# Patient Record
Sex: FEMALE | Race: BLACK OR AFRICAN AMERICAN | NOT HISPANIC OR LATINO | Employment: FULL TIME | URBAN - METROPOLITAN AREA
[De-identification: names, ages, dates, MRNs, and addresses within clinical notes are randomized per-mention and may not be internally consistent; named-entity substitution may affect disease eponyms.]

---

## 2018-06-15 ENCOUNTER — OFFICE VISIT (OUTPATIENT)
Dept: FAMILY MEDICINE | Facility: CLINIC | Age: 28
End: 2018-06-15
Payer: COMMERCIAL

## 2018-06-15 ENCOUNTER — LAB VISIT (OUTPATIENT)
Dept: LAB | Facility: HOSPITAL | Age: 28
End: 2018-06-15
Attending: NURSE PRACTITIONER
Payer: COMMERCIAL

## 2018-06-15 VITALS
RESPIRATION RATE: 18 BRPM | WEIGHT: 281.94 LBS | HEIGHT: 65 IN | TEMPERATURE: 98 F | OXYGEN SATURATION: 99 % | HEART RATE: 85 BPM | SYSTOLIC BLOOD PRESSURE: 138 MMHG | DIASTOLIC BLOOD PRESSURE: 82 MMHG | BODY MASS INDEX: 46.98 KG/M2

## 2018-06-15 VITALS
BODY MASS INDEX: 46.58 KG/M2 | HEART RATE: 86 BPM | SYSTOLIC BLOOD PRESSURE: 116 MMHG | HEIGHT: 65 IN | DIASTOLIC BLOOD PRESSURE: 72 MMHG | WEIGHT: 279.56 LBS

## 2018-06-15 DIAGNOSIS — Z01.419 WELL WOMAN EXAM: ICD-10-CM

## 2018-06-15 DIAGNOSIS — Z78.9 ATTEMPTING TO CONCEIVE: ICD-10-CM

## 2018-06-15 DIAGNOSIS — Z23 NEED FOR VACCINE FOR DT (DIPHTHERIA-TETANUS): ICD-10-CM

## 2018-06-15 DIAGNOSIS — F32.1 CURRENT MODERATE EPISODE OF MAJOR DEPRESSIVE DISORDER WITHOUT PRIOR EPISODE: ICD-10-CM

## 2018-06-15 DIAGNOSIS — Z00.00 ENCOUNTER FOR MEDICAL EXAMINATION TO ESTABLISH CARE: Primary | ICD-10-CM

## 2018-06-15 DIAGNOSIS — E66.01 MORBID OBESITY: ICD-10-CM

## 2018-06-15 DIAGNOSIS — L83 ACANTHOSIS NIGRICANS: ICD-10-CM

## 2018-06-15 DIAGNOSIS — N97.9 INFERTILITY, FEMALE: ICD-10-CM

## 2018-06-15 DIAGNOSIS — R53.83 FATIGUE, UNSPECIFIED TYPE: ICD-10-CM

## 2018-06-15 DIAGNOSIS — Z00.00 ANNUAL PHYSICAL EXAM: ICD-10-CM

## 2018-06-15 DIAGNOSIS — Z01.419 ENCOUNTER FOR WELL WOMAN EXAM: Primary | ICD-10-CM

## 2018-06-15 DIAGNOSIS — L30.8 OTHER ECZEMA: ICD-10-CM

## 2018-06-15 DIAGNOSIS — Z13.1 SCREENING FOR DIABETES MELLITUS: ICD-10-CM

## 2018-06-15 DIAGNOSIS — Z13.220 SCREENING FOR LIPID DISORDERS: ICD-10-CM

## 2018-06-15 DIAGNOSIS — N89.8 VAGINAL DISCHARGE: ICD-10-CM

## 2018-06-15 DIAGNOSIS — N89.8 VAGINAL ODOR: ICD-10-CM

## 2018-06-15 DIAGNOSIS — N92.6 IRREGULAR PERIODS/MENSTRUAL CYCLES: ICD-10-CM

## 2018-06-15 PROBLEM — L30.9 ECZEMA: Status: ACTIVE | Noted: 2018-06-15

## 2018-06-15 LAB
ALBUMIN SERPL BCP-MCNC: 3.5 G/DL
ALP SERPL-CCNC: 62 U/L
ALT SERPL W/O P-5'-P-CCNC: 13 U/L
ANION GAP SERPL CALC-SCNC: 7 MMOL/L
AST SERPL-CCNC: 15 U/L
BASOPHILS # BLD AUTO: 0.06 K/UL
BASOPHILS NFR BLD: 0.8 %
BILIRUB SERPL-MCNC: 0.9 MG/DL
BUN SERPL-MCNC: 14 MG/DL
CALCIUM SERPL-MCNC: 9.4 MG/DL
CHLORIDE SERPL-SCNC: 106 MMOL/L
CHOLEST SERPL-MCNC: 159 MG/DL
CHOLEST/HDLC SERPL: 4.2 {RATIO}
CO2 SERPL-SCNC: 24 MMOL/L
CREAT SERPL-MCNC: 0.8 MG/DL
DIFFERENTIAL METHOD: ABNORMAL
EOSINOPHIL # BLD AUTO: 0.1 K/UL
EOSINOPHIL NFR BLD: 1.1 %
ERYTHROCYTE [DISTWIDTH] IN BLOOD BY AUTOMATED COUNT: 12.6 %
EST. GFR  (AFRICAN AMERICAN): >60 ML/MIN/1.73 M^2
EST. GFR  (NON AFRICAN AMERICAN): >60 ML/MIN/1.73 M^2
ESTIMATED AVG GLUCOSE: 105 MG/DL
GLUCOSE SERPL-MCNC: 69 MG/DL
HBA1C MFR BLD HPLC: 5.3 %
HCT VFR BLD AUTO: 43.7 %
HDLC SERPL-MCNC: 38 MG/DL
HDLC SERPL: 23.9 %
HGB BLD-MCNC: 14 G/DL
IMM GRANULOCYTES # BLD AUTO: 0.02 K/UL
IMM GRANULOCYTES NFR BLD AUTO: 0.3 %
LDLC SERPL CALC-MCNC: 105.2 MG/DL
LYMPHOCYTES # BLD AUTO: 2.2 K/UL
LYMPHOCYTES NFR BLD: 29.7 %
MCH RBC QN AUTO: 28.3 PG
MCHC RBC AUTO-ENTMCNC: 32 G/DL
MCV RBC AUTO: 89 FL
MONOCYTES # BLD AUTO: 0.4 K/UL
MONOCYTES NFR BLD: 5.5 %
NEUTROPHILS # BLD AUTO: 4.7 K/UL
NEUTROPHILS NFR BLD: 62.6 %
NONHDLC SERPL-MCNC: 121 MG/DL
NRBC BLD-RTO: 0 /100 WBC
PLATELET # BLD AUTO: 367 K/UL
PMV BLD AUTO: 10.3 FL
POTASSIUM SERPL-SCNC: 4.2 MMOL/L
PROT SERPL-MCNC: 7.2 G/DL
RBC # BLD AUTO: 4.94 M/UL
SODIUM SERPL-SCNC: 137 MMOL/L
TRIGL SERPL-MCNC: 79 MG/DL
TSH SERPL DL<=0.005 MIU/L-ACNC: 1.43 UIU/ML
WBC # BLD AUTO: 7.48 K/UL

## 2018-06-15 PROCEDURE — 85025 COMPLETE CBC W/AUTO DIFF WBC: CPT

## 2018-06-15 PROCEDURE — 80061 LIPID PANEL: CPT

## 2018-06-15 PROCEDURE — 99999 PR PBB SHADOW E&M-EST. PATIENT-LVL III: CPT | Mod: PBBFAC,,, | Performed by: NURSE PRACTITIONER

## 2018-06-15 PROCEDURE — 90714 TD VACC NO PRESV 7 YRS+ IM: CPT | Mod: S$GLB,,, | Performed by: NURSE PRACTITIONER

## 2018-06-15 PROCEDURE — 90471 IMMUNIZATION ADMIN: CPT | Mod: S$GLB,,, | Performed by: NURSE PRACTITIONER

## 2018-06-15 PROCEDURE — 99999 PR PBB SHADOW E&M-NEW PATIENT-LVL V: CPT | Mod: PBBFAC,,, | Performed by: NURSE PRACTITIONER

## 2018-06-15 PROCEDURE — 99385 PREV VISIT NEW AGE 18-39: CPT | Mod: S$GLB,,, | Performed by: NURSE PRACTITIONER

## 2018-06-15 PROCEDURE — 84443 ASSAY THYROID STIM HORMONE: CPT

## 2018-06-15 PROCEDURE — 99385 PREV VISIT NEW AGE 18-39: CPT | Mod: 25,S$GLB,, | Performed by: NURSE PRACTITIONER

## 2018-06-15 PROCEDURE — 87510 GARDNER VAG DNA DIR PROBE: CPT

## 2018-06-15 PROCEDURE — 36415 COLL VENOUS BLD VENIPUNCTURE: CPT | Mod: PO

## 2018-06-15 PROCEDURE — 88175 CYTOPATH C/V AUTO FLUID REDO: CPT

## 2018-06-15 PROCEDURE — 80053 COMPREHEN METABOLIC PANEL: CPT

## 2018-06-15 PROCEDURE — 83036 HEMOGLOBIN GLYCOSYLATED A1C: CPT

## 2018-06-15 PROCEDURE — 87480 CANDIDA DNA DIR PROBE: CPT

## 2018-06-15 RX ORDER — ESCITALOPRAM OXALATE 20 MG/1
20 TABLET ORAL DAILY
Qty: 30 TABLET | Refills: 1 | Status: SHIPPED | OUTPATIENT
Start: 2018-06-15 | End: 2018-08-24 | Stop reason: SDUPTHER

## 2018-06-15 RX ORDER — TRIAMCINOLONE ACETONIDE 5 MG/G
CREAM TOPICAL 2 TIMES DAILY
Qty: 15 G | Refills: 2 | Status: SHIPPED | OUTPATIENT
Start: 2018-06-15 | End: 2018-09-13 | Stop reason: ALTCHOICE

## 2018-06-15 NOTE — PROGRESS NOTES
Chief Complaint   Patient presents with    Well Woman       History of Present Illness: Jamilah French is a 28 y.o. female that presents today 6/19/2018 for well gyn visit.  Pt presents today for well woman exam. She reports that this is her first well exam. She reports that her cycles are irregular and can occur monthly, but then she will skip 3 months at a time. The longest she has gone without a cycle in 6 months. Her LMP was 5/27/18. She does not wish to be on birth control at this time because her and her  are trying to conceive. She states that they have been trying to conceive for almost 2 years now. Pt reports that she uses an tanika to track her cycles and ovulation. She has never tried ovulation predictor kits. Pt does not desire STD testing. She does report noticing a vaginal odor off and on at times. She denies any vaginal discharge, itching or burning. Pt does report using feminine washes. No other complaints or concerns noted.         Past Medical History:   Diagnosis Date    Current moderate episode of major depressive disorder without prior episode 6/15/2018       History reviewed. No pertinent surgical history.    Family History   Problem Relation Age of Onset    Hypertension Mother     Mental illness Brother        Social History     Social History    Marital status:      Spouse name: Neil    Number of children: 0    Years of education: N/A     Social History Main Topics    Smoking status: Never Smoker    Smokeless tobacco: Never Used    Alcohol use Yes      Comment: social    Drug use: No    Sexual activity: Yes     Partners: Male     Birth control/ protection: None     Other Topics Concern    None     Social History Narrative    None       OB History   No data available       Current Outpatient Prescriptions   Medication Sig Dispense Refill    escitalopram oxalate (LEXAPRO) 20 MG tablet Take 1 tablet (20 mg total) by mouth once daily. Take 1/2 tab for 6 days then 1  "tab daily 30 tablet 1    triamcinolone acetonide 0.5% (KENALOG) 0.5 % Crea Apply topically 2 (two) times daily. 15 g 2    tinidazole (TINDAMAX) 500 MG tablet Take 4 tablets (2 g total) by mouth daily with breakfast. 8 tablet 0     No current facility-administered medications for this visit.        Review of patient's allergies indicates:   Allergen Reactions    Sulfa (sulfonamide antibiotics)        Review of Symptoms:  GENERAL: Denies weight gain or weight loss. Feeling well overall.   SKIN: Denies rash or lesions.   HEAD: Denies head injury or headache.   ABDOMEN: No abdominal pain, constipation, diarrhea, nausea, vomiting or rectal bleeding.   URINARY: No frequency, dysuria, hematuria, or burning on urination.    /72   Pulse 86   Ht 5' 5" (1.651 m)   Wt 126.8 kg (279 lb 8.7 oz)   LMP 05/27/2018     Physical Exam:  APPEARANCE: Well nourished, well developed, in no acute distress.  SKIN: Normal skin turgor, no lesions.  RESPIRATORY: Normal respiratory effort with no retractions or use of accessory muscles  ABDOMEN: Soft. No tenderness or masses. No hepatosplenomegaly. No hernias.  BREASTS: Symmetrical, no skin changes or visible lesions. No palpable masses, nipple discharge or adenopathy bilaterally.  PELVIC: Normal external female genitalia without lesions. Normal hair distribution. Adequate perineal body, normal urethral meatus. Urethra with no masses.  Bladder nontender. Vagina moist and well rugated without lesions, + thin yellow discharge; odor noted. Cervix pink and without lesions. No significant cystocele or rectocele. Bimanual exam showed uterus normal size, shape, position, mobile and nontender. Adnexa without masses or tenderness. Urethra and bladder normal. PAP DONE    ASSESSMENT/PLAN:  Encounter for well woman exam  -     Liquid-based pap smear, screening    Vaginal odor  -     Vaginosis Screen by DNA Probe    Vaginal discharge  -     Vaginosis Screen by DNA Probe    Attempting to " conceive      -Discussed avoiding any scented products in the vaginal area such as bubble baths, bath bombs, scented soaps/body washes, douches, feminine washes, etc... Also wear cotton underwear and change out of wet/sweaty clothing as soon as possible. Pt verbalized understanding.    -Discussed with pt that I suggest using ovulation predictor kits for a few months to see exactly when she is ovulating, so that her and her  can appropriately time intercourse. Instructed pt that if she does not have success with this the she needs to f/u with OBGYN for further testing and potentially a fertility clinic. Also discussed weight loss, which may help to resume monthly cycles. Pt verbalized understanding.       Patient was counseled today on Pap guidelines, recommendation for pelvic exams, mammograms starting annually at age 40, Colonoscopy after the age of 50, Dexa Bone Scan and calcium and vitamin D supplementation in menopause and to see her PCP for other health maintenance.       Follow-up:  RTC if symptoms worsen or do not improve  RTC in 1 year for well woman exam or as needed

## 2018-06-15 NOTE — PROGRESS NOTES
Subjective:       Patient ID: Jamilah French is a 28 y.o. female.    Chief Complaint: Annual Exam    HPI   Chief Complaint  Chief Complaint   Patient presents with    Annual Exam       HPI  Jmailah French is a 28 y.o. female with medical diagnoses as listed in the medical history and problem list that presents as a new patient to establish care and for an annual exam. Patient has no chronic medical problems and has never had any surgeries.  Patient does c/o irregular menstrual periods, skipping multiple months.  Has been trying to get pregnant for 2 years without success. Patient has never had a Pap Smear.  BMI today is 46.92 with a weight of 281 pounds.        PAST MEDICAL HISTORY:  Past Medical History:   Diagnosis Date    Current moderate episode of major depressive disorder without prior episode 6/15/2018       PAST SURGICAL HISTORY:  History reviewed. No pertinent surgical history.    SOCIAL HISTORY:  Social History     Social History    Marital status:      Spouse name: Neil    Number of children: 0    Years of education: N/A     Occupational History    Not on file.     Social History Main Topics    Smoking status: Never Smoker    Smokeless tobacco: Never Used    Alcohol use Yes      Comment: social    Drug use: No    Sexual activity: Yes     Partners: Male     Birth control/ protection: None     Other Topics Concern    Not on file     Social History Narrative    No narrative on file       FAMILY HISTORY:  Family History   Problem Relation Age of Onset    Hypertension Mother     Mental illness Brother        ALLERGIES AND MEDICATIONS: updated and reviewed.  Review of patient's allergies indicates:   Allergen Reactions    Sulfa (sulfonamide antibiotics)      Current Outpatient Prescriptions   Medication Sig Dispense Refill    escitalopram oxalate (LEXAPRO) 20 MG tablet Take 1 tablet (20 mg total) by mouth once daily. Take 1/2 tab for 6 days then 1 tab daily 30 tablet 1     triamcinolone acetonide 0.5% (KENALOG) 0.5 % Crea Apply topically 2 (two) times daily. 15 g 2     No current facility-administered medications for this visit.    I have reviewed the patient's medical history in detail and updated the computerized patient record.  Review of Systems   Constitutional: Positive for fatigue. Negative for activity change, appetite change, chills and fever.        Exercises cardio walking or goes to gym treadmill or bicycle 2 days per week. Occasional fatigue.    HENT: Negative for congestion, ear pain, nosebleeds, postnasal drip, rhinorrhea, sinus pain, sinus pressure and sore throat.    Eyes: Negative for visual disturbance.        Wears glasses with good correction   Respiratory: Negative for cough and shortness of breath.    Cardiovascular: Negative for chest pain, palpitations and leg swelling.   Gastrointestinal: Positive for nausea. Negative for abdominal pain, blood in stool, constipation, diarrhea and vomiting.        Occasional nausea   Genitourinary: Positive for menstrual problem. Negative for decreased urine volume, difficulty urinating, dysuria and urgency.        Periods are irregular, skips months, periods are usually 5 days long, some are heavy   Musculoskeletal: Negative for arthralgias and myalgias.   Skin: Positive for rash. Negative for wound.        Dry flaking skin that is pruritic to left lateral ankle   Neurological: Positive for numbness. Negative for dizziness and headaches.        Lack of sensation to right forearm that is continuous x 4 years, preceded by a sharp pain in back    Hematological: Does not bruise/bleed easily.   Psychiatric/Behavioral: Positive for dysphoric mood and sleep disturbance. The patient is nervous/anxious.         States probably has a problem with some depression and anxiety, difficulty falling and staying asleep most nights.  Has never been on medication to treat depression or anxiety. Suicide crosses her mind but it is not something  "she would do.      Patient Health Questionnaire Depression Scale (PHQ-9):    Over the last 2 weeks, how often have you been bothered by any of the following problems?  (Not at all: 0; several days: 1, more than half the days: 2, nearly every day: 3)    1. Little interest or pleasure in doing things: 2  2. Feeling down, depressed, or hopeless: 2  3. Trouble falling or staying asleep, or sleeping too much: 3  4. Feeling tired or having little energy: 2  5. Poor appetite or overeatin  6. Feeling bad about yourself, or that you are a failure, or have let yourself or your family down: 3  7. Trouble concentrating on things, such as reading the newspaper or watching television: 0  8. Moving or speaking so slowly that other people could have noticed. Or the opposite- being so fidgety or restless that you have been moving       around a lot more than usual: 1  9. Thoughts that you would be better off dead, or of hurting yourself in some way: 1    Total Score: 16    (Score >15 - major depression; Score >20 - severe major depression)  Objective:       Vitals:    06/15/18 0719   BP: 138/82   BP Location: Right arm   Patient Position: Sitting   BP Method: Large (Manual)   Pulse: 85   Resp: 18   Temp: 97.7 °F (36.5 °C)   TempSrc: Oral   SpO2: 99%   Weight: 127.9 kg (281 lb 15.5 oz)   Height: 5' 5" (1.651 m)     Physical Exam   Constitutional: She is oriented to person, place, and time. Vital signs are normal. She appears well-developed. No distress.   HENT:   Head: Normocephalic and atraumatic.   Right Ear: Tympanic membrane, external ear and ear canal normal.   Left Ear: Tympanic membrane, external ear and ear canal normal.   Nose: Nose normal.   Mouth/Throat: Oropharynx is clear and moist and mucous membranes are normal. No oropharyngeal exudate.   Moderate amount of dark brown cerumen to bilateral external ear canals   Eyes: Conjunctivae and lids are normal. Pupils are equal, round, and reactive to light. Right eye " exhibits no discharge. Left eye exhibits no discharge. Right conjunctiva is not injected. Left conjunctiva is not injected.   Neck: Normal range of motion. Neck supple. Normal carotid pulses present. Carotid bruit is not present. No thyromegaly present.   Cardiovascular: Normal rate, regular rhythm, S1 normal, S2 normal, normal heart sounds, intact distal pulses and normal pulses.  Exam reveals no gallop and no friction rub.    No murmur heard.  Pulses:       Carotid pulses are 2+ on the right side, and 2+ on the left side.       Radial pulses are 2+ on the right side, and 2+ on the left side.        Posterior tibial pulses are 2+ on the right side, and 2+ on the left side.   No edema   Pulmonary/Chest: Effort normal and breath sounds normal. No respiratory distress. She has no decreased breath sounds. She has no wheezes. She has no rhonchi. She has no rales.   Abdominal: Soft. Normal appearance, normal aorta and bowel sounds are normal. She exhibits no distension, no abdominal bruit, no pulsatile midline mass and no mass. There is no hepatosplenomegaly. There is no tenderness. No hernia.   obese   Musculoskeletal: Normal range of motion. She exhibits no edema, tenderness or deformity.   Negative SLR bilateral   Lymphadenopathy:        Head (right side): No submental, no submandibular, no tonsillar, no preauricular, no posterior auricular and no occipital adenopathy present.        Head (left side): No submental, no submandibular, no tonsillar, no preauricular, no posterior auricular and no occipital adenopathy present.     She has no cervical adenopathy.   Neurological: She is alert and oriented to person, place, and time. She has normal strength.   Skin: Skin is warm, dry and intact. Capillary refill takes less than 2 seconds. Rash noted. She is not diaphoretic.        Psychiatric: She has a normal mood and affect. Her speech is normal and behavior is normal. Judgment and thought content normal. Her mood appears  not anxious. Cognition and memory are normal. She does not exhibit a depressed mood.   Nursing note and vitals reviewed.      Assessment:       1. Encounter for medical examination to establish care    2. Annual physical exam    3. Irregular periods/menstrual cycles    4. Infertility, female    5. Current moderate episode of major depressive disorder without prior episode    6. Screening for lipid disorders    7. Fatigue, unspecified type    8. Acanthosis nigricans    9. Other eczema    10. Screening for diabetes mellitus    11. Need for vaccine for DT (diphtheria-tetanus)    12. Well woman exam    13. Morbid obesity    14. BMI 45.0-49.9, adult        Plan:   Jamilah was seen today for annual exam.    Diagnoses and all orders for this visit:    Encounter for medical examination to establish care   Patient has not had a PCP for several years due to no insurance  Annual physical exam  -     CBC auto differential; Future  -     Comprehensive metabolic panel; Future  -     Lipid panel; Future  -     Hemoglobin A1c; Future  -     TSH; Future  - Discussed healthy diet, regular exercise, necessary labs, age appropriate cancer screening, and routine vaccinations.  LINKs checked and patient has had her 3 doses of HPV vaccinations.   - Educational handouts provided. Prevention guidelines women age 18-39    Irregular periods/menstrual cycles  -     Ambulatory referral to Gynecology    Infertility, female  -     Ambulatory referral to Gynecology  - Patient has been actively trying to get pregnant for 2 years without success    Current moderate episode of major depressive disorder without prior episode  -     escitalopram oxalate (LEXAPRO) 20 MG tablet; Take 1 tablet (20 mg total) by mouth once daily. Take 1/2 tab for 6 days then 1 tab daily  - PHQ-9 score is 16 today, moderate depression  -     TSH; Future  - Return in 4 weeks for recheck    Screening for lipid disorders  -     Lipid panel; Future    Fatigue, unspecified type  -      CBC auto differential; Future  -     TSH; Future    Acanthosis nigricans  -     Hemoglobin A1c; Future  - Possible PCOS    Other eczema  -     triamcinolone acetonide 0.5% (KENALOG) 0.5 % Crea; Apply topically 2 (two) times daily.    Screening for diabetes mellitus  -     Comprehensive metabolic panel; Future  -     Hemoglobin A1c; Future    Need for vaccine for DT (diphtheria-tetanus)  -     (In Office Administered) Td Vaccine - Preservative Free    Well woman exam  -     Ambulatory referral to Gynecology    Morbid obesity  -     Comprehensive metabolic panel; Future  -     Lipid panel; Future  -     Hemoglobin A1c; Future    BMI 45.0-49.9, adult   BMI today is 46.92 today with a weight of 281 pounds   Weight loss recommended with well balanced diet and portion controlled meals and increased activity.     Follow-up in about 4 weeks (around 7/13/2018) for follow up depression.     Patient readiness: acceptance and barriers:readiness    During the course of the visit the patient was educated and counseled about the following:     Obesity:   General weight loss/lifestyle modification strategies discussed (elicit support from others; identify saboteurs; non-food rewards, etc).  Informal exercise measures discussed, e.g. taking stairs instead of elevator.  Regular aerobic exercise program discussed.    Goals: Obesity: Reduce calorie intake and BMI    Did patient meet goals/outcomes: No    The following self management tools provided: declined    Patient Instructions (the written plan) was given to the patient/family.     Time spent with patient: 45 minutes    Barriers to medications present (no )    Adverse reactions to current medications (no)    Over the counter medications reviewed (Yes)

## 2018-06-15 NOTE — PATIENT INSTRUCTIONS
Prevention Guidelines, Women Ages 18 to 39  Screening tests and vaccines are an important part of managing your health. Health counseling is essential, too. Below are guidelines for these, for women ages 18 to 39. Talk with your healthcare provider to make sure youre up-to-date on what you need.  Screening Who needs it How often   Alcohol misuse All women in this age group At routine exams   Blood pressure All women in this age group Every 2 years if your blood pressure is less than 120/80 mm Hg; yearly if your systolic blood pressure is 120 to 139 mm Hg, or your diastolic blood pressure reading is 80 to 89 mm Hg   Breast cancer All women in this age group should talk with their healthcare providers about the need for clinical breast exams (CBE)1 Clinical breast exam every 3 years1   Cervical cancer Women ages 21 and older Women between ages 21 and 29 should have a Pap test every 3 years; women between ages 30 and 65 are advised to have a Pap test plus an HPV test every 5 years   Chlamydia Sexually active women ages 24 and younger, and women at increased risk for infection Every 3 years if you're at risk or have symptoms   Depression All women in this age group At routine exams   Diabetes mellitus, type 2 Adults with no symptoms who are overweight or obese and have 1 or more other risk factors for diabetes At least every 3 years   Gonorrhea Sexually active women at increased risk for infection At routine exams   Hepatitis C Anyone at increased risk At routine exams   HIV All women At routine exams   Obesity All women in this age group At routine exams   Syphilis Women at increased risk for infection - talk with your healthcare provider At routine exams   Tuberculosis Women at increased risk for infection - talk with your healthcare provider Ask your healthcare provider   Vision All women in this age group At least 1 complete exam in your 20s, and 2 in your 30s   Vaccine2 Who needs it How often   Chickenpox  (varicella) All women in this age group who have no record of this infection or vaccine 2 doses; the second dose should be given 4 to 8 weeks after the first dose   Hepatitis A Women at increased risk for infection - talk with your healthcare provider 2 doses given at least 6 months apart   Hepatitis B Women at increased risk for infection - talk with your healthcare provider 3 doses over 6 months; second dose should be given 1 month after the first dose; the third dose should be given at least 2 months after the second dose and at least 4 months after the first dose   Haemophilus influenzae Type B (HIB) Women at increased risk for infection - talk with your healthcare provider 1 to 3 doses   Human papillomavirus (HPV) All women in this age group up to age 26 3 doses; the second dose should be given 1 to 2 months after the first dose and the third dose given 6 months after the first dose   Influenza (flu) All women in this age group Once a year   Measles, mumps, rubella (MMR) All women in this age group who have no record of these infections or vaccines 1 or 2 doses   Meningococcal Women at increased risk for infection - talk with your healthcare provider 1 or more doses   Pneumococcal conjugate vaccine (PCV13) and pneumococcal polysaccharide vaccine (PPSV23) Women at increased risk for infection - talk with your healthcare provider PCV13: 1 dose ages 19 to 65 (protects against 13 types of pneumococcal bacteria)  PPSV23: 1 to 2 doses through age 64, or 1 dose at 65 or older (protects against 23 types of pneumococcal bacteria)      Tetanus/diphtheria/pertussis (Td/Tdap) booster All women in this age group Td every 10 years, or a one-time dose of Tdap instead of a Td booster after age 18, then Td every 10 years   Counseling Who needs it How often   BRCA gene mutation testing for breast and ovarian cancer susceptibility Women with increased risk for having gene mutation When your risk is known   Breast cancer and  chemoprevention Women at high risk for breast cancer When your risk is known   Diet and exercise Women who are overweight or obese When diagnosed, and then at routine exams   Domestic violence Women at the age in which they are able to have children At routine exams   Sexually transmitted infection prevention Women who are sexually active At routine exams   Skin cancer Prevention of skin cancer in fair-skinned adults through age 24 At routine exams   Use of tobacco and the health effects it can cause All women in this age group Every visit   1According to the ACS, women ages 20 to 39 years should have a clinical breast exam (CBE) as part of their routine health exam every 3 years. Breast self-exams are an option for women starting in their 20s. But the  USPSTF does not recommend CBE.  2Those who are 18 years old and not up-to-date on their childhood vaccines should get all appropriate catch-up vaccines recommended by the CDC.  Date Last Reviewed: 8/27/2015  © 6054-9242 [employer]. [address] [place] [address], [place] [address]. All rights reserved. This information is not intended as a substitute for professional medical care. Always follow your healthcare professional's instructions.

## 2018-06-16 LAB
CANDIDA RRNA VAG QL PROBE: NEGATIVE
G VAGINALIS RRNA GENITAL QL PROBE: POSITIVE
T VAGINALIS RRNA GENITAL QL PROBE: NEGATIVE

## 2018-06-18 ENCOUNTER — PATIENT MESSAGE (OUTPATIENT)
Dept: FAMILY MEDICINE | Facility: CLINIC | Age: 28
End: 2018-06-18

## 2018-06-18 RX ORDER — TINIDAZOLE 500 MG/1
2 TABLET ORAL
Qty: 8 TABLET | Refills: 0 | Status: SHIPPED | OUTPATIENT
Start: 2018-06-18 | End: 2018-06-20

## 2018-07-13 ENCOUNTER — OFFICE VISIT (OUTPATIENT)
Dept: FAMILY MEDICINE | Facility: CLINIC | Age: 28
End: 2018-07-13
Payer: COMMERCIAL

## 2018-07-13 VITALS
BODY MASS INDEX: 47.02 KG/M2 | TEMPERATURE: 98 F | WEIGHT: 282.19 LBS | SYSTOLIC BLOOD PRESSURE: 118 MMHG | HEIGHT: 65 IN | HEART RATE: 68 BPM | DIASTOLIC BLOOD PRESSURE: 74 MMHG

## 2018-07-13 DIAGNOSIS — N97.9 INFERTILITY, FEMALE: ICD-10-CM

## 2018-07-13 DIAGNOSIS — R53.83 FATIGUE, UNSPECIFIED TYPE: ICD-10-CM

## 2018-07-13 DIAGNOSIS — F40.10 SOCIAL ANXIETY DISORDER: ICD-10-CM

## 2018-07-13 DIAGNOSIS — N92.6 IRREGULAR PERIODS/MENSTRUAL CYCLES: ICD-10-CM

## 2018-07-13 DIAGNOSIS — F32.0 CURRENT MILD EPISODE OF MAJOR DEPRESSIVE DISORDER WITHOUT PRIOR EPISODE: Primary | ICD-10-CM

## 2018-07-13 PROCEDURE — 99213 OFFICE O/P EST LOW 20 MIN: CPT | Mod: S$GLB,,, | Performed by: NURSE PRACTITIONER

## 2018-07-13 PROCEDURE — 99999 PR PBB SHADOW E&M-EST. PATIENT-LVL III: CPT | Mod: PBBFAC,,, | Performed by: NURSE PRACTITIONER

## 2018-07-13 PROCEDURE — 3008F BODY MASS INDEX DOCD: CPT | Mod: CPTII,S$GLB,, | Performed by: NURSE PRACTITIONER

## 2018-07-13 RX ORDER — BUSPIRONE HYDROCHLORIDE 5 MG/1
5 TABLET ORAL 2 TIMES DAILY
Qty: 60 TABLET | Refills: 2 | Status: SHIPPED | OUTPATIENT
Start: 2018-07-13 | End: 2018-09-13 | Stop reason: SDUPTHER

## 2018-07-13 NOTE — PROGRESS NOTES
Subjective:       Patient ID: Jamilah French is a 28 y.o. female.    Chief Complaint: Follow-up    HPI   Chief Complaint  Chief Complaint   Patient presents with    Follow-up       HPI  Jamilah French is a 28 y.o. female with medical diagnoses as listed in the medical history and problem list that presents for a 4 week follow up of depression since starting lexapro. Patient states that she is sleeping better, falling and staying asleep, still feeling tired during the day. BMI today is 46.96 and patient has gained 3 pounds since last visit.  Established patient with last clinic appointment 6/15/2018.        PAST MEDICAL HISTORY:  Past Medical History:   Diagnosis Date    Current moderate episode of major depressive disorder without prior episode 6/15/2018       PAST SURGICAL HISTORY:  History reviewed. No pertinent surgical history.    SOCIAL HISTORY:  Social History     Social History    Marital status:      Spouse name: Neil    Number of children: 0    Years of education: N/A     Occupational History    Not on file.     Social History Main Topics    Smoking status: Never Smoker    Smokeless tobacco: Never Used    Alcohol use Yes      Comment: social    Drug use: No    Sexual activity: Yes     Partners: Male     Birth control/ protection: None     Other Topics Concern    Not on file     Social History Narrative    No narrative on file       FAMILY HISTORY:  Family History   Problem Relation Age of Onset    Hypertension Mother     Mental illness Brother        ALLERGIES AND MEDICATIONS: updated and reviewed.  Review of patient's allergies indicates:   Allergen Reactions    Sulfa (sulfonamide antibiotics)      Current Outpatient Prescriptions   Medication Sig Dispense Refill    escitalopram oxalate (LEXAPRO) 20 MG tablet Take 1 tablet (20 mg total) by mouth once daily. Take 1/2 tab for 6 days then 1 tab daily 30 tablet 1    busPIRone (BUSPAR) 5 MG Tab Take 1 tablet (5 mg total) by mouth 2  (two) times daily. 60 tablet 2    triamcinolone acetonide 0.5% (KENALOG) 0.5 % Crea Apply topically 2 (two) times daily. 15 g 2     No current facility-administered medications for this visit.        Review of Systems   Constitutional: Positive for fatigue. Negative for activity change, appetite change, chills and fever.        Chronically fatigued.   Eyes: Negative for visual disturbance.        Vision is corrected with glasses   Respiratory: Negative for cough and shortness of breath.    Cardiovascular: Negative for chest pain and palpitations.   Gastrointestinal: Negative for abdominal pain, constipation, diarrhea, nausea and vomiting.   Genitourinary: Negative for difficulty urinating and dysuria.   Skin: Negative for rash and wound.   Psychiatric/Behavioral: Negative for dysphoric mood, sleep disturbance and suicidal ideas. The patient is nervous/anxious.         States feels more anxious than sad.      Patient Health Questionnaire Depression Scale (PHQ-9):    Over the last 2 weeks, how often have you been bothered by any of the following problems?  (Not at all: 0; several days: 1, more than half the days: 2, nearly every day: 3)    1. Little interest or pleasure in doing things: 2  2. Feeling down, depressed, or hopeless: 1  3. Trouble falling or staying asleep, or sleeping too much: 0  4. Feeling tired or having little energy: 1  5. Poor appetite or overeatin  6. Feeling bad about yourself, or that you are a failure, or have let yourself or your family down: 1  7. Trouble concentrating on things, such as reading the newspaper or watching television: 0  8. Moving or speaking so slowly that other people could have noticed. Or the opposite- being so fidgety or restless that you have been moving       around a lot more than usual: 2  9. Thoughts that you would be better off dead, or of hurting yourself in some way: 1    Total Score: 9    (Score >15 - major depression; Score >20 - severe major  "depression)    Patient states has more social anxiety, has trouble speaking up to people in authority or to people shr does not know.   Objective:       Vitals:    07/13/18 0715   BP: 118/74   BP Location: Right arm   Patient Position: Sitting   BP Method: Large (Automatic)   Pulse: 68   Temp: 97.9 °F (36.6 °C)   TempSrc: Oral   Weight: 128 kg (282 lb 3 oz)   Height: 5' 5" (1.651 m)     Physical Exam   Constitutional: She is oriented to person, place, and time. Vital signs are normal. She appears well-developed. No distress.   Blood pressure normal 118/74   HENT:   Head: Normocephalic and atraumatic.   Eyes: Conjunctivae and lids are normal. Right eye exhibits no discharge. Left eye exhibits no discharge. Right conjunctiva is not injected. Left conjunctiva is not injected.   Neck: Normal range of motion. Neck supple. Normal carotid pulses present. Carotid bruit is not present.   Neck circumference 14.5"   Cardiovascular: Normal rate, regular rhythm, S1 normal, S2 normal, normal heart sounds, intact distal pulses and normal pulses.    No murmur heard.  Pulses:       Carotid pulses are 2+ on the right side, and 2+ on the left side.       Radial pulses are 2+ on the right side, and 2+ on the left side.   Pulmonary/Chest: Effort normal and breath sounds normal. No respiratory distress. She has no decreased breath sounds. She has no wheezes. She has no rhonchi. She has no rales.   Musculoskeletal: Normal range of motion.   Neurological: She is alert and oriented to person, place, and time. She has normal strength.   Skin: Skin is warm, dry and intact. She is not diaphoretic. No pallor.   Psychiatric: She has a normal mood and affect. Her speech is normal and behavior is normal. Judgment and thought content normal. Cognition and memory are normal.   Nursing note and vitals reviewed.      Assessment:       1. Current mild episode of major depressive disorder without prior episode    2. Social anxiety disorder    3. " Fatigue, unspecified type    4. Irregular periods/menstrual cycles    5. Infertility, female    6. BMI 45.0-49.9, adult        Plan:   Jamilah was seen today for follow-up.    Diagnoses and all orders for this visit:    Current mild episode of major depressive disorder without prior episode   Continue lexapro 20 mg daily, depression improved with PHQ-9 score at 9 today down from 16   Follow up in 2 months  Social anxiety disorder  -     busPIRone (BUSPAR) 5 MG Tab; Take 1 tablet (5 mg total) by mouth 2 (two) times daily.  - Follow up in 2 months    Fatigue, unspecified type   Discussed possible sleep apnea, sleep study, patient does not wish to proceed with study at this time  Irregular periods/menstrual cycles   Has been to gynecology, normal Pap, treated for BV with flagyl  Infertility, female   Monitoring ovulation for 2-3 months and depending on results may have to be referred to OB  BMI 45.0-49.9, adult   Morbidly obese with BMI 46.96 and 3 pound weight gain since last visit   Weight loss recommended with well balanced diet and portion controlled meals and increased activity.     Follow-up in about 2 months (around 9/13/2018) for depression/anxiety follow up.     Patient readiness: nonacceptance and barriers:readiness    During the course of the visit the patient was educated and counseled about the following:     Obesity:   General weight loss/lifestyle modification strategies discussed (elicit support from others; identify saboteurs; non-food rewards, etc).  Informal exercise measures discussed, e.g. taking stairs instead of elevator.  Regular aerobic exercise program discussed.    Goals: Obesity: Reduce calorie intake and BMI    Did patient meet goals/outcomes: No    The following self management tools provided: declined    Patient Instructions (the written plan) was given to the patient/family.     Time spent with patient: 30 minutes    Barriers to medications present (no )    Adverse reactions to current  medications (no)    Over the counter medications reviewed (Yes)

## 2018-07-13 NOTE — PATIENT INSTRUCTIONS
Obstructive Sleep Apnea  Obstructive sleep apnea is a condition that causes your air passages to become narrowed or blocked during sleep. As a result, breathing stops for short periods. Your body wakes up enough for breathing to begin again, though you don't remember it. The cycle of stopped breathing and brief awakenings can repeat dozens of times a night. This prevents the body from getting to the deeper stages of sleep that are needed for good rest and may cause your body's oxygen level to fall.  Signs of sleep apnea include loud snoring, noisy breathing, and gasping sounds during sleep. Daytime symptoms include waking up tired after a full night's sleep, waking up with headaches, feeling very sleepy or falling asleep during the day, and having problems with memory or concentration.  Risk factors for sleep apnea include:  · Being overweight  · Being a man, or a woman in menopause  · Smoking  · Using alcohol or sedating medicines  · Having enlarged structures in the nose or throat  Home care  Lifestyle changes that can help treat snoring and sleep apnea include the following:  · If you are overweight, lose weight. Talk to your healthcare provider about a weight-loss plan for you.  · Avoid alcohol for 3 to 4 hours before bedtime. Avoid sedating medications. Ask your healthcare provider about the medicines you take.  · If you smoke, talk to your healthcare provider about ways to quit.  · Sleep on your side. This can help prevent gravity from pulling relaxed throat tissues into your breathing passages.  · If you have allergies or sinus problems that block your nose, ask your healthcare provider for help.  Follow-up care  Follow up with your healthcare provider, or as advised. A diagnosis of sleep apnea is made with a sleep study. Your healthcare provider can tell you more about this test.  When to seek medical advice  Sleep apnea can make you more likely to have certain health problems. These include high blood  pressure, heart attack, stroke, and sexual dysfunction. If you have sleep apnea, talk to your healthcare provider about the best treatments for you.  Date Last Reviewed: 4/1/2017  © 1078-0538 [employer]. [address] [place] [address], [place] [address]. All rights reserved. This information is not intended as a substitute for professional medical care. Always follow your healthcare professional's instructions.

## 2018-08-24 DIAGNOSIS — F32.1 CURRENT MODERATE EPISODE OF MAJOR DEPRESSIVE DISORDER WITHOUT PRIOR EPISODE: ICD-10-CM

## 2018-08-24 RX ORDER — ESCITALOPRAM OXALATE 20 MG/1
TABLET ORAL
Qty: 30 TABLET | Refills: 0 | Status: SHIPPED | OUTPATIENT
Start: 2018-08-24 | End: 2018-09-13 | Stop reason: SDUPTHER

## 2018-09-10 ENCOUNTER — TELEPHONE (OUTPATIENT)
Dept: FAMILY MEDICINE | Facility: CLINIC | Age: 28
End: 2018-09-10

## 2018-09-10 NOTE — TELEPHONE ENCOUNTER
Left voicemail for patient to return call to clinic to reschedule appointment. NP is out of office.

## 2018-09-13 ENCOUNTER — OFFICE VISIT (OUTPATIENT)
Dept: FAMILY MEDICINE | Facility: CLINIC | Age: 28
End: 2018-09-13
Payer: COMMERCIAL

## 2018-09-13 VITALS
BODY MASS INDEX: 47.65 KG/M2 | DIASTOLIC BLOOD PRESSURE: 86 MMHG | SYSTOLIC BLOOD PRESSURE: 138 MMHG | TEMPERATURE: 98 F | HEIGHT: 65 IN | HEART RATE: 74 BPM | WEIGHT: 286 LBS

## 2018-09-13 DIAGNOSIS — R06.81 WITNESSED EPISODE OF APNEA: ICD-10-CM

## 2018-09-13 DIAGNOSIS — F40.10 SOCIAL ANXIETY DISORDER: ICD-10-CM

## 2018-09-13 DIAGNOSIS — E66.01 MORBID OBESITY: ICD-10-CM

## 2018-09-13 DIAGNOSIS — Z23 NEED FOR IMMUNIZATION AGAINST INFLUENZA: ICD-10-CM

## 2018-09-13 DIAGNOSIS — R53.82 CHRONIC FATIGUE: ICD-10-CM

## 2018-09-13 DIAGNOSIS — N92.6 IRREGULAR PERIODS/MENSTRUAL CYCLES: ICD-10-CM

## 2018-09-13 DIAGNOSIS — R06.83 SNORING: ICD-10-CM

## 2018-09-13 DIAGNOSIS — N97.9 INFERTILITY, FEMALE: ICD-10-CM

## 2018-09-13 DIAGNOSIS — F32.1 CURRENT MODERATE EPISODE OF MAJOR DEPRESSIVE DISORDER WITHOUT PRIOR EPISODE: Primary | ICD-10-CM

## 2018-09-13 PROCEDURE — 99999 PR PBB SHADOW E&M-EST. PATIENT-LVL III: CPT | Mod: PBBFAC,,, | Performed by: NURSE PRACTITIONER

## 2018-09-13 PROCEDURE — 90686 IIV4 VACC NO PRSV 0.5 ML IM: CPT | Mod: S$GLB,,, | Performed by: NURSE PRACTITIONER

## 2018-09-13 PROCEDURE — 99214 OFFICE O/P EST MOD 30 MIN: CPT | Mod: 25,S$GLB,, | Performed by: NURSE PRACTITIONER

## 2018-09-13 PROCEDURE — 90471 IMMUNIZATION ADMIN: CPT | Mod: S$GLB,,, | Performed by: NURSE PRACTITIONER

## 2018-09-13 PROCEDURE — 3008F BODY MASS INDEX DOCD: CPT | Mod: CPTII,S$GLB,, | Performed by: NURSE PRACTITIONER

## 2018-09-13 RX ORDER — ESCITALOPRAM OXALATE 20 MG/1
20 TABLET ORAL DAILY
Qty: 30 TABLET | Refills: 3 | Status: SHIPPED | OUTPATIENT
Start: 2018-09-13 | End: 2020-09-01

## 2018-09-13 RX ORDER — BUSPIRONE HYDROCHLORIDE 10 MG/1
10 TABLET ORAL 3 TIMES DAILY
Qty: 90 TABLET | Refills: 3 | Status: SHIPPED | OUTPATIENT
Start: 2018-09-13 | End: 2020-09-01

## 2018-09-13 NOTE — PROGRESS NOTES
Subjective:       Patient ID: Jamilah French is a 28 y.o. female.    Chief Complaint: Depression and Anxiety    Chief Complaint  Chief Complaint   Patient presents with    Depression    Anxiety       HPI  Jamilah French is a 28 y.o. female with medical diagnoses as listed in the medical history and problem list that presents for follow up of anxiety and depression since starting buspar with her lexapro. Patient states that she has noted that she is 'braver' and is doing more things than she used to.  No side effects with buspar. BMI today is 47.59 and patient has gained 4 pounds since her last visit.   Established patient with last clinic appointment 7/13/2018.        PAST MEDICAL HISTORY:  Past Medical History:   Diagnosis Date    Current moderate episode of major depressive disorder without prior episode 6/15/2018       PAST SURGICAL HISTORY:  History reviewed. No pertinent surgical history.    SOCIAL HISTORY:  Social History     Socioeconomic History    Marital status:      Spouse name: Neil    Number of children: 0    Years of education: Not on file    Highest education level: Not on file   Social Needs    Financial resource strain: Not on file    Food insecurity - worry: Not on file    Food insecurity - inability: Not on file    Transportation needs - medical: Not on file    Transportation needs - non-medical: Not on file   Occupational History    Not on file   Tobacco Use    Smoking status: Never Smoker    Smokeless tobacco: Never Used   Substance and Sexual Activity    Alcohol use: Yes     Comment: social    Drug use: No    Sexual activity: Yes     Partners: Male     Birth control/protection: None   Other Topics Concern    Not on file   Social History Narrative    Not on file       FAMILY HISTORY:  Family History   Problem Relation Age of Onset    Hypertension Mother     Mental illness Brother        ALLERGIES AND MEDICATIONS: updated and reviewed.  Review of patient's  allergies indicates:   Allergen Reactions    Sulfa (sulfonamide antibiotics)      Current Outpatient Medications   Medication Sig Dispense Refill    busPIRone (BUSPAR) 10 MG tablet Take 1 tablet (10 mg total) by mouth 3 (three) times daily. 90 tablet 3    escitalopram oxalate (LEXAPRO) 20 MG tablet Take 1 tablet (20 mg total) by mouth once daily. 30 tablet 3     No current facility-administered medications for this visit.        I have reviewed the patient's medical history in detail and updated the computerized patient record.    Review of Systems   Constitutional: Positive for fatigue. Negative for activity change and unexpected weight change.        Some daytime fatigue.  and can fall asleep after driving for 20 minutes. May doze off at a red light.    HENT: Negative for hearing loss, rhinorrhea and trouble swallowing.    Eyes: Negative for discharge and visual disturbance.   Respiratory: Negative for cough, chest tightness, shortness of breath and wheezing.         Patient snores loudly at night and  states witnessed episodes of apnea.    Cardiovascular: Negative for chest pain and palpitations.   Gastrointestinal: Negative for blood in stool, constipation, diarrhea and vomiting.   Endocrine: Positive for polydipsia. Negative for polyuria.        Increased thirst but thinks it is just because she needs to drink more water   Genitourinary: Positive for menstrual problem. Negative for difficulty urinating, dysuria and hematuria.        Patient has irregular menstrual periods, trying to get pregnant for 2-3 years without success.  Has been to gynecology and is using ovulation kit for 2 months and will go back to see gynecology.  LMP was in August.    Musculoskeletal: Negative for arthralgias, joint swelling and neck pain.   Skin: Negative for wound.   Neurological: Negative for dizziness, weakness and headaches.   Hematological: Does not bruise/bleed easily.   Psychiatric/Behavioral:  Positive for dysphoric mood. Negative for confusion and sleep disturbance. The patient is not nervous/anxious.         Still feeling a little depressed, but improved. Sleeps good for the most part.  Wakes not feeling rested some days.    EPWORTH SLEEPINESS SCALE  0-WOULD NEVER FALL ASLEEP  , 1-SLIGHT CHANCE OF FALLING ASLEEP, 2-MODERATE CHANCE, 3-HIGH CHANCE    Sitting and reading 1  Watching TV 2  Sitting inactive in a public place 2  Being a passenger in a motor vehicle for an hour or more 3  Lying down in the afternoon 2  Sitting and talking to someone 0  Sitting quietly after lunch (no alcohol)* 2  Stopped for a few minutes in traffic while driving 1    [place] score 13 (10 or greater considered excess sleepiness)    Patient Health Questionnaire Depression Scale (PHQ-9):    Over the last 2 weeks, how often have you been bothered by any of the following problems?  (Not at all: 0; several days: 1, more than half the days: 2, nearly every day: 3)    1. Little interest or pleasure in doing things: 2  2. Feeling down, depressed, or hopeless: 1  3. Trouble falling or staying asleep, or sleeping too much: 3, either difficulty or sleeping all day on days off  4. Feeling tired or having little energy: 2  5. Poor appetite or overeatin  6. Feeling bad about yourself, or that you are a failure, or have let yourself or your family down: 3  7. Trouble concentrating on things, such as reading the newspaper or watching television: 0  8. Moving or speaking so slowly that other people could have noticed. Or the opposite- being so fidgety or restless that you have been moving       around a lot more than usual: 0  9. Thoughts that you would be better off dead, or of hurting yourself in some way: 1    Total Score: 13    (Score >15 - major depression; Score >20 - severe major depression)      Objective:      Vitals:    18 0716 18 0717   BP: (!) 150/86 138/86   BP Location: Right arm Left arm   Patient Position:  "Sitting Sitting   BP Method: X-Large (Automatic)    Pulse: 74    Temp: 98.1 °F (36.7 °C)    TempSrc: Oral    Weight: 129.7 kg (286 lb)    Height: 5' 5" (1.651 m)      Physical Exam   Constitutional: She is oriented to person, place, and time. Vital signs are normal. She appears well-developed. No distress.   Blood pressure rechecked 130/70    HENT:   Head: Normocephalic and atraumatic.   Right Ear: External ear normal.   Left Ear: External ear normal.   Eyes: Conjunctivae and lids are normal. Pupils are equal, round, and reactive to light. Right eye exhibits no discharge. Left eye exhibits no discharge. Right conjunctiva is not injected. Left conjunctiva is not injected.   Neck: Normal range of motion. Neck supple. Normal carotid pulses present. Carotid bruit is not present.   Cardiovascular: Normal rate, regular rhythm, S1 normal, S2 normal, normal heart sounds, intact distal pulses and normal pulses.   No murmur heard.  Pulses:       Carotid pulses are 2+ on the right side, and 2+ on the left side.       Radial pulses are 2+ on the right side, and 2+ on the left side.   No edema   Pulmonary/Chest: Effort normal. No respiratory distress. She has no decreased breath sounds. She has no wheezes. She has no rhonchi. She has no rales.   Musculoskeletal: Normal range of motion.   Neurological: She is alert and oriented to person, place, and time. She has normal strength.   Skin: Skin is warm, dry and intact. She is not diaphoretic. No pallor.   Psychiatric: She has a normal mood and affect. Her speech is normal and behavior is normal. Judgment and thought content normal. Cognition and memory are normal.   Nursing note and vitals reviewed.        Assessment:       1. Current moderate episode of major depressive disorder without prior episode    2. Social anxiety disorder    3. Chronic fatigue    4. Irregular periods/menstrual cycles    5. Infertility, female    6. Morbid obesity    7. Snoring    8. Witnessed episode of " apnea    9. Need for immunization against influenza          Plan:       Jamilah was seen today for depression and anxiety.    Diagnoses and all orders for this visit:    Current moderate episode of major depressive disorder without prior episode  -     escitalopram oxalate (LEXAPRO) 20 MG tablet; Take 1 tablet (20 mg total) by mouth once daily.  - PHQ-9 score is 13 today which is higher than previous visit, patient feels that much of her depression is related to infertility.  All of her friends are having babies, posted on social media, feels sad.    Social anxiety disorder  -     busPIRone (BUSPAR) 10 MG tablet; Take 1 tablet (10 mg total) by mouth 3 (three) times daily, increased dose.    Chronic fatigue  -     Polysomnogram (CPAP will be added if patient meets diagnostic criteria.); Future  - [place] Sleep Score is 13    Irregular periods/menstrual cycles   Patient assisted to schedule follow up with gynecology  Infertility, female   Patient assisted to schedule follow up with gynecology  Morbid obesity   BMI 47.59 with 4 pound weight gain since last visit   Weight loss recommended with well balanced diet and portion controlled meals and increased activity.   Snoring  -     Polysomnogram (CPAP will be added if patient meets diagnostic criteria.); Future    Witnessed episode of apnea  -     Polysomnogram (CPAP will be added if patient meets diagnostic criteria.); Future    Need for immunization against influenza  -     Influenza - Quadrivalent (3 years & older) (PF)      Follow-up in about 4 months (around 1/13/2019) for follow up.      Patient readiness: acceptance and barriers:readiness    During the course of the visit the patient was educated and counseled about the following:     Obesity:   General weight loss/lifestyle modification strategies discussed (elicit support from others; identify saboteurs; non-food rewards, etc).  Informal exercise measures discussed, e.g. taking stairs instead of  elevator.  Regular aerobic exercise program discussed.    Goals: Obesity: Reduce calorie intake and BMI    Did patient meet goals/outcomes: No    The following self management tools provided: declined    Patient Instructions (the written plan) was given to the patient/family.     Time spent with patient: 30 minutes    Barriers to medications present (no )    Adverse reactions to current medications (no)    Over the counter medications reviewed (Yes)

## 2018-09-18 ENCOUNTER — HOSPITAL ENCOUNTER (OUTPATIENT)
Dept: RADIOLOGY | Facility: HOSPITAL | Age: 28
Discharge: HOME | End: 2018-09-18
Attending: OBSTETRICS & GYNECOLOGY
Payer: COMMERCIAL

## 2018-09-18 ENCOUNTER — OFFICE VISIT (OUTPATIENT)
Dept: OBSTETRICS AND GYNECOLOGY | Facility: CLINIC | Age: 28
End: 2018-09-18
Payer: COMMERCIAL

## 2018-09-18 VITALS — WEIGHT: 285 LBS | BODY MASS INDEX: 47.43 KG/M2

## 2018-09-18 DIAGNOSIS — N92.6 IRREGULAR BLEEDING: ICD-10-CM

## 2018-09-18 DIAGNOSIS — N92.6 IRREGULAR BLEEDING: Primary | ICD-10-CM

## 2018-09-18 PROCEDURE — 76830 TRANSVAGINAL US NON-OB: CPT | Mod: 26,,, | Performed by: RADIOLOGY

## 2018-09-18 PROCEDURE — 76856 US EXAM PELVIC COMPLETE: CPT | Mod: 26,,, | Performed by: RADIOLOGY

## 2018-09-18 PROCEDURE — 99214 OFFICE O/P EST MOD 30 MIN: CPT | Mod: S$GLB,,, | Performed by: OBSTETRICS & GYNECOLOGY

## 2018-09-18 PROCEDURE — 76830 TRANSVAGINAL US NON-OB: CPT | Mod: TC

## 2018-09-18 PROCEDURE — 99999 PR PBB SHADOW E&M-EST. PATIENT-LVL III: CPT | Mod: PBBFAC,,, | Performed by: OBSTETRICS & GYNECOLOGY

## 2018-09-18 PROCEDURE — 3008F BODY MASS INDEX DOCD: CPT | Mod: CPTII,S$GLB,, | Performed by: OBSTETRICS & GYNECOLOGY

## 2018-09-18 NOTE — PROGRESS NOTES
Subjective:       Patient ID: Jamilah French is a 28 y.o. female.    Chief Complaint:  Advice Only      History of Present Illness  HPI  28 y.o. G0 referred for evaluation of irregular menses. Patient also desires pregnancy. Patient reports menarche at age 15 with regular menses till 5 years ago. Since then menses have been anywhere from monthly to every 2-3 months. Patient reports no change in weight this past 5-6 years.    GYN & OB History  Patient's last menstrual period was 09/15/2018 (approximate). Prior menses 08/11/18 and 05/27/18.  Date of Last Pap: 6/20/2018-normal    OB History   No data available       Review of Systems  Review of Systems   Constitutional: Negative for activity change, appetite change, chills, diaphoresis, fatigue, fever and unexpected weight change.   HENT: Negative for mouth sores and tinnitus.    Eyes: Negative for discharge and visual disturbance.   Respiratory: Negative for cough, shortness of breath and wheezing.    Cardiovascular: Negative for chest pain, palpitations and leg swelling.   Gastrointestinal: Negative for abdominal pain, bloating, blood in stool, constipation, diarrhea, nausea and vomiting.   Endocrine: Negative for diabetes, hair loss, hot flashes, hyperthyroidism and hypothyroidism.   Genitourinary: Positive for menstrual problem. Negative for decreased libido, dyspareunia, dysuria, flank pain, frequency, genital sores, hematuria, menorrhagia, pelvic pain, urgency, vaginal bleeding, vaginal discharge, vaginal pain, dysmenorrhea, urinary incontinence, postcoital bleeding, postmenopausal bleeding and vaginal odor.   Musculoskeletal: Negative for back pain, joint swelling and myalgias.   Skin:  Negative for rash, no acne and hair changes.   Neurological: Negative for seizures, syncope, numbness and headaches.   Hematological: Negative for adenopathy. Does not bruise/bleed easily.   Psychiatric/Behavioral: Positive for depression. Negative for sleep disturbance. The  patient is not nervous/anxious.    Breast: Negative for breast mass, breast pain, nipple discharge and skin changes          Objective:    Physical Exam:   Constitutional: She is oriented to person, place, and time. She appears well-developed and well-nourished. No distress.    HENT:   Head: Normocephalic.    Eyes: Pupils are equal, round, and reactive to light.    Neck: Normal range of motion.    Cardiovascular: Normal rate.     Pulmonary/Chest: Effort normal.          Genitourinary:   Genitourinary Comments: Deferred today           Musculoskeletal: Normal range of motion and moves all extremeties.       Neurological: She is alert and oriented to person, place, and time.    Skin: Skin is warm and dry.    Psychiatric: She has a normal mood and affect. Her behavior is normal. Judgment and thought content normal.          Assessment:        1. Irregular bleeding       2. Morbid Obesity  3. Possible PCOS         Plan:      Pelvic ultrasound this week  Serum progesterone,HCG,testosterone,and DHEA-SO4 in 3 weeks  Follow up at clinic in 4 weeks

## 2018-09-18 NOTE — LETTER
September 18, 2018      No Recipients           [facility]  [address] [place] [address] Drive [address]  [place] [address]  Phone: [phone]          Patient: Jamilah French   MR Number: [id number]   YOB: 1990   Date of Visit: 9/18/2018       Dear :    Thank you for referring Jamilah French to me for evaluation. Attached you will find relevant portions of my assessment and plan of care.    If you have questions, please do not hesitate to call me. I look forward to following Jamilah French along with you.    Sincerely,    Héctor Holden MD    Enclosure  CC:  No Recipients    If you would like to receive this communication electronically, please contact [email or web address] or [phone] to request more information on [employer] access.    For providers and/or their staff who would like to refer a patient to [facility], please contact us through our one-stop-shop provider referral line, [facility], at [phone].    If you feel you have received this communication in error or would no longer like to receive these types of communications, please e-mail [email or web address]

## 2018-10-09 ENCOUNTER — LAB VISIT (OUTPATIENT)
Dept: LAB | Facility: HOSPITAL | Age: 28
End: 2018-10-09
Attending: OBSTETRICS & GYNECOLOGY
Payer: COMMERCIAL

## 2018-10-09 DIAGNOSIS — N92.6 IRREGULAR BLEEDING: ICD-10-CM

## 2018-10-09 LAB
DHEA-S SERPL-MCNC: 238.4 UG/DL
HCG INTACT+B SERPL-ACNC: <1.2 MIU/ML
PROGEST SERPL-MCNC: 0.2 NG/ML

## 2018-10-09 PROCEDURE — 84402 ASSAY OF FREE TESTOSTERONE: CPT

## 2018-10-09 PROCEDURE — 36415 COLL VENOUS BLD VENIPUNCTURE: CPT | Mod: PO

## 2018-10-09 PROCEDURE — 82627 DEHYDROEPIANDROSTERONE: CPT

## 2018-10-09 PROCEDURE — 84702 CHORIONIC GONADOTROPIN TEST: CPT

## 2018-10-09 PROCEDURE — 84144 ASSAY OF PROGESTERONE: CPT

## 2018-10-11 ENCOUNTER — TELEPHONE (OUTPATIENT)
Dept: OBSTETRICS AND GYNECOLOGY | Facility: CLINIC | Age: 28
End: 2018-10-11

## 2018-10-11 NOTE — TELEPHONE ENCOUNTER
Called pt regarding her upcoming appointment on 10/16/18 @ 9am. Upon provider request, appointment needs to be reschedule due to appointment time issue. Called pt, left message explaining that we do have availability on 10/18/18, after from 9-3. Left clinic call back number for pt to call to reschedule at her earliest convenience.

## 2018-10-12 LAB — TESTOST FREE SERPL-MCNC: 2.3 PG/ML

## 2018-10-18 ENCOUNTER — OFFICE VISIT (OUTPATIENT)
Dept: OBSTETRICS AND GYNECOLOGY | Facility: CLINIC | Age: 28
End: 2018-10-18
Payer: COMMERCIAL

## 2018-10-18 VITALS
WEIGHT: 284.38 LBS | RESPIRATION RATE: 18 BRPM | HEIGHT: 65 IN | SYSTOLIC BLOOD PRESSURE: 124 MMHG | DIASTOLIC BLOOD PRESSURE: 80 MMHG | BODY MASS INDEX: 47.38 KG/M2

## 2018-10-18 DIAGNOSIS — D25.1 INTRAMURAL UTERINE FIBROID: ICD-10-CM

## 2018-10-18 DIAGNOSIS — N92.6 IRREGULAR MENSES: Primary | ICD-10-CM

## 2018-10-18 PROCEDURE — 99213 OFFICE O/P EST LOW 20 MIN: CPT | Mod: S$GLB,,, | Performed by: OBSTETRICS & GYNECOLOGY

## 2018-10-18 PROCEDURE — 99999 PR PBB SHADOW E&M-EST. PATIENT-LVL III: CPT | Mod: PBBFAC,,, | Performed by: OBSTETRICS & GYNECOLOGY

## 2018-10-18 PROCEDURE — 3008F BODY MASS INDEX DOCD: CPT | Mod: CPTII,S$GLB,, | Performed by: OBSTETRICS & GYNECOLOGY

## 2018-10-18 NOTE — PROGRESS NOTES
Subjective:       Patient ID: Jamilah French is a 28 y.o. female.    Chief Complaint:  Follow-up (painful cycles- uterine fibroids & simply appearing ovarian cyst)      History of Present Illness  HPI  28 y.o. for consultation after workup for irregular menses. Patient had ultrasound on day 4 of this past cycle with normal appearance of both ovaries and a 1.6 cm apparent fibroid in anterior uterus.The labs were all normal and the serum progesterone level on 10/09/18 was consistent with proliferative phase of this cycle.Patient desires pregnancy.    GYN & OB History  Patient's last menstrual period was 09/15/2018.   Date of Last Pap: 6/20/2018    OB History   No data available       Review of Systems  Review of Systems   Constitutional: Negative for activity change, appetite change, chills, diaphoresis, fatigue, fever and unexpected weight change.   HENT: Negative for mouth sores and tinnitus.    Eyes: Negative for discharge and visual disturbance.   Respiratory: Negative for cough, shortness of breath and wheezing.    Cardiovascular: Negative for chest pain, palpitations and leg swelling.   Gastrointestinal: Negative for abdominal pain, bloating, blood in stool, constipation, diarrhea, nausea and vomiting.   Endocrine: Negative for diabetes, hair loss, hot flashes, hyperthyroidism and hypothyroidism.   Genitourinary: Positive for menstrual problem. Negative for decreased libido, dyspareunia, dysuria, flank pain, frequency, genital sores, hematuria, menorrhagia, pelvic pain, urgency, vaginal bleeding, vaginal discharge, vaginal pain, dysmenorrhea, urinary incontinence, postcoital bleeding, postmenopausal bleeding and vaginal odor.   Musculoskeletal: Negative for back pain, joint swelling and myalgias.   Skin:  Negative for rash, no acne and hair changes.   Neurological: Negative for seizures, syncope, numbness and headaches.   Hematological: Negative for adenopathy. Does not bruise/bleed easily.    Psychiatric/Behavioral: Positive for depression. Negative for sleep disturbance. The patient is not nervous/anxious.    Breast: Negative for breast mass, breast pain, nipple discharge and skin changes          Objective:    Physical Exam:   Constitutional: She is oriented to person, place, and time. She appears well-developed and well-nourished. No distress.    HENT:   Head: Normocephalic.    Eyes: Pupils are equal, round, and reactive to light.    Neck: Normal range of motion.    Cardiovascular: Normal rate.     Pulmonary/Chest: Effort normal.        Abdominal:   obese     Genitourinary:   Genitourinary Comments: deferred           Musculoskeletal: Normal range of motion and moves all extremeties.       Neurological: She is alert and oriented to person, place, and time.    Skin: Skin is warm and dry.    Psychiatric: She has a normal mood and affect. Her behavior is normal. Judgment and thought content normal.          Assessment:        1. Irregular menses    2. Intramural uterine fibroid                Plan:      Repeat serum Progesterone level next week. If still proliferative phase level then will prescribe Provera 10 mg x 10 days to induce menses.Patient may require meds for ovulation induction in future.

## 2018-10-24 ENCOUNTER — LAB VISIT (OUTPATIENT)
Dept: LAB | Facility: HOSPITAL | Age: 28
End: 2018-10-24
Attending: OBSTETRICS & GYNECOLOGY
Payer: COMMERCIAL

## 2018-10-24 DIAGNOSIS — N92.6 IRREGULAR MENSES: ICD-10-CM

## 2018-10-24 LAB — PROGEST SERPL-MCNC: 0.2 NG/ML

## 2018-10-24 PROCEDURE — 36415 COLL VENOUS BLD VENIPUNCTURE: CPT | Mod: PO

## 2018-10-24 PROCEDURE — 84144 ASSAY OF PROGESTERONE: CPT

## 2018-10-25 RX ORDER — MEDROXYPROGESTERONE ACETATE 10 MG/1
10 TABLET ORAL DAILY
Qty: 10 TABLET | Refills: 3 | Status: SHIPPED | OUTPATIENT
Start: 2018-10-25 | End: 2020-09-01

## 2019-05-26 ENCOUNTER — HOSPITAL ENCOUNTER (EMERGENCY)
Facility: HOSPITAL | Age: 29
Discharge: HOME | End: 2019-05-26
Attending: EMERGENCY MEDICINE
Payer: COMMERCIAL

## 2019-05-26 VITALS
TEMPERATURE: 98 F | BODY MASS INDEX: 48.82 KG/M2 | SYSTOLIC BLOOD PRESSURE: 139 MMHG | RESPIRATION RATE: 18 BRPM | HEART RATE: 88 BPM | HEIGHT: 65 IN | WEIGHT: 293 LBS | DIASTOLIC BLOOD PRESSURE: 83 MMHG | OXYGEN SATURATION: 98 %

## 2019-05-26 DIAGNOSIS — J02.9 VIRAL PHARYNGITIS: Primary | ICD-10-CM

## 2019-05-26 LAB — DEPRECATED S PYO AG THROAT QL EIA: NEGATIVE

## 2019-05-26 PROCEDURE — 25000003 PHARM REV CODE 250: Performed by: NURSE PRACTITIONER

## 2019-05-26 PROCEDURE — 87880 STREP A ASSAY W/OPTIC: CPT

## 2019-05-26 PROCEDURE — 99282 EMERGENCY DEPT VISIT SF MDM: CPT

## 2019-05-26 PROCEDURE — 87081 CULTURE SCREEN ONLY: CPT

## 2019-05-26 RX ORDER — CETIRIZINE HYDROCHLORIDE 10 MG/1
10 TABLET ORAL
Status: COMPLETED | OUTPATIENT
Start: 2019-05-26 | End: 2019-05-26

## 2019-05-26 RX ADMIN — CETIRIZINE HYDROCHLORIDE 10 MG: 10 TABLET, FILM COATED ORAL at 01:05

## 2019-05-26 NOTE — ED NOTES
"Pt complained of a sore throat. Started a "few days ago" progressively gotten worse.  Mild redness to the left inner ear.  "

## 2019-05-26 NOTE — ED PROVIDER NOTES
Encounter Date: 5/26/2019    SCRIBE #1 NOTE: I, Fay Martinez, am scribing for, and in the presence of, Leonor Rutherford NP.       History     Chief Complaint   Patient presents with    Sore Throat     since friday night       Time seen by provider: 1:47 PM on 05/26/2019    Jamilah French is a 29 y.o. female with depression who presents to the ED with an onset of itchy throat. She states her symptoms began when she drank her [place] tea at dinner 2 days ago and reports a constant itching sensation. The patient denies smoking tobacco, having recent problems with seasonal allergies, fevers, chills, congestion, or any other symptoms at this time. No PSHx noted. Sulfa drug allergy noted.    The history is provided by the patient.     Review of patient's allergies indicates:   Allergen Reactions    Sulfa (sulfonamide antibiotics)      Past Medical History:   Diagnosis Date    Current moderate episode of major depressive disorder without prior episode 6/15/2018     No past surgical history on file.  Family History   Problem Relation Age of Onset    Hypertension Mother     Mental illness Brother      Social History     Tobacco Use    Smoking status: Never Smoker    Smokeless tobacco: Never Used   Substance Use Topics    Alcohol use: Yes     Comment: social    Drug use: No     Review of Systems   Constitutional: Negative for activity change, appetite change, chills and fever.   HENT: Positive for sore throat (itchy). Negative for congestion, ear pain, rhinorrhea, sinus pressure, sinus pain, sneezing, trouble swallowing and voice change.    Eyes: Negative for pain, discharge and redness.   Respiratory: Negative for cough, shortness of breath, wheezing and stridor.    Cardiovascular: Negative for chest pain, palpitations and leg swelling.   Gastrointestinal: Negative for abdominal distention, abdominal pain, blood in stool, constipation, diarrhea, nausea and vomiting.   Genitourinary: Negative for difficulty urinating,  dysuria, flank pain, frequency, hematuria and urgency.   Musculoskeletal: Negative for arthralgias, gait problem, joint swelling and myalgias.   Skin: Negative for rash and wound.   Neurological: Negative for dizziness, syncope, facial asymmetry, speech difficulty, weakness, light-headedness, numbness and headaches.   Hematological: Negative for adenopathy.   Psychiatric/Behavioral: Negative.      Physical Exam     Initial Vitals [05/26/19 1243]   BP Pulse Resp Temp SpO2   139/83 88 18 98.1 °F (36.7 °C) 98 %      MAP       --         Physical Exam    Nursing note and vitals reviewed.  Constitutional: She appears well-developed and well-nourished. She is not diaphoretic. She is active. No distress.   HENT:   Head: Normocephalic and atraumatic.   Right Ear: External ear normal.   Left Ear: External ear normal.   Nose: Nose normal.   Mouth/Throat: Oropharynx is clear and moist. No oropharyngeal exudate.   Eyes: Conjunctivae, EOM and lids are normal. Pupils are equal, round, and reactive to light. Right eye exhibits no chemosis and no discharge. Left eye exhibits no chemosis and no discharge. Right conjunctiva is not injected. Left conjunctiva is not injected.   Neck: Trachea normal and normal range of motion. Neck supple. No stridor present. No tracheal deviation present. No neck rigidity.   Cardiovascular: Normal rate, regular rhythm, normal heart sounds and normal pulses. Exam reveals no distant heart sounds and no friction rub.    No murmur heard.  Pulmonary/Chest: Breath sounds normal. No stridor. She has no wheezes. She has no rhonchi. She has no rales.   Musculoskeletal: Normal range of motion.   Neurological: She is alert and oriented to person, place, and time. She has normal strength. GCS score is 15. GCS eye subscore is 4. GCS verbal subscore is 5. GCS motor subscore is 6.   Skin: Skin is warm, dry and intact. Capillary refill takes less than 2 seconds. No rash noted.   Psychiatric: She has a normal mood and  affect. Her speech is normal and behavior is normal. Thought content normal.       ED Course   Procedures  Labs Reviewed   THROAT SCREEN, RAPID   CULTURE, STREP A,  THROAT        Imaging Results    None          Medical Decision Making:   History:   Old Medical Records: I decided to obtain old medical records.  Differential Diagnosis:   Allergic reaction  Strep throat  Viral pharyngitis  Seasonal allergies  Clinical Tests:   Lab Tests: Ordered and Reviewed       APC / Resident Notes:   The patient's symptoms are consistent with viral pharyngitis.  Rapid strep negative The patient doesn't appear to have any stridor, drooling, hoarseness, uvular deviation, facial swelling, meningismus to suggest peritonsillar abscess, retropharyngeal abscess, epiglotitis, meningitis, airway compromise.  Will treat with supportive care. I have discussed pt with Dr Ordonez who agrees with POC. Pt voices understanding and is agreeable to the plan.  She is given specific return precautions.            Scribe Attestation:   Scribe #1: I performed the above scribed service and the documentation accurately describes the services I performed. I attest to the accuracy of the note.    Attending Attestation:     Physician Attestation Statement for NP/PA:   I discussed this assessment and plan of this patient with the NP/PA, but I did not personally examine the patient. The face to face encounter was performed by the NP/PA.          I, PRAVEENA Henson, personally performed the services described in this documentation. All medical record entries made by the scribe were at my direction and in my presence.  I have reviewed the chart and agree that the record reflects my personal performance and is accurate and complete. PRAVEENA Henson.  4:10 PM 05/26/2019          ED Course as of May 26 1610   Sun May 26, 2019   1428 RAPID STREP A SCREEN: Negative [EF]   1428 BP: 139/83 [EF]   1428 BP: 139/83 [EF]   1428 Temp: 98.1 °F (36.7 °C) [EF]   1428  Temp src: Oral [EF]   1428 Pulse: 88 [EF]   1428 Resp: 18 [EF]   1428 SpO2: 98 % [EF]      ED Course User Index  [EF] Akhil Ordonez MD     Clinical Impression:       ICD-10-CM ICD-9-CM   1. Viral pharyngitis J02.9 462         Disposition:   Disposition: Discharged  Condition: Stable                        Leonor Rutherford NP  05/26/19 1610       Akhil Ordonez MD  05/26/19 3048

## 2019-05-29 LAB — BACTERIA THROAT CULT: NORMAL

## 2019-07-11 ENCOUNTER — TELEPHONE (OUTPATIENT)
Dept: OBSTETRICS AND GYNECOLOGY | Facility: CLINIC | Age: 29
End: 2019-07-11

## 2019-07-11 NOTE — TELEPHONE ENCOUNTER
Calling patient to reschedule her appointment on tomorrow per Dr. Almendarez. Left voicemail for patient to give us a call back so we can get her appointment rescheduled.    Radha

## 2019-07-12 ENCOUNTER — LAB VISIT (OUTPATIENT)
Dept: LAB | Facility: HOSPITAL | Age: 29
End: 2019-07-12
Attending: OBSTETRICS & GYNECOLOGY
Payer: COMMERCIAL

## 2019-07-12 ENCOUNTER — OFFICE VISIT (OUTPATIENT)
Dept: OBSTETRICS AND GYNECOLOGY | Facility: CLINIC | Age: 29
End: 2019-07-12
Payer: COMMERCIAL

## 2019-07-12 VITALS
HEIGHT: 65 IN | BODY MASS INDEX: 48.41 KG/M2 | WEIGHT: 290.56 LBS | SYSTOLIC BLOOD PRESSURE: 118 MMHG | DIASTOLIC BLOOD PRESSURE: 64 MMHG

## 2019-07-12 DIAGNOSIS — Z01.419 WELL WOMAN EXAM WITH ROUTINE GYNECOLOGICAL EXAM: Primary | ICD-10-CM

## 2019-07-12 DIAGNOSIS — E28.2 PCOS (POLYCYSTIC OVARIAN SYNDROME): ICD-10-CM

## 2019-07-12 DIAGNOSIS — N91.2 AMENORRHEA: ICD-10-CM

## 2019-07-12 LAB — PROLACTIN SERPL IA-MCNC: 15 NG/ML (ref 5.2–26.5)

## 2019-07-12 PROCEDURE — 99999 PR PBB SHADOW E&M-EST. PATIENT-LVL III: CPT | Mod: PBBFAC,,, | Performed by: OBSTETRICS & GYNECOLOGY

## 2019-07-12 PROCEDURE — 99395 PREV VISIT EST AGE 18-39: CPT | Mod: S$GLB,,, | Performed by: OBSTETRICS & GYNECOLOGY

## 2019-07-12 PROCEDURE — 36415 COLL VENOUS BLD VENIPUNCTURE: CPT

## 2019-07-12 PROCEDURE — 99395 PR PREVENTIVE VISIT,EST,18-39: ICD-10-PCS | Mod: S$GLB,,, | Performed by: OBSTETRICS & GYNECOLOGY

## 2019-07-12 PROCEDURE — 99999 PR PBB SHADOW E&M-EST. PATIENT-LVL III: ICD-10-PCS | Mod: PBBFAC,,, | Performed by: OBSTETRICS & GYNECOLOGY

## 2019-07-12 PROCEDURE — 84146 ASSAY OF PROLACTIN: CPT

## 2019-07-12 NOTE — PROGRESS NOTES
History & Physical  Gynecology      SUBJECTIVE:     Chief Complaint: Metrorrhagia       History of Present Illness:  Annual Exam-Premenopausal  Patient presents for annual exam. The patient has complaints today of absent menses. First menstrual period was in seventh grade. She had normal cycles, but when she reached college, she started missing months. In her early 20's, she would skip every other month.  Now, she has a period, then skips 2-3 months.  Does have some problems with hair growth on the face.  No problems with acne.  The patient is sexually active. GYN screening history: last pap: approximate date 2018 and was normal. The patient participates in regular exercise: yes.  The patient does not smoke.  UPT negative.      Contraception: none, desires pregnancy    FH:  Breast cancer: neg  Colon cancer: none  Ovarian cancer: neg    Review of patient's allergies indicates:   Allergen Reactions    Sulfa (sulfonamide antibiotics)        Past Medical History:   Diagnosis Date    Current moderate episode of major depressive disorder without prior episode 6/15/2018     History reviewed. No pertinent surgical history.  OB History    None       Family History   Problem Relation Age of Onset    Hypertension Mother     Mental illness Brother     Breast cancer Neg Hx     Cancer Neg Hx     Colon cancer Neg Hx     Diabetes Neg Hx     Eclampsia Neg Hx     Miscarriages / Stillbirths Neg Hx     Ovarian cancer Neg Hx      labor Neg Hx     Stroke Neg Hx      Social History     Tobacco Use    Smoking status: Never Smoker    Smokeless tobacco: Never Used   Substance Use Topics    Alcohol use: Yes     Comment: social    Drug use: No       Current Outpatient Medications   Medication Sig    escitalopram oxalate (LEXAPRO) 20 MG tablet Take 1 tablet (20 mg total) by mouth once daily.    medroxyPROGESTERone (PROVERA) 10 MG tablet Take 1 tablet (10 mg total) by mouth once daily.    busPIRone (BUSPAR) 10 MG  tablet Take 1 tablet (10 mg total) by mouth 3 (three) times daily.     No current facility-administered medications for this visit.          Review of Systems:  Review of Systems   Constitutional: Negative for appetite change, fever and unexpected weight change.   Respiratory: Negative for shortness of breath.    Cardiovascular: Negative for chest pain.   Gastrointestinal: Negative for nausea and vomiting.   Genitourinary: Positive for menstrual problem. Negative for menorrhagia, pelvic pain, vaginal bleeding, vaginal discharge and vaginal pain.   Integumentary:  Positive for hair changes. Negative for acne.        OBJECTIVE:     Physical Exam:  Physical Exam   Constitutional: She is oriented to person, place, and time. She appears well-developed and well-nourished.   HENT:   Positive chin hairs   Neck: Normal range of motion. Neck supple. No tracheal deviation present. No thyromegaly present.   Cardiovascular: Normal rate, regular rhythm and normal heart sounds.   Pulmonary/Chest: Effort normal and breath sounds normal. Right breast exhibits no inverted nipple, no mass, no nipple discharge, no skin change and no tenderness. Left breast exhibits no inverted nipple, no mass, no nipple discharge, no skin change and no tenderness. Breasts are symmetrical.   Abdominal: Soft.   Genitourinary: Vagina normal and uterus normal. No labial fusion. There is no rash, tenderness, lesion or injury on the right labia. There is no rash, tenderness, lesion or injury on the left labia. Uterus is not deviated, not enlarged, not fixed and not tender. Cervix exhibits no motion tenderness, no discharge and no friability. Right adnexum displays no mass, no tenderness and no fullness. Left adnexum displays no mass, no tenderness and no fullness. No erythema, tenderness or bleeding in the vagina. No foreign body in the vagina. No signs of injury around the vagina. No vaginal discharge found.   Genitourinary Comments: Urethra: normal  appearing urethra with no masses, tenderness or lesions  Urethral meatus: normal size, anterior vaginal wall with no prolapse, no lesions   Neurological: She is alert and oriented to person, place, and time.   Psychiatric: She has a normal mood and affect. Her behavior is normal. Judgment and thought content normal.   Nursing note and vitals reviewed.      Chaperoned by: Angela    ASSESSMENT:       ICD-10-CM ICD-9-CM    1. Well woman exam with routine gynecological exam Z01.419 V72.31    2. Amenorrhea N91.2 626.0 POCT Urine Pregnancy      Prolactin   3. PCOS (polycystic ovarian syndrome) E28.2 256.4           Plan:      Jamilah was seen today for metrorrhagia.    Diagnoses and all orders for this visit:    Well woman exam with routine gynecological exam    Amenorrhea  -     POCT Urine Pregnancy  -     Prolactin; Future    PCOS (polycystic ovarian syndrome)        Orders Placed This Encounter   Procedures    Prolactin    POCT Urine Pregnancy       Well Woman:  - Pap smear up to date  - Birth control: none desired; provera monthly  - GC/CT:na  - Mammogram: due age 42  - Smoking cessation: n/a  - Labs: prolactin   - Vaccines: n/a  - Exercise counseling    PCOS.obesity:  - discussed pathology of oligomenorrhea  - recent lab and ultrasound reviewed  - patient counseled on uterine protection with provera and weight loss for achievement of normal menstrual cycles    Follow up in one year for annual or prn.    Brittani Almendarez

## 2020-08-20 ENCOUNTER — OFFICE VISIT (OUTPATIENT)
Dept: ENDOCRINOLOGY | Facility: CLINIC | Age: 30
End: 2020-08-20
Payer: COMMERCIAL

## 2020-08-20 VITALS
SYSTOLIC BLOOD PRESSURE: 128 MMHG | WEIGHT: 288.06 LBS | BODY MASS INDEX: 47.99 KG/M2 | HEIGHT: 65 IN | DIASTOLIC BLOOD PRESSURE: 88 MMHG | HEART RATE: 123 BPM

## 2020-08-20 DIAGNOSIS — E28.2 PCOS (POLYCYSTIC OVARIAN SYNDROME): ICD-10-CM

## 2020-08-20 DIAGNOSIS — N97.0 INFERTILITY ASSOCIATED WITH ANOVULATION: ICD-10-CM

## 2020-08-20 PROCEDURE — 99204 OFFICE O/P NEW MOD 45 MIN: CPT | Mod: S$GLB,,, | Performed by: INTERNAL MEDICINE

## 2020-08-20 PROCEDURE — 99999 PR PBB SHADOW E&M-EST. PATIENT-LVL IV: ICD-10-PCS | Mod: PBBFAC,,, | Performed by: INTERNAL MEDICINE

## 2020-08-20 PROCEDURE — 3008F PR BODY MASS INDEX (BMI) DOCUMENTED: ICD-10-PCS | Mod: CPTII,S$GLB,, | Performed by: INTERNAL MEDICINE

## 2020-08-20 PROCEDURE — 99999 PR PBB SHADOW E&M-EST. PATIENT-LVL IV: CPT | Mod: PBBFAC,,, | Performed by: INTERNAL MEDICINE

## 2020-08-20 PROCEDURE — 3008F BODY MASS INDEX DOCD: CPT | Mod: CPTII,S$GLB,, | Performed by: INTERNAL MEDICINE

## 2020-08-20 PROCEDURE — 99204 PR OFFICE/OUTPT VISIT, NEW, LEVL IV, 45-59 MIN: ICD-10-PCS | Mod: S$GLB,,, | Performed by: INTERNAL MEDICINE

## 2020-08-20 NOTE — ASSESSMENT & PLAN NOTE
Criteria:  Irregular menstrual cycles though onset was as a young adult  Not ovulating regularly and is trying to conceive without success  Cystic ovaries   Terminal hair    Would obtain testosterone panel, OGTT, 17 OHP  PROLACTIN and TSH are normal  OBGYN evaluation      may need evaluation with sperm sample.

## 2020-08-20 NOTE — PROGRESS NOTES
Subjective:      Patient ID: Jamilah French is a 30 y.o. female.    Chief Complaint:  Polycystic Ovary Syndrome      History of Present Illness  Jamilah French is here for evaluation and management of irregular menstrual cycles.    Menarchal history:  Menarche occurred at 12/13. Very very regular until age [age]. Menstrual cycle frequency has changed in the past eight years, from skipping a few weeks, to skipping a month, to skipping several months.   Menstrual bleed can last 2 1/2 weeks.   LMP 6/19  Denies use of OCPs  Denies previous pregnancies.    Hyperandrogenism:  Reports acne over face. No treatment right now.   Reports dark, coarse hair over chin, jaw line. No immediate family member with terminal hair (grandmother maybe a little bit).  She removes terminal hair by shaving occasionally (every other month). Reports hair loss over frontal/temporal area.       Ultrasound of the ovaries in the past demonstrates(done due to irregular cycles two years ago) :   Right ovary:    Size: 3.7 x 2.3 x 2.7 cm   Appearance: Several cysts largest measuring 1.7 x 1.0 by 1.6 cm and a few other subcentimeter.  Right ovarian volume 12 cc.   Vascular flow: Normal.   Left ovary: Size: 2.7 x 2.2 x 2.7 cm.  Volume 8.4 cc   Appearance: 1.6 by 1.9 x 1.3 cm cyst along with othe a few other, smaller cysts next largest measuring 7 x 12 mm.     Metabolic risk:  Denies elevated lipid profile  Denies history of hypertension or elevated blood pressure.   Denies history of insulin resistance or preDM/DM.   Denies history of fatty liver  Reports snoring at nights, does not feel refreshed in the morning.    Denies recent changes to weight: fluctuates 10 - 15 lbs for the past four to five years.      Current exercise regimen includes:  Boxing a few times a week   Meal plan     Denies any history of galactorrhea, headaches or recent changes to vision.   Denies striae, plethora, proximal muscle weakness. Denies eating disorders.     Right knee  pain.     Review of Systems   Constitutional: Negative for fever.   HENT: Negative for congestion.         Strep throat three weeks ago.    Eyes: Negative for visual disturbance.   Respiratory: Negative for shortness of breath.    Cardiovascular: Negative for chest pain.   Gastrointestinal: Negative for abdominal pain.   Genitourinary: Negative for dysuria.   Musculoskeletal: Negative for arthralgias.   Skin: Negative for rash.   Neurological: Negative for weakness.   Hematological: Does not bruise/bleed easily.   Psychiatric/Behavioral: Negative for sleep disturbance.       Objective:   Physical Exam  Constitutional:       General: She is not in acute distress.     Appearance: She is well-developed.   HENT:      Head: Normocephalic and atraumatic.      Nose: Nose normal.      Mouth/Throat:      Pharynx: No oropharyngeal exudate.   Eyes:      General: No scleral icterus.     Conjunctiva/sclera: Conjunctivae normal.      Pupils: Pupils are equal, round, and reactive to light.   Neck:      Musculoskeletal: Normal range of motion and neck supple.      Thyroid: No thyromegaly.      Trachea: No tracheal deviation.   Cardiovascular:      Rate and Rhythm: Normal rate and regular rhythm.      Heart sounds: Normal heart sounds.   Pulmonary:      Effort: Pulmonary effort is normal.      Breath sounds: Normal breath sounds.   Abdominal:      General: Bowel sounds are normal. There is no distension.      Palpations: Abdomen is soft. There is no mass.   Musculoskeletal: Normal range of motion.         General: No tenderness.   Lymphadenopathy:      Cervical: No cervical adenopathy.   Skin:     General: Skin is warm and dry.      Findings: No rash.      Comments: A few terminal hair over jaw line   Recently shaved    Acanthosis over posterior neck   Neurological:      Mental Status: She is alert.      Deep Tendon Reflexes: Reflexes are normal and symmetric. Reflexes normal.       Vitals:    08/20/20 1412   BP: 128/88   Pulse: (!)  "123   Weight: 130.7 kg (288 lb 0.5 oz)   Height: 5' 5" (1.651 m)       BP Readings from Last 3 Encounters:   08/20/20 128/88   07/12/19 118/64   05/26/19 139/83     Wt Readings from Last 1 Encounters:   08/20/20 1412 130.7 kg (288 lb 0.5 oz)         Body mass index is 47.93 kg/m².    Lab Review:   Lab Results   Component Value Date    HGBA1C 5.3 06/15/2018     Lab Results   Component Value Date    CHOL 159 06/15/2018    HDL 38 (L) 06/15/2018    LDLCALC 105.2 06/15/2018    TRIG 79 06/15/2018    CHOLHDL 23.9 06/15/2018     Lab Results   Component Value Date     06/15/2018    K 4.2 06/15/2018     06/15/2018    CO2 24 06/15/2018    GLU 69 (L) 06/15/2018    BUN 14 06/15/2018    CREATININE 0.8 06/15/2018    CALCIUM 9.4 06/15/2018    PROT 7.2 06/15/2018    ALBUMIN 3.5 06/15/2018    BILITOT 0.9 06/15/2018    ALKPHOS 62 06/15/2018    AST 15 06/15/2018    ALT 13 06/15/2018    ANIONGAP 7 (L) 06/15/2018    ESTGFRAFRICA >60.0 06/15/2018    EGFRNONAA >60.0 06/15/2018    TSH 1.426 06/15/2018     Results for MITESH BILL (MRN [id number]) as of 8/20/2020 14:49   Ref. Range 10/9/2018 14:15   DHEA-SO4 Latest Ref Range: 95.8 - 511.7 ug/dL 238.4   Progesterone Latest Ref Range: See Text ng/mL 0.2   Testosterone, Free Latest Units: pg/mL 2.3   Results for MITESH BILL (MRN [id number]) as of 8/20/2020 14:49   Ref. Range 7/12/2019 11:10   Prolactin Latest Ref Range: 5.2 - 26.5 ng/mL 15.0   Results for MITESH BILL (MRN [id number]) as of 8/20/2020 14:49   Ref. Range 6/15/2018 08:54   TSH Latest Ref Range: 0.400 - 4.000 uIU/mL 1.426   Results for MITESH BILL (MRN [id number]) as of 8/20/2020 14:49   Ref. Range 6/15/2018 08:54   Hemoglobin A1C External Latest Ref Range: 4.0 - 5.6 % 5.3   Estimated Avg Glucose Latest Ref Range: 68 - 131 mg/dL 105       Assessment and Plan     PCOS (polycystic ovarian syndrome)  Criteria:  Irregular menstrual cycles though onset was as a young adult  Not ovulating regularly and is trying to " conceive without success  Cystic ovaries   Terminal hair    Would obtain testosterone panel, OGTT, 17 OHP  PROLACTIN and TSH are normal  OBGYN evaluation      may need evaluation with sperm sample.     Infertility associated with anovulation  Referral for gyn   Discussed goals   Recommend PNV  Discussed letrozole/clomiphine.

## 2020-08-21 ENCOUNTER — LAB VISIT (OUTPATIENT)
Dept: LAB | Facility: HOSPITAL | Age: 30
End: 2020-08-21
Attending: INTERNAL MEDICINE
Payer: COMMERCIAL

## 2020-08-21 DIAGNOSIS — E28.2 PCOS (POLYCYSTIC OVARIAN SYNDROME): ICD-10-CM

## 2020-08-21 DIAGNOSIS — N97.0 INFERTILITY ASSOCIATED WITH ANOVULATION: ICD-10-CM

## 2020-08-21 PROCEDURE — 82040 ASSAY OF SERUM ALBUMIN: CPT

## 2020-08-21 PROCEDURE — 36415 COLL VENOUS BLD VENIPUNCTURE: CPT | Mod: PO

## 2020-08-21 PROCEDURE — 82951 GLUCOSE TOLERANCE TEST (GTT): CPT

## 2020-08-22 LAB
GLUCOSE SERPL-MCNC: 126 MG/DL
GLUCOSE SERPL-MCNC: 148 MG/DL
GLUCOSE SERPL-MCNC: 81 MG/DL (ref 70–110)

## 2020-08-27 LAB
ALBUMIN SERPL-MCNC: 3.9 G/DL (ref 3.6–5.1)
SHBG SERPL-SCNC: 41 NMOL/L (ref 17–124)
TESTOST FREE SERPL-MCNC: 7.4 PG/ML (ref 0.2–5)
TESTOST SERPL-MCNC: 72 NG/DL (ref 2–45)
TESTOSTERONE.FREE+WB SERPL-MCNC: 13.3 NG/DL (ref 0.5–8.5)

## 2020-09-01 ENCOUNTER — OFFICE VISIT (OUTPATIENT)
Dept: OBSTETRICS AND GYNECOLOGY | Facility: CLINIC | Age: 30
End: 2020-09-01
Payer: COMMERCIAL

## 2020-09-01 ENCOUNTER — HOSPITAL ENCOUNTER (OUTPATIENT)
Dept: RADIOLOGY | Facility: HOSPITAL | Age: 30
Discharge: HOME | End: 2020-09-01
Attending: OBSTETRICS & GYNECOLOGY
Payer: COMMERCIAL

## 2020-09-01 VITALS
WEIGHT: 284.38 LBS | SYSTOLIC BLOOD PRESSURE: 110 MMHG | BODY MASS INDEX: 47.38 KG/M2 | HEIGHT: 65 IN | DIASTOLIC BLOOD PRESSURE: 78 MMHG

## 2020-09-01 DIAGNOSIS — Z31.9 PROCREATIVE MANAGEMENT: ICD-10-CM

## 2020-09-01 DIAGNOSIS — Z01.419 ENCOUNTER FOR GYNECOLOGICAL EXAMINATION WITHOUT ABNORMAL FINDING: Primary | ICD-10-CM

## 2020-09-01 DIAGNOSIS — N92.6 IRREGULAR MENSTRUAL CYCLE: ICD-10-CM

## 2020-09-01 PROCEDURE — 99395 PREV VISIT EST AGE 18-39: CPT | Mod: S$GLB,,, | Performed by: OBSTETRICS & GYNECOLOGY

## 2020-09-01 PROCEDURE — 3008F PR BODY MASS INDEX (BMI) DOCUMENTED: ICD-10-PCS | Mod: CPTII,S$GLB,, | Performed by: OBSTETRICS & GYNECOLOGY

## 2020-09-01 PROCEDURE — 99999 PR PBB SHADOW E&M-EST. PATIENT-LVL III: CPT | Mod: PBBFAC,,, | Performed by: OBSTETRICS & GYNECOLOGY

## 2020-09-01 PROCEDURE — 99395 PR PREVENTIVE VISIT,EST,18-39: ICD-10-PCS | Mod: S$GLB,,, | Performed by: OBSTETRICS & GYNECOLOGY

## 2020-09-01 PROCEDURE — 99999 PR PBB SHADOW E&M-EST. PATIENT-LVL III: ICD-10-PCS | Mod: PBBFAC,,, | Performed by: OBSTETRICS & GYNECOLOGY

## 2020-09-01 PROCEDURE — 3008F BODY MASS INDEX DOCD: CPT | Mod: CPTII,S$GLB,, | Performed by: OBSTETRICS & GYNECOLOGY

## 2020-09-01 PROCEDURE — 76830 TRANSVAGINAL US NON-OB: CPT | Mod: TC

## 2020-09-01 NOTE — PROGRESS NOTES
"CC: Well woman exam    Jamilah French is a 30 y.o. female No obstetric history on file. presents for a well woman exam.    She has been trying to get pregnant.   She has irregular cycles.  She is not easily tracking ovulation with the kits.   Her weight is going up and down.   She has a great deal of spotting in between      Past Medical History:   Diagnosis Date    Current moderate episode of major depressive disorder without prior episode 6/15/2018       No past surgical history on file.    OB History   No obstetric history on file.       Family History   Problem Relation Age of Onset    Hypertension Mother     Mental illness Brother     Breast cancer Neg Hx     Cancer Neg Hx     Colon cancer Neg Hx     Diabetes Neg Hx     Eclampsia Neg Hx     Miscarriages / Stillbirths Neg Hx     Ovarian cancer Neg Hx      labor Neg Hx     Stroke Neg Hx        Social History     Tobacco Use    Smoking status: Never Smoker    Smokeless tobacco: Never Used   Substance Use Topics    Alcohol use: Yes     Comment: social    Drug use: No       /78   Ht 5' 5" (1.651 m)   Wt 129 kg (284 lb 6.3 oz)   LMP 2020   BMI 47.33 kg/m²     ROS:  GENERAL: Denies weight gain or weight loss. Feeling well overall.   SKIN: Denies rash or lesions.   HEAD: Denies head injury or headache.   NODES: Denies enlarged lymph nodes.   CHEST: Denies chest pain or shortness of breath.   CARDIOVASCULAR: Denies palpitations or left sided chest pain.   ABDOMEN: No abdominal pain, constipation, diarrhea, nausea, vomiting or rectal bleeding.   URINARY: No frequency, dysuria, hematuria, or burning on urination.  REPRODUCTIVE: See HPI.   BREASTS: The patient performs breast self-examination and denies pain, lumps, or nipple discharge.   HEMATOLOGIC: No easy bruisability or excessive bleeding.  MUSCULOSKELETAL: Denies joint pain or swelling.   NEUROLOGIC: Denies syncope or weakness.   PSYCHIATRIC: Denies depression, anxiety or mood " swings.    Physical Exam:    APPEARANCE: Well nourished, well developed, in no acute distress.  AFFECT: WNL, alert and oriented x 3  SKIN: No acne or hirsutism  NECK: Neck symmetric without masses or thyromegaly  NODES: No inguinal, cervical, axillary, or femoral lymph node enlargement  CHEST: Good respiratory effect  ABDOMEN: Soft.  No tenderness or masses.  No hepatosplenomegaly.  No hernias.  BREASTS: Symmetrical, no skin changes or visible lesions.  No palpable masses, nipple discharge bilaterally.  PELVIC: Normal external genitalia without lesions.  Normal hair distribution.  Adequate perineal body, normal urethral meatus.  Vagina moist and well rugated without lesions or discharge.  Cervix pink, without lesions, discharge or tenderness.  No significant cystocele or rectocele.  Bimanual exam shows uterus to be normal size, regular, mobile and nontender.  Adnexa without masses or tenderness.    EXTREMITIES: No edema.      ASSESSMENT AND PLAN  1. Encounter for gynecological examination without abnormal finding     2. Irregular menstrual cycle  US Pelvis Comp with Transvag NON-OB (xpd   3. Procreative management  US Pelvis Comp with Transvag NON-OB (xpd     SA- IF NL  Consider Femara/ LH kits   PNV daily  F.U on US  May consider hysteroscopy if Abnormal    Patient was counseled today on A.C.S. Pap guidelines and recommendations for yearly pelvic exams, mammograms and monthly self breast exams; to see her PCP for other health maintenance.     Follow up if symptoms worsen or fail to improve.   F/U after SA and US

## 2020-09-14 ENCOUNTER — LAB VISIT (OUTPATIENT)
Dept: LAB | Facility: HOSPITAL | Age: 30
End: 2020-09-14
Attending: INTERNAL MEDICINE
Payer: COMMERCIAL

## 2020-09-14 ENCOUNTER — TELEPHONE (OUTPATIENT)
Dept: ENDOCRINOLOGY | Facility: CLINIC | Age: 30
End: 2020-09-14

## 2020-09-14 DIAGNOSIS — E28.2 PCOS (POLYCYSTIC OVARIAN SYNDROME): ICD-10-CM

## 2020-09-14 DIAGNOSIS — E28.2 PCOS (POLYCYSTIC OVARIAN SYNDROME): Primary | ICD-10-CM

## 2020-09-14 DIAGNOSIS — N97.0 INFERTILITY ASSOCIATED WITH ANOVULATION: ICD-10-CM

## 2020-09-14 LAB
ALBUMIN SERPL BCP-MCNC: 3.7 G/DL (ref 3.5–5.2)
ALP SERPL-CCNC: 66 U/L (ref 55–135)
ALT SERPL W/O P-5'-P-CCNC: 16 U/L (ref 10–44)
ANION GAP SERPL CALC-SCNC: 7 MMOL/L (ref 8–16)
AST SERPL-CCNC: 16 U/L (ref 10–40)
BILIRUB SERPL-MCNC: 0.6 MG/DL (ref 0.1–1)
BUN SERPL-MCNC: 13 MG/DL (ref 6–20)
CALCIUM SERPL-MCNC: 9 MG/DL (ref 8.7–10.5)
CHLORIDE SERPL-SCNC: 105 MMOL/L (ref 95–110)
CO2 SERPL-SCNC: 27 MMOL/L (ref 23–29)
CREAT SERPL-MCNC: 0.9 MG/DL (ref 0.5–1.4)
EST. GFR  (AFRICAN AMERICAN): >60 ML/MIN/1.73 M^2
EST. GFR  (NON AFRICAN AMERICAN): >60 ML/MIN/1.73 M^2
GLUCOSE SERPL-MCNC: 75 MG/DL (ref 70–110)
POTASSIUM SERPL-SCNC: 4.3 MMOL/L (ref 3.5–5.1)
PROT SERPL-MCNC: 7.4 G/DL (ref 6–8.4)
SODIUM SERPL-SCNC: 139 MMOL/L (ref 136–145)

## 2020-09-14 PROCEDURE — 80053 COMPREHEN METABOLIC PANEL: CPT

## 2020-09-14 PROCEDURE — 83498 ASY HYDROXYPROGESTERONE 17-D: CPT

## 2020-09-14 PROCEDURE — 36415 COLL VENOUS BLD VENIPUNCTURE: CPT | Mod: PO

## 2020-09-14 RX ORDER — METFORMIN HYDROCHLORIDE 500 MG/1
500 TABLET, EXTENDED RELEASE ORAL 2 TIMES DAILY WITH MEALS
Qty: 60 TABLET | Refills: 2 | Status: SHIPPED | OUTPATIENT
Start: 2020-09-14 | End: 2020-10-18

## 2020-09-14 NOTE — PROGRESS NOTES
Goal is conception   Trial of metformin for 3 - 6 months, may improve conception rates   Has high levels of T   But normal OGTT (although 1 hr is borderline)    Check C MP and 17 OHP today or tomorrow  Metformin s/e discussed   Try one daily for 7 - 10 dys  Increase to twice daily if tolerates   Once on medicaiton for one month try 750 mg one tab bid

## 2020-09-18 LAB — 17OHP SERPL-MCNC: 251 NG/DL (ref 35–413)

## 2020-09-22 ENCOUNTER — OFFICE VISIT (OUTPATIENT)
Dept: OBSTETRICS AND GYNECOLOGY | Facility: CLINIC | Age: 30
End: 2020-09-22
Payer: COMMERCIAL

## 2020-09-22 VITALS
BODY MASS INDEX: 47.31 KG/M2 | SYSTOLIC BLOOD PRESSURE: 122 MMHG | DIASTOLIC BLOOD PRESSURE: 72 MMHG | HEIGHT: 65 IN | WEIGHT: 283.94 LBS

## 2020-09-22 DIAGNOSIS — Z31.9 PROCREATIVE MANAGEMENT: ICD-10-CM

## 2020-09-22 DIAGNOSIS — N93.9 ABNORMAL UTERINE BLEEDING: Primary | ICD-10-CM

## 2020-09-22 PROCEDURE — 99214 PR OFFICE/OUTPT VISIT, EST, LEVL IV, 30-39 MIN: ICD-10-PCS | Mod: S$GLB,,, | Performed by: OBSTETRICS & GYNECOLOGY

## 2020-09-22 PROCEDURE — 3008F BODY MASS INDEX DOCD: CPT | Mod: CPTII,S$GLB,, | Performed by: OBSTETRICS & GYNECOLOGY

## 2020-09-22 PROCEDURE — 99999 PR PBB SHADOW E&M-EST. PATIENT-LVL IV: ICD-10-PCS | Mod: PBBFAC,,, | Performed by: OBSTETRICS & GYNECOLOGY

## 2020-09-22 PROCEDURE — 3008F PR BODY MASS INDEX (BMI) DOCUMENTED: ICD-10-PCS | Mod: CPTII,S$GLB,, | Performed by: OBSTETRICS & GYNECOLOGY

## 2020-09-22 PROCEDURE — 99999 PR PBB SHADOW E&M-EST. PATIENT-LVL IV: CPT | Mod: PBBFAC,,, | Performed by: OBSTETRICS & GYNECOLOGY

## 2020-09-22 PROCEDURE — 99214 OFFICE O/P EST MOD 30 MIN: CPT | Mod: S$GLB,,, | Performed by: OBSTETRICS & GYNECOLOGY

## 2020-09-24 ENCOUNTER — TELEPHONE (OUTPATIENT)
Dept: OBSTETRICS AND GYNECOLOGY | Facility: CLINIC | Age: 30
End: 2020-09-24

## 2020-09-24 NOTE — TELEPHONE ENCOUNTER
----- Message from [facility] sent at 9/24/2020  3:16 PM CDT -----  Type:  Patient Returning Call    Who Called: MITESH BILL    Who Left Message for Patient: Meghanasampson Mcmullen    Does the patient know what this is regarding?: No    Best Call Back Number: [phone]    Additional Information:  Please call patient after 4:30, if possible.

## 2020-09-24 NOTE — TELEPHONE ENCOUNTER
I have scheduled your appointment with Dr. Rivas at 11:15 AM on 9/30. She will not be in the office in the afternoon.   Also, the semen analysis show a low concentration. Dr. Rivas recommend that you all follow up with  Urology base on the results.       Patient verbalized understanding.

## 2020-09-30 ENCOUNTER — OFFICE VISIT (OUTPATIENT)
Dept: OBSTETRICS AND GYNECOLOGY | Facility: CLINIC | Age: 30
End: 2020-09-30
Payer: COMMERCIAL

## 2020-09-30 ENCOUNTER — ANESTHESIA EVENT (OUTPATIENT)
Dept: SURGERY | Facility: OTHER | Age: 30
End: 2020-09-30
Payer: COMMERCIAL

## 2020-09-30 ENCOUNTER — HOSPITAL ENCOUNTER (OUTPATIENT)
Dept: PREADMISSION TESTING | Facility: OTHER | Age: 30
Discharge: HOME | End: 2020-09-30
Attending: OBSTETRICS & GYNECOLOGY
Payer: COMMERCIAL

## 2020-09-30 VITALS
HEIGHT: 65 IN | SYSTOLIC BLOOD PRESSURE: 122 MMHG | BODY MASS INDEX: 47.31 KG/M2 | DIASTOLIC BLOOD PRESSURE: 72 MMHG | WEIGHT: 283.94 LBS

## 2020-09-30 VITALS
BODY MASS INDEX: 46.65 KG/M2 | TEMPERATURE: 97 F | RESPIRATION RATE: 16 BRPM | OXYGEN SATURATION: 98 % | HEART RATE: 97 BPM | DIASTOLIC BLOOD PRESSURE: 79 MMHG | SYSTOLIC BLOOD PRESSURE: 138 MMHG | WEIGHT: 280 LBS | HEIGHT: 65 IN

## 2020-09-30 DIAGNOSIS — N93.9 ABNORMAL UTERINE BLEEDING: Primary | ICD-10-CM

## 2020-09-30 DIAGNOSIS — Z01.818 PREOP TESTING: Primary | ICD-10-CM

## 2020-09-30 LAB
BASOPHILS # BLD AUTO: 0.03 K/UL (ref 0–0.2)
BASOPHILS NFR BLD: 0.3 % (ref 0–1.9)
DIFFERENTIAL METHOD: ABNORMAL
EOSINOPHIL # BLD AUTO: 0.1 K/UL (ref 0–0.5)
EOSINOPHIL NFR BLD: 1.1 % (ref 0–8)
ERYTHROCYTE [DISTWIDTH] IN BLOOD BY AUTOMATED COUNT: 13.2 % (ref 11.5–14.5)
HCT VFR BLD AUTO: 38.5 % (ref 37–48.5)
HGB BLD-MCNC: 12.3 G/DL (ref 12–16)
IMM GRANULOCYTES # BLD AUTO: 0.02 K/UL (ref 0–0.04)
IMM GRANULOCYTES NFR BLD AUTO: 0.2 % (ref 0–0.5)
LYMPHOCYTES # BLD AUTO: 2.5 K/UL (ref 1–4.8)
LYMPHOCYTES NFR BLD: 25 % (ref 18–48)
MCH RBC QN AUTO: 27.5 PG (ref 27–31)
MCHC RBC AUTO-ENTMCNC: 31.9 G/DL (ref 32–36)
MCV RBC AUTO: 86 FL (ref 82–98)
MONOCYTES # BLD AUTO: 0.5 K/UL (ref 0.3–1)
MONOCYTES NFR BLD: 4.8 % (ref 4–15)
NEUTROPHILS # BLD AUTO: 6.9 K/UL (ref 1.8–7.7)
NEUTROPHILS NFR BLD: 68.6 % (ref 38–73)
NRBC BLD-RTO: 0 /100 WBC
PLATELET # BLD AUTO: 345 K/UL (ref 150–350)
PMV BLD AUTO: 9.3 FL (ref 9.2–12.9)
RBC # BLD AUTO: 4.47 M/UL (ref 4–5.4)
WBC # BLD AUTO: 10.06 K/UL (ref 3.9–12.7)

## 2020-09-30 PROCEDURE — 99999 PR PBB SHADOW E&M-EST. PATIENT-LVL III: ICD-10-PCS | Mod: PBBFAC,,, | Performed by: OBSTETRICS & GYNECOLOGY

## 2020-09-30 PROCEDURE — 85025 COMPLETE CBC W/AUTO DIFF WBC: CPT

## 2020-09-30 PROCEDURE — 99999 PR PBB SHADOW E&M-EST. PATIENT-LVL III: CPT | Mod: PBBFAC,,, | Performed by: OBSTETRICS & GYNECOLOGY

## 2020-09-30 PROCEDURE — 36415 COLL VENOUS BLD VENIPUNCTURE: CPT

## 2020-09-30 PROCEDURE — 99499 UNLISTED E&M SERVICE: CPT | Mod: S$GLB,,, | Performed by: OBSTETRICS & GYNECOLOGY

## 2020-09-30 PROCEDURE — 99499 NO LOS: ICD-10-PCS | Mod: S$GLB,,, | Performed by: OBSTETRICS & GYNECOLOGY

## 2020-09-30 RX ORDER — SODIUM CHLORIDE 9 MG/ML
INJECTION, SOLUTION INTRAVENOUS CONTINUOUS
Status: CANCELLED | OUTPATIENT
Start: 2020-09-30

## 2020-09-30 RX ORDER — HYDROCODONE BITARTRATE AND ACETAMINOPHEN 5; 325 MG/1; MG/1
1 TABLET ORAL EVERY 6 HOURS PRN
Qty: 20 TABLET | Refills: 0 | Status: ON HOLD | OUTPATIENT
Start: 2020-09-30 | End: 2020-10-26 | Stop reason: HOSPADM

## 2020-09-30 RX ORDER — FAMOTIDINE 20 MG/1
20 TABLET, FILM COATED ORAL
Status: CANCELLED | OUTPATIENT
Start: 2020-09-30 | End: 2020-09-30

## 2020-09-30 RX ORDER — ACETAMINOPHEN 500 MG
1000 TABLET ORAL
Status: CANCELLED | OUTPATIENT
Start: 2020-09-30 | End: 2020-09-30

## 2020-09-30 RX ORDER — MUPIROCIN 20 MG/G
OINTMENT TOPICAL
Status: CANCELLED | OUTPATIENT
Start: 2020-09-30

## 2020-09-30 RX ORDER — LIDOCAINE HYDROCHLORIDE 10 MG/ML
0.5 INJECTION, SOLUTION EPIDURAL; INFILTRATION; INTRACAUDAL; PERINEURAL ONCE
Status: CANCELLED | OUTPATIENT
Start: 2020-09-30 | End: 2020-09-30

## 2020-09-30 RX ORDER — SODIUM CHLORIDE, SODIUM LACTATE, POTASSIUM CHLORIDE, CALCIUM CHLORIDE 600; 310; 30; 20 MG/100ML; MG/100ML; MG/100ML; MG/100ML
INJECTION, SOLUTION INTRAVENOUS CONTINUOUS
Status: CANCELLED | OUTPATIENT
Start: 2020-09-30

## 2020-09-30 NOTE — H&P (VIEW-ONLY)
[facility] [address]  Obstetrics & Gynecology  History & Physical    Patient Name: Jamilah French  MRN: [id number]  Admission Date: (Not on file)  Primary Care Provider: Primary Doctor No    Subjective:     Chief Complaint/Reason for Admission: procreative management   Fluid in the uterus      History of Present Illness:   Jamilah French is a 30 y.o. female  presents for abnormal uterine bleeding  She is trying to conceive for years.  She has bleeding in between cycles. NO dysmenorrhea or dyspareunia  She was diagnosed with PCOS. She is on glucaphage 500 XR daily.  SA done at [facility] - Results being faxed here today.  Semen has low concentration         US showed  FINDINGS:  Uterus: Normal in size but mildly heterogeneous in echotexture. There  is a subserosal fibroid along the anterior mid body measuring 1.9 x  1.7 x 1.4 cm, and intramural fibroid in the fundus measuring 1.8 x 1.8  x 1.6 cm. There is a small nabothian cyst in the cervix and trace  fluid in the endocervical canal.  Position: Anteverted  Uterine size: 7.0 x 4.3 x 3.9 cm (60 mL)     Endometrium: Unremarkable. No abnormal vascularity on color Doppler.  Maximum thickness: 4-5 mm     RIGHT ovary/adnexa: The right ovary is unremarkable. There is normal  color flow in the ovary.  Ovarian size: 3.1 x 2.8 x 1.8 cm     LEFT ovary/adnexa: The left ovary is within normal limits noting a  dominant simple follicle measuring 12 mm in diameter.. There is normal  color flow in the ovary.  Ovarian size: 2.7 x 2.3 x 2.1 cm     Fluid: There is no abnormal free fluid in the cul-de-sac.     IMPRESSION:  1. Heterogeneous appearing fibroid uterus with index fibroids as  outlined above.  2. Physiologic appearing ovaries.                     Current Outpatient Medications on File Prior to Visit   Medication Sig    metFORMIN (GLUCOPHAGE-XR) 500 MG ER 24hr tablet Take 1 tablet (500 mg total) by mouth 2 (two) times daily with meals.    PNV  102-IRON-FOLATE 1-DSS-DHA ORAL Take 1 tablet by mouth once daily.     No current facility-administered medications on file prior to visit.        Review of patient's allergies indicates:   Allergen Reactions    Sulfa (sulfonamide antibiotics)        Past Medical History:   Diagnosis Date    Current moderate episode of major depressive disorder without prior episode 6/15/2018     OB History    Para Term  AB Living   0 0 0 0 0 0   SAB TAB Ectopic Multiple Live Births   0 0 0 0 0     History reviewed. No pertinent surgical history.  Family History     Problem Relation (Age of Onset)    Hypertension Mother    Mental illness Brother        Tobacco Use    Smoking status: Current Every Day Smoker     Types: Vaping w/o nicotine    Smokeless tobacco: Never Used   Substance and Sexual Activity    Alcohol use: Yes     Comment: social    Drug use: No    Sexual activity: Yes     Partners: Male     Birth control/protection: None     Review of Systems  Objective:     Vital Signs (Most Recent):  BP: 122/72 (20 1133) Vital Signs (24h Range):  [unfilled]     Weight: 128.8 kg (283 lb 15.2 oz)  Body mass index is 47.25 kg/m².  Patient's last menstrual period was 2020.    Physical Exam    PE:  APPEARANCE: Well nourished, well developed, in no acute distress.  AFFECT: WNL, alert and oriented x 3.  SKIN: No lesions.  NECK: Neck symmetric without masses or thyromegaly.  NODES: No inguinal, cervical, axillary or femoral lymph node enlargement.  CHEST: Good respiratory effort.   ABDOMEN: Soft. No tenderness or masses. No hepatosplenomegaly. No hernias.  BREASTS: Symmetrical, no skin changes or visible lesions. No palpable masses, nipple discharge bilaterally.  PELVIC: ATROPHIC EXTERNAL FEMALE GENITALIA without lesions. Normal hair distribution. Adequate perineal body, normal urethral meatus. Vagina dry without lesions or discharge. CERVIX STENOTIC without lesions, discharge or tenderness. No significant  cystocele or rectocele. Bimanual exam shows uterus to be normal size, regular, mobile and nontender. Adnexa without masses or tenderness.  EXTREMITIES: No edema.        Assessment/Plan:     There are no hospital problems to display for this patient.      1.  Abnormal uterine bleeding   2.  Procreative management    Will prepare for Hysteroscopy, D &  C    Will prepare for surgery as discussed  The risks benefits and alternatives of surgery discussed in detail  Vicodin rx given   Post op appt given  Pelvic rest X 4  wks      Manjula Rivas MD  Obstetrics & Gynecology  [facility] [address]

## 2020-09-30 NOTE — ANESTHESIA PREPROCEDURE EVALUATION
09/30/2020  Jamilah French is a 30 y.o., female.    Anesthesia Evaluation    I have reviewed the Patient Summary Reports.    I have reviewed the Nursing Notes. I have reviewed the NPO Status.   I have reviewed the Medications.     Review of Systems  Anesthesia Hx:  Neg history of prior surgery. Denies Family Hx of Anesthesia complications.   Denies Personal Hx of Anesthesia complications.   Social:  Non-Smoker    Hematology/Oncology:  Hematology Normal   Oncology Normal     EENT/Dental:EENT/Dental Normal   Cardiovascular:  Cardiovascular Normal     Pulmonary:  Pulmonary Normal    Renal/:  Renal/ Normal     Hepatic/GI:  Hepatic/GI Normal    Musculoskeletal:  Musculoskeletal Normal    OB/GYN/PEDS:  PCOS on metformin   Neurological:  Neurology Normal    Endocrine:   Denies Diabetes.    Dermatological:  Skin Normal    Psych:   depression          Physical Exam  General:  Morbid Obesity    Airway/Jaw/Neck:  Airway Findings: Mouth Opening: Normal Mallampati: II  TM Distance: Normal, at least 6 cm      Dental:  Dental Findings: In tact        Mental Status:  Mental Status Findings:  Cooperative, Alert and Oriented         Anesthesia Plan  Type of Anesthesia, risks & benefits discussed:  Anesthesia Type:  general  Patient's Preference:   Intra-op Monitoring Plan: standard ASA monitors  Intra-op Monitoring Plan Comments:   Post Op Pain Control Plan: per primary service following discharge from PACU and multimodal analgesia  Post Op Pain Control Plan Comments:   Induction:   IV  Beta Blocker:         Informed Consent: Patient understands risks and agrees with Anesthesia plan.  Questions answered. Anesthesia consent signed with patient.  ASA Score: 3     Day of Surgery Review of History & Physical:    H&P update referred to the surgeon.     Anesthesia Plan Notes: CBC today        Ready For Surgery From  Anesthesia Perspective.

## 2020-09-30 NOTE — PROGRESS NOTES
[facility] [address]  Obstetrics & Gynecology  History & Physical    Patient Name: Jamilah French  MRN: [id number]  Admission Date: (Not on file)  Primary Care Provider: Primary Doctor No    Subjective:     Chief Complaint/Reason for Admission: procreative management   Fluid in the uterus      History of Present Illness:   Jamilah French is a 30 y.o. female  presents for abnormal uterine bleeding  She is trying to conceive for years.  She has bleeding in between cycles. NO dysmenorrhea or dyspareunia  She was diagnosed with PCOS. She is on glucaphage 500 XR daily.  SA done at [facility] - Results being faxed here today.  Semen has low concentration         US showed  FINDINGS:  Uterus: Normal in size but mildly heterogeneous in echotexture. There  is a subserosal fibroid along the anterior mid body measuring 1.9 x  1.7 x 1.4 cm, and intramural fibroid in the fundus measuring 1.8 x 1.8  x 1.6 cm. There is a small nabothian cyst in the cervix and trace  fluid in the endocervical canal.  Position: Anteverted  Uterine size: 7.0 x 4.3 x 3.9 cm (60 mL)     Endometrium: Unremarkable. No abnormal vascularity on color Doppler.  Maximum thickness: 4-5 mm     RIGHT ovary/adnexa: The right ovary is unremarkable. There is normal  color flow in the ovary.  Ovarian size: 3.1 x 2.8 x 1.8 cm     LEFT ovary/adnexa: The left ovary is within normal limits noting a  dominant simple follicle measuring 12 mm in diameter.. There is normal  color flow in the ovary.  Ovarian size: 2.7 x 2.3 x 2.1 cm     Fluid: There is no abnormal free fluid in the cul-de-sac.     IMPRESSION:  1. Heterogeneous appearing fibroid uterus with index fibroids as  outlined above.  2. Physiologic appearing ovaries.                     Current Outpatient Medications on File Prior to Visit   Medication Sig    metFORMIN (GLUCOPHAGE-XR) 500 MG ER 24hr tablet Take 1 tablet (500 mg total) by mouth 2 (two) times daily with meals.    PNV  102-IRON-FOLATE 1-DSS-DHA ORAL Take 1 tablet by mouth once daily.     No current facility-administered medications on file prior to visit.        Review of patient's allergies indicates:   Allergen Reactions    Sulfa (sulfonamide antibiotics)        Past Medical History:   Diagnosis Date    Current moderate episode of major depressive disorder without prior episode 6/15/2018     OB History    Para Term  AB Living   0 0 0 0 0 0   SAB TAB Ectopic Multiple Live Births   0 0 0 0 0     History reviewed. No pertinent surgical history.  Family History     Problem Relation (Age of Onset)    Hypertension Mother    Mental illness Brother        Tobacco Use    Smoking status: Current Every Day Smoker     Types: Vaping w/o nicotine    Smokeless tobacco: Never Used   Substance and Sexual Activity    Alcohol use: Yes     Comment: social    Drug use: No    Sexual activity: Yes     Partners: Male     Birth control/protection: None     Review of Systems  Objective:     Vital Signs (Most Recent):  BP: 122/72 (20 1133) Vital Signs (24h Range):  [unfilled]     Weight: 128.8 kg (283 lb 15.2 oz)  Body mass index is 47.25 kg/m².  Patient's last menstrual period was 2020.    Physical Exam    PE:  APPEARANCE: Well nourished, well developed, in no acute distress.  AFFECT: WNL, alert and oriented x 3.  SKIN: No lesions.  NECK: Neck symmetric without masses or thyromegaly.  NODES: No inguinal, cervical, axillary or femoral lymph node enlargement.  CHEST: Good respiratory effort.   ABDOMEN: Soft. No tenderness or masses. No hepatosplenomegaly. No hernias.  BREASTS: Symmetrical, no skin changes or visible lesions. No palpable masses, nipple discharge bilaterally.  PELVIC: ATROPHIC EXTERNAL FEMALE GENITALIA without lesions. Normal hair distribution. Adequate perineal body, normal urethral meatus. Vagina dry without lesions or discharge. CERVIX STENOTIC without lesions, discharge or tenderness. No significant  cystocele or rectocele. Bimanual exam shows uterus to be normal size, regular, mobile and nontender. Adnexa without masses or tenderness.  EXTREMITIES: No edema.        Assessment/Plan:     There are no hospital problems to display for this patient.      1.  Abnormal uterine bleeding   2.  Procreative management    Will prepare for Hysteroscopy, D &  C    Will prepare for surgery as discussed  The risks benefits and alternatives of surgery discussed in detail  Vicodin rx given   Post op appt given  Pelvic rest X 4  wks      Manjula Rivas MD  Obstetrics & Gynecology  [facility] [address]

## 2020-09-30 NOTE — DISCHARGE INSTRUCTIONS
Information to Prepare you for your Surgery    PRE-ADMIT TESTING -  [phone]    [address]  [place] [facility]          Your surgery has been scheduled at [facility]. We are pleased to have the opportunity to serve you. For Further Information please call [phone].    On the day of surgery please report to the Information Desk on the 1st floor.    · CONTACT YOUR PHYSICIAN'S OFFICE THE DAY PRIOR TO YOUR SURGERY TO OBTAIN YOUR ARRIVAL TIME.     · The evening before surgery do not eat anything after 9 p.m. ( this includes hard candy, chewing gum and mints).  You may only have GATORADE, POWERADE AND WATER  from 9 p.m. until you leave your home.   DO NOT DRINK ANY LIQUIDS ON THE WAY TO THE HOSPITAL.      SPECIAL MEDICATION INSTRUCTIONS: TAKE medications checked off by the Anesthesiologist on your Medication List.    Angiogram Patients: Take medications as instructed by your physician, including aspirin.     Surgery Patients:    If you take ASPIRIN - Your PHYSICIAN/SURGEON will need to inform you IF/OR when you need to stop taking aspirin prior to your surgery.     Do Not take any medications containing IBUPROFEN.  Do Not Wear any make-up or dark nail polish   (especially eye make-up) to surgery. If you come to surgery with makeup on you will be required to remove the makeup or nail polish.    Do not shave your surgical area at least 5 days prior to your surgery. The surgical prep will be performed at the hospital according to Infection Control regulations.    Leave all valuables at home.   Do Not wear any jewelry or watches, including any metal in body piercings. Jewelry must be removed prior to coming to the hospital.  There is a possibility that rings that are unable to be removed may be cut off if they are on the surgical extremity.    Contact Lens must be removed before surgery. Either do not wear the contact lens or bring a case and solution for  storage.  Please bring a container for eyeglasses or dentures as required.  Bring any paperwork your physician has provided, such as consent forms,  history and physicals, doctor's orders, etc.   Bring comfortable clothes that are loose fitting to wear upon discharge. Take into consideration the type of surgery being performed.  Maintain your diet as advised per your physician the day prior to surgery.      Adequate rest the night before surgery is advised.   Park in the Parking lot behind the hospital or in the [facility] across the street from the parking lot. Parking is complimentary.  If you will be discharged the same day as your procedure, please arrange for a responsible adult to drive you home or to accompany you if traveling by taxi.   YOU WILL NOT BE PERMITTED TO DRIVE OR TO LEAVE THE HOSPITAL ALONE AFTER SURGERY.   If you are being discharged the same day, it is strongly recommended that you arrange for someone to remain with you for the first 24 hrs following your surgery.    The Surgeon will speak to your family/visitor after your surgery regarding the outcome of your surgery and post op care.  The Surgeon may speak to you after your surgery, but there is a possibility you may not remember the details.  Please check with your family members regarding the conversation with the Surgeon.    We strongly recommend whoever is bringing you home be present for discharge instructions.  This will ensure a thorough understanding for your post op home care.    ALL CHILDREN MUST ALWAYS BE ACCOMPANIED BY AN ADULT.    Visitors-Refer to current Visitor policy handouts.    Thank you for your cooperation.  The Staff of [facility].                Bathing Instructions with Hibiclens     Shower the evening before and morning of your procedure with Hibiclens:   Wash your face with water and your regular face wash/soap   Apply Hibiclens directly on your skin or on a wet washcloth and wash  gently. When showering: Move away from the shower stream when applying Hibiclens to avoid rinsing off too soon.   Rinse thoroughly with warm water   Do not dilute Hibiclens         Dry off as usual, do not use any deodorant, powder, body lotions, perfume, after shave or cologne.

## 2020-10-02 ENCOUNTER — TELEPHONE (OUTPATIENT)
Dept: URGENT CARE | Facility: CLINIC | Age: 30
End: 2020-10-02

## 2020-10-04 ENCOUNTER — NURSE TRIAGE (OUTPATIENT)
Dept: ADMINISTRATIVE | Facility: CLINIC | Age: 30
End: 2020-10-04

## 2020-10-04 NOTE — TELEPHONE ENCOUNTER
Reason for Disposition   Question about upcoming scheduled test, no triage required and triager able to answer question    Additional Information   Negative: [1] Caller is not with the adult (patient) AND [2] reporting urgent symptoms   Negative: Lab result questions   Negative: Medication questions   Negative: Caller can't be reached by phone   Negative: Caller has already spoken to PCP or another triager   Negative: RN needs further essential information from caller in order to complete triage   Negative: Requesting regular office appointment   Negative: [1] Caller requesting NON-URGENT health information AND [2] PCP's office is the best resource   Negative: Health Information question, no triage required and triager able to answer question   Negative: General information question, no triage required and triager able to answer question   Negative: [1] Caller is not with the adult (patient) AND [2] probable NON-URGENT symptoms   Negative: [1] Follow-up call to recent contact AND [2] information only call, no triage required    Protocols used: INFORMATION ONLY CALL - NO TRIAGE-A-

## 2020-10-05 ENCOUNTER — LAB VISIT (OUTPATIENT)
Dept: PRIMARY CARE CLINIC | Facility: CLINIC | Age: 30
End: 2020-10-05
Payer: COMMERCIAL

## 2020-10-05 ENCOUNTER — TELEPHONE (OUTPATIENT)
Dept: OBSTETRICS AND GYNECOLOGY | Facility: CLINIC | Age: 30
End: 2020-10-05

## 2020-10-05 DIAGNOSIS — N93.9 ABNORMAL UTERINE BLEEDING: Primary | ICD-10-CM

## 2020-10-05 DIAGNOSIS — N93.9 ABNORMAL UTERINE BLEEDING: ICD-10-CM

## 2020-10-05 PROCEDURE — U0003 INFECTIOUS AGENT DETECTION BY NUCLEIC ACID (DNA OR RNA); SEVERE ACUTE RESPIRATORY SYNDROME CORONAVIRUS 2 (SARS-COV-2) (CORONAVIRUS DISEASE [COVID-19]), AMPLIFIED PROBE TECHNIQUE, MAKING USE OF HIGH THROUGHPUT TECHNOLOGIES AS DESCRIBED BY CMS-2020-01-R: HCPCS

## 2020-10-05 NOTE — TELEPHONE ENCOUNTER
Patient has bee rescheduled for her covid testing. Will be done today at 10:50 AM at the [place] location.

## 2020-10-06 ENCOUNTER — TELEPHONE (OUTPATIENT)
Dept: OBSTETRICS AND GYNECOLOGY | Facility: CLINIC | Age: 30
End: 2020-10-06

## 2020-10-06 LAB — SARS-COV-2 RNA RESP QL NAA+PROBE: NOT DETECTED

## 2020-10-06 NOTE — TELEPHONE ENCOUNTER
Called patient. No answer. Left voice message for patient to call the office.     Need to rescheduled surgery. 10/26 available.

## 2020-10-09 ENCOUNTER — PATIENT MESSAGE (OUTPATIENT)
Dept: OBSTETRICS AND GYNECOLOGY | Facility: CLINIC | Age: 30
End: 2020-10-09

## 2020-10-09 ENCOUNTER — TELEPHONE (OUTPATIENT)
Dept: OBSTETRICS AND GYNECOLOGY | Facility: CLINIC | Age: 30
End: 2020-10-09

## 2020-10-09 DIAGNOSIS — N93.9 ABNORMAL UTERINE BLEEDING: Primary | ICD-10-CM

## 2020-10-14 ENCOUNTER — PATIENT MESSAGE (OUTPATIENT)
Dept: ENDOCRINOLOGY | Facility: CLINIC | Age: 30
End: 2020-10-14

## 2020-10-18 RX ORDER — METFORMIN HYDROCHLORIDE 750 MG/1
750 TABLET, EXTENDED RELEASE ORAL 2 TIMES DAILY WITH MEALS
Qty: 60 TABLET | Refills: 11 | Status: SHIPPED | OUTPATIENT
Start: 2020-10-18 | End: 2022-04-12 | Stop reason: CLARIF

## 2020-10-18 NOTE — TELEPHONE ENCOUNTER
Tolerating metformin 500 mg onetab bid well   Ok to increase to 750 mg bid     Has had menstrual cycle this past ,month.

## 2020-10-19 ENCOUNTER — PATIENT MESSAGE (OUTPATIENT)
Dept: OBSTETRICS AND GYNECOLOGY | Facility: CLINIC | Age: 30
End: 2020-10-19

## 2020-10-19 ENCOUNTER — PATIENT MESSAGE (OUTPATIENT)
Dept: ENDOCRINOLOGY | Facility: CLINIC | Age: 30
End: 2020-10-19

## 2020-10-23 ENCOUNTER — LAB VISIT (OUTPATIENT)
Dept: PRIMARY CARE CLINIC | Facility: CLINIC | Age: 30
End: 2020-10-23
Payer: COMMERCIAL

## 2020-10-23 ENCOUNTER — TELEPHONE (OUTPATIENT)
Dept: OBSTETRICS AND GYNECOLOGY | Facility: CLINIC | Age: 30
End: 2020-10-23

## 2020-10-23 DIAGNOSIS — N93.9 ABNORMAL UTERINE BLEEDING: ICD-10-CM

## 2020-10-23 PROCEDURE — U0003 INFECTIOUS AGENT DETECTION BY NUCLEIC ACID (DNA OR RNA); SEVERE ACUTE RESPIRATORY SYNDROME CORONAVIRUS 2 (SARS-COV-2) (CORONAVIRUS DISEASE [COVID-19]), AMPLIFIED PROBE TECHNIQUE, MAKING USE OF HIGH THROUGHPUT TECHNOLOGIES AS DESCRIBED BY CMS-2020-01-R: HCPCS

## 2020-10-23 NOTE — TELEPHONE ENCOUNTER
Called patient. No answer. Left voice message for patient to call the office.     Left message for patient to arrive at 11 AM for her procedure on 10/26/20.

## 2020-10-24 LAB — SARS-COV-2 RNA RESP QL NAA+PROBE: NOT DETECTED

## 2020-10-26 ENCOUNTER — ANESTHESIA (OUTPATIENT)
Dept: SURGERY | Facility: OTHER | Age: 30
End: 2020-10-26
Payer: COMMERCIAL

## 2020-10-26 ENCOUNTER — HOSPITAL ENCOUNTER (OUTPATIENT)
Facility: OTHER | Age: 30
Discharge: HOME | End: 2020-10-26
Attending: OBSTETRICS & GYNECOLOGY | Admitting: OBSTETRICS & GYNECOLOGY
Payer: COMMERCIAL

## 2020-10-26 VITALS
OXYGEN SATURATION: 100 % | RESPIRATION RATE: 16 BRPM | TEMPERATURE: 99 F | WEIGHT: 280 LBS | BODY MASS INDEX: 46.65 KG/M2 | DIASTOLIC BLOOD PRESSURE: 72 MMHG | HEART RATE: 93 BPM | HEIGHT: 65 IN | SYSTOLIC BLOOD PRESSURE: 125 MMHG

## 2020-10-26 DIAGNOSIS — Z98.890 STATUS POST D&C: Primary | ICD-10-CM

## 2020-10-26 DIAGNOSIS — N93.9 ABNORMAL UTERINE BLEEDING: ICD-10-CM

## 2020-10-26 LAB
B-HCG UR QL: NEGATIVE
CTP QC/QA: YES

## 2020-10-26 PROCEDURE — 63600175 PHARM REV CODE 636 W HCPCS: Performed by: OBSTETRICS & GYNECOLOGY

## 2020-10-26 PROCEDURE — 63600175 PHARM REV CODE 636 W HCPCS: Performed by: NURSE ANESTHETIST, CERTIFIED REGISTERED

## 2020-10-26 PROCEDURE — 58558 HYSTEROSCOPY BIOPSY: CPT | Mod: ,,, | Performed by: OBSTETRICS & GYNECOLOGY

## 2020-10-26 PROCEDURE — 36000707: Performed by: OBSTETRICS & GYNECOLOGY

## 2020-10-26 PROCEDURE — C1782 MORCELLATOR: HCPCS | Performed by: OBSTETRICS & GYNECOLOGY

## 2020-10-26 PROCEDURE — 81025 URINE PREGNANCY TEST: CPT | Performed by: ANESTHESIOLOGY

## 2020-10-26 PROCEDURE — 63600175 PHARM REV CODE 636 W HCPCS: Performed by: ANESTHESIOLOGY

## 2020-10-26 PROCEDURE — 27201423 OPTIME MED/SURG SUP & DEVICES STERILE SUPPLY: Performed by: OBSTETRICS & GYNECOLOGY

## 2020-10-26 PROCEDURE — 37000009 HC ANESTHESIA EA ADD 15 MINS: Performed by: OBSTETRICS & GYNECOLOGY

## 2020-10-26 PROCEDURE — 37000008 HC ANESTHESIA 1ST 15 MINUTES: Performed by: OBSTETRICS & GYNECOLOGY

## 2020-10-26 PROCEDURE — 90471 IMMUNIZATION ADMIN: CPT | Performed by: OBSTETRICS & GYNECOLOGY

## 2020-10-26 PROCEDURE — 88305 TISSUE EXAM BY PATHOLOGIST: ICD-10-PCS | Mod: 26,,, | Performed by: PATHOLOGY

## 2020-10-26 PROCEDURE — 71000016 HC POSTOP RECOV ADDL HR: Performed by: OBSTETRICS & GYNECOLOGY

## 2020-10-26 PROCEDURE — 71000015 HC POSTOP RECOV 1ST HR: Performed by: OBSTETRICS & GYNECOLOGY

## 2020-10-26 PROCEDURE — 25000003 PHARM REV CODE 250: Performed by: OBSTETRICS & GYNECOLOGY

## 2020-10-26 PROCEDURE — 36000706: Performed by: OBSTETRICS & GYNECOLOGY

## 2020-10-26 PROCEDURE — 88305 TISSUE EXAM BY PATHOLOGIST: CPT | Mod: 26,,, | Performed by: PATHOLOGY

## 2020-10-26 PROCEDURE — 25000003 PHARM REV CODE 250: Performed by: ANESTHESIOLOGY

## 2020-10-26 PROCEDURE — 71000033 HC RECOVERY, INTIAL HOUR: Performed by: OBSTETRICS & GYNECOLOGY

## 2020-10-26 PROCEDURE — 25000003 PHARM REV CODE 250: Performed by: NURSE ANESTHETIST, CERTIFIED REGISTERED

## 2020-10-26 PROCEDURE — 90686 IIV4 VACC NO PRSV 0.5 ML IM: CPT | Performed by: OBSTETRICS & GYNECOLOGY

## 2020-10-26 PROCEDURE — 88305 TISSUE EXAM BY PATHOLOGIST: CPT | Performed by: PATHOLOGY

## 2020-10-26 PROCEDURE — 58558 PR HYSTEROSCOPY,W/ENDO BX: ICD-10-PCS | Mod: ,,, | Performed by: OBSTETRICS & GYNECOLOGY

## 2020-10-26 RX ORDER — DIPHENHYDRAMINE HCL 25 MG
25 CAPSULE ORAL EVERY 4 HOURS PRN
Status: CANCELLED | OUTPATIENT
Start: 2020-10-26

## 2020-10-26 RX ORDER — MIDAZOLAM HYDROCHLORIDE 1 MG/ML
INJECTION INTRAMUSCULAR; INTRAVENOUS
Status: DISCONTINUED | OUTPATIENT
Start: 2020-10-26 | End: 2020-10-26

## 2020-10-26 RX ORDER — SUCCINYLCHOLINE CHLORIDE 20 MG/ML
INJECTION INTRAMUSCULAR; INTRAVENOUS
Status: DISCONTINUED | OUTPATIENT
Start: 2020-10-26 | End: 2020-10-26

## 2020-10-26 RX ORDER — ACETAMINOPHEN 500 MG
1000 TABLET ORAL
Status: COMPLETED | OUTPATIENT
Start: 2020-10-26 | End: 2020-10-26

## 2020-10-26 RX ORDER — HYDROMORPHONE HYDROCHLORIDE 2 MG/ML
0.4 INJECTION, SOLUTION INTRAMUSCULAR; INTRAVENOUS; SUBCUTANEOUS EVERY 5 MIN PRN
Status: DISCONTINUED | OUTPATIENT
Start: 2020-10-26 | End: 2020-10-26 | Stop reason: HOSPADM

## 2020-10-26 RX ORDER — FENTANYL CITRATE 50 UG/ML
INJECTION, SOLUTION INTRAMUSCULAR; INTRAVENOUS
Status: DISCONTINUED | OUTPATIENT
Start: 2020-10-26 | End: 2020-10-26

## 2020-10-26 RX ORDER — SILVER NITRATE 38.21; 12.74 MG/1; MG/1
STICK TOPICAL
Status: DISCONTINUED | OUTPATIENT
Start: 2020-10-26 | End: 2020-10-26 | Stop reason: HOSPADM

## 2020-10-26 RX ORDER — MUPIROCIN 20 MG/G
OINTMENT TOPICAL
Status: DISCONTINUED | OUTPATIENT
Start: 2020-10-26 | End: 2020-10-26 | Stop reason: HOSPADM

## 2020-10-26 RX ORDER — ONDANSETRON 8 MG/1
8 TABLET, ORALLY DISINTEGRATING ORAL EVERY 8 HOURS PRN
Status: DISCONTINUED | OUTPATIENT
Start: 2020-10-26 | End: 2020-10-26 | Stop reason: HOSPADM

## 2020-10-26 RX ORDER — PROPOFOL 10 MG/ML
VIAL (ML) INTRAVENOUS
Status: DISCONTINUED | OUTPATIENT
Start: 2020-10-26 | End: 2020-10-26

## 2020-10-26 RX ORDER — MEPERIDINE HYDROCHLORIDE 25 MG/ML
12.5 INJECTION INTRAMUSCULAR; INTRAVENOUS; SUBCUTANEOUS ONCE AS NEEDED
Status: DISCONTINUED | OUTPATIENT
Start: 2020-10-26 | End: 2020-10-26 | Stop reason: HOSPADM

## 2020-10-26 RX ORDER — OXYCODONE HYDROCHLORIDE 5 MG/1
5 TABLET ORAL
Status: DISCONTINUED | OUTPATIENT
Start: 2020-10-26 | End: 2020-10-26 | Stop reason: HOSPADM

## 2020-10-26 RX ORDER — IBUPROFEN 600 MG/1
600 TABLET ORAL EVERY 6 HOURS PRN
Status: CANCELLED | OUTPATIENT
Start: 2020-10-26

## 2020-10-26 RX ORDER — LIDOCAINE HYDROCHLORIDE 20 MG/ML
INJECTION INTRAVENOUS
Status: DISCONTINUED | OUTPATIENT
Start: 2020-10-26 | End: 2020-10-26

## 2020-10-26 RX ORDER — DIPHENHYDRAMINE HYDROCHLORIDE 50 MG/ML
25 INJECTION INTRAMUSCULAR; INTRAVENOUS EVERY 4 HOURS PRN
Status: CANCELLED | OUTPATIENT
Start: 2020-10-26

## 2020-10-26 RX ORDER — ONDANSETRON 2 MG/ML
4 INJECTION INTRAMUSCULAR; INTRAVENOUS DAILY PRN
Status: DISCONTINUED | OUTPATIENT
Start: 2020-10-26 | End: 2020-10-26 | Stop reason: HOSPADM

## 2020-10-26 RX ORDER — SODIUM CHLORIDE, SODIUM LACTATE, POTASSIUM CHLORIDE, CALCIUM CHLORIDE 600; 310; 30; 20 MG/100ML; MG/100ML; MG/100ML; MG/100ML
INJECTION, SOLUTION INTRAVENOUS CONTINUOUS
Status: DISCONTINUED | OUTPATIENT
Start: 2020-10-26 | End: 2020-10-26 | Stop reason: HOSPADM

## 2020-10-26 RX ORDER — SODIUM CHLORIDE 9 MG/ML
INJECTION, SOLUTION INTRAVENOUS CONTINUOUS
Status: DISCONTINUED | OUTPATIENT
Start: 2020-10-26 | End: 2020-10-26 | Stop reason: HOSPADM

## 2020-10-26 RX ORDER — SODIUM CHLORIDE 0.9 % (FLUSH) 0.9 %
3 SYRINGE (ML) INJECTION
Status: DISCONTINUED | OUTPATIENT
Start: 2020-10-26 | End: 2020-10-26 | Stop reason: HOSPADM

## 2020-10-26 RX ORDER — HYDROCODONE BITARTRATE AND ACETAMINOPHEN 5; 325 MG/1; MG/1
1 TABLET ORAL EVERY 6 HOURS PRN
Qty: 10 TABLET | Refills: 0 | Status: SHIPPED | OUTPATIENT
Start: 2020-10-26 | End: 2021-10-04

## 2020-10-26 RX ORDER — ONDANSETRON 2 MG/ML
INJECTION INTRAMUSCULAR; INTRAVENOUS
Status: DISCONTINUED | OUTPATIENT
Start: 2020-10-26 | End: 2020-10-26

## 2020-10-26 RX ORDER — HYDROCODONE BITARTRATE AND ACETAMINOPHEN 5; 325 MG/1; MG/1
1 TABLET ORAL EVERY 4 HOURS PRN
Status: CANCELLED | OUTPATIENT
Start: 2020-10-26

## 2020-10-26 RX ORDER — LIDOCAINE HYDROCHLORIDE 10 MG/ML
0.5 INJECTION, SOLUTION EPIDURAL; INFILTRATION; INTRACAUDAL; PERINEURAL ONCE
Status: DISCONTINUED | OUTPATIENT
Start: 2020-10-26 | End: 2020-10-26 | Stop reason: HOSPADM

## 2020-10-26 RX ORDER — FAMOTIDINE 20 MG/1
20 TABLET, FILM COATED ORAL
Status: COMPLETED | OUTPATIENT
Start: 2020-10-26 | End: 2020-10-26

## 2020-10-26 RX ORDER — IBUPROFEN 600 MG/1
600 TABLET ORAL 3 TIMES DAILY
Qty: 60 TABLET | Refills: 2 | Status: SHIPPED | OUTPATIENT
Start: 2020-10-26 | End: 2021-10-04

## 2020-10-26 RX ORDER — ROCURONIUM BROMIDE 10 MG/ML
INJECTION, SOLUTION INTRAVENOUS
Status: DISCONTINUED | OUTPATIENT
Start: 2020-10-26 | End: 2020-10-26

## 2020-10-26 RX ADMIN — ONDANSETRON HYDROCHLORIDE 4 MG: 2 INJECTION INTRAMUSCULAR; INTRAVENOUS at 01:10

## 2020-10-26 RX ADMIN — OXYCODONE 5 MG: 5 TABLET ORAL at 03:10

## 2020-10-26 RX ADMIN — MIDAZOLAM HYDROCHLORIDE 2 MG: 1 INJECTION, SOLUTION INTRAMUSCULAR; INTRAVENOUS at 12:10

## 2020-10-26 RX ADMIN — SODIUM CHLORIDE, SODIUM LACTATE, POTASSIUM CHLORIDE, AND CALCIUM CHLORIDE: 600; 310; 30; 20 INJECTION, SOLUTION INTRAVENOUS at 12:10

## 2020-10-26 RX ADMIN — ROCURONIUM BROMIDE 10 MG: 10 INJECTION, SOLUTION INTRAVENOUS at 01:10

## 2020-10-26 RX ADMIN — INFLUENZA VIRUS VACCINE 0.5 ML: 15; 15; 15; 15 SUSPENSION INTRAMUSCULAR at 03:10

## 2020-10-26 RX ADMIN — MUPIROCIN: 20 OINTMENT TOPICAL at 11:10

## 2020-10-26 RX ADMIN — FAMOTIDINE 20 MG: 20 TABLET ORAL at 11:10

## 2020-10-26 RX ADMIN — CARBOXYMETHYLCELLULOSE SODIUM 2 DROP: 2.5 SOLUTION/ DROPS OPHTHALMIC at 01:10

## 2020-10-26 RX ADMIN — SUCCINYLCHOLINE CHLORIDE 160 MG: 20 INJECTION, SOLUTION INTRAMUSCULAR; INTRAVENOUS at 01:10

## 2020-10-26 RX ADMIN — LIDOCAINE HYDROCHLORIDE 50 MG: 20 INJECTION, SOLUTION INTRAVENOUS at 01:10

## 2020-10-26 RX ADMIN — PROPOFOL 200 MG: 10 INJECTION, EMULSION INTRAVENOUS at 01:10

## 2020-10-26 RX ADMIN — ACETAMINOPHEN 1000 MG: 500 TABLET, FILM COATED ORAL at 11:10

## 2020-10-26 RX ADMIN — FENTANYL CITRATE 100 MCG: 50 INJECTION, SOLUTION INTRAMUSCULAR; INTRAVENOUS at 01:10

## 2020-10-26 NOTE — DISCHARGE INSTRUCTIONS
Home Care Instruction D&C Hysteroscopy             ACTIVITY LEVEL:  If you received sedation and/or an anesthetic, you may feel sleepy for several hours. Rest until you feel more  awake. Gradually resume your normal activities.    DIET:  At home, continue with liquids. If there is no nausea, you may eat a soft diet and gradually resume a regular diet.    BATHING:  You may shower  as desired in one day.  You should avoid tub baths, hot tubs and swimming pools until seen by your physician for a post-op follow up.    CARE:  You can expect watery or bloody vaginal discharge for several days. Do not use tampons, please only use pads. Sexual activity is restricted as advised by your doctor.    MEDICATIONS:  You will receive instructions for any pain and/or antibiotic prescriptions. Pain medication should be taken only if needed and as directed. Antibiotics, if ordered, should be taken as directed until the entire prescription is completed.    ADDITIONAL INFORMATION:  __________________________________________________________________________________________  WHEN TO CALL THE DOCTOR:   For any heavy vaginal bleeding   Fever over 101°F (38.4°C)   Any lower abdominal pain not relieved by the pain mediation  RETURN APPOINTMENT:  __________________________________________________________________________________________  FOR EMERGENCIES:  If any unusual problems or difficulties occur, contact  _______Rob___________ or the resident at [phone] (page ) or the Clinic office, [phone].      Anesthesia: After Your Surgery  Youve just had surgery. During surgery, you received medication called anesthesia to keep you comfortable and pain-free. After surgery, you may experience some pain or nausea. This is common. Here are some tips for feeling better and recovering after surgery.    Going home  Your doctor or nurse will show you how to take care of yourself when you go home. He or she will also answer  your questions. Have an adult family member or friend drive you home. For the first 24 hours after your surgery:  · Do not drive or use heavy equipment.  · Do not make important decisions or sign legal documents.  · Avoid alcohol.  · Have someone stay with you, if needed. He or she can watch for problems and help keep you safe.  · Take your time getting up from a seated or lying position. You may experience dizziness for 24 hours  Be sure to keep all follow-up appointments with your doctor. And rest after your procedure for as long as your doctor tells you to.    Coping with pain  If you have pain after surgery, pain medication will help you feel better. Take it as directed, before pain becomes severe. Also, ask your doctor or pharmacist about other ways to control pain, such as with heat, ice, and relaxation. And follow any other instructions your surgeon or nurse gives you.    URINARY RETENTION  Should you experience a decrease in your urine output or are unable to urinate following surgery, this can be due to the medications given during surgery.  We recommend you going to the nearest Emergency Department.    Tips for taking pain medication  To get the best relief possible, remember these points:  · Pain medications can upset your stomach. Taking them with a little food may help.  · Most pain relievers taken by mouth need at least 20 to 30 minutes to take effect.  · Taking medication on a schedule can help you remember to take it. Try to time your medication so that you can take it before beginning an activity, such as dressing, walking, or sitting down for dinner.  · Constipation is a common side effect of pain medications. Contact your doctor before taking any medications like laxatives or stool softeners to help relieve constipation. Also ask about any dietary restrictions, because drinking lots of fluids and eating foods like fruits and vegetables that are high in fiber can also help. Remember, dont take  laxatives unless your surgeon has prescribed them.  · Mixing alcohol and pain medication can cause dizziness and slow your breathing. It can even be fatal. Dont drink alcohol while taking pain medication.  · Pain medication can slow your reflexes. Dont drive or operate machinery while taking pain medication.  If your health care provider tells you to take acetaminophen to help relieve your pain, ask him or her how much you are supposed to take each day. (Acetaminophen is the generic name for Tylenol and other brand-name pain relievers.) Acetaminophen or other pain relievers may interact with your prescription medicines or other over-the-counter (OTC) drugs. Some prescription medications contain acetaminophen along with other active ingredients. Using both prescription and OTC acetaminophen for pain can cause you to overdose. The FDA recommends that you read the labels on your OTC medications carefully. This will help you to clearly understand the list of active ingredients, dosing instructions, and any warnings. It may also help you avoid taking too much acetaminophen. If you have questions or don't understand the information, ask your pharmacist or health care provider to explain it to you before you take the OTC medication.    Managing nausea  Some people have an upset stomach after surgery. This is often due to anesthesia, pain, pain medications, or the stress of surgery. The following tips will help you manage nausea and get good nutrition as you recover. If you were on a special diet before surgery, ask your doctor if you should follow it during recovery. These tips may help:  · Dont push yourself to eat. Your body will tell you when to eat and how much.  · Start off with clear liquids and soup. They are easier to digest.  · Progress to semi-solid foods (mashed potatoes, applesauce, and gelatin) as you feel ready.  · Slowly move to solid foods. Dont eat fatty, rich, or spicy foods at first.  · Dont force  yourself to have three large meals a day. Instead, eat smaller amounts more often.  · Take pain medications with a small amount of solid food, such as crackers or toast to avoid nausea.      Call your surgeon if    · You feel too sleepy, dizzy, or groggy (medication may be too strong).  · You have side effects like nausea, vomiting, or skin changes (rash, itching, or hives).   © 4008-3843 [employer]. [address] [place] [address], [place] [address]. All rights reserved. This information is not intended as a substitute for professional medical care. Always follow your healthcare professional's instructions.

## 2020-10-26 NOTE — OP NOTE
OPERATIVE REPORT    10/26/2020     PREOPERATIVE DIAGNOSIS  1. AUB    POSTOPERATIVE DIAGNOSIS  1. same. S/P D&C/Hysteroscopy    PROCEDURE:  1. Hysteroscopy  2. Dilation and curettage   3.  Myosure resection of polyps    SURGEON: Dr. Manjula iRvas MD    ASSISTANT: Katherine Boecking MD PGY-1  ANESTHESIA: General    COMPLICATIONS: None    EBL: < 10 cc    IV FLUIDS: See anesthesia report    URINE OUTPUT: 100  cc drained via in-and-out catheterization prior to procedure    Hysteroscopy fluid deficit: 140cc    FINDINGS:  Uterus sounded to 9 cm. Cervix serially dilated to 14 Emirati using Jhony dilators. Entire uterine cavity visualized with hysteroscope and proliferative tissue noted. Curettage performed with MyoSure device and sharp curettage. Specimens sent to pathology. Good hemostasis noted at tenaculum site with silver nitrate.     SPECIMENS:  1. Endometrial curettage      PROCEDURE:   Patient was taken to the operating room where general anesthesia was administered and found to be adequate.  She was placed in the dorsal lithotomy position using Emery stirrups, then prepped and draped in the usual sterile fashion. Bladder was drained via in-and-out catheterization prior to procedure.  A surgical timeout was performed with patient's name, date of birth, procedure to be performed, and allergies verbalized. All OR staff in agreement. No preoperative antibiotics were administered as none were indicated.    Attention was then turned to the vagina where a weighted sterile speculum was placed in the posterior aspect, and a right angle retractor was placed in the anterior aspect.  The anterior lip of the cervix was grasped with a single tooth tenaculum.  The uterus was sounded to approximately 9 cm. The cervix was serially dilated to 14. The 5mm hysteroscope was advanced through the cervical os and the uterus was distended with normal saline.  The endometrium was inspected and found proliferative tissue.  The tubal ostia were  identified without difficulty, and appeared normal. The Reach device was then inserted and curettage performed of proliferative tissue. Endometrial tissue sent to pathology. The hysteroscope and Reach device were withdrawn without difficulty.     The uterus was then curetted in a clockwise fashion until gritty feeling was noted in all aspects of the uterus. The endometrial scrapings were sent to pathology. The tenaculum was removed and hemostasis was noted at the puncture sites in the cervix.     Sponge and instrument counts were correct x 2. The patient tolerated the procedure well and was awakened without difficulty. She was taken to the recovery room in stable condition.    Katherine Boecking MD  Ob/Gyn PGY-1

## 2020-10-26 NOTE — TRANSFER OF CARE
"Anesthesia Transfer of Care Note    Patient: Jamilah French    Procedure(s) Performed: Procedure(s) (LRB):  HYSTEROSCOPY, WITH DILATION AND CURETTAGE OF UTERUS (N/A)    Patient location: PACU    Anesthesia Type: general    Transport from OR: Transported from OR on 2-3 L/min O2 by NC with adequate spontaneous ventilation    Post pain: adequate analgesia    Post assessment: no apparent anesthetic complications and tolerated procedure well    Post vital signs: stable    Level of consciousness: awake, alert and oriented    Nausea/Vomiting: no nausea/vomiting    Complications: none    Transfer of care protocol was followed      Last vitals:   Visit Vitals  BP (!) 144/75 (BP Location: Right arm, Patient Position: Lying)   Pulse 101   Temp 37 °C (98.6 °F) (Oral)   Resp 16   Ht 5' 5" (1.651 m)   Wt 127 kg (280 lb)   SpO2 100%   Breastfeeding No   BMI 46.59 kg/m²     "

## 2020-10-26 NOTE — PLAN OF CARE
Jamilah Jaime French has met all discharge criteria from Phase II. Vital Signs are stable, ambulating  without difficulty.Pain is now under control and tolerable for the pt. Pain score 3 at this time.  Discharge instructions given, patient verbalized understanding. Discharged from facility via wheelchair in stable condition.

## 2020-10-26 NOTE — ANESTHESIA POSTPROCEDURE EVALUATION
Anesthesia Post Evaluation    Patient: Jamilah French    Procedure(s) Performed: Procedure(s) (LRB):  HYSTEROSCOPY, WITH DILATION AND CURETTAGE OF UTERUS (N/A)    Final Anesthesia Type: general    Patient location during evaluation: PACU  Patient participation: Yes- Able to Participate  Level of consciousness: awake and alert  Post-procedure vital signs: reviewed and stable  Pain management: adequate  Airway patency: patent  NATALIE mitigation strategies: Extubation while patient is awake  PONV status at discharge: No PONV  Anesthetic complications: no      Cardiovascular status: hemodynamically stable  Respiratory status: unassisted  Hydration status: euvolemic  Follow-up not needed.          Vitals Value Taken Time   /88 10/26/20 1448   Temp 36.9 °C (98.5 °F) 10/26/20 1448   Pulse 84 10/26/20 1448   Resp 16 10/26/20 1448   SpO2 99 % 10/26/20 1448         Event Time   Out of Recovery 14:41:42         Pain/Sidney Score: Pain Rating Prior to Med Admin: 0 (10/26/2020 11:30 AM)  Sidney Score: 10 (10/26/2020  2:48 PM)

## 2020-10-26 NOTE — INTERVAL H&P NOTE
The patient has been examined and the H&P has been reviewed:    I concur with the findings and no changes have occurred since H&P was written.    Surgery risks, benefits and alternative options discussed and understood by patient/family.    Katherine Boecking MD   Ob/Gyn PGY 1        Active Hospital Problems    Diagnosis  POA    Abnormal uterine bleeding [N93.9]  Yes      Resolved Hospital Problems   No resolved problems to display.

## 2020-10-26 NOTE — OR NURSING
Pt states mild tolerable cramping at this time. No change from previous assessment. prpepared fo transfer to acu.

## 2020-10-26 NOTE — ANESTHESIA PROCEDURE NOTES
Intubation  Performed by: Bella Sofia CRNA  Authorized by: Sharad Watts MD     Intubation:     Induction:  Rapid sequence induction    Intubated:  Postinduction    Mask Ventilation:  Easy mask    Attempts:  1    Attempted By:  CRNA    Method of Intubation:  Video laryngoscopy    Blade:  Estrella 3    Laryngeal View Grade: Grade I - full view of chords      Difficult Airway Encountered?: No      Complications:  None    Airway Device:  Oral endotracheal tube    Airway Device Size:  7.5    Style/Cuff Inflation:  Cuffed (inflated to minimal occlusive pressure)    Inflation Amount (mL):  3    Tube secured:  22    Secured at:  The teeth    Placement Verified By:  Capnometry    Complicating Factors:  Obesity, short neck and poor neck/head extension    Findings Post-Intubation:  BS equal bilateral and atraumatic/condition of teeth unchanged

## 2020-10-26 NOTE — DISCHARGE SUMMARY
[facility]  Brief Op Note/Discharge Note  Short Stay    Admit Date: 10/26/2020    Discharge Date: 10/26/2020    Attending Physician: Manjula Rivas MD     Surgery Date: 10/26/2020     Surgeon(s) and Role:     * Manjula Rivas MD - Primary    Assisting Surgeon: Katherine Boecking MD PGY-1    Pre-op Diagnosis:  Abnormal uterine bleeding [N93.9]    Post-op Diagnosis:  Post-Op Diagnosis Codes:     * Abnormal uterine bleeding [N93.9]    Procedure(s) (LRB):  HYSTEROSCOPY, WITH DILATION AND CURETTAGE OF UTERUS (N/A)    Anesthesia: General    Findings/Key Components: Uterus sounded to 9 cm. Cervix serially dilated to 14 french using Jhony dilators. Entire uterine cavity visualized with hysteroscope and proliferative tissue noted. Curettage performed with MyoSure device and sharp curettage. Specimens sent to pathology. Good hemostasis noted at tenaculum site with silver nitrate.     Estimated Blood Loss: < 10 cc         Specimens:   Endometrial Curettage    Discharge Provider: Katherine C Boecking    Diagnoses:  Active Hospital Problems    Diagnosis  POA    *Status post hysteroscope D&C [Z98.890]  Not Applicable    Abnormal uterine bleeding [N93.9]  Yes      Resolved Hospital Problems   No resolved problems to display.       Discharged Condition: good    Hospital Course:   Patient was admitted for outpatient procedure as above, and tolerated the procedure well with no complications. Please see operative report for further details. Following the procedure, the patient was awakened from anesthesia and transferred to the recovery area in stable condition. She was discharged to home once ambulating, voiding, tolerating PO intake, and pain was well-controlled. Patient was given routine post-op instructions and prescriptions for pain medication to take as needed. Patient instructed to follow up with Dr. Rivas in 4 weeks.    Final Diagnoses: Same as principal problem.    Disposition: Home or Self Care    Follow  up/Patient Instructions:    Medications:  Reconciled Home Medications:      Medication List      START taking these medications    ibuprofen 600 MG tablet  Commonly known as: ADVIL,MOTRIN  Take 1 tablet (600 mg total) by mouth 3 (three) times daily.        CONTINUE taking these medications    HYDROcodone-acetaminophen 5-325 mg per tablet  Commonly known as: NORCO  Take 1 tablet by mouth every 6 (six) hours as needed for Pain.     metFORMIN 750 MG ER 24hr tablet  Commonly known as: GLUCOPHAGE-XR  Take 1 tablet (750 mg total) by mouth 2 (two) times daily with meals.     -IRON-FOLATE 1-DSS-DHA ORAL  Take 1 tablet by mouth once daily.          Discharge Procedure Orders   Diet Adult Regular     Pelvic Rest   Order Comments: Nothing in vagina for 1-2 weeks     Lifting restrictions   Order Comments: Nothing heavier than 5 lbs for 2 weeks     No driving until:   Order Comments: Until not taking narcotics     Notify your health care provider if you experience any of the following:  temperature >100.4     Notify your health care provider if you experience any of the following:  persistent nausea and vomiting or diarrhea     Notify your health care provider if you experience any of the following:  severe uncontrolled pain     Notify your health care provider if you experience any of the following:  redness, tenderness, or signs of infection (pain, swelling, redness, odor or green/yellow discharge around incision site)     Notify your health care provider if you experience any of the following:  difficulty breathing or increased cough     Notify your health care provider if you experience any of the following:  severe persistent headache     Notify your health care provider if you experience any of the following:  persistent dizziness, light-headedness, or visual disturbances     Notify your health care provider if you experience any of the following:  increased confusion or weakness     Notify your health care provider  if you experience any of the following:   Order Comments: Heavy vaginal bleeding     Activity as tolerated     Follow-up Information     Manjula Rivas MD In 4 weeks.    Specialties: Obstetrics, Obstetrics and Gynecology  Why: post op follow up  Contact information:  [address] [id number] [phone]                     Katherine Boecking M.D.  [facility]

## 2020-10-28 LAB
FINAL PATHOLOGIC DIAGNOSIS: NORMAL
GROSS: NORMAL
Lab: NORMAL

## 2020-11-27 ENCOUNTER — OFFICE VISIT (OUTPATIENT)
Dept: OBSTETRICS AND GYNECOLOGY | Facility: CLINIC | Age: 30
End: 2020-11-27
Payer: COMMERCIAL

## 2020-11-27 VITALS
DIASTOLIC BLOOD PRESSURE: 76 MMHG | BODY MASS INDEX: 46.94 KG/M2 | WEIGHT: 281.75 LBS | SYSTOLIC BLOOD PRESSURE: 124 MMHG | HEIGHT: 65 IN

## 2020-11-27 DIAGNOSIS — Z98.890 POST-OPERATIVE STATE: Primary | ICD-10-CM

## 2020-11-27 PROCEDURE — 99024 PR POST-OP FOLLOW-UP VISIT: ICD-10-PCS | Mod: S$GLB,,, | Performed by: OBSTETRICS & GYNECOLOGY

## 2020-11-27 PROCEDURE — 1126F PR PAIN SEVERITY QUANTIFIED, NO PAIN PRESENT: ICD-10-PCS | Mod: S$GLB,,, | Performed by: OBSTETRICS & GYNECOLOGY

## 2020-11-27 PROCEDURE — 3008F BODY MASS INDEX DOCD: CPT | Mod: CPTII,S$GLB,, | Performed by: OBSTETRICS & GYNECOLOGY

## 2020-11-27 PROCEDURE — 99999 PR PBB SHADOW E&M-EST. PATIENT-LVL III: CPT | Mod: PBBFAC,,, | Performed by: OBSTETRICS & GYNECOLOGY

## 2020-11-27 PROCEDURE — 3008F PR BODY MASS INDEX (BMI) DOCUMENTED: ICD-10-PCS | Mod: CPTII,S$GLB,, | Performed by: OBSTETRICS & GYNECOLOGY

## 2020-11-27 PROCEDURE — 1126F AMNT PAIN NOTED NONE PRSNT: CPT | Mod: S$GLB,,, | Performed by: OBSTETRICS & GYNECOLOGY

## 2020-11-27 PROCEDURE — 99999 PR PBB SHADOW E&M-EST. PATIENT-LVL III: ICD-10-PCS | Mod: PBBFAC,,, | Performed by: OBSTETRICS & GYNECOLOGY

## 2020-11-27 PROCEDURE — 99024 POSTOP FOLLOW-UP VISIT: CPT | Mod: S$GLB,,, | Performed by: OBSTETRICS & GYNECOLOGY

## 2020-11-27 NOTE — PROGRESS NOTES
"CC: Postoperative visit    Jamilah French is a 30 y.o. female  presents for a postoperative visit s/p  Hysteroscopy  2. Dilation and curettage   3.  Myosure resection of polyps on 10/26/2020 .  Her postoperative course was uncomplicated.  She is doing well postoperative.    Pathology showed:  ENDOMETRIAL CURRETINGS:  Benign endometrium with features of external hormonal intake  Negative for hyperplasia or malignancy      /76   Ht 5' 5" (1.651 m)   Wt 127.8 kg (281 lb 12 oz)   LMP 10/09/2020 (Exact Date)   BMI 46.89 kg/m²     ROS:  GENERAL: No fever, chills, fatigability or weight loss.  VULVAR: No pain, no lesions and no itching.  VAGINAL: No relaxation, no itching, no discharge, no abnormal bleeding and no lesions.  ABDOMEN: No abdominal pain. Denies nausea. Denies vomiting. No diarrhea. No constipation  BREAST: Denies pain. No lumps. No discharge.  URINARY: No incontinence, no nocturia, no frequency and no dysuria.  CARDIOVASCULAR: No chest pain. No shortness of breath. No leg cramps.  NEUROLOGICAL: No headaches. No vision changes.      Physical Exam      PELVIC: Normal external genitalia without lesions.  Normal hair distribution.  Adequate perineal body, normal urethral meatus.  Vagina moist and well rugated without lesions or discharge.  Cervix pink, without lesions, discharge or tenderness.  No significant cystocele or rectocele.  Bimanual exam shows uterus to be normal size, regular, mobile and nontender.  Adnexa without masses or tenderness.      Diagnosis    1. Post-operative state       Timed relations  lh kits  [employer] daily     Patient can return to normal activities.  Return to clinic in 1 year for well woman exam.  Continue with exercise and weight loss        "

## 2021-03-30 ENCOUNTER — IMMUNIZATION (OUTPATIENT)
Dept: PRIMARY CARE CLINIC | Facility: CLINIC | Age: 31
End: 2021-03-30
Payer: COMMERCIAL

## 2021-03-30 DIAGNOSIS — Z23 NEED FOR VACCINATION: Primary | ICD-10-CM

## 2021-03-30 PROCEDURE — 0001A COVID-19, MRNA, LNP-S, PF, 30 MCG/0.3 ML DOSE VACCINE: ICD-10-PCS | Mod: CV19,S$GLB,, | Performed by: FAMILY MEDICINE

## 2021-03-30 PROCEDURE — 91300 COVID-19, MRNA, LNP-S, PF, 30 MCG/0.3 ML DOSE VACCINE: CPT | Mod: S$GLB,,, | Performed by: FAMILY MEDICINE

## 2021-03-30 PROCEDURE — 91300 COVID-19, MRNA, LNP-S, PF, 30 MCG/0.3 ML DOSE VACCINE: ICD-10-PCS | Mod: S$GLB,,, | Performed by: FAMILY MEDICINE

## 2021-03-30 PROCEDURE — 0001A COVID-19, MRNA, LNP-S, PF, 30 MCG/0.3 ML DOSE VACCINE: CPT | Mod: CV19,S$GLB,, | Performed by: FAMILY MEDICINE

## 2021-04-20 ENCOUNTER — IMMUNIZATION (OUTPATIENT)
Dept: PRIMARY CARE CLINIC | Facility: CLINIC | Age: 31
End: 2021-04-20
Payer: COMMERCIAL

## 2021-04-20 DIAGNOSIS — Z23 NEED FOR VACCINATION: Primary | ICD-10-CM

## 2021-04-20 PROCEDURE — 91300 COVID-19, MRNA, LNP-S, PF, 30 MCG/0.3 ML DOSE VACCINE: ICD-10-PCS | Mod: S$GLB,,, | Performed by: FAMILY MEDICINE

## 2021-04-20 PROCEDURE — 0002A COVID-19, MRNA, LNP-S, PF, 30 MCG/0.3 ML DOSE VACCINE: ICD-10-PCS | Mod: CV19,S$GLB,, | Performed by: FAMILY MEDICINE

## 2021-04-20 PROCEDURE — 91300 COVID-19, MRNA, LNP-S, PF, 30 MCG/0.3 ML DOSE VACCINE: CPT | Mod: S$GLB,,, | Performed by: FAMILY MEDICINE

## 2021-04-20 PROCEDURE — 0002A COVID-19, MRNA, LNP-S, PF, 30 MCG/0.3 ML DOSE VACCINE: CPT | Mod: CV19,S$GLB,, | Performed by: FAMILY MEDICINE

## 2021-06-10 ENCOUNTER — OFFICE VISIT (OUTPATIENT)
Dept: FAMILY MEDICINE | Facility: CLINIC | Age: 31
End: 2021-06-10
Payer: COMMERCIAL

## 2021-06-10 ENCOUNTER — LAB VISIT (OUTPATIENT)
Dept: LAB | Facility: HOSPITAL | Age: 31
End: 2021-06-10
Attending: NURSE PRACTITIONER
Payer: COMMERCIAL

## 2021-06-10 DIAGNOSIS — R30.0 DYSURIA: ICD-10-CM

## 2021-06-10 DIAGNOSIS — N30.00 ACUTE CYSTITIS WITHOUT HEMATURIA: Primary | ICD-10-CM

## 2021-06-10 LAB
BACTERIA #/AREA URNS HPF: ABNORMAL /HPF
BILIRUB UR QL STRIP: NEGATIVE
CLARITY UR: ABNORMAL
COLOR UR: YELLOW
GLUCOSE UR QL STRIP: NEGATIVE
HGB UR QL STRIP: ABNORMAL
HYALINE CASTS #/AREA URNS LPF: 76 /LPF
KETONES UR QL STRIP: NEGATIVE
LEUKOCYTE ESTERASE UR QL STRIP: ABNORMAL
MICROSCOPIC COMMENT: ABNORMAL
NITRITE UR QL STRIP: NEGATIVE
PH UR STRIP: 7 [PH] (ref 5–8)
PROT UR QL STRIP: ABNORMAL
RBC #/AREA URNS HPF: 8 /HPF (ref 0–4)
SP GR UR STRIP: 1.03 (ref 1–1.03)
SQUAMOUS #/AREA URNS HPF: 4 /HPF
URN SPEC COLLECT METH UR: ABNORMAL
UROBILINOGEN UR STRIP-ACNC: NEGATIVE EU/DL
WBC #/AREA URNS HPF: >100 /HPF (ref 0–5)

## 2021-06-10 PROCEDURE — 99213 PR OFFICE/OUTPT VISIT, EST, LEVL III, 20-29 MIN: ICD-10-PCS | Mod: 95,,, | Performed by: NURSE PRACTITIONER

## 2021-06-10 PROCEDURE — 87086 URINE CULTURE/COLONY COUNT: CPT | Performed by: NURSE PRACTITIONER

## 2021-06-10 PROCEDURE — 87186 SC STD MICRODIL/AGAR DIL: CPT | Performed by: NURSE PRACTITIONER

## 2021-06-10 PROCEDURE — 99213 OFFICE O/P EST LOW 20 MIN: CPT | Mod: 95,,, | Performed by: NURSE PRACTITIONER

## 2021-06-10 PROCEDURE — 87077 CULTURE AEROBIC IDENTIFY: CPT | Performed by: NURSE PRACTITIONER

## 2021-06-10 PROCEDURE — 81001 URINALYSIS AUTO W/SCOPE: CPT | Performed by: NURSE PRACTITIONER

## 2021-06-10 RX ORDER — NITROFURANTOIN 25; 75 MG/1; MG/1
100 CAPSULE ORAL 2 TIMES DAILY
Qty: 14 CAPSULE | Refills: 0 | Status: SHIPPED | OUTPATIENT
Start: 2021-06-10 | End: 2021-06-17

## 2021-06-12 LAB — BACTERIA UR CULT: ABNORMAL

## 2021-10-04 ENCOUNTER — LAB VISIT (OUTPATIENT)
Dept: LAB | Facility: HOSPITAL | Age: 31
End: 2021-10-04
Attending: INTERNAL MEDICINE
Payer: COMMERCIAL

## 2021-10-04 ENCOUNTER — OFFICE VISIT (OUTPATIENT)
Dept: INTERNAL MEDICINE | Facility: CLINIC | Age: 31
End: 2021-10-04
Payer: COMMERCIAL

## 2021-10-04 VITALS
DIASTOLIC BLOOD PRESSURE: 84 MMHG | TEMPERATURE: 99 F | BODY MASS INDEX: 48.41 KG/M2 | SYSTOLIC BLOOD PRESSURE: 123 MMHG | HEIGHT: 65 IN | WEIGHT: 290.56 LBS | HEART RATE: 116 BPM

## 2021-10-04 DIAGNOSIS — M54.2 NECK PAIN, CHRONIC: ICD-10-CM

## 2021-10-04 DIAGNOSIS — G89.29 NECK PAIN, CHRONIC: ICD-10-CM

## 2021-10-04 DIAGNOSIS — R20.0 RIGHT SIDED NUMBNESS: ICD-10-CM

## 2021-10-04 DIAGNOSIS — G44.51 HEMICRANIA CONTINUA: ICD-10-CM

## 2021-10-04 DIAGNOSIS — R51.9 RIGHT FACIAL PAIN: ICD-10-CM

## 2021-10-04 DIAGNOSIS — R51.9 RIGHT FACIAL PAIN: Primary | ICD-10-CM

## 2021-10-04 DIAGNOSIS — M62.81 RIGHT-SIDED MUSCLE WEAKNESS: ICD-10-CM

## 2021-10-04 DIAGNOSIS — J32.9 CHRONIC SINUSITIS, UNSPECIFIED LOCATION: ICD-10-CM

## 2021-10-04 DIAGNOSIS — R53.83 FATIGUE, UNSPECIFIED TYPE: ICD-10-CM

## 2021-10-04 LAB
ALBUMIN SERPL BCP-MCNC: 3.4 G/DL (ref 3.5–5.2)
ALP SERPL-CCNC: 65 U/L (ref 55–135)
ALT SERPL W/O P-5'-P-CCNC: 15 U/L (ref 10–44)
ANION GAP SERPL CALC-SCNC: 9 MMOL/L (ref 8–16)
AST SERPL-CCNC: 14 U/L (ref 10–40)
BASOPHILS # BLD AUTO: 0.07 K/UL (ref 0–0.2)
BASOPHILS NFR BLD: 0.8 % (ref 0–1.9)
BILIRUB SERPL-MCNC: 0.9 MG/DL (ref 0.1–1)
BUN SERPL-MCNC: 9 MG/DL (ref 6–20)
CALCIUM SERPL-MCNC: 9.2 MG/DL (ref 8.7–10.5)
CHLORIDE SERPL-SCNC: 102 MMOL/L (ref 95–110)
CO2 SERPL-SCNC: 26 MMOL/L (ref 23–29)
CREAT SERPL-MCNC: 0.8 MG/DL (ref 0.5–1.4)
DIFFERENTIAL METHOD: ABNORMAL
EOSINOPHIL # BLD AUTO: 0.6 K/UL (ref 0–0.5)
EOSINOPHIL NFR BLD: 6.1 % (ref 0–8)
ERYTHROCYTE [DISTWIDTH] IN BLOOD BY AUTOMATED COUNT: 13.2 % (ref 11.5–14.5)
EST. GFR  (AFRICAN AMERICAN): >60 ML/MIN/1.73 M^2
EST. GFR  (NON AFRICAN AMERICAN): >60 ML/MIN/1.73 M^2
GLUCOSE SERPL-MCNC: 102 MG/DL (ref 70–110)
HCT VFR BLD AUTO: 41.8 % (ref 37–48.5)
HGB BLD-MCNC: 13.6 G/DL (ref 12–16)
IMM GRANULOCYTES # BLD AUTO: 0.03 K/UL (ref 0–0.04)
IMM GRANULOCYTES NFR BLD AUTO: 0.3 % (ref 0–0.5)
LYMPHOCYTES # BLD AUTO: 2.1 K/UL (ref 1–4.8)
LYMPHOCYTES NFR BLD: 22.1 % (ref 18–48)
MCH RBC QN AUTO: 27.8 PG (ref 27–31)
MCHC RBC AUTO-ENTMCNC: 32.5 G/DL (ref 32–36)
MCV RBC AUTO: 85 FL (ref 82–98)
MONOCYTES # BLD AUTO: 0.6 K/UL (ref 0.3–1)
MONOCYTES NFR BLD: 6.5 % (ref 4–15)
NEUTROPHILS # BLD AUTO: 6 K/UL (ref 1.8–7.7)
NEUTROPHILS NFR BLD: 64.2 % (ref 38–73)
NRBC BLD-RTO: 0 /100 WBC
PLATELET # BLD AUTO: 350 K/UL (ref 150–450)
PMV BLD AUTO: 9.7 FL (ref 9.2–12.9)
POTASSIUM SERPL-SCNC: 3.5 MMOL/L (ref 3.5–5.1)
PROT SERPL-MCNC: 7.2 G/DL (ref 6–8.4)
RBC # BLD AUTO: 4.9 M/UL (ref 4–5.4)
SODIUM SERPL-SCNC: 137 MMOL/L (ref 136–145)
TSH SERPL DL<=0.005 MIU/L-ACNC: 1.6 UIU/ML (ref 0.4–4)
WBC # BLD AUTO: 9.28 K/UL (ref 3.9–12.7)

## 2021-10-04 PROCEDURE — 84443 ASSAY THYROID STIM HORMONE: CPT | Performed by: INTERNAL MEDICINE

## 2021-10-04 PROCEDURE — 80053 COMPREHEN METABOLIC PANEL: CPT | Performed by: INTERNAL MEDICINE

## 2021-10-04 PROCEDURE — 1159F MED LIST DOCD IN RCRD: CPT | Mod: CPTII,S$GLB,, | Performed by: INTERNAL MEDICINE

## 2021-10-04 PROCEDURE — 3074F PR MOST RECENT SYSTOLIC BLOOD PRESSURE < 130 MM HG: ICD-10-PCS | Mod: CPTII,S$GLB,, | Performed by: INTERNAL MEDICINE

## 2021-10-04 PROCEDURE — 3008F BODY MASS INDEX DOCD: CPT | Mod: CPTII,S$GLB,, | Performed by: INTERNAL MEDICINE

## 2021-10-04 PROCEDURE — 3008F PR BODY MASS INDEX (BMI) DOCUMENTED: ICD-10-PCS | Mod: CPTII,S$GLB,, | Performed by: INTERNAL MEDICINE

## 2021-10-04 PROCEDURE — 1159F PR MEDICATION LIST DOCUMENTED IN MEDICAL RECORD: ICD-10-PCS | Mod: CPTII,S$GLB,, | Performed by: INTERNAL MEDICINE

## 2021-10-04 PROCEDURE — 1160F PR REVIEW ALL MEDS BY PRESCRIBER/CLIN PHARMACIST DOCUMENTED: ICD-10-PCS | Mod: CPTII,S$GLB,, | Performed by: INTERNAL MEDICINE

## 2021-10-04 PROCEDURE — 1160F RVW MEDS BY RX/DR IN RCRD: CPT | Mod: CPTII,S$GLB,, | Performed by: INTERNAL MEDICINE

## 2021-10-04 PROCEDURE — 85025 COMPLETE CBC W/AUTO DIFF WBC: CPT | Performed by: INTERNAL MEDICINE

## 2021-10-04 PROCEDURE — 99204 PR OFFICE/OUTPT VISIT, NEW, LEVL IV, 45-59 MIN: ICD-10-PCS | Mod: S$GLB,,, | Performed by: INTERNAL MEDICINE

## 2021-10-04 PROCEDURE — 36415 COLL VENOUS BLD VENIPUNCTURE: CPT | Performed by: INTERNAL MEDICINE

## 2021-10-04 PROCEDURE — 99204 OFFICE O/P NEW MOD 45 MIN: CPT | Mod: S$GLB,,, | Performed by: INTERNAL MEDICINE

## 2021-10-04 PROCEDURE — 3074F SYST BP LT 130 MM HG: CPT | Mod: CPTII,S$GLB,, | Performed by: INTERNAL MEDICINE

## 2021-10-04 PROCEDURE — 99999 PR PBB SHADOW E&M-EST. PATIENT-LVL III: ICD-10-PCS | Mod: PBBFAC,,, | Performed by: INTERNAL MEDICINE

## 2021-10-04 PROCEDURE — 3079F PR MOST RECENT DIASTOLIC BLOOD PRESSURE 80-89 MM HG: ICD-10-PCS | Mod: CPTII,S$GLB,, | Performed by: INTERNAL MEDICINE

## 2021-10-04 PROCEDURE — 3079F DIAST BP 80-89 MM HG: CPT | Mod: CPTII,S$GLB,, | Performed by: INTERNAL MEDICINE

## 2021-10-04 PROCEDURE — 99999 PR PBB SHADOW E&M-EST. PATIENT-LVL III: CPT | Mod: PBBFAC,,, | Performed by: INTERNAL MEDICINE

## 2021-10-04 RX ORDER — PREDNISONE 20 MG/1
40 TABLET ORAL DAILY
Qty: 10 TABLET | Refills: 0 | Status: SHIPPED | OUTPATIENT
Start: 2021-10-04 | End: 2021-10-09

## 2021-10-04 RX ORDER — AMOXICILLIN AND CLAVULANATE POTASSIUM 875; 125 MG/1; MG/1
1 TABLET, FILM COATED ORAL EVERY 12 HOURS
Qty: 20 TABLET | Refills: 0 | Status: SHIPPED | OUTPATIENT
Start: 2021-10-04 | End: 2021-10-12

## 2021-10-04 RX ORDER — FLUTICASONE PROPIONATE 50 MCG
1 SPRAY, SUSPENSION (ML) NASAL DAILY
Qty: 16 G | Refills: 2 | Status: SHIPPED | OUTPATIENT
Start: 2021-10-04 | End: 2021-11-03

## 2021-10-04 RX ORDER — GUAIFENESIN AND DEXTROMETHORPHAN HYDROBROMIDE 600; 30 MG/1; MG/1
1 TABLET, EXTENDED RELEASE ORAL 2 TIMES DAILY
Qty: 20 TABLET | Refills: 0 | Status: SHIPPED | OUTPATIENT
Start: 2021-10-04 | End: 2021-10-12

## 2021-10-05 ENCOUNTER — HOSPITAL ENCOUNTER (OUTPATIENT)
Dept: RADIOLOGY | Facility: HOSPITAL | Age: 31
Discharge: HOME | End: 2021-10-05
Attending: INTERNAL MEDICINE
Payer: COMMERCIAL

## 2021-10-05 DIAGNOSIS — G89.29 NECK PAIN, CHRONIC: ICD-10-CM

## 2021-10-05 DIAGNOSIS — R20.0 RIGHT SIDED NUMBNESS: ICD-10-CM

## 2021-10-05 DIAGNOSIS — G44.51 HEMICRANIA CONTINUA: ICD-10-CM

## 2021-10-05 DIAGNOSIS — M62.81 RIGHT-SIDED MUSCLE WEAKNESS: ICD-10-CM

## 2021-10-05 DIAGNOSIS — R51.9 RIGHT FACIAL PAIN: ICD-10-CM

## 2021-10-05 DIAGNOSIS — M54.2 NECK PAIN, CHRONIC: ICD-10-CM

## 2021-10-05 PROCEDURE — 70450 CT HEAD/BRAIN W/O DYE: CPT | Mod: TC,PO

## 2021-10-05 PROCEDURE — 72050 X-RAY EXAM NECK SPINE 4/5VWS: CPT | Mod: TC,PO

## 2021-10-08 ENCOUNTER — OFFICE VISIT (OUTPATIENT)
Dept: OTOLARYNGOLOGY | Facility: CLINIC | Age: 31
End: 2021-10-08
Payer: COMMERCIAL

## 2021-10-08 ENCOUNTER — TELEPHONE (OUTPATIENT)
Dept: NEUROLOGY | Facility: CLINIC | Age: 31
End: 2021-10-08

## 2021-10-08 VITALS
BODY MASS INDEX: 48.43 KG/M2 | HEART RATE: 79 BPM | SYSTOLIC BLOOD PRESSURE: 135 MMHG | WEIGHT: 291 LBS | DIASTOLIC BLOOD PRESSURE: 81 MMHG

## 2021-10-08 DIAGNOSIS — G50.0 TRIGEMINAL NEURALGIA OF RIGHT SIDE OF FACE: ICD-10-CM

## 2021-10-08 DIAGNOSIS — R51.9 RIGHT FACIAL PAIN: Primary | ICD-10-CM

## 2021-10-08 DIAGNOSIS — G43.809 OTHER MIGRAINE WITHOUT STATUS MIGRAINOSUS, NOT INTRACTABLE: ICD-10-CM

## 2021-10-08 DIAGNOSIS — J32.9 CHRONIC SINUSITIS, UNSPECIFIED LOCATION: ICD-10-CM

## 2021-10-08 DIAGNOSIS — G47.30 SLEEP APNEA, UNSPECIFIED TYPE: ICD-10-CM

## 2021-10-08 PROCEDURE — 3008F BODY MASS INDEX DOCD: CPT | Mod: CPTII,S$GLB,, | Performed by: STUDENT IN AN ORGANIZED HEALTH CARE EDUCATION/TRAINING PROGRAM

## 2021-10-08 PROCEDURE — 3079F PR MOST RECENT DIASTOLIC BLOOD PRESSURE 80-89 MM HG: ICD-10-PCS | Mod: CPTII,S$GLB,, | Performed by: STUDENT IN AN ORGANIZED HEALTH CARE EDUCATION/TRAINING PROGRAM

## 2021-10-08 PROCEDURE — 3075F PR MOST RECENT SYSTOLIC BLOOD PRESS GE 130-139MM HG: ICD-10-PCS | Mod: CPTII,S$GLB,, | Performed by: STUDENT IN AN ORGANIZED HEALTH CARE EDUCATION/TRAINING PROGRAM

## 2021-10-08 PROCEDURE — 99204 PR OFFICE/OUTPT VISIT, NEW, LEVL IV, 45-59 MIN: ICD-10-PCS | Mod: 25,S$GLB,, | Performed by: STUDENT IN AN ORGANIZED HEALTH CARE EDUCATION/TRAINING PROGRAM

## 2021-10-08 PROCEDURE — 3008F PR BODY MASS INDEX (BMI) DOCUMENTED: ICD-10-PCS | Mod: CPTII,S$GLB,, | Performed by: STUDENT IN AN ORGANIZED HEALTH CARE EDUCATION/TRAINING PROGRAM

## 2021-10-08 PROCEDURE — 3075F SYST BP GE 130 - 139MM HG: CPT | Mod: CPTII,S$GLB,, | Performed by: STUDENT IN AN ORGANIZED HEALTH CARE EDUCATION/TRAINING PROGRAM

## 2021-10-08 PROCEDURE — 99204 OFFICE O/P NEW MOD 45 MIN: CPT | Mod: 25,S$GLB,, | Performed by: STUDENT IN AN ORGANIZED HEALTH CARE EDUCATION/TRAINING PROGRAM

## 2021-10-08 PROCEDURE — 1159F MED LIST DOCD IN RCRD: CPT | Mod: CPTII,S$GLB,, | Performed by: STUDENT IN AN ORGANIZED HEALTH CARE EDUCATION/TRAINING PROGRAM

## 2021-10-08 PROCEDURE — 99999 PR PBB SHADOW E&M-EST. PATIENT-LVL III: ICD-10-PCS | Mod: PBBFAC,,, | Performed by: STUDENT IN AN ORGANIZED HEALTH CARE EDUCATION/TRAINING PROGRAM

## 2021-10-08 PROCEDURE — 31231 PR NASAL ENDOSCOPY, DX: ICD-10-PCS | Mod: S$GLB,,, | Performed by: STUDENT IN AN ORGANIZED HEALTH CARE EDUCATION/TRAINING PROGRAM

## 2021-10-08 PROCEDURE — 99999 PR PBB SHADOW E&M-EST. PATIENT-LVL III: CPT | Mod: PBBFAC,,, | Performed by: STUDENT IN AN ORGANIZED HEALTH CARE EDUCATION/TRAINING PROGRAM

## 2021-10-08 PROCEDURE — 3079F DIAST BP 80-89 MM HG: CPT | Mod: CPTII,S$GLB,, | Performed by: STUDENT IN AN ORGANIZED HEALTH CARE EDUCATION/TRAINING PROGRAM

## 2021-10-08 PROCEDURE — 31231 NASAL ENDOSCOPY DX: CPT | Mod: S$GLB,,, | Performed by: STUDENT IN AN ORGANIZED HEALTH CARE EDUCATION/TRAINING PROGRAM

## 2021-10-08 PROCEDURE — 1159F PR MEDICATION LIST DOCUMENTED IN MEDICAL RECORD: ICD-10-PCS | Mod: CPTII,S$GLB,, | Performed by: STUDENT IN AN ORGANIZED HEALTH CARE EDUCATION/TRAINING PROGRAM

## 2021-10-12 ENCOUNTER — OFFICE VISIT (OUTPATIENT)
Dept: NEUROLOGY | Facility: CLINIC | Age: 31
End: 2021-10-12
Payer: COMMERCIAL

## 2021-10-12 ENCOUNTER — LAB VISIT (OUTPATIENT)
Dept: LAB | Facility: HOSPITAL | Age: 31
End: 2021-10-12
Attending: PSYCHIATRY & NEUROLOGY
Payer: COMMERCIAL

## 2021-10-12 VITALS
SYSTOLIC BLOOD PRESSURE: 136 MMHG | HEIGHT: 65 IN | HEART RATE: 88 BPM | WEIGHT: 288.38 LBS | DIASTOLIC BLOOD PRESSURE: 87 MMHG | BODY MASS INDEX: 48.05 KG/M2

## 2021-10-12 DIAGNOSIS — M54.12 RADICULOPATHY, CERVICAL REGION: ICD-10-CM

## 2021-10-12 DIAGNOSIS — R51.9 RIGHT FACIAL PAIN: Primary | ICD-10-CM

## 2021-10-12 DIAGNOSIS — R29.2 BABINSKI REFLEX: ICD-10-CM

## 2021-10-12 DIAGNOSIS — R51.9 RIGHT FACIAL PAIN: ICD-10-CM

## 2021-10-12 LAB
ESTIMATED AVG GLUCOSE: 108 MG/DL (ref 68–131)
HBA1C MFR BLD: 5.4 % (ref 4–5.6)
VIT B12 SERPL-MCNC: 477 PG/ML (ref 210–950)

## 2021-10-12 PROCEDURE — 3008F PR BODY MASS INDEX (BMI) DOCUMENTED: ICD-10-PCS | Mod: CPTII,S$GLB,, | Performed by: PSYCHIATRY & NEUROLOGY

## 2021-10-12 PROCEDURE — 36415 COLL VENOUS BLD VENIPUNCTURE: CPT | Performed by: PSYCHIATRY & NEUROLOGY

## 2021-10-12 PROCEDURE — 3079F DIAST BP 80-89 MM HG: CPT | Mod: CPTII,S$GLB,, | Performed by: PSYCHIATRY & NEUROLOGY

## 2021-10-12 PROCEDURE — 1159F MED LIST DOCD IN RCRD: CPT | Mod: CPTII,S$GLB,, | Performed by: PSYCHIATRY & NEUROLOGY

## 2021-10-12 PROCEDURE — 99204 PR OFFICE/OUTPT VISIT, NEW, LEVL IV, 45-59 MIN: ICD-10-PCS | Mod: S$GLB,,, | Performed by: PSYCHIATRY & NEUROLOGY

## 2021-10-12 PROCEDURE — 3075F PR MOST RECENT SYSTOLIC BLOOD PRESS GE 130-139MM HG: ICD-10-PCS | Mod: CPTII,S$GLB,, | Performed by: PSYCHIATRY & NEUROLOGY

## 2021-10-12 PROCEDURE — 82607 VITAMIN B-12: CPT | Performed by: PSYCHIATRY & NEUROLOGY

## 2021-10-12 PROCEDURE — 3044F HG A1C LEVEL LT 7.0%: CPT | Mod: CPTII,S$GLB,, | Performed by: PSYCHIATRY & NEUROLOGY

## 2021-10-12 PROCEDURE — 99999 PR PBB SHADOW E&M-EST. PATIENT-LVL III: ICD-10-PCS | Mod: PBBFAC,,, | Performed by: PSYCHIATRY & NEUROLOGY

## 2021-10-12 PROCEDURE — 83036 HEMOGLOBIN GLYCOSYLATED A1C: CPT | Performed by: PSYCHIATRY & NEUROLOGY

## 2021-10-12 PROCEDURE — 99204 OFFICE O/P NEW MOD 45 MIN: CPT | Mod: S$GLB,,, | Performed by: PSYCHIATRY & NEUROLOGY

## 2021-10-12 PROCEDURE — 3079F PR MOST RECENT DIASTOLIC BLOOD PRESSURE 80-89 MM HG: ICD-10-PCS | Mod: CPTII,S$GLB,, | Performed by: PSYCHIATRY & NEUROLOGY

## 2021-10-12 PROCEDURE — 3008F BODY MASS INDEX DOCD: CPT | Mod: CPTII,S$GLB,, | Performed by: PSYCHIATRY & NEUROLOGY

## 2021-10-12 PROCEDURE — 3044F PR MOST RECENT HEMOGLOBIN A1C LEVEL <7.0%: ICD-10-PCS | Mod: CPTII,S$GLB,, | Performed by: PSYCHIATRY & NEUROLOGY

## 2021-10-12 PROCEDURE — 99999 PR PBB SHADOW E&M-EST. PATIENT-LVL III: CPT | Mod: PBBFAC,,, | Performed by: PSYCHIATRY & NEUROLOGY

## 2021-10-12 PROCEDURE — 3075F SYST BP GE 130 - 139MM HG: CPT | Mod: CPTII,S$GLB,, | Performed by: PSYCHIATRY & NEUROLOGY

## 2021-10-12 PROCEDURE — 1159F PR MEDICATION LIST DOCUMENTED IN MEDICAL RECORD: ICD-10-PCS | Mod: CPTII,S$GLB,, | Performed by: PSYCHIATRY & NEUROLOGY

## 2021-10-12 RX ORDER — GABAPENTIN 300 MG/1
300 CAPSULE ORAL 3 TIMES DAILY
Qty: 90 CAPSULE | Refills: 5 | Status: ON HOLD | OUTPATIENT
Start: 2021-10-12 | End: 2022-06-01

## 2021-10-19 ENCOUNTER — HOSPITAL ENCOUNTER (OUTPATIENT)
Dept: RADIOLOGY | Facility: HOSPITAL | Age: 31
Discharge: HOME | End: 2021-10-19
Attending: PSYCHIATRY & NEUROLOGY
Payer: COMMERCIAL

## 2021-10-19 DIAGNOSIS — R51.9 RIGHT FACIAL PAIN: ICD-10-CM

## 2021-10-19 DIAGNOSIS — R29.2 BABINSKI REFLEX: ICD-10-CM

## 2021-10-19 DIAGNOSIS — M54.12 RADICULOPATHY, CERVICAL REGION: ICD-10-CM

## 2021-10-19 PROCEDURE — 25500020 PHARM REV CODE 255: Mod: PO | Performed by: PSYCHIATRY & NEUROLOGY

## 2021-10-19 PROCEDURE — A9585 GADOBUTROL INJECTION: HCPCS | Mod: PO | Performed by: PSYCHIATRY & NEUROLOGY

## 2021-10-19 PROCEDURE — 72156 MRI NECK SPINE W/O & W/DYE: CPT | Mod: TC,PO

## 2021-10-19 PROCEDURE — 70553 MRI BRAIN STEM W/O & W/DYE: CPT | Mod: TC,PO

## 2021-10-19 RX ORDER — GADOBUTROL 604.72 MG/ML
13.5 INJECTION INTRAVENOUS
Status: COMPLETED | OUTPATIENT
Start: 2021-10-19 | End: 2021-10-19

## 2021-10-19 RX ADMIN — GADOBUTROL 13.5 ML: 604.72 INJECTION INTRAVENOUS at 09:10

## 2021-10-20 ENCOUNTER — TELEPHONE (OUTPATIENT)
Dept: NEUROLOGY | Facility: CLINIC | Age: 31
End: 2021-10-20

## 2021-10-20 NOTE — TELEPHONE ENCOUNTER
----- Message from Stacey Young sent at 10/20/2021  2:15 PM CDT -----  Regarding: MESSAGE - MRI RESULTS  Contact: Self  Pt stated she had a missed call from staff - Dr Brown to discuss her MRI Results Pt ask for a call back      Contact info  [phone]

## 2021-10-20 NOTE — TELEPHONE ENCOUNTER
MRI shows cervical syrinx and tonsilar ectopia.  I would like to add T-spine MRI w/ w/out and refer to NS.    I left VM for patient to call me back so I can discuss above.  SIRISHA

## 2021-10-21 DIAGNOSIS — G95.0 SYRINX OF SPINAL CORD: Primary | ICD-10-CM

## 2021-10-22 ENCOUNTER — TELEPHONE (OUTPATIENT)
Dept: NEUROSURGERY | Facility: CLINIC | Age: 31
End: 2021-10-22

## 2021-11-10 ENCOUNTER — HOSPITAL ENCOUNTER (OUTPATIENT)
Dept: RADIOLOGY | Facility: HOSPITAL | Age: 31
Discharge: HOME | End: 2021-11-10
Attending: PSYCHIATRY & NEUROLOGY
Payer: COMMERCIAL

## 2021-11-10 ENCOUNTER — OFFICE VISIT (OUTPATIENT)
Dept: NEUROSURGERY | Facility: CLINIC | Age: 31
End: 2021-11-10
Payer: COMMERCIAL

## 2021-11-10 VITALS
WEIGHT: 286.63 LBS | HEART RATE: 87 BPM | DIASTOLIC BLOOD PRESSURE: 77 MMHG | SYSTOLIC BLOOD PRESSURE: 138 MMHG | BODY MASS INDEX: 47.69 KG/M2

## 2021-11-10 DIAGNOSIS — G93.5 CHIARI MALFORMATION TYPE I: Primary | ICD-10-CM

## 2021-11-10 DIAGNOSIS — G95.0 SYRINX OF SPINAL CORD: ICD-10-CM

## 2021-11-10 PROCEDURE — 1160F RVW MEDS BY RX/DR IN RCRD: CPT | Mod: CPTII,S$GLB,, | Performed by: NEUROLOGICAL SURGERY

## 2021-11-10 PROCEDURE — 99204 OFFICE O/P NEW MOD 45 MIN: CPT | Mod: S$GLB,,, | Performed by: NEUROLOGICAL SURGERY

## 2021-11-10 PROCEDURE — 72157 MRI CHEST SPINE W/O & W/DYE: CPT | Mod: 26,,, | Performed by: RADIOLOGY

## 2021-11-10 PROCEDURE — 3075F PR MOST RECENT SYSTOLIC BLOOD PRESS GE 130-139MM HG: ICD-10-PCS | Mod: CPTII,S$GLB,, | Performed by: NEUROLOGICAL SURGERY

## 2021-11-10 PROCEDURE — 3008F PR BODY MASS INDEX (BMI) DOCUMENTED: ICD-10-PCS | Mod: CPTII,S$GLB,, | Performed by: NEUROLOGICAL SURGERY

## 2021-11-10 PROCEDURE — 3044F PR MOST RECENT HEMOGLOBIN A1C LEVEL <7.0%: ICD-10-PCS | Mod: CPTII,S$GLB,, | Performed by: NEUROLOGICAL SURGERY

## 2021-11-10 PROCEDURE — 72157 MRI THORACIC SPINE W WO CONTRAST: ICD-10-PCS | Mod: 26,,, | Performed by: RADIOLOGY

## 2021-11-10 PROCEDURE — 99999 PR PBB SHADOW E&M-EST. PATIENT-LVL IV: ICD-10-PCS | Mod: PBBFAC,,, | Performed by: NEUROLOGICAL SURGERY

## 2021-11-10 PROCEDURE — 1159F MED LIST DOCD IN RCRD: CPT | Mod: CPTII,S$GLB,, | Performed by: NEUROLOGICAL SURGERY

## 2021-11-10 PROCEDURE — 1160F PR REVIEW ALL MEDS BY PRESCRIBER/CLIN PHARMACIST DOCUMENTED: ICD-10-PCS | Mod: CPTII,S$GLB,, | Performed by: NEUROLOGICAL SURGERY

## 2021-11-10 PROCEDURE — 25500020 PHARM REV CODE 255: Performed by: PSYCHIATRY & NEUROLOGY

## 2021-11-10 PROCEDURE — 3008F BODY MASS INDEX DOCD: CPT | Mod: CPTII,S$GLB,, | Performed by: NEUROLOGICAL SURGERY

## 2021-11-10 PROCEDURE — 3078F DIAST BP <80 MM HG: CPT | Mod: CPTII,S$GLB,, | Performed by: NEUROLOGICAL SURGERY

## 2021-11-10 PROCEDURE — 99204 PR OFFICE/OUTPT VISIT, NEW, LEVL IV, 45-59 MIN: ICD-10-PCS | Mod: S$GLB,,, | Performed by: NEUROLOGICAL SURGERY

## 2021-11-10 PROCEDURE — 1159F PR MEDICATION LIST DOCUMENTED IN MEDICAL RECORD: ICD-10-PCS | Mod: CPTII,S$GLB,, | Performed by: NEUROLOGICAL SURGERY

## 2021-11-10 PROCEDURE — 99999 PR PBB SHADOW E&M-EST. PATIENT-LVL IV: CPT | Mod: PBBFAC,,, | Performed by: NEUROLOGICAL SURGERY

## 2021-11-10 PROCEDURE — 72157 MRI CHEST SPINE W/O & W/DYE: CPT | Mod: TC

## 2021-11-10 PROCEDURE — 3075F SYST BP GE 130 - 139MM HG: CPT | Mod: CPTII,S$GLB,, | Performed by: NEUROLOGICAL SURGERY

## 2021-11-10 PROCEDURE — 3078F PR MOST RECENT DIASTOLIC BLOOD PRESSURE < 80 MM HG: ICD-10-PCS | Mod: CPTII,S$GLB,, | Performed by: NEUROLOGICAL SURGERY

## 2021-11-10 PROCEDURE — A9585 GADOBUTROL INJECTION: HCPCS | Performed by: PSYCHIATRY & NEUROLOGY

## 2021-11-10 PROCEDURE — 3044F HG A1C LEVEL LT 7.0%: CPT | Mod: CPTII,S$GLB,, | Performed by: NEUROLOGICAL SURGERY

## 2021-11-10 RX ORDER — GADOBUTROL 604.72 MG/ML
10 INJECTION INTRAVENOUS
Status: COMPLETED | OUTPATIENT
Start: 2021-11-10 | End: 2021-11-10

## 2021-11-10 RX ADMIN — GADOBUTROL 10 ML: 604.72 INJECTION INTRAVENOUS at 12:11

## 2021-11-18 ENCOUNTER — TELEPHONE (OUTPATIENT)
Dept: PREADMISSION TESTING | Facility: HOSPITAL | Age: 31
End: 2021-11-18
Payer: COMMERCIAL

## 2021-11-18 DIAGNOSIS — Z01.818 PREOPERATIVE TESTING: Primary | ICD-10-CM

## 2021-12-07 ENCOUNTER — HOSPITAL ENCOUNTER (OUTPATIENT)
Dept: PREADMISSION TESTING | Facility: HOSPITAL | Age: 31
Discharge: HOME | End: 2021-12-07
Attending: NEUROLOGICAL SURGERY
Payer: COMMERCIAL

## 2021-12-07 ENCOUNTER — OFFICE VISIT (OUTPATIENT)
Dept: INTERNAL MEDICINE | Facility: CLINIC | Age: 31
End: 2021-12-07
Payer: COMMERCIAL

## 2021-12-07 ENCOUNTER — ANESTHESIA EVENT (OUTPATIENT)
Dept: SURGERY | Facility: HOSPITAL | Age: 31
DRG: 026 | End: 2021-12-07
Payer: COMMERCIAL

## 2021-12-07 VITALS
RESPIRATION RATE: 18 BRPM | WEIGHT: 286 LBS | SYSTOLIC BLOOD PRESSURE: 140 MMHG | BODY MASS INDEX: 47.65 KG/M2 | HEIGHT: 65 IN | DIASTOLIC BLOOD PRESSURE: 86 MMHG | HEART RATE: 90 BPM | TEMPERATURE: 98 F | OXYGEN SATURATION: 99 %

## 2021-12-07 DIAGNOSIS — E28.2 PCOS (POLYCYSTIC OVARIAN SYNDROME): ICD-10-CM

## 2021-12-07 DIAGNOSIS — Z01.818 PREOP EXAMINATION: Primary | ICD-10-CM

## 2021-12-07 DIAGNOSIS — L30.9 ECZEMA, UNSPECIFIED TYPE: ICD-10-CM

## 2021-12-07 DIAGNOSIS — R53.83 FATIGUE, UNSPECIFIED TYPE: ICD-10-CM

## 2021-12-07 DIAGNOSIS — F32.1 CURRENT MODERATE EPISODE OF MAJOR DEPRESSIVE DISORDER WITHOUT PRIOR EPISODE: ICD-10-CM

## 2021-12-07 DIAGNOSIS — E66.01 MORBID OBESITY: ICD-10-CM

## 2021-12-07 DIAGNOSIS — R31.29 MICROSCOPIC HEMATURIA: ICD-10-CM

## 2021-12-07 DIAGNOSIS — T78.40XD ALLERGY, SUBSEQUENT ENCOUNTER: ICD-10-CM

## 2021-12-07 DIAGNOSIS — R51.9 RIGHT FACIAL PAIN: ICD-10-CM

## 2021-12-07 PROBLEM — T78.40XA ALLERGIES: Status: ACTIVE | Noted: 2021-12-07

## 2021-12-07 PROCEDURE — 99999 PR PBB SHADOW E&M-EST. PATIENT-LVL IV: ICD-10-PCS | Mod: PBBFAC,,, | Performed by: HOSPITALIST

## 2021-12-07 PROCEDURE — 99213 PR OFFICE/OUTPT VISIT, EST, LEVL III, 20-29 MIN: ICD-10-PCS | Mod: S$GLB,,, | Performed by: HOSPITALIST

## 2021-12-07 PROCEDURE — 99213 OFFICE O/P EST LOW 20 MIN: CPT | Mod: S$GLB,,, | Performed by: HOSPITALIST

## 2021-12-07 PROCEDURE — 99999 PR PBB SHADOW E&M-EST. PATIENT-LVL IV: CPT | Mod: PBBFAC,,, | Performed by: HOSPITALIST

## 2021-12-09 ENCOUNTER — PATIENT MESSAGE (OUTPATIENT)
Dept: NEUROLOGY | Facility: CLINIC | Age: 31
End: 2021-12-09
Payer: COMMERCIAL

## 2021-12-13 ENCOUNTER — TELEPHONE (OUTPATIENT)
Dept: NEUROSURGERY | Facility: CLINIC | Age: 31
End: 2021-12-13
Payer: COMMERCIAL

## 2021-12-15 PROBLEM — R31.29 MICROSCOPIC HEMATURIA: Status: ACTIVE | Noted: 2021-12-15

## 2021-12-21 RX ORDER — MUPIROCIN 20 MG/G
1 OINTMENT TOPICAL 2 TIMES DAILY
Status: CANCELLED | OUTPATIENT
Start: 2021-12-21 | End: 2021-12-22

## 2021-12-22 ENCOUNTER — TELEPHONE (OUTPATIENT)
Dept: NEUROSURGERY | Facility: CLINIC | Age: 31
End: 2021-12-22
Payer: COMMERCIAL

## 2021-12-23 ENCOUNTER — HOSPITAL ENCOUNTER (INPATIENT)
Facility: HOSPITAL | Age: 31
LOS: 3 days | Discharge: HOME | DRG: 026 | End: 2021-12-26
Attending: NEUROLOGICAL SURGERY | Admitting: NEUROLOGICAL SURGERY
Payer: COMMERCIAL

## 2021-12-23 ENCOUNTER — ANESTHESIA (OUTPATIENT)
Dept: SURGERY | Facility: HOSPITAL | Age: 31
DRG: 026 | End: 2021-12-23
Payer: COMMERCIAL

## 2021-12-23 DIAGNOSIS — G93.5 CHIARI MALFORMATION TYPE I: ICD-10-CM

## 2021-12-23 DIAGNOSIS — Z01.818 PREOPERATIVE TESTING: ICD-10-CM

## 2021-12-23 PROBLEM — Q04.8 CEREBELLAR TONSILLAR ECTOPIA: Status: ACTIVE | Noted: 2021-12-23

## 2021-12-23 LAB
ABO + RH BLD: NORMAL
ANION GAP SERPL CALC-SCNC: 8 MMOL/L (ref 8–16)
B-HCG UR QL: NEGATIVE
BASOPHILS # BLD AUTO: 0.04 K/UL (ref 0–0.2)
BASOPHILS NFR BLD: 0.6 % (ref 0–1.9)
BLD GP AB SCN CELLS X3 SERPL QL: NORMAL
BUN SERPL-MCNC: 10 MG/DL (ref 6–20)
CALCIUM SERPL-MCNC: 9.4 MG/DL (ref 8.7–10.5)
CHLORIDE SERPL-SCNC: 103 MMOL/L (ref 95–110)
CO2 SERPL-SCNC: 26 MMOL/L (ref 23–29)
CREAT SERPL-MCNC: 0.7 MG/DL (ref 0.5–1.4)
CTP QC/QA: YES
DIFFERENTIAL METHOD: NORMAL
EOSINOPHIL # BLD AUTO: 0.5 K/UL (ref 0–0.5)
EOSINOPHIL NFR BLD: 7.3 % (ref 0–8)
ERYTHROCYTE [DISTWIDTH] IN BLOOD BY AUTOMATED COUNT: 12.4 % (ref 11.5–14.5)
EST. GFR  (AFRICAN AMERICAN): >60 ML/MIN/1.73 M^2
EST. GFR  (NON AFRICAN AMERICAN): >60 ML/MIN/1.73 M^2
GLUCOSE SERPL-MCNC: 73 MG/DL (ref 70–110)
HCT VFR BLD AUTO: 42.4 % (ref 37–48.5)
HGB BLD-MCNC: 13.6 G/DL (ref 12–16)
IMM GRANULOCYTES # BLD AUTO: 0.01 K/UL (ref 0–0.04)
IMM GRANULOCYTES NFR BLD AUTO: 0.2 % (ref 0–0.5)
LYMPHOCYTES # BLD AUTO: 1.9 K/UL (ref 1–4.8)
LYMPHOCYTES NFR BLD: 31.1 % (ref 18–48)
MCH RBC QN AUTO: 27.4 PG (ref 27–31)
MCHC RBC AUTO-ENTMCNC: 32.1 G/DL (ref 32–36)
MCV RBC AUTO: 85 FL (ref 82–98)
MONOCYTES # BLD AUTO: 0.4 K/UL (ref 0.3–1)
MONOCYTES NFR BLD: 6.8 % (ref 4–15)
NEUTROPHILS # BLD AUTO: 3.3 K/UL (ref 1.8–7.7)
NEUTROPHILS NFR BLD: 54 % (ref 38–73)
NRBC BLD-RTO: 0 /100 WBC
PLATELET # BLD AUTO: 345 K/UL (ref 150–450)
PMV BLD AUTO: 9.7 FL (ref 9.2–12.9)
POCT GLUCOSE: 83 MG/DL (ref 70–110)
POTASSIUM SERPL-SCNC: 4 MMOL/L (ref 3.5–5.1)
RBC # BLD AUTO: 4.97 M/UL (ref 4–5.4)
SODIUM SERPL-SCNC: 137 MMOL/L (ref 136–145)
WBC # BLD AUTO: 6.17 K/UL (ref 3.9–12.7)

## 2021-12-23 PROCEDURE — C1768 GRAFT, VASCULAR: HCPCS | Performed by: NEUROLOGICAL SURGERY

## 2021-12-23 PROCEDURE — 63600175 PHARM REV CODE 636 W HCPCS: Performed by: NEUROLOGICAL SURGERY

## 2021-12-23 PROCEDURE — 25000003 PHARM REV CODE 250: Performed by: STUDENT IN AN ORGANIZED HEALTH CARE EDUCATION/TRAINING PROGRAM

## 2021-12-23 PROCEDURE — D9220A PRA ANESTHESIA: Mod: ,,, | Performed by: ANESTHESIOLOGY

## 2021-12-23 PROCEDURE — 61343 PR SUBOCCIPT DECOMP MEDULLA/SP CRD: ICD-10-PCS | Mod: 22,,, | Performed by: NEUROLOGICAL SURGERY

## 2021-12-23 PROCEDURE — 61343 CRNEC SOPL CRV LAM DCMPRN: CPT | Mod: 22,,, | Performed by: NEUROLOGICAL SURGERY

## 2021-12-23 PROCEDURE — 25000003 PHARM REV CODE 250: Performed by: NURSE ANESTHETIST, CERTIFIED REGISTERED

## 2021-12-23 PROCEDURE — 37000008 HC ANESTHESIA 1ST 15 MINUTES: Performed by: NEUROLOGICAL SURGERY

## 2021-12-23 PROCEDURE — 86920 COMPATIBILITY TEST SPIN: CPT | Performed by: NEUROLOGICAL SURGERY

## 2021-12-23 PROCEDURE — 80048 BASIC METABOLIC PNL TOTAL CA: CPT | Performed by: STUDENT IN AN ORGANIZED HEALTH CARE EDUCATION/TRAINING PROGRAM

## 2021-12-23 PROCEDURE — 27201423 OPTIME MED/SURG SUP & DEVICES STERILE SUPPLY: Performed by: NEUROLOGICAL SURGERY

## 2021-12-23 PROCEDURE — 99223 PR INITIAL HOSPITAL CARE,LEVL III: ICD-10-PCS | Mod: ,,, | Performed by: NURSE PRACTITIONER

## 2021-12-23 PROCEDURE — C1713 ANCHOR/SCREW BN/BN,TIS/BN: HCPCS | Performed by: NEUROLOGICAL SURGERY

## 2021-12-23 PROCEDURE — 25000003 PHARM REV CODE 250: Performed by: NURSE PRACTITIONER

## 2021-12-23 PROCEDURE — D9220A PRA ANESTHESIA: ICD-10-PCS | Mod: ,,, | Performed by: ANESTHESIOLOGY

## 2021-12-23 PROCEDURE — 69990 PR MICROSURG TECHNIQUES,REQ OPER MICROSCOPE: ICD-10-PCS | Mod: ,,, | Performed by: NEUROLOGICAL SURGERY

## 2021-12-23 PROCEDURE — 63600175 PHARM REV CODE 636 W HCPCS: Performed by: NURSE PRACTITIONER

## 2021-12-23 PROCEDURE — 63600175 PHARM REV CODE 636 W HCPCS: Performed by: NURSE ANESTHETIST, CERTIFIED REGISTERED

## 2021-12-23 PROCEDURE — 36000711: Performed by: NEUROLOGICAL SURGERY

## 2021-12-23 PROCEDURE — 37000009 HC ANESTHESIA EA ADD 15 MINS: Performed by: NEUROLOGICAL SURGERY

## 2021-12-23 PROCEDURE — 85025 COMPLETE CBC W/AUTO DIFF WBC: CPT | Performed by: STUDENT IN AN ORGANIZED HEALTH CARE EDUCATION/TRAINING PROGRAM

## 2021-12-23 PROCEDURE — D9220A PRA ANESTHESIA: ICD-10-PCS | Mod: ,,, | Performed by: NURSE ANESTHETIST, CERTIFIED REGISTERED

## 2021-12-23 PROCEDURE — 81025 URINE PREGNANCY TEST: CPT | Performed by: NEUROLOGICAL SURGERY

## 2021-12-23 PROCEDURE — P9016 RBC LEUKOCYTES REDUCED: HCPCS | Performed by: NEUROLOGICAL SURGERY

## 2021-12-23 PROCEDURE — 63600175 PHARM REV CODE 636 W HCPCS: Performed by: STUDENT IN AN ORGANIZED HEALTH CARE EDUCATION/TRAINING PROGRAM

## 2021-12-23 PROCEDURE — 69990 MICROSURGERY ADD-ON: CPT | Mod: ,,, | Performed by: NEUROLOGICAL SURGERY

## 2021-12-23 PROCEDURE — 25000003 PHARM REV CODE 250: Performed by: NEUROLOGICAL SURGERY

## 2021-12-23 PROCEDURE — 99223 1ST HOSP IP/OBS HIGH 75: CPT | Mod: ,,, | Performed by: NURSE PRACTITIONER

## 2021-12-23 PROCEDURE — D9220A PRA ANESTHESIA: Mod: ,,, | Performed by: NURSE ANESTHETIST, CERTIFIED REGISTERED

## 2021-12-23 PROCEDURE — 94761 N-INVAS EAR/PLS OXIMETRY MLT: CPT

## 2021-12-23 PROCEDURE — 86900 BLOOD TYPING SEROLOGIC ABO: CPT | Performed by: NEUROLOGICAL SURGERY

## 2021-12-23 PROCEDURE — 20000000 HC ICU ROOM

## 2021-12-23 PROCEDURE — 36000710: Performed by: NEUROLOGICAL SURGERY

## 2021-12-23 DEVICE — PATCH PERI-GUARD 6CM X 8CM: Type: IMPLANTABLE DEVICE | Site: CRANIAL | Status: FUNCTIONAL

## 2021-12-23 RX ORDER — GABAPENTIN 300 MG/1
300 CAPSULE ORAL 3 TIMES DAILY
Status: DISCONTINUED | OUTPATIENT
Start: 2021-12-23 | End: 2021-12-26 | Stop reason: HOSPADM

## 2021-12-23 RX ORDER — AMOXICILLIN 250 MG
1 CAPSULE ORAL DAILY
Status: DISCONTINUED | OUTPATIENT
Start: 2021-12-24 | End: 2021-12-26 | Stop reason: HOSPADM

## 2021-12-23 RX ORDER — BACITRACIN ZINC 500 UNIT/G
OINTMENT (GRAM) TOPICAL
Status: DISCONTINUED | OUTPATIENT
Start: 2021-12-23 | End: 2021-12-23 | Stop reason: HOSPADM

## 2021-12-23 RX ORDER — FENTANYL CITRATE 50 UG/ML
INJECTION, SOLUTION INTRAMUSCULAR; INTRAVENOUS
Status: DISCONTINUED | OUTPATIENT
Start: 2021-12-23 | End: 2021-12-23

## 2021-12-23 RX ORDER — LIDOCAINE HYDROCHLORIDE AND EPINEPHRINE 10; 10 MG/ML; UG/ML
INJECTION, SOLUTION INFILTRATION; PERINEURAL
Status: DISCONTINUED | OUTPATIENT
Start: 2021-12-23 | End: 2021-12-23 | Stop reason: HOSPADM

## 2021-12-23 RX ORDER — IBUPROFEN 200 MG
24 TABLET ORAL
Status: DISCONTINUED | OUTPATIENT
Start: 2021-12-23 | End: 2021-12-24

## 2021-12-23 RX ORDER — BUPIVACAINE HYDROCHLORIDE AND EPINEPHRINE 5; 5 MG/ML; UG/ML
INJECTION, SOLUTION EPIDURAL; INTRACAUDAL; PERINEURAL
Status: DISCONTINUED | OUTPATIENT
Start: 2021-12-23 | End: 2021-12-23 | Stop reason: HOSPADM

## 2021-12-23 RX ORDER — SODIUM CHLORIDE 9 MG/ML
INJECTION, SOLUTION INTRAVENOUS CONTINUOUS
Status: DISCONTINUED | OUTPATIENT
Start: 2021-12-23 | End: 2021-12-26 | Stop reason: HOSPADM

## 2021-12-23 RX ORDER — ONDANSETRON 2 MG/ML
4 INJECTION INTRAMUSCULAR; INTRAVENOUS EVERY 6 HOURS PRN
Status: DISCONTINUED | OUTPATIENT
Start: 2021-12-23 | End: 2021-12-26 | Stop reason: HOSPADM

## 2021-12-23 RX ORDER — SODIUM CHLORIDE 0.9 % (FLUSH) 0.9 %
10 SYRINGE (ML) INJECTION
Status: DISCONTINUED | OUTPATIENT
Start: 2021-12-23 | End: 2021-12-26 | Stop reason: HOSPADM

## 2021-12-23 RX ORDER — LABETALOL HCL 20 MG/4 ML
10 SYRINGE (ML) INTRAVENOUS EVERY 4 HOURS PRN
Status: DISCONTINUED | OUTPATIENT
Start: 2021-12-23 | End: 2021-12-26 | Stop reason: HOSPADM

## 2021-12-23 RX ORDER — INSULIN ASPART 100 [IU]/ML
1-10 INJECTION, SOLUTION INTRAVENOUS; SUBCUTANEOUS
Status: DISCONTINUED | OUTPATIENT
Start: 2021-12-23 | End: 2021-12-24

## 2021-12-23 RX ORDER — MUPIROCIN 20 MG/G
OINTMENT TOPICAL
Status: DISCONTINUED | OUTPATIENT
Start: 2021-12-23 | End: 2021-12-23 | Stop reason: HOSPADM

## 2021-12-23 RX ORDER — SODIUM CHLORIDE 0.9 % (FLUSH) 0.9 %
10 SYRINGE (ML) INJECTION
Status: CANCELLED | OUTPATIENT
Start: 2021-12-23

## 2021-12-23 RX ORDER — DIAZEPAM 5 MG/1
5 TABLET ORAL EVERY 6 HOURS
Status: DISCONTINUED | OUTPATIENT
Start: 2021-12-23 | End: 2021-12-24

## 2021-12-23 RX ORDER — METHOCARBAMOL 500 MG/1
500 TABLET, FILM COATED ORAL 4 TIMES DAILY
Status: DISCONTINUED | OUTPATIENT
Start: 2021-12-23 | End: 2021-12-26 | Stop reason: HOSPADM

## 2021-12-23 RX ORDER — KETAMINE HCL IN 0.9 % NACL 50 MG/5 ML
SYRINGE (ML) INTRAVENOUS
Status: DISCONTINUED | OUTPATIENT
Start: 2021-12-23 | End: 2021-12-23

## 2021-12-23 RX ORDER — PHENYLEPHRINE HYDROCHLORIDE 10 MG/ML
INJECTION INTRAVENOUS
Status: DISCONTINUED | OUTPATIENT
Start: 2021-12-23 | End: 2021-12-23

## 2021-12-23 RX ORDER — MORPHINE SULFATE 2 MG/ML
2 INJECTION, SOLUTION INTRAMUSCULAR; INTRAVENOUS EVERY 4 HOURS PRN
Status: DISCONTINUED | OUTPATIENT
Start: 2021-12-23 | End: 2021-12-26 | Stop reason: HOSPADM

## 2021-12-23 RX ORDER — OXYCODONE HYDROCHLORIDE 5 MG/1
5 TABLET ORAL EVERY 6 HOURS PRN
Status: DISCONTINUED | OUTPATIENT
Start: 2021-12-23 | End: 2021-12-24

## 2021-12-23 RX ORDER — LIDOCAINE HYDROCHLORIDE 20 MG/ML
INJECTION INTRAVENOUS
Status: DISCONTINUED | OUTPATIENT
Start: 2021-12-23 | End: 2021-12-23

## 2021-12-23 RX ORDER — FENTANYL CITRATE 50 UG/ML
12.5 INJECTION, SOLUTION INTRAMUSCULAR; INTRAVENOUS ONCE
Status: COMPLETED | OUTPATIENT
Start: 2021-12-23 | End: 2021-12-23

## 2021-12-23 RX ORDER — PROPOFOL 10 MG/ML
VIAL (ML) INTRAVENOUS
Status: DISCONTINUED | OUTPATIENT
Start: 2021-12-23 | End: 2021-12-23

## 2021-12-23 RX ORDER — ROCURONIUM BROMIDE 10 MG/ML
INJECTION, SOLUTION INTRAVENOUS
Status: DISCONTINUED | OUTPATIENT
Start: 2021-12-23 | End: 2021-12-23

## 2021-12-23 RX ORDER — MIDAZOLAM HYDROCHLORIDE 1 MG/ML
INJECTION, SOLUTION INTRAMUSCULAR; INTRAVENOUS
Status: DISCONTINUED | OUTPATIENT
Start: 2021-12-23 | End: 2021-12-23

## 2021-12-23 RX ORDER — GLUCAGON 1 MG
1 KIT INJECTION
Status: DISCONTINUED | OUTPATIENT
Start: 2021-12-23 | End: 2021-12-24

## 2021-12-23 RX ORDER — IBUPROFEN 200 MG
16 TABLET ORAL
Status: DISCONTINUED | OUTPATIENT
Start: 2021-12-23 | End: 2021-12-24

## 2021-12-23 RX ORDER — ONDANSETRON 2 MG/ML
INJECTION INTRAMUSCULAR; INTRAVENOUS
Status: DISCONTINUED | OUTPATIENT
Start: 2021-12-23 | End: 2021-12-23

## 2021-12-23 RX ORDER — ONDANSETRON 2 MG/ML
4 INJECTION INTRAMUSCULAR; INTRAVENOUS DAILY PRN
Status: CANCELLED | OUTPATIENT
Start: 2021-12-23

## 2021-12-23 RX ORDER — ACETAMINOPHEN 325 MG/1
650 TABLET ORAL EVERY 6 HOURS PRN
Status: DISCONTINUED | OUTPATIENT
Start: 2021-12-23 | End: 2021-12-26 | Stop reason: HOSPADM

## 2021-12-23 RX ADMIN — MIDAZOLAM HYDROCHLORIDE 2 MG: 1 INJECTION, SOLUTION INTRAMUSCULAR; INTRAVENOUS at 04:12

## 2021-12-23 RX ADMIN — ROCURONIUM BROMIDE 20 MG: 10 INJECTION, SOLUTION INTRAVENOUS at 05:12

## 2021-12-23 RX ADMIN — Medication 20 MG: at 04:12

## 2021-12-23 RX ADMIN — SODIUM CHLORIDE: 0.9 INJECTION, SOLUTION INTRAVENOUS at 05:12

## 2021-12-23 RX ADMIN — CEFTRIAXONE SODIUM 2 G: 2 INJECTION, SOLUTION INTRAVENOUS at 04:12

## 2021-12-23 RX ADMIN — ROCURONIUM BROMIDE 20 MG: 10 INJECTION, SOLUTION INTRAVENOUS at 07:12

## 2021-12-23 RX ADMIN — Medication 10 MG: at 06:12

## 2021-12-23 RX ADMIN — Medication 10 MG: at 07:12

## 2021-12-23 RX ADMIN — FENTANYL CITRATE 100 MCG: 50 INJECTION, SOLUTION INTRAMUSCULAR; INTRAVENOUS at 04:12

## 2021-12-23 RX ADMIN — PHENYLEPHRINE HYDROCHLORIDE 100 MCG: 10 INJECTION INTRAVENOUS at 05:12

## 2021-12-23 RX ADMIN — FENTANYL CITRATE 12.5 MCG: 50 INJECTION INTRAMUSCULAR; INTRAVENOUS at 10:12

## 2021-12-23 RX ADMIN — FENTANYL CITRATE 50 MCG: 50 INJECTION, SOLUTION INTRAMUSCULAR; INTRAVENOUS at 08:12

## 2021-12-23 RX ADMIN — PROPOFOL 200 MG: 10 INJECTION, EMULSION INTRAVENOUS at 04:12

## 2021-12-23 RX ADMIN — LABETALOL HYDROCHLORIDE 10 MG: 5 INJECTION, SOLUTION INTRAVENOUS at 11:12

## 2021-12-23 RX ADMIN — OXYCODONE 5 MG: 5 TABLET ORAL at 10:12

## 2021-12-23 RX ADMIN — Medication 10 MG: at 05:12

## 2021-12-23 RX ADMIN — LIDOCAINE HYDROCHLORIDE 100 MG: 20 INJECTION, SOLUTION INTRAVENOUS at 04:12

## 2021-12-23 RX ADMIN — DIAZEPAM 5 MG: 5 TABLET ORAL at 09:12

## 2021-12-23 RX ADMIN — GABAPENTIN 300 MG: 300 CAPSULE ORAL at 09:12

## 2021-12-23 RX ADMIN — ROCURONIUM BROMIDE 20 MG: 10 INJECTION, SOLUTION INTRAVENOUS at 06:12

## 2021-12-23 RX ADMIN — METHOCARBAMOL 500 MG: 500 TABLET ORAL at 09:12

## 2021-12-23 RX ADMIN — SODIUM CHLORIDE: 0.9 INJECTION, SOLUTION INTRAVENOUS at 04:12

## 2021-12-23 RX ADMIN — MORPHINE SULFATE 2 MG: 2 INJECTION, SOLUTION INTRAMUSCULAR; INTRAVENOUS at 09:12

## 2021-12-23 RX ADMIN — ROCURONIUM BROMIDE 50 MG: 10 INJECTION, SOLUTION INTRAVENOUS at 04:12

## 2021-12-23 RX ADMIN — MUPIROCIN: 20 OINTMENT TOPICAL at 02:12

## 2021-12-23 RX ADMIN — ONDANSETRON 4 MG: 2 INJECTION INTRAMUSCULAR; INTRAVENOUS at 07:12

## 2021-12-24 LAB
ALBUMIN SERPL BCP-MCNC: 3.1 G/DL (ref 3.5–5.2)
ALP SERPL-CCNC: 58 U/L (ref 55–135)
ALT SERPL W/O P-5'-P-CCNC: 12 U/L (ref 10–44)
ANION GAP SERPL CALC-SCNC: 9 MMOL/L (ref 8–16)
APTT BLDCRRT: <21 SEC (ref 21–32)
AST SERPL-CCNC: 16 U/L (ref 10–40)
BASOPHILS # BLD AUTO: 0.03 K/UL (ref 0–0.2)
BASOPHILS NFR BLD: 0.3 % (ref 0–1.9)
BILIRUB SERPL-MCNC: 0.6 MG/DL (ref 0.1–1)
BUN SERPL-MCNC: 7 MG/DL (ref 6–20)
CALCIUM SERPL-MCNC: 8.1 MG/DL (ref 8.7–10.5)
CHLORIDE SERPL-SCNC: 106 MMOL/L (ref 95–110)
CO2 SERPL-SCNC: 22 MMOL/L (ref 23–29)
CREAT SERPL-MCNC: 0.8 MG/DL (ref 0.5–1.4)
DIFFERENTIAL METHOD: ABNORMAL
EOSINOPHIL # BLD AUTO: 0 K/UL (ref 0–0.5)
EOSINOPHIL NFR BLD: 0.2 % (ref 0–8)
ERYTHROCYTE [DISTWIDTH] IN BLOOD BY AUTOMATED COUNT: 12.6 % (ref 11.5–14.5)
EST. GFR  (AFRICAN AMERICAN): >60 ML/MIN/1.73 M^2
EST. GFR  (NON AFRICAN AMERICAN): >60 ML/MIN/1.73 M^2
GLUCOSE SERPL-MCNC: 98 MG/DL (ref 70–110)
HCT VFR BLD AUTO: 38.5 % (ref 37–48.5)
HGB BLD-MCNC: 12.6 G/DL (ref 12–16)
IMM GRANULOCYTES # BLD AUTO: 0.02 K/UL (ref 0–0.04)
IMM GRANULOCYTES NFR BLD AUTO: 0.2 % (ref 0–0.5)
INR PPP: 1.1 (ref 0.8–1.2)
LYMPHOCYTES # BLD AUTO: 1.1 K/UL (ref 1–4.8)
LYMPHOCYTES NFR BLD: 10 % (ref 18–48)
MAGNESIUM SERPL-MCNC: 1.9 MG/DL (ref 1.6–2.6)
MCH RBC QN AUTO: 28.1 PG (ref 27–31)
MCHC RBC AUTO-ENTMCNC: 32.7 G/DL (ref 32–36)
MCV RBC AUTO: 86 FL (ref 82–98)
MONOCYTES # BLD AUTO: 0.4 K/UL (ref 0.3–1)
MONOCYTES NFR BLD: 4.1 % (ref 4–15)
NEUTROPHILS # BLD AUTO: 9 K/UL (ref 1.8–7.7)
NEUTROPHILS NFR BLD: 85.2 % (ref 38–73)
NRBC BLD-RTO: 0 /100 WBC
PHOSPHATE SERPL-MCNC: 2.4 MG/DL (ref 2.7–4.5)
PLATELET # BLD AUTO: 310 K/UL (ref 150–450)
PMV BLD AUTO: 10.2 FL (ref 9.2–12.9)
POCT GLUCOSE: 110 MG/DL (ref 70–110)
POCT GLUCOSE: 148 MG/DL (ref 70–110)
POTASSIUM SERPL-SCNC: 4 MMOL/L (ref 3.5–5.1)
PROT SERPL-MCNC: 6.2 G/DL (ref 6–8.4)
PROTHROMBIN TIME: 11.5 SEC (ref 9–12.5)
RBC # BLD AUTO: 4.48 M/UL (ref 4–5.4)
SODIUM SERPL-SCNC: 137 MMOL/L (ref 136–145)
WBC # BLD AUTO: 10.55 K/UL (ref 3.9–12.7)

## 2021-12-24 PROCEDURE — 97165 OT EVAL LOW COMPLEX 30 MIN: CPT

## 2021-12-24 PROCEDURE — 25000003 PHARM REV CODE 250: Performed by: NURSE PRACTITIONER

## 2021-12-24 PROCEDURE — 85730 THROMBOPLASTIN TIME PARTIAL: CPT | Performed by: NURSE PRACTITIONER

## 2021-12-24 PROCEDURE — 92610 EVALUATE SWALLOWING FUNCTION: CPT

## 2021-12-24 PROCEDURE — 85025 COMPLETE CBC W/AUTO DIFF WBC: CPT | Performed by: NURSE PRACTITIONER

## 2021-12-24 PROCEDURE — 80053 COMPREHEN METABOLIC PANEL: CPT | Performed by: NURSE PRACTITIONER

## 2021-12-24 PROCEDURE — 63600175 PHARM REV CODE 636 W HCPCS: Performed by: STUDENT IN AN ORGANIZED HEALTH CARE EDUCATION/TRAINING PROGRAM

## 2021-12-24 PROCEDURE — 94761 N-INVAS EAR/PLS OXIMETRY MLT: CPT

## 2021-12-24 PROCEDURE — 85610 PROTHROMBIN TIME: CPT | Performed by: NURSE PRACTITIONER

## 2021-12-24 PROCEDURE — 92523 SPEECH SOUND LANG COMPREHEN: CPT

## 2021-12-24 PROCEDURE — 20000000 HC ICU ROOM

## 2021-12-24 PROCEDURE — 25000003 PHARM REV CODE 250: Performed by: STUDENT IN AN ORGANIZED HEALTH CARE EDUCATION/TRAINING PROGRAM

## 2021-12-24 PROCEDURE — 97161 PT EVAL LOW COMPLEX 20 MIN: CPT

## 2021-12-24 PROCEDURE — 99233 PR SUBSEQUENT HOSPITAL CARE,LEVL III: ICD-10-PCS | Mod: ,,, | Performed by: NURSE PRACTITIONER

## 2021-12-24 PROCEDURE — 97530 THERAPEUTIC ACTIVITIES: CPT

## 2021-12-24 PROCEDURE — 83735 ASSAY OF MAGNESIUM: CPT | Performed by: NURSE PRACTITIONER

## 2021-12-24 PROCEDURE — 84100 ASSAY OF PHOSPHORUS: CPT | Performed by: NURSE PRACTITIONER

## 2021-12-24 PROCEDURE — 99233 SBSQ HOSP IP/OBS HIGH 50: CPT | Mod: ,,, | Performed by: NURSE PRACTITIONER

## 2021-12-24 RX ORDER — HYDROCODONE BITARTRATE AND ACETAMINOPHEN 5; 325 MG/1; MG/1
1 TABLET ORAL EVERY 6 HOURS PRN
Status: DISCONTINUED | OUTPATIENT
Start: 2021-12-24 | End: 2021-12-24

## 2021-12-24 RX ORDER — HEPARIN SODIUM 5000 [USP'U]/ML
7500 INJECTION, SOLUTION INTRAVENOUS; SUBCUTANEOUS EVERY 8 HOURS
Status: DISCONTINUED | OUTPATIENT
Start: 2021-12-24 | End: 2021-12-26 | Stop reason: HOSPADM

## 2021-12-24 RX ORDER — HYDROCODONE BITARTRATE AND ACETAMINOPHEN 5; 325 MG/1; MG/1
1 TABLET ORAL EVERY 6 HOURS
Status: DISCONTINUED | OUTPATIENT
Start: 2021-12-24 | End: 2021-12-25

## 2021-12-24 RX ORDER — DIAZEPAM 5 MG/1
5 TABLET ORAL EVERY 6 HOURS PRN
Status: DISCONTINUED | OUTPATIENT
Start: 2021-12-24 | End: 2021-12-25

## 2021-12-24 RX ORDER — HYDROCODONE BITARTRATE AND ACETAMINOPHEN 5; 325 MG/1; MG/1
1 TABLET ORAL EVERY 6 HOURS
Status: DISCONTINUED | OUTPATIENT
Start: 2021-12-24 | End: 2021-12-24

## 2021-12-24 RX ADMIN — METHOCARBAMOL 500 MG: 500 TABLET ORAL at 08:12

## 2021-12-24 RX ADMIN — DIAZEPAM 5 MG: 5 TABLET ORAL at 05:12

## 2021-12-24 RX ADMIN — MORPHINE SULFATE 2 MG: 2 INJECTION, SOLUTION INTRAMUSCULAR; INTRAVENOUS at 07:12

## 2021-12-24 RX ADMIN — GABAPENTIN 300 MG: 300 CAPSULE ORAL at 08:12

## 2021-12-24 RX ADMIN — HYDROCODONE BITARTRATE AND ACETAMINOPHEN 1 TABLET: 5; 325 TABLET ORAL at 10:12

## 2021-12-24 RX ADMIN — HYDROCODONE BITARTRATE AND ACETAMINOPHEN 1 TABLET: 5; 325 TABLET ORAL at 05:12

## 2021-12-24 RX ADMIN — ACETAMINOPHEN 650 MG: 325 TABLET ORAL at 08:12

## 2021-12-24 RX ADMIN — METHOCARBAMOL 500 MG: 500 TABLET ORAL at 01:12

## 2021-12-24 RX ADMIN — GABAPENTIN 300 MG: 300 CAPSULE ORAL at 10:12

## 2021-12-24 RX ADMIN — HEPARIN SODIUM 7500 UNITS: 5000 INJECTION INTRAVENOUS; SUBCUTANEOUS at 10:12

## 2021-12-24 RX ADMIN — LABETALOL HYDROCHLORIDE 10 MG: 5 INJECTION, SOLUTION INTRAVENOUS at 05:12

## 2021-12-24 RX ADMIN — METHOCARBAMOL 500 MG: 500 TABLET ORAL at 05:12

## 2021-12-24 RX ADMIN — SENNOSIDES AND DOCUSATE SODIUM 1 TABLET: 50; 8.6 TABLET ORAL at 08:12

## 2021-12-24 RX ADMIN — METHOCARBAMOL 500 MG: 500 TABLET ORAL at 10:12

## 2021-12-24 RX ADMIN — ONDANSETRON 4 MG: 2 INJECTION INTRAMUSCULAR; INTRAVENOUS at 11:12

## 2021-12-24 RX ADMIN — GABAPENTIN 300 MG: 300 CAPSULE ORAL at 03:12

## 2021-12-25 LAB
BASOPHILS # BLD AUTO: 0.04 K/UL (ref 0–0.2)
BASOPHILS NFR BLD: 0.3 % (ref 0–1.9)
BILIRUB UR QL STRIP: NEGATIVE
CLARITY UR REFRACT.AUTO: CLEAR
COLOR UR AUTO: YELLOW
DIFFERENTIAL METHOD: ABNORMAL
EOSINOPHIL # BLD AUTO: 0 K/UL (ref 0–0.5)
EOSINOPHIL NFR BLD: 0.2 % (ref 0–8)
ERYTHROCYTE [DISTWIDTH] IN BLOOD BY AUTOMATED COUNT: 12.8 % (ref 11.5–14.5)
GLUCOSE UR QL STRIP: NEGATIVE
HCT VFR BLD AUTO: 37 % (ref 37–48.5)
HGB BLD-MCNC: 12.1 G/DL (ref 12–16)
HGB UR QL STRIP: NEGATIVE
IMM GRANULOCYTES # BLD AUTO: 0.05 K/UL (ref 0–0.04)
IMM GRANULOCYTES NFR BLD AUTO: 0.4 % (ref 0–0.5)
KETONES UR QL STRIP: NEGATIVE
LEUKOCYTE ESTERASE UR QL STRIP: NEGATIVE
LYMPHOCYTES # BLD AUTO: 1.9 K/UL (ref 1–4.8)
LYMPHOCYTES NFR BLD: 14.1 % (ref 18–48)
MCH RBC QN AUTO: 28.2 PG (ref 27–31)
MCHC RBC AUTO-ENTMCNC: 32.7 G/DL (ref 32–36)
MCV RBC AUTO: 86 FL (ref 82–98)
MONOCYTES # BLD AUTO: 1.1 K/UL (ref 0.3–1)
MONOCYTES NFR BLD: 8 % (ref 4–15)
NEUTROPHILS # BLD AUTO: 10.1 K/UL (ref 1.8–7.7)
NEUTROPHILS NFR BLD: 77 % (ref 38–73)
NITRITE UR QL STRIP: NEGATIVE
NRBC BLD-RTO: 0 /100 WBC
PH UR STRIP: 6 [PH] (ref 5–8)
PLATELET # BLD AUTO: 278 K/UL (ref 150–450)
PMV BLD AUTO: 9.8 FL (ref 9.2–12.9)
PROCALCITONIN SERPL IA-MCNC: 0.04 NG/ML
PROT UR QL STRIP: NEGATIVE
RBC # BLD AUTO: 4.29 M/UL (ref 4–5.4)
SP GR UR STRIP: 1 (ref 1–1.03)
URN SPEC COLLECT METH UR: NORMAL
WBC # BLD AUTO: 13.1 K/UL (ref 3.9–12.7)

## 2021-12-25 PROCEDURE — 25000003 PHARM REV CODE 250: Performed by: NURSE PRACTITIONER

## 2021-12-25 PROCEDURE — 85025 COMPLETE CBC W/AUTO DIFF WBC: CPT | Performed by: NURSE PRACTITIONER

## 2021-12-25 PROCEDURE — 25000003 PHARM REV CODE 250: Performed by: STUDENT IN AN ORGANIZED HEALTH CARE EDUCATION/TRAINING PROGRAM

## 2021-12-25 PROCEDURE — 36415 COLL VENOUS BLD VENIPUNCTURE: CPT | Performed by: NURSE PRACTITIONER

## 2021-12-25 PROCEDURE — 81003 URINALYSIS AUTO W/O SCOPE: CPT | Performed by: NURSE PRACTITIONER

## 2021-12-25 PROCEDURE — 27000221 HC OXYGEN, UP TO 24 HOURS

## 2021-12-25 PROCEDURE — 99233 SBSQ HOSP IP/OBS HIGH 50: CPT | Mod: ,,, | Performed by: NURSE PRACTITIONER

## 2021-12-25 PROCEDURE — 84145 PROCALCITONIN (PCT): CPT | Performed by: NURSE PRACTITIONER

## 2021-12-25 PROCEDURE — 63600175 PHARM REV CODE 636 W HCPCS: Performed by: STUDENT IN AN ORGANIZED HEALTH CARE EDUCATION/TRAINING PROGRAM

## 2021-12-25 PROCEDURE — 11000001 HC ACUTE MED/SURG PRIVATE ROOM

## 2021-12-25 PROCEDURE — 99233 PR SUBSEQUENT HOSPITAL CARE,LEVL III: ICD-10-PCS | Mod: ,,, | Performed by: NURSE PRACTITIONER

## 2021-12-25 PROCEDURE — 94761 N-INVAS EAR/PLS OXIMETRY MLT: CPT

## 2021-12-25 RX ORDER — DIAZEPAM 5 MG/1
5 TABLET ORAL EVERY 6 HOURS
Status: DISCONTINUED | OUTPATIENT
Start: 2021-12-25 | End: 2021-12-26 | Stop reason: HOSPADM

## 2021-12-25 RX ORDER — ACETAMINOPHEN 325 MG/1
650 TABLET ORAL ONCE
Status: COMPLETED | OUTPATIENT
Start: 2021-12-25 | End: 2021-12-25

## 2021-12-25 RX ORDER — OXYCODONE HYDROCHLORIDE 5 MG/1
5 TABLET ORAL EVERY 4 HOURS PRN
Status: DISCONTINUED | OUTPATIENT
Start: 2021-12-25 | End: 2021-12-26 | Stop reason: HOSPADM

## 2021-12-25 RX ORDER — PROMETHAZINE HYDROCHLORIDE 12.5 MG/1
12.5 TABLET ORAL EVERY 6 HOURS PRN
Status: DISCONTINUED | OUTPATIENT
Start: 2021-12-25 | End: 2021-12-26 | Stop reason: HOSPADM

## 2021-12-25 RX ADMIN — ACETAMINOPHEN 650 MG: 325 TABLET ORAL at 06:12

## 2021-12-25 RX ADMIN — HEPARIN SODIUM 7500 UNITS: 5000 INJECTION INTRAVENOUS; SUBCUTANEOUS at 01:12

## 2021-12-25 RX ADMIN — HEPARIN SODIUM 7500 UNITS: 5000 INJECTION INTRAVENOUS; SUBCUTANEOUS at 06:12

## 2021-12-25 RX ADMIN — OXYCODONE 5 MG: 5 TABLET ORAL at 10:12

## 2021-12-25 RX ADMIN — HEPARIN SODIUM 7500 UNITS: 5000 INJECTION INTRAVENOUS; SUBCUTANEOUS at 09:12

## 2021-12-25 RX ADMIN — GABAPENTIN 300 MG: 300 CAPSULE ORAL at 04:12

## 2021-12-25 RX ADMIN — OXYCODONE 5 MG: 5 TABLET ORAL at 01:12

## 2021-12-25 RX ADMIN — HYDROCODONE BITARTRATE AND ACETAMINOPHEN 1 TABLET: 5; 325 TABLET ORAL at 12:12

## 2021-12-25 RX ADMIN — SENNOSIDES AND DOCUSATE SODIUM 1 TABLET: 50; 8.6 TABLET ORAL at 08:12

## 2021-12-25 RX ADMIN — METHOCARBAMOL 500 MG: 500 TABLET ORAL at 09:12

## 2021-12-25 RX ADMIN — METHOCARBAMOL 500 MG: 500 TABLET ORAL at 01:12

## 2021-12-25 RX ADMIN — OXYCODONE 5 MG: 5 TABLET ORAL at 05:12

## 2021-12-25 RX ADMIN — GABAPENTIN 300 MG: 300 CAPSULE ORAL at 08:12

## 2021-12-25 RX ADMIN — METHOCARBAMOL 500 MG: 500 TABLET ORAL at 08:12

## 2021-12-25 RX ADMIN — DIAZEPAM 5 MG: 5 TABLET ORAL at 05:12

## 2021-12-25 RX ADMIN — GABAPENTIN 300 MG: 300 CAPSULE ORAL at 09:12

## 2021-12-25 RX ADMIN — METHOCARBAMOL 500 MG: 500 TABLET ORAL at 05:12

## 2021-12-26 VITALS
WEIGHT: 293 LBS | DIASTOLIC BLOOD PRESSURE: 91 MMHG | SYSTOLIC BLOOD PRESSURE: 174 MMHG | BODY MASS INDEX: 48.82 KG/M2 | RESPIRATION RATE: 16 BRPM | HEIGHT: 65 IN | HEART RATE: 104 BPM | TEMPERATURE: 98 F | OXYGEN SATURATION: 95 %

## 2021-12-26 PROCEDURE — 25000003 PHARM REV CODE 250: Performed by: STUDENT IN AN ORGANIZED HEALTH CARE EDUCATION/TRAINING PROGRAM

## 2021-12-26 PROCEDURE — 63600175 PHARM REV CODE 636 W HCPCS: Performed by: STUDENT IN AN ORGANIZED HEALTH CARE EDUCATION/TRAINING PROGRAM

## 2021-12-26 PROCEDURE — 25000003 PHARM REV CODE 250: Performed by: NURSE PRACTITIONER

## 2021-12-26 RX ORDER — PROMETHAZINE HYDROCHLORIDE 12.5 MG/1
12.5 TABLET ORAL EVERY 6 HOURS PRN
Qty: 25 TABLET | Refills: 3 | Status: SHIPPED | OUTPATIENT
Start: 2021-12-26 | End: 2022-04-07

## 2021-12-26 RX ORDER — DIAZEPAM 5 MG/1
5 TABLET ORAL EVERY 8 HOURS PRN
Qty: 25 TABLET | Refills: 0 | Status: SHIPPED | OUTPATIENT
Start: 2021-12-26 | End: 2022-04-07

## 2021-12-26 RX ORDER — OXYCODONE HYDROCHLORIDE 5 MG/1
5 TABLET ORAL EVERY 4 HOURS PRN
Qty: 45 TABLET | Refills: 0 | Status: SHIPPED | OUTPATIENT
Start: 2021-12-26 | End: 2022-04-07

## 2021-12-26 RX ADMIN — ACETAMINOPHEN 650 MG: 325 TABLET ORAL at 09:12

## 2021-12-26 RX ADMIN — HEPARIN SODIUM 7500 UNITS: 5000 INJECTION INTRAVENOUS; SUBCUTANEOUS at 05:12

## 2021-12-26 RX ADMIN — METHOCARBAMOL 500 MG: 500 TABLET ORAL at 09:12

## 2021-12-26 RX ADMIN — GABAPENTIN 300 MG: 300 CAPSULE ORAL at 09:12

## 2021-12-26 RX ADMIN — DIAZEPAM 5 MG: 5 TABLET ORAL at 01:12

## 2021-12-26 RX ADMIN — DIAZEPAM 5 MG: 5 TABLET ORAL at 05:12

## 2021-12-26 RX ADMIN — SENNOSIDES AND DOCUSATE SODIUM 1 TABLET: 50; 8.6 TABLET ORAL at 09:12

## 2021-12-27 LAB
BLD PROD TYP BPU: NORMAL
BLD PROD TYP BPU: NORMAL
BLOOD UNIT EXPIRATION DATE: NORMAL
BLOOD UNIT EXPIRATION DATE: NORMAL
BLOOD UNIT TYPE CODE: 7300
BLOOD UNIT TYPE CODE: 7300
BLOOD UNIT TYPE: NORMAL
BLOOD UNIT TYPE: NORMAL
CODING SYSTEM: NORMAL
CODING SYSTEM: NORMAL
DISPENSE STATUS: NORMAL
DISPENSE STATUS: NORMAL
NUM UNITS TRANS PACKED RBC: NORMAL
NUM UNITS TRANS PACKED RBC: NORMAL

## 2021-12-30 ENCOUNTER — TELEPHONE (OUTPATIENT)
Dept: NEUROSURGERY | Facility: CLINIC | Age: 31
End: 2021-12-30
Payer: COMMERCIAL

## 2022-01-05 ENCOUNTER — HOSPITAL ENCOUNTER (INPATIENT)
Facility: HOSPITAL | Age: 32
LOS: 1 days | Discharge: HOME | DRG: 176 | End: 2022-01-07
Attending: EMERGENCY MEDICINE | Admitting: INTERNAL MEDICINE
Payer: COMMERCIAL

## 2022-01-05 DIAGNOSIS — R06.02 SHORTNESS OF BREATH: ICD-10-CM

## 2022-01-05 DIAGNOSIS — R07.9 CHEST PAIN: ICD-10-CM

## 2022-01-05 DIAGNOSIS — E28.2 PCOS (POLYCYSTIC OVARIAN SYNDROME): ICD-10-CM

## 2022-01-05 DIAGNOSIS — R06.00 DYSPNEA: ICD-10-CM

## 2022-01-05 DIAGNOSIS — I26.99 BILATERAL PULMONARY EMBOLISM: Primary | ICD-10-CM

## 2022-01-05 LAB
ALBUMIN SERPL BCP-MCNC: 4.2 G/DL (ref 3.5–5.2)
ALP SERPL-CCNC: 74 U/L (ref 55–135)
ALT SERPL W/O P-5'-P-CCNC: 18 U/L (ref 10–44)
AMPHET+METHAMPHET UR QL: NEGATIVE
ANION GAP SERPL CALC-SCNC: 13 MMOL/L (ref 8–16)
AST SERPL-CCNC: 16 U/L (ref 10–40)
B-HCG UR QL: NEGATIVE
BARBITURATES UR QL SCN>200 NG/ML: NEGATIVE
BASOPHILS # BLD AUTO: 0.1 K/UL (ref 0–0.2)
BASOPHILS NFR BLD: 0.5 % (ref 0–1.9)
BENZODIAZ UR QL SCN>200 NG/ML: ABNORMAL
BILIRUB SERPL-MCNC: 1.2 MG/DL (ref 0.1–1)
BUN SERPL-MCNC: 15 MG/DL (ref 6–20)
BZE UR QL SCN: NEGATIVE
CALCIUM SERPL-MCNC: 9.8 MG/DL (ref 8.7–10.5)
CANNABINOIDS UR QL SCN: NEGATIVE
CHLORIDE SERPL-SCNC: 101 MMOL/L (ref 95–110)
CO2 SERPL-SCNC: 24 MMOL/L (ref 23–29)
CREAT SERPL-MCNC: 1 MG/DL (ref 0.5–1.4)
CREAT UR-MCNC: 278 MG/DL (ref 15–325)
CTP QC/QA: YES
DIFFERENTIAL METHOD: ABNORMAL
EOSINOPHIL # BLD AUTO: 0.2 K/UL (ref 0–0.5)
EOSINOPHIL NFR BLD: 1.3 % (ref 0–8)
ERYTHROCYTE [DISTWIDTH] IN BLOOD BY AUTOMATED COUNT: 12.6 % (ref 11.5–14.5)
EST. GFR  (AFRICAN AMERICAN): >60 ML/MIN/1.73 M^2
EST. GFR  (NON AFRICAN AMERICAN): >60 ML/MIN/1.73 M^2
GLUCOSE SERPL-MCNC: 90 MG/DL (ref 70–110)
HCT VFR BLD AUTO: 49.2 % (ref 37–48.5)
HGB BLD-MCNC: 15.8 G/DL (ref 12–16)
IMM GRANULOCYTES # BLD AUTO: 0.08 K/UL (ref 0–0.04)
IMM GRANULOCYTES NFR BLD AUTO: 0.4 % (ref 0–0.5)
LYMPHOCYTES # BLD AUTO: 2.5 K/UL (ref 1–4.8)
LYMPHOCYTES NFR BLD: 13.6 % (ref 18–48)
MCH RBC QN AUTO: 27.2 PG (ref 27–31)
MCHC RBC AUTO-ENTMCNC: 32.1 G/DL (ref 32–36)
MCV RBC AUTO: 85 FL (ref 82–98)
MONOCYTES # BLD AUTO: 1.1 K/UL (ref 0.3–1)
MONOCYTES NFR BLD: 5.8 % (ref 4–15)
NEUTROPHILS # BLD AUTO: 14.4 K/UL (ref 1.8–7.7)
NEUTROPHILS NFR BLD: 78.4 % (ref 38–73)
NRBC BLD-RTO: 0 /100 WBC
OPIATES UR QL SCN: NEGATIVE
PCP UR QL SCN>25 NG/ML: NEGATIVE
PLATELET # BLD AUTO: 383 K/UL (ref 150–450)
PMV BLD AUTO: 10 FL (ref 9.2–12.9)
POTASSIUM SERPL-SCNC: 4.2 MMOL/L (ref 3.5–5.1)
PROT SERPL-MCNC: 8.9 G/DL (ref 6–8.4)
RBC # BLD AUTO: 5.8 M/UL (ref 4–5.4)
SARS-COV-2 RDRP RESP QL NAA+PROBE: NEGATIVE
SODIUM SERPL-SCNC: 138 MMOL/L (ref 136–145)
TOXICOLOGY INFORMATION: ABNORMAL
WBC # BLD AUTO: 18.38 K/UL (ref 3.9–12.7)

## 2022-01-05 PROCEDURE — 80307 DRUG TEST PRSMV CHEM ANLYZR: CPT | Performed by: EMERGENCY MEDICINE

## 2022-01-05 PROCEDURE — 99900035 HC TECH TIME PER 15 MIN (STAT)

## 2022-01-05 PROCEDURE — 93010 ELECTROCARDIOGRAM REPORT: CPT | Mod: ,,, | Performed by: INTERNAL MEDICINE

## 2022-01-05 PROCEDURE — 99900031 HC PATIENT EDUCATION (STAT)

## 2022-01-05 PROCEDURE — 93005 ELECTROCARDIOGRAM TRACING: CPT | Performed by: INTERNAL MEDICINE

## 2022-01-05 PROCEDURE — 96372 THER/PROPH/DIAG INJ SC/IM: CPT | Mod: 59

## 2022-01-05 PROCEDURE — 80053 COMPREHEN METABOLIC PANEL: CPT | Performed by: STUDENT IN AN ORGANIZED HEALTH CARE EDUCATION/TRAINING PROGRAM

## 2022-01-05 PROCEDURE — 25000242 PHARM REV CODE 250 ALT 637 W/ HCPCS: Performed by: EMERGENCY MEDICINE

## 2022-01-05 PROCEDURE — 94761 N-INVAS EAR/PLS OXIMETRY MLT: CPT

## 2022-01-05 PROCEDURE — 25500020 PHARM REV CODE 255: Performed by: STUDENT IN AN ORGANIZED HEALTH CARE EDUCATION/TRAINING PROGRAM

## 2022-01-05 PROCEDURE — 93010 EKG 12-LEAD: ICD-10-PCS | Mod: ,,, | Performed by: INTERNAL MEDICINE

## 2022-01-05 PROCEDURE — 94640 AIRWAY INHALATION TREATMENT: CPT

## 2022-01-05 PROCEDURE — 99285 EMERGENCY DEPT VISIT HI MDM: CPT | Mod: 25

## 2022-01-05 PROCEDURE — 85025 COMPLETE CBC W/AUTO DIFF WBC: CPT | Performed by: STUDENT IN AN ORGANIZED HEALTH CARE EDUCATION/TRAINING PROGRAM

## 2022-01-05 PROCEDURE — 81025 URINE PREGNANCY TEST: CPT | Performed by: EMERGENCY MEDICINE

## 2022-01-05 PROCEDURE — U0002 COVID-19 LAB TEST NON-CDC: HCPCS | Performed by: STUDENT IN AN ORGANIZED HEALTH CARE EDUCATION/TRAINING PROGRAM

## 2022-01-05 RX ORDER — ENOXAPARIN SODIUM 100 MG/ML
100 INJECTION SUBCUTANEOUS
Status: COMPLETED | OUTPATIENT
Start: 2022-01-05 | End: 2022-01-06

## 2022-01-05 RX ORDER — LEVALBUTEROL 1.25 MG/.5ML
1.25 SOLUTION, CONCENTRATE RESPIRATORY (INHALATION)
Status: COMPLETED | OUTPATIENT
Start: 2022-01-05 | End: 2022-01-05

## 2022-01-05 RX ADMIN — LEVALBUTEROL HYDROCHLORIDE 1.25 MG: 1.25 SOLUTION, CONCENTRATE RESPIRATORY (INHALATION) at 08:01

## 2022-01-05 RX ADMIN — IOHEXOL 100 ML: 350 INJECTION, SOLUTION INTRAVENOUS at 10:01

## 2022-01-06 ENCOUNTER — TELEPHONE (OUTPATIENT)
Dept: NEUROLOGY | Facility: CLINIC | Age: 32
End: 2022-01-06
Payer: COMMERCIAL

## 2022-01-06 ENCOUNTER — CLINICAL SUPPORT (OUTPATIENT)
Dept: CARDIOLOGY | Facility: HOSPITAL | Age: 32
DRG: 176 | End: 2022-01-06
Attending: INTERNAL MEDICINE
Payer: COMMERCIAL

## 2022-01-06 VITALS — BODY MASS INDEX: 46.65 KG/M2 | WEIGHT: 280 LBS | HEIGHT: 65 IN

## 2022-01-06 PROBLEM — I26.99 BILATERAL PULMONARY EMBOLISM: Status: ACTIVE | Noted: 2022-01-06

## 2022-01-06 LAB
AORTIC ROOT ANNULUS: 2.63 CM
AORTIC VALVE CUSP SEPERATION: 1.5 CM
AV INDEX (PROSTH): 1.52
AV MEAN GRADIENT: 2 MMHG
AV PEAK GRADIENT: 3 MMHG
AV VALVE AREA: 4.83 CM2
AV VELOCITY RATIO: 100.72
BNP SERPL-MCNC: 116 PG/ML (ref 0–99)
BSA FOR ECHO PROCEDURE: 2.41 M2
CV ECHO LV RWT: 0.44 CM
DOP CALC AO PEAK VEL: 0.83 M/S
DOP CALC AO VTI: 8.88 CM
DOP CALC LVOT AREA: 3.2 CM2
DOP CALC LVOT DIAMETER: 2.01 CM
DOP CALC LVOT PEAK VEL: 83.6 M/S
DOP CALC LVOT STROKE VOLUME: 42.91 CM3
DOP CALCLVOT PEAK VEL VTI: 13.53 CM
E WAVE DECELERATION TIME: 177.73 MSEC
E/A RATIO: 0.83
E/E' RATIO: 3.62 M/S
ECHO LV POSTERIOR WALL: 0.8 CM (ref 0.6–1.1)
EJECTION FRACTION: 60 %
FRACTIONAL SHORTENING: 51 % (ref 28–44)
INTERVENTRICULAR SEPTUM: 0.8 CM (ref 0.6–1.1)
IVRT: 74.59 MSEC
LEFT INTERNAL DIMENSION IN SYSTOLE: 1.76 CM (ref 2.1–4)
LEFT VENTRICLE MASS INDEX: 35 G/M2
LEFT VENTRICULAR INTERNAL DIMENSION IN DIASTOLE: 3.62 CM (ref 3.5–6)
LEFT VENTRICULAR MASS: 79.47 G
LV LATERAL E/E' RATIO: 2.71 M/S
LV SEPTAL E/E' RATIO: 5.43 M/S
MV PEAK A VEL: 0.46 M/S
MV PEAK E VEL: 0.38 M/S
PISA TR MAX VEL: 2.51 M/S
PROCALCITONIN SERPL IA-MCNC: <0.05 NG/ML (ref 0–0.5)
PV PEAK VELOCITY: 69.67 CM/S
RIGHT VENTRICULAR END-DIASTOLIC DIMENSION: 251 CM
TDI LATERAL: 0.14 M/S
TDI SEPTAL: 0.07 M/S
TDI: 0.11 M/S
TR MAX PG: 25 MMHG

## 2022-01-06 PROCEDURE — 25000003 PHARM REV CODE 250: Performed by: INTERNAL MEDICINE

## 2022-01-06 PROCEDURE — 21400001 HC TELEMETRY ROOM

## 2022-01-06 PROCEDURE — 83880 ASSAY OF NATRIURETIC PEPTIDE: CPT | Performed by: EMERGENCY MEDICINE

## 2022-01-06 PROCEDURE — 25000003 PHARM REV CODE 250: Performed by: GENERAL PRACTICE

## 2022-01-06 PROCEDURE — 99223 1ST HOSP IP/OBS HIGH 75: CPT | Mod: ,,, | Performed by: GENERAL PRACTICE

## 2022-01-06 PROCEDURE — 36415 COLL VENOUS BLD VENIPUNCTURE: CPT | Performed by: EMERGENCY MEDICINE

## 2022-01-06 PROCEDURE — 63600175 PHARM REV CODE 636 W HCPCS: Performed by: EMERGENCY MEDICINE

## 2022-01-06 PROCEDURE — 63600175 PHARM REV CODE 636 W HCPCS: Performed by: INTERNAL MEDICINE

## 2022-01-06 PROCEDURE — 96372 THER/PROPH/DIAG INJ SC/IM: CPT

## 2022-01-06 PROCEDURE — 84145 PROCALCITONIN (PCT): CPT | Performed by: EMERGENCY MEDICINE

## 2022-01-06 PROCEDURE — 99223 PR INITIAL HOSPITAL CARE,LEVL III: ICD-10-PCS | Mod: ,,, | Performed by: GENERAL PRACTICE

## 2022-01-06 PROCEDURE — 93306 ECHO (CUPID ONLY): ICD-10-PCS | Mod: 26,,, | Performed by: INTERNAL MEDICINE

## 2022-01-06 PROCEDURE — 93306 TTE W/DOPPLER COMPLETE: CPT | Mod: 26,,, | Performed by: INTERNAL MEDICINE

## 2022-01-06 PROCEDURE — 93306 TTE W/DOPPLER COMPLETE: CPT

## 2022-01-06 RX ORDER — ACETAMINOPHEN 325 MG/1
650 TABLET ORAL EVERY 8 HOURS PRN
Status: DISCONTINUED | OUTPATIENT
Start: 2022-01-06 | End: 2022-01-07 | Stop reason: HOSPADM

## 2022-01-06 RX ORDER — ENOXAPARIN SODIUM 100 MG/ML
30 INJECTION SUBCUTANEOUS ONCE
Status: COMPLETED | OUTPATIENT
Start: 2022-01-06 | End: 2022-01-06

## 2022-01-06 RX ORDER — TALC
6 POWDER (GRAM) TOPICAL NIGHTLY PRN
Status: DISCONTINUED | OUTPATIENT
Start: 2022-01-06 | End: 2022-01-07 | Stop reason: HOSPADM

## 2022-01-06 RX ORDER — METOPROLOL TARTRATE 50 MG/1
50 TABLET ORAL 2 TIMES DAILY
Status: DISCONTINUED | OUTPATIENT
Start: 2022-01-06 | End: 2022-01-07 | Stop reason: HOSPADM

## 2022-01-06 RX ORDER — ENOXAPARIN SODIUM 100 MG/ML
1 INJECTION SUBCUTANEOUS
Status: DISCONTINUED | OUTPATIENT
Start: 2022-01-07 | End: 2022-01-07

## 2022-01-06 RX ORDER — ENOXAPARIN SODIUM 100 MG/ML
1 INJECTION SUBCUTANEOUS
Status: DISCONTINUED | OUTPATIENT
Start: 2022-01-07 | End: 2022-01-06

## 2022-01-06 RX ORDER — ONDANSETRON 2 MG/ML
4 INJECTION INTRAMUSCULAR; INTRAVENOUS EVERY 8 HOURS PRN
Status: DISCONTINUED | OUTPATIENT
Start: 2022-01-06 | End: 2022-01-07 | Stop reason: HOSPADM

## 2022-01-06 RX ORDER — NALOXONE HCL 0.4 MG/ML
0.02 VIAL (ML) INJECTION
Status: DISCONTINUED | OUTPATIENT
Start: 2022-01-06 | End: 2022-01-07 | Stop reason: HOSPADM

## 2022-01-06 RX ORDER — OXYCODONE HYDROCHLORIDE 5 MG/1
5 TABLET ORAL EVERY 4 HOURS PRN
Status: DISCONTINUED | OUTPATIENT
Start: 2022-01-06 | End: 2022-01-07 | Stop reason: HOSPADM

## 2022-01-06 RX ORDER — POLYETHYLENE GLYCOL 3350 17 G/17G
17 POWDER, FOR SOLUTION ORAL DAILY PRN
Status: DISCONTINUED | OUTPATIENT
Start: 2022-01-06 | End: 2022-01-07 | Stop reason: HOSPADM

## 2022-01-06 RX ORDER — DIAZEPAM 5 MG/1
5 TABLET ORAL EVERY 8 HOURS PRN
Status: DISCONTINUED | OUTPATIENT
Start: 2022-01-06 | End: 2022-01-07 | Stop reason: HOSPADM

## 2022-01-06 RX ADMIN — ENOXAPARIN SODIUM 30 MG: 30 INJECTION SUBCUTANEOUS at 01:01

## 2022-01-06 RX ADMIN — DIAZEPAM 5 MG: 5 TABLET ORAL at 09:01

## 2022-01-06 RX ADMIN — METOPROLOL TARTRATE 50 MG: 50 TABLET, FILM COATED ORAL at 08:01

## 2022-01-06 RX ADMIN — ENOXAPARIN SODIUM 100 MG: 100 INJECTION SUBCUTANEOUS at 12:01

## 2022-01-06 NOTE — PLAN OF CARE
Problem: Adult Inpatient Plan of Care  Goal: Plan of Care Review  Outcome: Ongoing, Progressing  Goal: Patient-Specific Goal (Individualized)  Outcome: Ongoing, Progressing  Goal: Absence of Hospital-Acquired Illness or Injury  Outcome: Ongoing, Progressing  Goal: Optimal Comfort and Wellbeing  Outcome: Ongoing, Progressing  Goal: Readiness for Transition of Care  Outcome: Ongoing, Progressing     Problem: Bariatric Environmental Safety  Goal: Safety Maintained with Care  Outcome: Ongoing, Progressing     Problem: Fall Injury Risk  Goal: Absence of Fall and Fall-Related Injury  Outcome: Ongoing, Progressing

## 2022-01-06 NOTE — TELEPHONE ENCOUNTER
----- Message from Blank Russo sent at 1/6/2022 12:34 PM CST -----  Contact: MITESH BILL [[id number]]  Type:  Patient Returning Call    Who Called:   MITESH BILL [[id number]]    Who Left Message for Patient: unsure    Does the patient know what this is regarding?: Yes    Would the patient rather a call back or a response via [facility]?  Call back     Best Call Back Number: [phone] (mobile)    Additional Information:    Informed patient to call us when she's discharged so we can get her rescheduled

## 2022-01-06 NOTE — SUBJECTIVE & OBJECTIVE
Past Medical History:   Diagnosis Date    Bilateral pulmonary embolism 1/6/2022    Current moderate episode of major depressive disorder without prior episode 6/15/2018    Radiculopathy, cervical region 10/12/2021       Past Surgical History:   Procedure Laterality Date    DECOMPRESSION OF CHIARI MALFORMATION BY REMOVAL OF POSTERIOR ARCH OF FIRST CERVICAL VERTEBRA N/A 12/23/2021    Procedure: DECOMPRESSION, CHIARI MALFORMATION, BY 1ST CERVICAL VERTEBRA POSTERIOR ARCH REMOVAL;  Surgeon: Kirk Reis MD;  Location: [address];  Service: Neurosurgery;  Laterality: N/A;  ASA1  TOR1  T&Cross x 2 units  [place]    HYSTEROSCOPY WITH DILATION AND CURETTAGE OF UTERUS N/A 10/26/2020    Procedure: HYSTEROSCOPY, WITH DILATION AND CURETTAGE OF UTERUS;  Surgeon: Manjula Rivas MD;  Location: [facility];  Service: OB/GYN;  Laterality: N/A;       Review of patient's allergies indicates:   Allergen Reactions    Sulfa (sulfonamide antibiotics) Hives             No current facility-administered medications on file prior to encounter.     Current Outpatient Medications on File Prior to Encounter   Medication Sig    diazePAM (VALIUM) 5 MG tablet Take 1 tablet (5 mg total) by mouth every 8 (eight) hours as needed for Anxiety.    gabapentin (NEURONTIN) 300 MG capsule Take 1 capsule (300 mg total) by mouth 3 (three) times daily.    oxyCODONE (ROXICODONE) 5 MG immediate release tablet Take 1 tablet (5 mg total) by mouth every 4 (four) hours as needed.    metFORMIN (GLUCOPHAGE-XR) 750 MG ER 24hr tablet Take 1 tablet (750 mg total) by mouth 2 (two) times daily with meals.    -IRON-FOLATE 1-DSS-DHA ORAL Take 1 tablet by mouth once daily.    promethazine (PHENERGAN) 12.5 MG Tab Take 1 tablet (12.5 mg total) by mouth every 6 (six) hours as needed.     Family History     Problem Relation (Age of Onset)    Hypertension Mother    Mental illness Brother        Tobacco Use    Smoking status: Former Smoker     Types:  Vaping w/o nicotine     Quit date: 2018     Years since quittin.0    Smokeless tobacco: Never Used   Substance and Sexual Activity    Alcohol use: Yes     Comment: once in 3 months     Drug use: No    Sexual activity: Yes     Partners: Male     Birth control/protection: None       Objective:     Vital Signs (Most Recent):  Temp: 98.2 °F (36.8 °C) (22 1706)  Pulse: (!) 131 (22 0000)  Resp: (!) 21 (22 0000)  BP: 118/69 (22 0000)  SpO2: 97 % (22 0000) Vital Signs (24h Range):  Temp:  [98.2 °F (36.8 °C)] 98.2 °F (36.8 °C)  Pulse:  [129-143] 131  Resp:  [18-26] 21  SpO2:  [95 %-100 %] 97 %  BP: (118-142)/(59-86) 118/69     Weight: 127 kg (280 lb)  Body mass index is 46.59 kg/m².    Physical Exam      General: Patient in respiratory distress due to tachypnea.  Anxious.  Obese  Eyes:  No conjunctival pallor. No scleral icterus.  Lungs:  Tachypnea with accessory muscle use noted.  Diminished breath sounds at the bases  Cor:  Tachycardic.  Regular in rhythm. No murmurs. No pedal edema.  Abd: Soft. Nontender. No HSM.   Musculoskeletal: No arthropathy, deformity.  Skin: No rashes, swelling, or erythema.  Neuro: A&O x3. Moving all extremities equally  Ext: No clubbing. No cyanosis. Peripheral pulses +      Significant Labs:   All pertinent labs within the past 24 hours have been reviewed.  CBC:   Recent Labs   Lab 22   WBC 18.38*   HGB 15.8   HCT 49.2*        CMP:   Recent Labs   Lab 22      K 4.2      CO2 24   GLU 90   BUN 15   CREATININE 1.0   CALCIUM 9.8   PROT 8.9*   ALBUMIN 4.2   BILITOT 1.2*   ALKPHOS 74   AST 16   ALT 18   ANIONGAP 13   EGFRNONAA >60.0       Significant Imaging: I have reviewed all pertinent imaging results/findings within the past 24 hours.     Imaging Results          CTA Chest Non-Coronary (PE Study) (Edited Result - FINAL)  Result time 22 23:18:51    Edited Result - FINAL by Sanjeev Menezes MD (22 23:18:51)                  Narrative:         ADDENDUM #1       The above findings were discussed with and acknowledged by Dr. Castro at 11:12 PM CST on 1/5/2022.    Electronically signed by:  Sanjeev Menezes MD  1/5/2022 11:18 PM CST Workstation: [id number]     ORIGINAL REPORT         EXAM:    CT chest angiogram with contrast.    INDICATION:    Chest pain.    TECHNIQUE:    Contiguous axial CT images of the chest.  Intravenous contrast: Present.  Protocol: Pulmonary embolus (PE) protocol angiogram.  Reformats: MIPs and MPRs created and utilized.   mGy-cm.  This exam was performed according to our departmental dose-optimization program, which includes automated exposure control, adjustment of the mA and/or kV according to patient size and/or use of iterative reconstruction technique.    Note:  LV=left ventricle.  RV=right ventricle.    COMPARISON:    None.    FINDINGS:    Upper abdomen: Partially imaged.    Thoracic aorta: Unremarkable.    Heart: No right atrial thrombus.  RV/LV ratio:  Leftward bowing of the intraventricular septum    Pulmonary arteries:  Extensive bilateral pulmonary embolisms. No saddle embolism    Mediastinum: No pathologic sized middle mediastinal lymphadenopathy.    Tracheobronchial tree: Unremarkable.    Lungs:  Lobar consolidation: Negative.  Pleural effusion: Negative.  Pneumothorax: Negative.  Other:  Negative.    Bones: Unremarkable.    IMPRESSION:  Extensive bilateral pulmonary embolisms with findings of right heart strain.  Clear lungs.    Electronically signed by:  Sanjeev Menezes MD  1/5/2022 11:08 PM CST Workstation: [id number]                  Final result by Sanjeev Menezes MD (01/05/22 23:08:52)                 Narrative:      EXAM:    CT chest angiogram with contrast.    INDICATION:    Chest pain.    TECHNIQUE:    Contiguous axial CT images of the chest.  Intravenous contrast: Present.  Protocol: Pulmonary embolus (PE) protocol angiogram.  Reformats: MIPs and MPRs created and utilized.    mGy-cm.  This exam was performed according to our departmental dose-optimization program, which includes automated exposure control, adjustment of the mA and/or kV according to patient size and/or use of iterative reconstruction technique.    Note:  LV=left ventricle.  RV=right ventricle.    COMPARISON:    None.    FINDINGS:    Upper abdomen: Partially imaged.    Thoracic aorta: Unremarkable.    Heart: No right atrial thrombus.  RV/LV ratio:  Leftward bowing of the intraventricular septum    Pulmonary arteries:  Extensive bilateral pulmonary embolisms. No saddle embolism    Mediastinum: No pathologic sized middle mediastinal lymphadenopathy.    Tracheobronchial tree: Unremarkable.    Lungs:  Lobar consolidation: Negative.  Pleural effusion: Negative.  Pneumothorax: Negative.  Other:  Negative.    Bones: Unremarkable.    IMPRESSION:  Extensive bilateral pulmonary embolisms with findings of right heart strain.  Clear lungs.    Electronically signed by:  Sanjeev Menezes MD  1/5/2022 11:08 PM CST Workstation: [id number]                             US Lower Extremity Veins Bilateral (Final result)  Result time 01/05/22 19:35:46    Final result by Fitz Larsen MD (01/05/22 19:35:46)                 Impression:      Negative for DVT throughout bilateral lower extremities.      Electronically signed by: Fitz Larsen MD  Date:    01/05/2022  Time:    19:35             Narrative:    EXAMINATION:  US LOWER EXTREMITY VEINS BILATERAL    CLINICAL HISTORY:  Dyspnea, unspecified    COMPARISON:  None    FINDINGS:  Grayscale, color Doppler, and spectral Doppler analysis was performed.    Bilateral common femoral, femoral, profunda femoral, popliteal, and proximal great saphenous veins show normal compressibility, augmentation, and color Doppler flow.    Bilateral posterior tibial, anterior tibial, and peroneal veins are unremarkable.                               X-Ray Chest 1 View (Final result)  Result time 01/05/22 17:40:57    Final  result by Fitz Larsen MD (01/05/22 17:40:57)                 Narrative:    Reason: shortness of breath Shortness of breath    FINDINGS:  Portable chest at 1727 compared with 12/25/2021 shows normal cardiomediastinal silhouette.    Lungs are clear. Pulmonary vasculature is normal. No acute osseous abnormality.    IMPRESSION:  Negative chest.    Electronically signed by:  Fitz Larsen MD  1/5/2022 5:40 PM CST Workstation: [id number]                            EKG:  Independently interpreted.  Sinus tachycardia.  S1, Q3, T3 pattern suggestive of pulmonary embolism

## 2022-01-06 NOTE — PLAN OF CARE
Replaced by [facility]  Initial Discharge Assessment       Primary Care Provider: Primary Doctor No    Admission Diagnosis: Bilateral pulmonary embolism [I26.99]    Admission Date: 1/5/2022  Expected Discharge Date:      Assessment completed with pt at bedside, spouse in room sleeping. Pt appeared somewhat drowsy but answered all questions appropriately. Pt recently had surgery with Dr. Reis and was supposed to get staples out today. She has already been in contact with his office. Pt states she was given a rolling walker at her last discharge. Pt does not have a PCP - with pt's permission message sent to Breann NEGRON to [facility]. No further needs addressed at this time.    Discharge Barriers Identified: None    Payor: BLUE CROSS BLUE SHIELD / Plan: BCBS OF payleven HRA / Product Type: Commercial /     Extended Emergency Contact Information  Primary Emergency Contact: Neil French  Address: [address]           [place] [address] [place]  Mobile Phone: [phone]  Relation: Spouse  Preferred language: English   needed? No    Discharge Plan A: Home with family  Discharge Plan B: Home with family      [facility] - [place] - [address] [address]  Phone: [phone] Fax: [phone]      Initial Assessment (most recent)     Adult Discharge Assessment - 01/06/22 1051        Discharge Assessment    Assessment Type Discharge Planning Assessment     Confirmed/corrected address, phone number and insurance Yes     Confirmed Demographics Correct on Facesheet     Source of Information patient;health record     When was your last doctors appointment? --   November    Communicated LORETO with patient/caregiver Date not available/Unable to determine     Reason For Admission Bilateral Pulmonary Embolism     Lives With spouse     Facility Arrived From: home     Do you expect to return to your current living  "situation? Yes     Do you have help at home or someone to help you manage your care at home? Yes     Who are your caregiver(s) and their phone number(s)? Spouse Neil French [phone]     Prior to hospitilization cognitive status: Alert/Oriented     Walking or Climbing Stairs Difficulty ambulation difficulty, requires equipment     Mobility Management Uses rolling walker as needed (got post-surgery)     Dressing/Bathing Difficulty none     Equipment Currently Used at Home walker, rolling     Readmission within 30 days? Yes     Patient currently being followed by outpatient case management? No     Do you currently have service(s) that help you manage your care at home? No     Do you take prescription medications? Yes     Do you have prescription coverage? Yes     Coverage BCBS     Do you have any problems affording any of your prescribed medications? No     Is the patient taking medications as prescribed? yes     Who is going to help you get home at discharge? spouse     How do you get to doctors appointments? family or friend will provide     Are you on dialysis? No     Do you take coumadin? No     Discharge Plan A Home with family     Discharge Plan B Home with family     DME Needed Upon Discharge  none     Discharge Plan discussed with: Patient     Discharge Barriers Identified None                 Readmission Assessment (most recent)     Readmission Assessment - 01/06/22 1055        Readmission    Why were you hospitalized in the last 30 days? 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 Related to previous admission;Alarmed about signs/symptoms     When you left the hospital how did you feel? "fine" "a little stronger"     When you left the hospital where did you go? Home with Family     Did patient/caregiver refused recommended DC plan? No     Tell me about what happened between when you left the hospital and the day you returned. "I was fine for the past couple of " "days." Yesterday got out of breath/fell on spouse walking from bathroom to bedroom.     When did you start not feeling well? yesterday     Did you call anyone? No     Why? went straight to ER since she couldn't breath     Did you try to see or did see a doctor or nurse before you came? No     Did you have  a follow-up appointment on discharge? Yes     Did you go? No   was supposed to get staples removed today    Was this a planned readmission? No                      "

## 2022-01-06 NOTE — NURSING
Pt admitted to the floor at 0400. AOx4. SOB noted after ambulating from Stretcher to the bed.VItals stable. O2 97% on room air. Pt denied pain.   HR reported to RN by Tele in 130's. Dropped to the 120's. MD Reyes was texted, no reply no orders received.     Day shift Kaylan given report.POC continues.

## 2022-01-06 NOTE — ED PROVIDER NOTES
Encounter Date: 1/5/2022       History     Chief Complaint   Patient presents with    Shortness of Breath     Sudden onset of SOB and chest pain, day 10 post op     31-year-old female with history of obesity, status post decompression of chiari malformation by removal of posterior arch of the 1st cervical vertebrae.  Patient postoperative day 10. Patient presents emergency department with complaint of chest tightness and shortness of breath which began earlier today.  Patient found to be tachycardic on evaluation emergency department.  Patient states that she is having left substernal pleuritic chest pain.  Felt like she had difficulty catching her breath.  Patient denied fever, no abdominal pain, no nausea, no vomiting, no other constitutional symptoms.  Patient denies leg pain.        Review of patient's allergies indicates:   Allergen Reactions    Sulfa (sulfonamide antibiotics) Hives           Past Medical History:   Diagnosis Date    Current moderate episode of major depressive disorder without prior episode 6/15/2018    Radiculopathy, cervical region 10/12/2021     Past Surgical History:   Procedure Laterality Date    DECOMPRESSION OF CHIARI MALFORMATION BY REMOVAL OF POSTERIOR ARCH OF FIRST CERVICAL VERTEBRA N/A 12/23/2021    Procedure: DECOMPRESSION, CHIARI MALFORMATION, BY 1ST CERVICAL VERTEBRA POSTERIOR ARCH REMOVAL;  Surgeon: Kirk Reis MD;  Location: [address];  Service: Neurosurgery;  Laterality: N/A;  ASA1  TOR1  T&Cross x 2 units  [place]    HYSTEROSCOPY WITH DILATION AND CURETTAGE OF UTERUS N/A 10/26/2020    Procedure: HYSTEROSCOPY, WITH DILATION AND CURETTAGE OF UTERUS;  Surgeon: Manjula Rivas MD;  Location: [facility];  Service: OB/GYN;  Laterality: N/A;     Family History   Problem Relation Age of Onset    Hypertension Mother     Mental illness Brother     Breast cancer Neg Hx     Cancer Neg Hx     Colon cancer Neg Hx     Diabetes Neg Hx     Eclampsia Neg Hx      Miscarriages / Stillbirths Neg Hx     Ovarian cancer Neg Hx      labor Neg Hx     Stroke Neg Hx      Social History     Tobacco Use    Smoking status: Former Smoker     Types: Vaping w/o nicotine     Quit date: 2018     Years since quittin.0    Smokeless tobacco: Never Used   Substance Use Topics    Alcohol use: Yes     Comment: once in 3 months     Drug use: No     Review of Systems   Constitutional: Negative for fatigue and fever.   HENT: Negative for sore throat.    Respiratory: Positive for chest tightness and shortness of breath.    Cardiovascular: Positive for chest pain.   Gastrointestinal: Negative for nausea and vomiting.   Genitourinary: Negative for dysuria.   Musculoskeletal: Negative for arthralgias, back pain and myalgias.   Skin: Negative for rash.   Neurological: Negative for weakness, light-headedness and headaches.   Hematological: Does not bruise/bleed easily.       Physical Exam     Initial Vitals [22 1706]   BP Pulse Resp Temp SpO2   (!) 142/85 (!) 143 18 98.2 °F (36.8 °C) 98 %      MAP       --         Physical Exam    Nursing note and vitals reviewed.  Constitutional: She appears well-developed and well-nourished.   HENT:   Head: Normocephalic and atraumatic.       Nose: Nose normal.   Mouth/Throat: Oropharynx is clear and moist.   Eyes: Conjunctivae and EOM are normal. Pupils are equal, round, and reactive to light.   Neck: Neck supple. No thyromegaly present. No tracheal deviation present.   Normal range of motion.  Cardiovascular: Normal rate, regular rhythm, normal heart sounds and intact distal pulses. Exam reveals no gallop and no friction rub.    No murmur heard.  Pulmonary/Chest: No stridor. No respiratory distress.   Course bilateral breath sounds no adventitious sounds   Abdominal: Abdomen is soft. Bowel sounds are normal. She exhibits no mass. There is no rebound and no guarding.   Musculoskeletal:         General: No edema. Normal range of motion.       Cervical back: Normal range of motion and neck supple.     Lymphadenopathy:     She has no cervical adenopathy.   Neurological: She is alert and oriented to person, place, and time. She has normal strength and normal reflexes. GCS score is 15. GCS eye subscore is 4. GCS verbal subscore is 5. GCS motor subscore is 6.   Skin: Skin is warm and dry. Capillary refill takes less than 2 seconds.   Psychiatric: She has a normal mood and affect.         ED Course   Procedures  Labs Reviewed   CBC W/ AUTO DIFFERENTIAL - Abnormal; Notable for the following components:       Result Value    WBC 18.38 (*)     RBC 5.80 (*)     Hematocrit 49.2 (*)     Gran # (ANC) 14.4 (*)     Immature Grans (Abs) 0.08 (*)     Mono # 1.1 (*)     Gran % 78.4 (*)     Lymph % 13.6 (*)     All other components within normal limits   COMPREHENSIVE METABOLIC PANEL - Abnormal; Notable for the following components:    Total Protein 8.9 (*)     Total Bilirubin 1.2 (*)     All other components within normal limits   DRUG SCREEN PANEL, URINE EMERGENCY - Abnormal; Notable for the following components:    Benzodiazepines Presumptive Positive (*)     All other components within normal limits    Narrative:     Specimen Source->Urine   SARS-COV-2 RNA AMPLIFICATION, QUAL   POCT URINE PREGNANCY          Imaging Results          CTA Chest Non-Coronary (PE Study) (Final result)  Result time 01/05/22 23:08:52    Final result by Sanjeve Menezes MD (01/05/22 23:08:52)                 Narrative:      EXAM:    CT chest angiogram with contrast.    INDICATION:    Chest pain.    TECHNIQUE:    Contiguous axial CT images of the chest.  Intravenous contrast: Present.  Protocol: Pulmonary embolus (PE) protocol angiogram.  Reformats: MIPs and MPRs created and utilized.   mGy-cm.  This exam was performed according to our departmental dose-optimization program, which includes automated exposure control, adjustment of the mA and/or kV according to patient size and/or use of  iterative reconstruction technique.    Note:  LV=left ventricle.  RV=right ventricle.    COMPARISON:    None.    FINDINGS:    Upper abdomen: Partially imaged.    Thoracic aorta: Unremarkable.    Heart: No right atrial thrombus.  RV/LV ratio:  Leftward bowing of the intraventricular septum    Pulmonary arteries:  Extensive bilateral pulmonary embolisms. No saddle embolism    Mediastinum: No pathologic sized middle mediastinal lymphadenopathy.    Tracheobronchial tree: Unremarkable.    Lungs:  Lobar consolidation: Negative.  Pleural effusion: Negative.  Pneumothorax: Negative.  Other:  Negative.    Bones: Unremarkable.    IMPRESSION:  Extensive bilateral pulmonary embolisms with findings of right heart strain.  Clear lungs.    Electronically signed by:  Sanjeev Menezes MD  1/5/2022 11:08 PM CST Workstation: [id number]                             US Lower Extremity Veins Bilateral (Final result)  Result time 01/05/22 19:35:46    Final result by Fitz Larsen MD (01/05/22 19:35:46)                 Impression:      Negative for DVT throughout bilateral lower extremities.      Electronically signed by: Fitz Larsen MD  Date:    01/05/2022  Time:    19:35             Narrative:    EXAMINATION:  US LOWER EXTREMITY VEINS BILATERAL    CLINICAL HISTORY:  Dyspnea, unspecified    COMPARISON:  None    FINDINGS:  Grayscale, color Doppler, and spectral Doppler analysis was performed.    Bilateral common femoral, femoral, profunda femoral, popliteal, and proximal great saphenous veins show normal compressibility, augmentation, and color Doppler flow.    Bilateral posterior tibial, anterior tibial, and peroneal veins are unremarkable.                               X-Ray Chest 1 View (Final result)  Result time 01/05/22 17:40:57    Final result by Fitz Larsen MD (01/05/22 17:40:57)                 Narrative:    Reason: shortness of breath Shortness of breath    FINDINGS:  Portable chest at 1727 compared with 12/25/2021 shows normal  cardiomediastinal silhouette.    Lungs are clear. Pulmonary vasculature is normal. No acute osseous abnormality.    IMPRESSION:  Negative chest.    Electronically signed by:  Fitz Larsen MD  1/5/2022 5:40 PM CST Workstation: [id number]                               Medications   enoxaparin injection 100 mg (has no administration in time range)   iohexoL (OMNIPAQUE 350) injection 100 mL (100 mLs Intravenous Given 1/5/22 2234)   levalbuterol nebulizer solution 1.25 mg (1.25 mg Nebulization Given 1/5/22 2027)                          Clinical Impression:   Final diagnoses:  [R06.02] Shortness of breath  [R06.00] Dyspnea  [I26.99] Bilateral pulmonary embolism (Primary)          ED Disposition Condition    Admit               Paulino Castro MD  01/05/22 1747

## 2022-01-06 NOTE — ED TRIAGE NOTES
Sob w/ chest pains started approx 4pm today.  + nausea and cough.   Denies vomiting, diarrhea, or fever.    + headaches on and off, + recent surgery for malformation in brain/spine.

## 2022-01-06 NOTE — H&P
[facility] - Emergency Dept  Hospital Medicine  History & Physical    Patient Name: Jamilah French  MRN: [id number]  Patient Class: IP- Inpatient  Admission Date: 1/5/2022  Attending Physician: Satinder Reyes MD  Primary Care Provider: Primary Doctor No         Patient information was obtained from patient, past medical records and ER records.     Subjective:     Principal Problem:Bilateral pulmonary embolism    Chief Complaint:   Chief Complaint   Patient presents with    Shortness of Breath     Sudden onset of SOB and chest pain, day 10 post op        HPI: Patient is a 31-year-old female who recently underwent elective decompression of type 1 Chiari malformation presents to the ED with chief complaint of acute onset shortness of breath and chest pain    She reports onset of symptoms acutely at around 4 p.m..  Severe in nature.  Shortness of breath is exertional and is associated with pleuritic chest pain.  Pain is diffuse in location over the anterior chest.  Describes symptoms as difficulty catching my breath. She underwent her surgery on 12/23.  Chart review indicates that her postoperative status was punctuated by nausea and vomiting which have since resolved.  She is not on oral contraceptive pills and is not a smoker.  She denies lower extremity swelling or pain.      Rest of the 10 point review of systems is negative except as mentioned above.      Past Medical History:   Diagnosis Date    Bilateral pulmonary embolism 1/6/2022    Current moderate episode of major depressive disorder without prior episode 6/15/2018    Radiculopathy, cervical region 10/12/2021       Past Surgical History:   Procedure Laterality Date    DECOMPRESSION OF CHIARI MALFORMATION BY REMOVAL OF POSTERIOR ARCH OF FIRST CERVICAL VERTEBRA N/A 12/23/2021    Procedure: DECOMPRESSION, CHIARI MALFORMATION, BY 1ST CERVICAL VERTEBRA POSTERIOR ARCH REMOVAL;  Surgeon: Kirk Reis MD;  Location: [facility] OR [address] [place] [address];   Service: Neurosurgery;  Laterality: N/A;  ASA1  TOR1  T&Cross x 2 units  [place]    HYSTEROSCOPY WITH DILATION AND CURETTAGE OF UTERUS N/A 10/26/2020    Procedure: HYSTEROSCOPY, WITH DILATION AND CURETTAGE OF UTERUS;  Surgeon: Manjula Rivas MD;  Location: [facility];  Service: OB/GYN;  Laterality: N/A;       Review of patient's allergies indicates:   Allergen Reactions    Sulfa (sulfonamide antibiotics) Hives             No current facility-administered medications on file prior to encounter.     Current Outpatient Medications on File Prior to Encounter   Medication Sig    diazePAM (VALIUM) 5 MG tablet Take 1 tablet (5 mg total) by mouth every 8 (eight) hours as needed for Anxiety.    gabapentin (NEURONTIN) 300 MG capsule Take 1 capsule (300 mg total) by mouth 3 (three) times daily.    oxyCODONE (ROXICODONE) 5 MG immediate release tablet Take 1 tablet (5 mg total) by mouth every 4 (four) hours as needed.    metFORMIN (GLUCOPHAGE-XR) 750 MG ER 24hr tablet Take 1 tablet (750 mg total) by mouth 2 (two) times daily with meals.    -IRON-FOLATE 1-DSS-DHA ORAL Take 1 tablet by mouth once daily.    promethazine (PHENERGAN) 12.5 MG Tab Take 1 tablet (12.5 mg total) by mouth every 6 (six) hours as needed.     Family History     Problem Relation (Age of Onset)    Hypertension Mother    Mental illness Brother        Tobacco Use    Smoking status: Former Smoker     Types: Vaping w/o nicotine     Quit date: 2018     Years since quittin.0    Smokeless tobacco: Never Used   Substance and Sexual Activity    Alcohol use: Yes     Comment: once in 3 months     Drug use: No    Sexual activity: Yes     Partners: Male     Birth control/protection: None       Objective:     Vital Signs (Most Recent):  Temp: 98.2 °F (36.8 °C) (22 1706)  Pulse: (!) 131 (22 0000)  Resp: (!) 21 (22 0000)  BP: 118/69 (22 0000)  SpO2: 97 % (22 0000) Vital Signs (24h Range):  Temp:  [98.2 °F (36.8 °C)] 98.2  °F (36.8 °C)  Pulse:  [129-143] 131  Resp:  [18-26] 21  SpO2:  [95 %-100 %] 97 %  BP: (118-142)/(59-86) 118/69     Weight: 127 kg (280 lb)  Body mass index is 46.59 kg/m².    Physical Exam      General: Patient in respiratory distress due to tachypnea.  Anxious.  Obese  Eyes:  No conjunctival pallor. No scleral icterus.  Lungs:  Tachypnea with accessory muscle use noted.  Diminished breath sounds at the bases  Cor:  Tachycardic.  Regular in rhythm. No murmurs. No pedal edema.  Abd: Soft. Nontender. No HSM.   Musculoskeletal: No arthropathy, deformity.  Skin: No rashes, swelling, or erythema.  Neuro: A&O x3. Moving all extremities equally  Ext: No clubbing. No cyanosis. Peripheral pulses +      Significant Labs:   All pertinent labs within the past 24 hours have been reviewed.  CBC:   Recent Labs   Lab 01/05/22 2050   WBC 18.38*   HGB 15.8   HCT 49.2*        CMP:   Recent Labs   Lab 01/05/22 2050      K 4.2      CO2 24   GLU 90   BUN 15   CREATININE 1.0   CALCIUM 9.8   PROT 8.9*   ALBUMIN 4.2   BILITOT 1.2*   ALKPHOS 74   AST 16   ALT 18   ANIONGAP 13   EGFRNONAA >60.0       Significant Imaging: I have reviewed all pertinent imaging results/findings within the past 24 hours.     Imaging Results          CTA Chest Non-Coronary (PE Study) (Edited Result - FINAL)  Result time 01/05/22 23:18:51    Edited Result - FINAL by Sanjeev Menezes MD (01/05/22 23:18:51)                 Narrative:         ADDENDUM #1       The above findings were discussed with and acknowledged by Dr. Castro at 11:12 PM CST on 1/5/2022.    Electronically signed by:  Sanjeev Menezes MD  1/5/2022 11:18 PM CST Workstation: [id number]     ORIGINAL REPORT         EXAM:    CT chest angiogram with contrast.    INDICATION:    Chest pain.    TECHNIQUE:    Contiguous axial CT images of the chest.  Intravenous contrast: Present.  Protocol: Pulmonary embolus (PE) protocol angiogram.  Reformats: MIPs and MPRs created and utilized.    mGy-cm.  This exam was performed according to our departmental dose-optimization program, which includes automated exposure control, adjustment of the mA and/or kV according to patient size and/or use of iterative reconstruction technique.    Note:  LV=left ventricle.  RV=right ventricle.    COMPARISON:    None.    FINDINGS:    Upper abdomen: Partially imaged.    Thoracic aorta: Unremarkable.    Heart: No right atrial thrombus.  RV/LV ratio:  Leftward bowing of the intraventricular septum    Pulmonary arteries:  Extensive bilateral pulmonary embolisms. No saddle embolism    Mediastinum: No pathologic sized middle mediastinal lymphadenopathy.    Tracheobronchial tree: Unremarkable.    Lungs:  Lobar consolidation: Negative.  Pleural effusion: Negative.  Pneumothorax: Negative.  Other:  Negative.    Bones: Unremarkable.    IMPRESSION:  Extensive bilateral pulmonary embolisms with findings of right heart strain.  Clear lungs.    Electronically signed by:  Sanjeev Menezes MD  1/5/2022 11:08 PM CST Workstation: [id number]                  Final result by Sanjeev Menezes MD (01/05/22 23:08:52)                 Narrative:      EXAM:    CT chest angiogram with contrast.    INDICATION:    Chest pain.    TECHNIQUE:    Contiguous axial CT images of the chest.  Intravenous contrast: Present.  Protocol: Pulmonary embolus (PE) protocol angiogram.  Reformats: MIPs and MPRs created and utilized.   mGy-cm.  This exam was performed according to our departmental dose-optimization program, which includes automated exposure control, adjustment of the mA and/or kV according to patient size and/or use of iterative reconstruction technique.    Note:  LV=left ventricle.  RV=right ventricle.    COMPARISON:    None.    FINDINGS:    Upper abdomen: Partially imaged.    Thoracic aorta: Unremarkable.    Heart: No right atrial thrombus.  RV/LV ratio:  Leftward bowing of the intraventricular septum    Pulmonary arteries:  Extensive bilateral  pulmonary embolisms. No saddle embolism    Mediastinum: No pathologic sized middle mediastinal lymphadenopathy.    Tracheobronchial tree: Unremarkable.    Lungs:  Lobar consolidation: Negative.  Pleural effusion: Negative.  Pneumothorax: Negative.  Other:  Negative.    Bones: Unremarkable.    IMPRESSION:  Extensive bilateral pulmonary embolisms with findings of right heart strain.  Clear lungs.    Electronically signed by:  Sanjeev Menezes MD  1/5/2022 11:08 PM CST Workstation: [id number]                             US Lower Extremity Veins Bilateral (Final result)  Result time 01/05/22 19:35:46    Final result by Fitz Larsen MD (01/05/22 19:35:46)                 Impression:      Negative for DVT throughout bilateral lower extremities.      Electronically signed by: Fitz Larsen MD  Date:    01/05/2022  Time:    19:35             Narrative:    EXAMINATION:  US LOWER EXTREMITY VEINS BILATERAL    CLINICAL HISTORY:  Dyspnea, unspecified    COMPARISON:  None    FINDINGS:  Grayscale, color Doppler, and spectral Doppler analysis was performed.    Bilateral common femoral, femoral, profunda femoral, popliteal, and proximal great saphenous veins show normal compressibility, augmentation, and color Doppler flow.    Bilateral posterior tibial, anterior tibial, and peroneal veins are unremarkable.                               X-Ray Chest 1 View (Final result)  Result time 01/05/22 17:40:57    Final result by Fitz Larsen MD (01/05/22 17:40:57)                 Narrative:    Reason: shortness of breath Shortness of breath    FINDINGS:  Portable chest at 1727 compared with 12/25/2021 shows normal cardiomediastinal silhouette.    Lungs are clear. Pulmonary vasculature is normal. No acute osseous abnormality.    IMPRESSION:  Negative chest.    Electronically signed by:  Fitz Larsen MD  1/5/2022 5:40 PM CST Workstation: [id number]                            EKG:  Independently interpreted.  Sinus tachycardia.  S1, Q3, T3 pattern  suggestive of pulmonary embolism    Assessment/Plan:     Active Hospital Problems    Diagnosis  POA    *Bilateral pulmonary embolism [I26.99]  Yes    Chiari malformation type I [G93.5]  Yes     Status post recent elective decompression on 12/23/2021      Syrinx of spinal cord [G95.0]  Yes    PCOS (polycystic ovarian syndrome) [E28.2]  Yes    BMI 45.0-49.9, adult [Z68.42]  Not Applicable      Resolved Hospital Problems   No resolved problems to display.       Plan:  Bilateral pulmonary embolism which is most likely provoked in the setting of recent postoperative state  No evidence of deep venous thrombosis in lower extremities  Received 1 dose of enoxaparin the ED.  Will continue at 1 milligram/kg body weight q.12 hours  Evidence of right heart strain noted on CTA chest  Check BNP  Obtain echocardiogram for further evaluation of RV function  Patient is extremely anxious; already on diazepam as needed for anxiety which will be continued  Close monitoring of respiratory status.  Low threshold to transfer to ICU      Satinder Reyes MD  Department of Hospital Medicine   [facility] - Emergency Dept

## 2022-01-06 NOTE — HPI
Patient is a 31-year-old female who recently underwent elective decompression of type 1 Chiari malformation presents to the ED with chief complaint of acute onset shortness of breath and chest pain    She reports onset of symptoms acutely at around 4 p.m..  Severe in nature.  Shortness of breath is exertional and is associated with pleuritic chest pain.  Pain is diffuse in location over the anterior chest.  Describes symptoms as difficulty catching my breath. She underwent her surgery on 12/23.  Chart review indicates that her postoperative status was punctuated by nausea and vomiting which have since resolved.  She is not on oral contraceptive pills and is not a smoker.  She denies lower extremity swelling or pain.      Rest of the 10 point review of systems is negative except as mentioned above.

## 2022-01-06 NOTE — CONSULTS
[facility]  Department of Cardiology  Consult Note      PATIENT NAME: Jamilah French    MRN: [id number]  TODAY'S DATE: 01/06/2022  ADMIT DATE: 1/5/2022                          CONSULT REQUESTED BY: Toño Leal MD    SUBJECTIVE     PRINCIPAL PROBLEM: Bilateral pulmonary embolism      REASON FOR CONSULT:  TACHYCARDIA      HPI:  Satinder Reyes MD   Physician   [facility] Medicine   H&P      Signed   Creation Time:  1/6/2022 12:37 AM          Patient information was obtained from patient, past medical records and ER records.      Subjective:      Principal Problem:Bilateral pulmonary embolism     Chief Complaint:        Chief Complaint   Patient presents with    Shortness of Breath       Sudden onset of SOB and chest pain, day 10 post op         HPI: Patient is a 31-year-old female who recently underwent elective decompression of type 1 Chiari malformation presents to the ED with chief complaint of acute onset shortness of breath and chest pain     She reports onset of symptoms acutely at around 4 p.m..  Severe in nature.  Shortness of breath is exertional and is associated with pleuritic chest pain.  Pain is diffuse in location over the anterior chest.  Describes symptoms as difficulty catching my breath. She underwent her surgery on 12/23.  Chart review indicates that her postoperative status was punctuated by nausea and vomiting which have since resolved.  She is not on oral contraceptive pills and is not a smoker.  She denies lower extremity swelling or pain.           Review of patient's allergies indicates:   Allergen Reactions    Sulfa (sulfonamide antibiotics) Hives             Past Medical History:   Diagnosis Date    Bilateral pulmonary embolism 1/6/2022    Current moderate episode of major depressive disorder without prior episode 6/15/2018    Radiculopathy, cervical region 10/12/2021     Past Surgical History:   Procedure Laterality Date    DECOMPRESSION OF CHIARI  MALFORMATION BY REMOVAL OF POSTERIOR ARCH OF FIRST CERVICAL VERTEBRA N/A 2021    Procedure: DECOMPRESSION, CHIARI MALFORMATION, BY 1ST CERVICAL VERTEBRA POSTERIOR ARCH REMOVAL;  Surgeon: Kirk Reis MD;  Location: [address];  Service: Neurosurgery;  Laterality: N/A;  ASA1  TOR1  T&Cross x 2 units  [place]    HYSTEROSCOPY WITH DILATION AND CURETTAGE OF UTERUS N/A 10/26/2020    Procedure: HYSTEROSCOPY, WITH DILATION AND CURETTAGE OF UTERUS;  Surgeon: Manjula Rivas MD;  Location: [facility];  Service: OB/GYN;  Laterality: N/A;     Social History     Tobacco Use    Smoking status: Former Smoker     Types: Vaping w/o nicotine     Quit date: 2018     Years since quittin.0    Smokeless tobacco: Never Used   Substance Use Topics    Alcohol use: Yes     Comment: once in 3 months     Drug use: No        REVIEW OF SYSTEMS  CONSTITUTIONAL: Negative for chills, fatigue and fever.   EYES: No double vision, No blurred vision  NEURO: No headaches, No dizziness  RESPIRATORY: Negative for cough, shortness of breath and wheezing.    CARDIOVASCULAR: Negative for chest pain. Negative for palpitations and leg swelling.   GI: Negative for abdominal pain, No melena, diarrhea, nausea and vomiting.   : Negative for dysuria and frequency, Negative for hematuria  SKIN: Negative for bruising, Negative for edema or discoloration noted.   ENDOCRINE: Negative for polyphagia, Negative for heat intolerance, Negative for cold intolerance  PSYCHIATRIC: Negative for depression, Negative for anxiety, Negative for memory loss  MUSCULOSKELETAL: Negative for neck pain, Negative for muscle weakness, Negative for back pain     OBJECTIVE     VITAL SIGNS (Most Recent)  Temp: 98.4 °F (36.9 °C) (22)  Pulse: (!) 122 (22)  Resp: (!) 24 (22)  BP: 121/76 (22)  SpO2: 97 % (22)    VENTILATION STATUS  Resp: (!) 24 (22)  SpO2: 97 % (22)       I & O (Last 24H):No  intake or output data in the 24 hours ending 01/06/22 1029    WEIGHTS  Wt Readings from Last 3 Encounters:   01/05/22 1706 127 kg (280 lb)   12/25/21 2000 135.5 kg (298 lb 11.6 oz)   12/23/21 2045 135.5 kg (298 lb 11.6 oz)   12/07/21 0905 129.7 kg (286 lb)   12/07/21 0904 129.7 kg (286 lb)       PHYSICAL EXAM  GENERAL: well built, well nourished, well-developed in no apparent distress alert and oriented.   HEENT: Normocephalic. Pupils normal and conjunctivae normal.  Mucous membranes normal, no cyanosis or icterus, trachea central,no pallor or icterus is noted..   NECK: No JVD. No bruit..   THYROID: Thyroid not enlarged. No nodules present..   CARDIAC: Regular rate and rhythm. S1 is normal.S2 is normal.No gallops, clicks or murmurs noted at this time.  CHEST ANATOMY: normal.   LUNGS: Clear to auscultation. No wheezing or rhonchi..   ABDOMEN: Soft no masses or organomegaly.  No abdomen pulsations or bruits.  Normal bowel sounds. No pulsations and no masses felt, No guarding or rebound.   URINARY: No garland catheter   EXTREMITIES: No cyanosis, clubbing or edema noted at this time., no calf tenderness bilaterally.   PERIPHERAL VASCULAR SYSTEM: Good palpable distal pulses.   CENTRAL NERVOUS SYSTEM: No focal motor or sensory deficits noted.   SKIN: Skin without lesions, moist, well perfused.   MUSCLE STRENGTH & TONE: No noteable weakness, atrophy or abnormal movement.     HOME MEDICATIONS:  No current facility-administered medications on file prior to encounter.     Current Outpatient Medications on File Prior to Encounter   Medication Sig Dispense Refill    diazePAM (VALIUM) 5 MG tablet Take 1 tablet (5 mg total) by mouth every 8 (eight) hours as needed for Anxiety. 25 tablet 0    gabapentin (NEURONTIN) 300 MG capsule Take 1 capsule (300 mg total) by mouth 3 (three) times daily. 90 capsule 5    oxyCODONE (ROXICODONE) 5 MG immediate release tablet Take 1 tablet (5 mg total) by mouth every 4 (four) hours as needed. 45  tablet 0    metFORMIN (GLUCOPHAGE-XR) 750 MG ER 24hr tablet Take 1 tablet (750 mg total) by mouth 2 (two) times daily with meals. 60 tablet 11    -IRON-FOLATE 1-DSS-DHA ORAL Take 1 tablet by mouth once daily.      promethazine (PHENERGAN) 12.5 MG Tab Take 1 tablet (12.5 mg total) by mouth every 6 (six) hours as needed. 25 tablet 3       SCHEDULED MEDS:   [START ON 1/7/2022] enoxparin  1 mg/kg Subcutaneous Q12H       CONTINUOUS INFUSIONS:    PRN MEDS:acetaminophen, diazePAM, melatonin, naloxone, ondansetron, oxyCODONE, polyethylene glycol    LABS AND DIAGNOSTICS     CBC LAST 3 DAYS  Recent Labs   Lab 01/05/22 2050   WBC 18.38*   RBC 5.80*   HGB 15.8   HCT 49.2*   MCV 85   MCH 27.2   MCHC 32.1   RDW 12.6      MPV 10.0   GRAN 78.4*  14.4*   LYMPH 13.6*  2.5   MONO 5.8  1.1*   BASO 0.10   NRBC 0       COAGULATION LAST 3 DAYS  No results for input(s): LABPT, INR, APTT in the last 168 hours.    CHEMISTRY LAST 3 DAYS  Recent Labs   Lab 01/05/22 2050      K 4.2      CO2 24   ANIONGAP 13   BUN 15   CREATININE 1.0   GLU 90   CALCIUM 9.8   ALBUMIN 4.2   PROT 8.9*   ALKPHOS 74   ALT 18   AST 16   BILITOT 1.2*       CARDIAC PROFILE LAST 3 DAYS  Recent Labs   Lab 01/06/22  0113   *       ENDOCRINE LAST 3 DAYS  Recent Labs   Lab 01/06/22  0156   PROCAL <0.05       LAST ARTERIAL BLOOD GAS  ABG  No results for input(s): PH, PO2, PCO2, HCO3, BE in the last 168 hours.    LAST 7 DAYS MICROBIOLOGY   Microbiology Results (last 7 days)     ** No results found for the last 168 hours. **          MOST RECENT IMAGING  CTA Chest Non-Coronary (PE Study)   ADDENDUM #1     The above findings were discussed with and acknowledged by Dr. Castro at 11:12 PM CST on 1/5/2022.    Electronically signed by:  Sanjeev Menezes MD  1/5/2022 11:18 PM CST Workstation: [id number]     ORIGINAL REPORT     EXAM:    CT chest angiogram with contrast.    INDICATION:    Chest pain.    TECHNIQUE:    Contiguous axial CT images of  the chest.  Intravenous contrast: Present.  Protocol: Pulmonary embolus (PE) protocol angiogram.  Reformats: MIPs and MPRs created and utilized.   mGy-cm.  This exam was performed according to our departmental dose-optimization program, which includes automated exposure control, adjustment of the mA and/or kV according to patient size and/or use of iterative reconstruction technique.    Note:  LV=left ventricle.  RV=right ventricle.    COMPARISON:    None.    FINDINGS:    Upper abdomen: Partially imaged.    Thoracic aorta: Unremarkable.    Heart: No right atrial thrombus.  RV/LV ratio:  Leftward bowing of the intraventricular septum    Pulmonary arteries:  Extensive bilateral pulmonary embolisms. No saddle embolism    Mediastinum: No pathologic sized middle mediastinal lymphadenopathy.    Tracheobronchial tree: Unremarkable.    Lungs:  Lobar consolidation: Negative.  Pleural effusion: Negative.  Pneumothorax: Negative.  Other:  Negative.    Bones: Unremarkable.    IMPRESSION:  Extensive bilateral pulmonary embolisms with findings of right heart strain.  Clear lungs.    Electronically signed by:  Sanjeev Menezes MD  1/5/2022 11:08 PM CST Workstation: [id number]  US Lower Extremity Veins Bilateral  Narrative: EXAMINATION:  US LOWER EXTREMITY VEINS BILATERAL    CLINICAL HISTORY:  Dyspnea, unspecified    COMPARISON:  None    FINDINGS:  Grayscale, color Doppler, and spectral Doppler analysis was performed.    Bilateral common femoral, femoral, profunda femoral, popliteal, and proximal great saphenous veins show normal compressibility, augmentation, and color Doppler flow.    Bilateral posterior tibial, anterior tibial, and peroneal veins are unremarkable.  Impression: Negative for DVT throughout bilateral lower extremities.    Electronically signed by: Fitz Larsen MD  Date:    01/05/2022  Time:    19:35  X-Ray Chest 1 View  Reason: shortness of breath Shortness of breath    FINDINGS:  Portable chest at 1727 compared  with 12/25/2021 shows normal cardiomediastinal silhouette.    Lungs are clear. Pulmonary vasculature is normal. No acute osseous abnormality.    IMPRESSION:  Negative chest.    Electronically signed by:  Fitz Larsen MD  1/5/2022 5:40 PM CST Workstation: [id number]      ECHOCARDIOGRAM RESULTS (last 5)  No results found for this or any previous visit.  Summary    · The left ventricle is normal in size with normal systolic function.  · The estimated ejection fraction is 60%.  · Mild tricuspid regurgitation.  · Unable to visualize right atrium and right ventricle, clinical concerns persist consider alternative cardiac imaging.        CURRENT/PREVIOUS VISIT EKG  Results for orders placed or performed during the hospital encounter of 01/05/22   EKG 12-lead    Collection Time: 01/05/22  5:27 PM    Narrative    Test Reason : R06.02,    Vent. Rate : 137 BPM     Atrial Rate : 137 BPM     P-R Int : 124 ms          QRS Dur : 106 ms      QT Int : 354 ms       P-R-T Axes : 056 028 008 degrees     QTc Int : 534 ms    Sinus tachycardia  Incomplete right bundle branch block  Nonspecific T wave abnormality  Abnormal ECG  When compared with ECG of 05-JAN-2022 17:26,  No significant change was found    Referred By: [email or web address]   SELF           Confirmed By:            ASSESSMENT/PLAN:     Active Hospital Problems    Diagnosis    *Bilateral pulmonary embolism    Chiari malformation type I     Status post recent elective decompression on 12/23/2021      Syrinx of spinal cord    PCOS (polycystic ovarian syndrome)    BMI 45.0-49.9, adult       ASSESSMENT & PLAN:   Tachycardia secondary pulmonary embolus  Right heart enlargement on CT    RECOMMENDATIONS:  No contraindication to beta-blocker.  Suggest repeat echo at later day.        Kannan Paez MD  [facility]  Department of Cardiology  Date of Service: 01/06/2022  10:29 AM

## 2022-01-07 VITALS
HEIGHT: 65 IN | BODY MASS INDEX: 46.65 KG/M2 | RESPIRATION RATE: 18 BRPM | TEMPERATURE: 99 F | HEART RATE: 106 BPM | WEIGHT: 280 LBS | OXYGEN SATURATION: 98 % | SYSTOLIC BLOOD PRESSURE: 134 MMHG | DIASTOLIC BLOOD PRESSURE: 83 MMHG

## 2022-01-07 LAB
ALBUMIN SERPL BCP-MCNC: 3.6 G/DL (ref 3.5–5.2)
ALP SERPL-CCNC: 61 U/L (ref 55–135)
ALT SERPL W/O P-5'-P-CCNC: 15 U/L (ref 10–44)
ANION GAP SERPL CALC-SCNC: 12 MMOL/L (ref 8–16)
AST SERPL-CCNC: 13 U/L (ref 10–40)
BILIRUB SERPL-MCNC: 1.3 MG/DL (ref 0.1–1)
BUN SERPL-MCNC: 16 MG/DL (ref 6–20)
CALCIUM SERPL-MCNC: 9.1 MG/DL (ref 8.7–10.5)
CHLORIDE SERPL-SCNC: 101 MMOL/L (ref 95–110)
CO2 SERPL-SCNC: 23 MMOL/L (ref 23–29)
CREAT SERPL-MCNC: 0.9 MG/DL (ref 0.5–1.4)
ERYTHROCYTE [DISTWIDTH] IN BLOOD BY AUTOMATED COUNT: 12.4 % (ref 11.5–14.5)
EST. GFR  (AFRICAN AMERICAN): >60 ML/MIN/1.73 M^2
EST. GFR  (NON AFRICAN AMERICAN): >60 ML/MIN/1.73 M^2
GLUCOSE SERPL-MCNC: 104 MG/DL (ref 70–110)
HCT VFR BLD AUTO: 41.8 % (ref 37–48.5)
HGB BLD-MCNC: 13.4 G/DL (ref 12–16)
MCH RBC QN AUTO: 27.1 PG (ref 27–31)
MCHC RBC AUTO-ENTMCNC: 32.1 G/DL (ref 32–36)
MCV RBC AUTO: 84 FL (ref 82–98)
PLATELET # BLD AUTO: 397 K/UL (ref 150–450)
PMV BLD AUTO: 10.1 FL (ref 9.2–12.9)
POTASSIUM SERPL-SCNC: 3.6 MMOL/L (ref 3.5–5.1)
PROT SERPL-MCNC: 7.7 G/DL (ref 6–8.4)
RBC # BLD AUTO: 4.95 M/UL (ref 4–5.4)
SODIUM SERPL-SCNC: 136 MMOL/L (ref 136–145)
TROPONIN I SERPL DL<=0.01 NG/ML-MCNC: 0.05 NG/ML
WBC # BLD AUTO: 14.03 K/UL (ref 3.9–12.7)

## 2022-01-07 PROCEDURE — 94618 PULMONARY STRESS TESTING: CPT

## 2022-01-07 PROCEDURE — 63600175 PHARM REV CODE 636 W HCPCS: Performed by: STUDENT IN AN ORGANIZED HEALTH CARE EDUCATION/TRAINING PROGRAM

## 2022-01-07 PROCEDURE — 80053 COMPREHEN METABOLIC PANEL: CPT | Performed by: STUDENT IN AN ORGANIZED HEALTH CARE EDUCATION/TRAINING PROGRAM

## 2022-01-07 PROCEDURE — 25000003 PHARM REV CODE 250: Performed by: INTERNAL MEDICINE

## 2022-01-07 PROCEDURE — 99233 SBSQ HOSP IP/OBS HIGH 50: CPT | Mod: ,,, | Performed by: GENERAL PRACTICE

## 2022-01-07 PROCEDURE — 25000003 PHARM REV CODE 250: Performed by: GENERAL PRACTICE

## 2022-01-07 PROCEDURE — 84484 ASSAY OF TROPONIN QUANT: CPT | Performed by: STUDENT IN AN ORGANIZED HEALTH CARE EDUCATION/TRAINING PROGRAM

## 2022-01-07 PROCEDURE — 99900031 HC PATIENT EDUCATION (STAT)

## 2022-01-07 PROCEDURE — 36415 COLL VENOUS BLD VENIPUNCTURE: CPT | Performed by: STUDENT IN AN ORGANIZED HEALTH CARE EDUCATION/TRAINING PROGRAM

## 2022-01-07 PROCEDURE — 99900035 HC TECH TIME PER 15 MIN (STAT)

## 2022-01-07 PROCEDURE — 25000003 PHARM REV CODE 250: Performed by: STUDENT IN AN ORGANIZED HEALTH CARE EDUCATION/TRAINING PROGRAM

## 2022-01-07 PROCEDURE — 99233 PR SUBSEQUENT HOSPITAL CARE,LEVL III: ICD-10-PCS | Mod: ,,, | Performed by: GENERAL PRACTICE

## 2022-01-07 PROCEDURE — 85027 COMPLETE CBC AUTOMATED: CPT | Performed by: STUDENT IN AN ORGANIZED HEALTH CARE EDUCATION/TRAINING PROGRAM

## 2022-01-07 RX ORDER — METOPROLOL TARTRATE 50 MG/1
50 TABLET ORAL 2 TIMES DAILY
Qty: 60 TABLET | Refills: 11 | Status: ON HOLD | OUTPATIENT
Start: 2022-01-07 | End: 2022-06-01

## 2022-01-07 RX ADMIN — ENOXAPARIN SODIUM 130 MG: 80 INJECTION SUBCUTANEOUS at 12:01

## 2022-01-07 RX ADMIN — APIXABAN 10 MG: 5 TABLET, FILM COATED ORAL at 05:01

## 2022-01-07 RX ADMIN — MELATONIN 6 MG: at 12:01

## 2022-01-07 RX ADMIN — METOPROLOL TARTRATE 50 MG: 50 TABLET, FILM COATED ORAL at 08:01

## 2022-01-07 NOTE — PLAN OF CARE
1551 Home oxygen orders received. Chat message was sent to Elvi with [facility] requesting approval to pull portable tanks from the [facility].

## 2022-01-07 NOTE — PROGRESS NOTES
[facility]  Department of Cardiology  Progress Note    PATIENT NAME: Jamilah French  MRN: [id number]  TODAY'S DATE: 01/07/2022  ADMIT DATE: 1/5/2022    SUBJECTIVE     PRINCIPLE PROBLEM: Bilateral pulmonary embolism    INTERVAL HISTORY:      Patient is a 31-year-old female who recently underwent elective decompression of type 1 Chiari malformation presents to the ED with chief complaint of acute onset shortness of breath and chest pain     She reports onset of symptoms acutely at around 4 p.m..  Severe in nature.  Shortness of breath is exertional and is associated with pleuritic chest pain.  Pain is diffuse in location over the anterior chest.  Describes symptoms as difficulty catching my breath. She underwent her surgery on 12/23.  Chart review indicates that her postoperative status was punctuated by nausea and vomiting which have since resolved.  She is not on oral contraceptive pills and is not a smoker.  She denies lower extremity swelling or pain.    1/7/2022  PATIENT IS A 31-YEAR-OLD FEMALE BILATERAL PULMONARY EMBOLUS WITH EVIDENCE OF RIGHT HEART STRAIN.  Were consulted for tachycardia beta-blockers been started and well tolerated so for with a heart rate acceptable.    Review of patient's allergies indicates:   Allergen Reactions    Sulfa (sulfonamide antibiotics) Hives             REVIEW OF SYSTEMS  CARDIOVASCULAR: No recent chest pain, palpitations, arm, neck, or jaw pain  RESPIRATORY: No recent fever, cough chills, SOB or congestion  : No blood in the urine  GI: No Nausea, vomiting, constipation, diarrhea, blood, or reflux.  MUSCULOSKELETAL: No myalgias  NEURO: No lightheadedness or dizziness  EYES: No Double vision, blurry, vision or headache     OBJECTIVE     VITAL SIGNS (Most Recent)  Temp: 97.7 °F (36.5 °C) (01/07/22 0705)  Pulse: 103 (01/07/22 0705)  Resp: 20 (01/07/22 0705)  BP: 130/73 (01/07/22 0705)  SpO2: 96 % (01/07/22 0705)    VENTILATION STATUS  Resp: 20 (01/07/22  0705)  SpO2: 96 % (01/07/22 0705)       I & O (Last 24H):    Intake/Output Summary (Last 24 hours) at 1/7/2022 1110  Last data filed at 1/7/2022 0900  Gross per 24 hour   Intake 680 ml   Output --   Net 680 ml       WEIGHTS  Wt Readings from Last 3 Encounters:   01/06/22 1557 127 kg (279 lb 15.8 oz)   01/05/22 1706 127 kg (280 lb)   01/06/22 1144 127 kg (280 lb)   12/25/21 2000 135.5 kg (298 lb 11.6 oz)   12/23/21 2045 135.5 kg (298 lb 11.6 oz)       PHYSICAL EXAM  CONSTITUTIONAL: Well built, well nourished in no apparent distress  NECK: no carotid bruit, no JVD  LUNGS: CTA  CHEST WALL: no tenderness  HEART: regular rate and rhythm, S1, S2 normal, no murmur, click, rub or gallop   ABDOMEN: soft, non-tender; bowel sounds normal; no masses,  no organomegaly  EXTREMITIES: Extremities normal, no edema  NEURO: AAO X 3    SCHEDULED MEDS:   enoxparin  1 mg/kg Subcutaneous Q12H    metoprolol tartrate  50 mg Oral BID       CONTINUOUS INFUSIONS:     PRN MEDS:acetaminophen, diazePAM, melatonin, naloxone, ondansetron, oxyCODONE, polyethylene glycol    LABS AND DIAGNOSTICS     CBC LAST 3 DAYS  Recent Labs   Lab 01/05/22 2050 01/07/22  0401   WBC 18.38* 14.03*   RBC 5.80* 4.95   HGB 15.8 13.4   HCT 49.2* 41.8   MCV 85 84   MCH 27.2 27.1   MCHC 32.1 32.1   RDW 12.6 12.4    397   MPV 10.0 10.1   GRAN 78.4*  14.4*  --    LYMPH 13.6*  2.5  --    MONO 5.8  1.1*  --    BASO 0.10  --    NRBC 0  --        COAGULATION LAST 3 DAYS  No results for input(s): LABPT, INR, APTT in the last 168 hours.    CHEMISTRY LAST 3 DAYS  Recent Labs   Lab 01/05/22 2050 01/07/22  0401    136   K 4.2 3.6    101   CO2 24 23   ANIONGAP 13 12   BUN 15 16   CREATININE 1.0 0.9   GLU 90 104   CALCIUM 9.8 9.1   ALBUMIN 4.2 3.6   PROT 8.9* 7.7   ALKPHOS 74 61   ALT 18 15   AST 16 13   BILITOT 1.2* 1.3*       CARDIAC PROFILE LAST 3 DAYS  Recent Labs   Lab 01/06/22  0113 01/07/22  0401   *  --    TROPONINI  --  0.049*       ENDOCRINE  LAST 3 DAYS  Recent Labs   Lab 01/06/22  0156   PROCAL <0.05       LAST ARTERIAL BLOOD GAS  ABG  No results for input(s): PH, PO2, PCO2, HCO3, BE in the last 168 hours.    LAST 7 DAYS MICROBIOLOGY   Microbiology Results (last 7 days)       ** No results found for the last 168 hours. **            MOST RECENT IMAGING  Echo  · The left ventricle is normal in size with normal systolic function.  · The estimated ejection fraction is 60%.  · Mild tricuspid regurgitation.  · Unable to visualize right atrium and right ventricle, clinical concerns   persist consider alternative cardiac imaging.         LASTECHO  Results for orders placed during the hospital encounter of 01/05/22    Echo    Interpretation Summary  · The left ventricle is normal in size with normal systolic function.  · The estimated ejection fraction is 60%.  · Mild tricuspid regurgitation.  · Unable to visualize right atrium and right ventricle, clinical concerns persist consider alternative cardiac imaging.      CURRENT/PREVIOUS VISIT EKG  Results for orders placed or performed during the hospital encounter of 01/05/22   EKG 12-lead    Collection Time: 01/05/22  5:27 PM    Narrative    Test Reason : R06.02,    Vent. Rate : 137 BPM     Atrial Rate : 137 BPM     P-R Int : 124 ms          QRS Dur : 106 ms      QT Int : 354 ms       P-R-T Axes : 056 028 008 degrees     QTc Int : 534 ms    Sinus tachycardia  Incomplete right bundle branch block  Nonspecific T wave abnormality  Abnormal ECG  When compared with ECG of 05-JAN-2022 17:26,  No significant change was found    Referred By: [email or web address]   SELF           Confirmed By:        ASSESSMENT/PLAN:     Active Hospital Problems    Diagnosis    *Bilateral pulmonary embolism    Chiari malformation type I     Status post recent elective decompression on 12/23/2021      Syrinx of spinal cord    PCOS (polycystic ovarian syndrome)    BMI 45.0-49.9, adult       ASSESSMENT & PLAN:   Beta-blocker was well tolerated by the  patient heart rate is       RECOMMENDATIONS:  Continue beta-blocker for now.  Okay to discharge.  Can have follow-up echocardiogram after recovers for pulmonary embolus to of follow-up on right heart strain.        Kannan Paez MD  [facility]  Department of Cardiology  Date of Service: 01/07/2022  11:10 AM

## 2022-01-07 NOTE — HOSPITAL COURSE
31-year-old woman who recently underwent elective decompression of type 1 Chiari malformation on 12/23 who present to the ED with acute onset shortness of breath and chest pain was found to have bilateral pulmonary embolism.  CTA was initially concerning for right-sided heart dysfunction.  Attempted to get echocardiogram, but echo was limited due to body habitus.  LV is normal in size with normal systolic function, but RV was not visualized.  Per discussion with Cardiology, patient is stable for discharge home today. Will repeat the echocardiogram as outpatient.  Patient started on metoprolol tartrate and tolerated the medication well.  She was transitioned from therapeutic Lovenox to oral Eliquis the day of discharge.  She was evaluated by respiratory therapy, who verified that the patient requires home oxygen.  The patient will discharge home today and follow-up with cardiology as an outpatient.  I will also give her ambulatory referral to establish with primary care provider.    Physical Exam  General:  Obese.  Appears more comfortable compared to day prior.  Nasal cannula in place.  Eyes:  No conjunctival pallor. No scleral icterus.  Lungs:  Diminished breath sounds at the bases.  No tachypnea.  Breathing comfortably.  Cor:  Borderline tachycardia.  Regular in rhythm. No murmurs. No pedal edema.  Abd: Soft. Nontender. No HSM.   Musculoskeletal: No arthropathy, deformity.  Skin: No rashes, swelling, or erythema.  Neuro: A&O x3. Moving all extremities equally  Ext: No clubbing. No cyanosis. Peripheral pulses +

## 2022-01-07 NOTE — PROGRESS NOTES
Update to plan of care:    Cardiology consulted; okay to start metoprolol b.i.d.    Bilateral PE in the setting of recent surgery; continue therapeutic Lovenox    Ultrasound bilateral lower extremity did not identify DVT.    Echocardiogram was unable to visualize right ventricle; consider repeating echo in future    Repeat CBC and CMP in a.m.    Home oxygen evaluation in the morning

## 2022-01-07 NOTE — PLAN OF CARE
1551 Home oxygen orders received. Chat message was sent to Elvi with [facility] requesting approval to pull portable tanks from the [facility].      2208 Elvi gave approval to pull one E tank set up and  one E tank for the patient.

## 2022-01-07 NOTE — RESPIRATORY THERAPY
01/07/22 1400   Home Oxygen Qualification   $ Home O2 Qualification Pulmonary Stress Test/6 min walk;Tech time 15 minutes   Room Air SpO2 At Rest 96 %   Room Air SpO2 During Ambulation (!) 88 %   SpO2 During Ambulation on O2 97 %   Heart Rate on O2 119 bpm   Ambulation O2 LPM 2 LPM   SpO2 Post Ambulation 96 %   Post Ambulation Heart Rate 105 bpm   Post Ambulation O2 LPM 2 LPM   Home O2 Eval Comments pt qaulifies for 2L Home Oxygen

## 2022-01-07 NOTE — DISCHARGE SUMMARY
[facility]  Discharge Summary      Patient Name: Jamilah French  MRN: [id number]  Patient Class: IP- Inpatient  Admission Date: 1/5/2022  Hospital Length of Stay: 1 days  Discharge Date and Time:  01/07/2022 5:25 PM  Attending Physician: Toño Leal MD   Discharging Provider: Toño Leal MD  Primary Care Provider: EITAN Cruz      HPI:   Patient is a 31-year-old female who recently underwent elective decompression of type 1 Chiari malformation presents to the ED with chief complaint of acute onset shortness of breath and chest pain    She reports onset of symptoms acutely at around 4 p.m..  Severe in nature.  Shortness of breath is exertional and is associated with pleuritic chest pain.  Pain is diffuse in location over the anterior chest.  Describes symptoms as difficulty catching my breath. She underwent her surgery on 12/23.  Chart review indicates that her postoperative status was punctuated by nausea and vomiting which have since resolved.  She is not on oral contraceptive pills and is not a smoker.  She denies lower extremity swelling or pain.      Rest of the 10 point review of systems is negative except as mentioned above.      * No surgery found *      Hospital Course:   31-year-old woman who recently underwent elective decompression of type 1 Chiari malformation on 12/23 who present to the ED with acute onset shortness of breath and chest pain was found to have bilateral pulmonary embolism.  CTA was initially concerning for right-sided heart dysfunction.  Attempted to get echocardiogram, but echo was limited due to body habitus.  LV is normal in size with normal systolic function, but RV was not visualized.  Per discussion with Cardiology, patient is stable for discharge home today. Will repeat the echocardiogram as outpatient.  Patient started on metoprolol tartrate and tolerated the medication well.  She was transitioned from therapeutic  Lovenox to oral Eliquis the day of discharge.  She was evaluated by respiratory therapy, who verified that the patient requires home oxygen.  The patient will discharge home today and follow-up with cardiology as an outpatient.  I will also give her ambulatory referral to establish with primary care provider.    Physical Exam  General:  Obese.  Appears more comfortable compared to day prior.  Nasal cannula in place.  Eyes:  No conjunctival pallor. No scleral icterus.  Lungs:  Diminished breath sounds at the bases.  No tachypnea.  Breathing comfortably.  Cor:  Borderline tachycardia.  Regular in rhythm. No murmurs. No pedal edema.  Abd: Soft. Nontender. No HSM.   Musculoskeletal: No arthropathy, deformity.  Skin: No rashes, swelling, or erythema.  Neuro: A&O x3. Moving all extremities equally  Ext: No clubbing. No cyanosis. Peripheral pulses +       Goals of Care Treatment Preferences:  Code Status: Full Code      Consults:   Consults (From admission, onward)        Status Ordering Provider     Inpatient consult to Registered Dietitian/Nutritionist  Once        Provider:  (Not yet assigned)    Acknowledged THO FERNANDEZ     Inpatient consult to Cardiology  Once        Provider:  Kannan Paez MD    Completed THO FERNANDEZ     Inpatient consult to Hospitalist  Once        Provider:  Devorah Reyes MD    Acknowledged DEVORAH REYES          No new Assessment & Plan notes have been filed under this hospital service since the last note was generated.  Service: Hospital Medicine    Final Active Diagnoses:    Diagnosis Date Noted POA    PRINCIPAL PROBLEM:  Bilateral pulmonary embolism [I26.99] 01/06/2022 Yes    Chiari malformation type I [G93.5] 12/23/2021 Yes    Syrinx of spinal cord [G95.0] 10/21/2021 Yes    PCOS (polycystic ovarian syndrome) [E28.2] 08/20/2020 Yes    BMI 45.0-49.9, adult [Z68.42] 06/15/2018 Not Applicable      Problems Resolved During this Admission:       Discharged Condition:  "good    Disposition: Home or Self Care    Follow Up:   Contact information for follow-up providers     Kannan Paez MD. Call in 1 month.    Specialties: Cardiovascular Disease, Cardiology  Why: Please call make an appointment to see Dr. Paez.  He will help manage her blood thinners, heart rate medication, and will speak with you about repeating the sonogram of the heart (if necessary).  Contact information:  [facility]  [address]  [facility] [address]  [phone]                   Contact information for after-discharge care     Durable Medical Equipment     [facility] .    Service: Durable Medical Equipment  Contact information:  [address] [place] [id number] [phone]                           Patient Instructions:      OXYGEN FOR HOME USE     Order Specific Question Answer Comments   Liter Flow 2    Duration With activity    Qualifying Test Performed at: Activity    Oxygen saturation at rest 96    Oxygen saturation with activity 88    Oxygen saturation with activity on oxygen 97    Portable mode: continuous    Route nasal cannula    Device: home concentrator with portable concentrator    Length of need (in months): 12 mos    Patient condition with qualifying saturation Other - List qualifying diagnosis and code    Select a diagnosis & list the code in the comments Pulmonary embolism [490488]    Height: 5' 5" (1.651 m)    Weight: 127 kg (279 lb 15.8 oz)    Alternative treatment measures have been tried or considered and deemed clinically ineffective. Yes      Diet Cardiac     Activity as tolerated   Order Comments: Activity as tolerated using oxygen.       Significant Diagnostic Studies: Labs:   BMP:   Recent Labs   Lab 01/05/22 2050 01/07/22  0401   GLU 90 104    136   K 4.2 3.6    101   CO2 24 23   BUN 15 16   CREATININE 1.0 0.9   CALCIUM 9.8 9.1    and CBC   Recent Labs   Lab 01/05/22 2050 01/05/22 2050 01/07/22  0401   WBC 18.38*  --  14.03*   HGB " 15.8  --  13.4   HCT 49.2*   < > 41.8     --  397    < > = values in this interval not displayed.     Radiology: CT angiogram of the chest, non coronary: Extensive bilateral pulmonary embolisms with findings of right heart strain.     Pending Diagnostic Studies:     None         Medications:  Reconciled Home Medications:      Medication List      START taking these medications    apixaban 5 mg Tab  Commonly known as: ELIQUIS  Take 2 tablets (10 mg total) by mouth 2 (two) times daily. Take 2 tablets (10mg) by mouth twice per day for 7 days.  After 7 days, take 1 tablet (5mg) by mouth twice per day.     metoprolol tartrate 50 MG tablet  Commonly known as: LOPRESSOR  Take 1 tablet (50 mg total) by mouth 2 (two) times daily.        CONTINUE taking these medications    diazePAM 5 MG tablet  Commonly known as: VALIUM  Take 1 tablet (5 mg total) by mouth every 8 (eight) hours as needed for Anxiety.     gabapentin 300 MG capsule  Commonly known as: NEURONTIN  Take 1 capsule (300 mg total) by mouth 3 (three) times daily.     metFORMIN 750 MG ER 24hr tablet  Commonly known as: GLUCOPHAGE-XR  Take 1 tablet (750 mg total) by mouth 2 (two) times daily with meals.     oxyCODONE 5 MG immediate release tablet  Commonly known as: ROXICODONE  Take 1 tablet (5 mg total) by mouth every 4 (four) hours as needed.     -IRON-FOLATE 1-DSS-DHA ORAL  Take 1 tablet by mouth once daily.     promethazine 12.5 MG Tab  Commonly known as: PHENERGAN  Take 1 tablet (12.5 mg total) by mouth every 6 (six) hours as needed.            Indwelling Lines/Drains at time of discharge:   Lines/Drains/Airways     None                 Time spent on the discharge of patient: 40 minutes         Toño Leal MD  Department of Hospital Medicine  [facility]

## 2022-01-07 NOTE — NURSING
Pt remained safe and stable, slept after a dose of Melatonin.   Dr. Leal notified of missing Lovenox dose.    Ordered Lovenox 130 mg q 12.   Dose administered at 0100.   Pt used oxygen PRN.   No distress noted.   Plan of care continues.

## 2022-01-07 NOTE — PLAN OF CARE
01/07/22 1731   Final Note   Assessment Type Final Discharge Note   Anticipated Discharge Disposition Home   What phone number can be called within the next 1-3 days to see how you are doing after discharge? [phone]   Hospital Resources/Appts/Education Provided Post-Acute resouces added to AVS   Post-Acute Status   Post-Acute Authorization HME   HME Status Set-up Complete/Auth obtained  ([facility]-home oxygen)   Discharge Delays None known at this time       Oxygen delivered to patient at the bedside-one E tank set up and one E tank. Patient signed the delivery ticket and rental agreement. Susy VARGAS was asked to instruct the patient on the use of the oxygen tanks. No other CM needs noted.

## 2022-01-10 ENCOUNTER — TELEPHONE (OUTPATIENT)
Dept: NEUROSURGERY | Facility: CLINIC | Age: 32
End: 2022-01-10
Payer: COMMERCIAL

## 2022-01-11 ENCOUNTER — OFFICE VISIT (OUTPATIENT)
Dept: NEUROSURGERY | Facility: CLINIC | Age: 32
End: 2022-01-11
Payer: COMMERCIAL

## 2022-01-11 VITALS
WEIGHT: 276.25 LBS | HEART RATE: 102 BPM | TEMPERATURE: 95 F | DIASTOLIC BLOOD PRESSURE: 64 MMHG | SYSTOLIC BLOOD PRESSURE: 132 MMHG | HEIGHT: 65 IN | BODY MASS INDEX: 46.02 KG/M2

## 2022-01-11 DIAGNOSIS — G93.5 CHIARI MALFORMATION TYPE I: Primary | ICD-10-CM

## 2022-01-11 PROCEDURE — 3075F PR MOST RECENT SYSTOLIC BLOOD PRESS GE 130-139MM HG: ICD-10-PCS | Mod: CPTII,S$GLB,, | Performed by: PHYSICIAN ASSISTANT

## 2022-01-11 PROCEDURE — 99999 PR PBB SHADOW E&M-EST. PATIENT-LVL IV: CPT | Mod: PBBFAC,,, | Performed by: PHYSICIAN ASSISTANT

## 2022-01-11 PROCEDURE — 99024 POSTOP FOLLOW-UP VISIT: CPT | Mod: S$GLB,,, | Performed by: PHYSICIAN ASSISTANT

## 2022-01-11 PROCEDURE — 1160F RVW MEDS BY RX/DR IN RCRD: CPT | Mod: CPTII,S$GLB,, | Performed by: PHYSICIAN ASSISTANT

## 2022-01-11 PROCEDURE — 3008F PR BODY MASS INDEX (BMI) DOCUMENTED: ICD-10-PCS | Mod: CPTII,S$GLB,, | Performed by: PHYSICIAN ASSISTANT

## 2022-01-11 PROCEDURE — 3075F SYST BP GE 130 - 139MM HG: CPT | Mod: CPTII,S$GLB,, | Performed by: PHYSICIAN ASSISTANT

## 2022-01-11 PROCEDURE — 3008F BODY MASS INDEX DOCD: CPT | Mod: CPTII,S$GLB,, | Performed by: PHYSICIAN ASSISTANT

## 2022-01-11 PROCEDURE — 1160F PR REVIEW ALL MEDS BY PRESCRIBER/CLIN PHARMACIST DOCUMENTED: ICD-10-PCS | Mod: CPTII,S$GLB,, | Performed by: PHYSICIAN ASSISTANT

## 2022-01-11 PROCEDURE — 3078F DIAST BP <80 MM HG: CPT | Mod: CPTII,S$GLB,, | Performed by: PHYSICIAN ASSISTANT

## 2022-01-11 PROCEDURE — 1159F PR MEDICATION LIST DOCUMENTED IN MEDICAL RECORD: ICD-10-PCS | Mod: CPTII,S$GLB,, | Performed by: PHYSICIAN ASSISTANT

## 2022-01-11 PROCEDURE — 99024 PR POST-OP FOLLOW-UP VISIT: ICD-10-PCS | Mod: S$GLB,,, | Performed by: PHYSICIAN ASSISTANT

## 2022-01-11 PROCEDURE — 1159F MED LIST DOCD IN RCRD: CPT | Mod: CPTII,S$GLB,, | Performed by: PHYSICIAN ASSISTANT

## 2022-01-11 PROCEDURE — 99999 PR PBB SHADOW E&M-EST. PATIENT-LVL IV: ICD-10-PCS | Mod: PBBFAC,,, | Performed by: PHYSICIAN ASSISTANT

## 2022-01-11 PROCEDURE — 3078F PR MOST RECENT DIASTOLIC BLOOD PRESSURE < 80 MM HG: ICD-10-PCS | Mod: CPTII,S$GLB,, | Performed by: PHYSICIAN ASSISTANT

## 2022-01-11 RX ORDER — FLUCONAZOLE 150 MG/1
150 TABLET ORAL DAILY
Qty: 1 TABLET | Refills: 0 | Status: SHIPPED | OUTPATIENT
Start: 2022-01-11 | End: 2022-01-12

## 2022-01-11 RX ORDER — CLINDAMYCIN HYDROCHLORIDE 150 MG/1
150 CAPSULE ORAL EVERY 6 HOURS
Qty: 28 CAPSULE | Refills: 0 | Status: SHIPPED | OUTPATIENT
Start: 2022-01-11 | End: 2022-01-18

## 2022-01-11 NOTE — PROGRESS NOTES
Wound Check   Neurosurgery     Jamilah French is a 31 y.o. female who presents to clinic today for 2 week wound check, s/p chiari decompression with Dr. Reis on 12/23/21.  Denies fevers, chills, night sweats or N/V. Further denies wound drainage or swelling. Pt has been taking Oxy IR 5mg and valium 5mg sparingly for pain relief. Post-op recovery was complicated by PE. She was treated in the ED and is now using supplemental home O2.      Physical Exam:   General: well developed, well nourished, no distress  Neurologic: Alert and oriented. Thought content appropriate.   GCS: Motor: 6/Verbal: 5/Eyes: 4 GCS Total: 15   Mental Status: Awake, Alert, Oriented x3   Pulmonary: SOB  Cranial nerves: face symmetric, tongue midline, pupils equal, round, reactive to light with accomodation, EOMI.   Motor Strength: moves all extremities with good strength and tone   Sensation: response to light touch throughout  No gait disturbances     Incision is clean, dry and intact with no signs of erythema, swelling or purulent drainage. Staples are intact on exam and removed in clinic. Some superficial breakdown with evidence of granular tissue noted at the cervicothoracic junction.        Vitals:    01/11/22 1124   BP: 132/64   Pulse: 102   Temp: (!) 94.5 °F (34.7 °C)             Assessment/Plan:   Jamilah French is a 31 y.o. female who presents for 2 week wound check, s/p chiari decompression on 12/23/21. Mild wound breakdown at cervicothoracic junction. Recommend medihoney and Abx with FU in 1 week.     -Keep incision open to air   -start applying medihoney twice per day to incision.   -start clindamycin   -Can shower and get incision wet, just pat dry and no vigorous scrubbing. Do not submerge incision for another 4 weeks.   -No lifting more than 10 lbs or excessive bending/twisting.   -Can drive after 4 weeks when no longer taking narcotics   -Follow up with Dr. Reis in 4 weeks   -FU next week via virtual visit for wound  check   -Encouraged patient to call if they have any questions or concerns prior to next follow up appt        Tracy Hurd PA-C  [facility]  Department of Neurosurgery  [phone]

## 2022-01-11 NOTE — PATIENT INSTRUCTIONS
-Keep incision open to air   -start applying medihoney twice per day to incision.   -start clindamycin   -Can shower and get incision wet, just pat dry and no vigorous scrubbing. Do not submerge incision for another 4 weeks.   -No lifting more than 10 lbs or excessive bending/twisting.   -Can drive after 4 weeks when no longer taking narcotics   -Follow up with Dr. Reis in 4 weeks   -FU next week via virtual visit for wound check   -Please call with any questions or concerns prior to your next appointment.        Kannan Paez MD. Call in 1 month.    Specialties: Cardiovascular Disease, Cardiology  Why: Please call make an appointment to see Dr. Paez.  He will help manage her blood thinners, heart rate medication, and will speak with you about repeating the sonogram of the heart (if necessary).  Contact information:  [facility]  [address] [place] [id number] [phone]

## 2022-01-18 ENCOUNTER — OFFICE VISIT (OUTPATIENT)
Dept: NEUROSURGERY | Facility: CLINIC | Age: 32
End: 2022-01-18
Payer: COMMERCIAL

## 2022-01-18 ENCOUNTER — PATIENT MESSAGE (OUTPATIENT)
Dept: NEUROSURGERY | Facility: CLINIC | Age: 32
End: 2022-01-18

## 2022-01-18 DIAGNOSIS — G93.5 CHIARI MALFORMATION TYPE I: Primary | ICD-10-CM

## 2022-01-18 PROCEDURE — 99024 PR POST-OP FOLLOW-UP VISIT: ICD-10-PCS | Mod: 95,,, | Performed by: PHYSICIAN ASSISTANT

## 2022-01-18 PROCEDURE — 1160F PR REVIEW ALL MEDS BY PRESCRIBER/CLIN PHARMACIST DOCUMENTED: ICD-10-PCS | Mod: CPTII,95,, | Performed by: PHYSICIAN ASSISTANT

## 2022-01-18 PROCEDURE — 99024 POSTOP FOLLOW-UP VISIT: CPT | Mod: 95,,, | Performed by: PHYSICIAN ASSISTANT

## 2022-01-18 PROCEDURE — 1159F MED LIST DOCD IN RCRD: CPT | Mod: CPTII,95,, | Performed by: PHYSICIAN ASSISTANT

## 2022-01-18 PROCEDURE — 1159F PR MEDICATION LIST DOCUMENTED IN MEDICAL RECORD: ICD-10-PCS | Mod: CPTII,95,, | Performed by: PHYSICIAN ASSISTANT

## 2022-01-18 PROCEDURE — 1160F RVW MEDS BY RX/DR IN RCRD: CPT | Mod: CPTII,95,, | Performed by: PHYSICIAN ASSISTANT

## 2022-01-18 NOTE — PROGRESS NOTES
The patient location is: home  The chief complaint leading to consultation is: wound check     Visit type: audiovisual    Face to Face time with patient: 10 min  15 minutes of total time spent on the encounter, which includes face to face time and non-face to face time preparing to see the patient (eg, review of tests), Obtaining and/or reviewing separately obtained history, Documenting clinical information in the electronic or other health record, Independently interpreting results (not separately reported) and communicating results to the patient/family/caregiver, or Care coordination (not separately reported).         Each patient to whom he or she provides medical services by telemedicine is:  (1) informed of the relationship between the physician and patient and the respective role of any other health care provider with respect to management of the patient; and (2) notified that he or she may decline to receive medical services by telemedicine and may withdraw from such care at any time.    Notes:       Wound Check   Neurosurgery     Interval history 1/18/22  Patient returns via virtual visit today for repeat wound check. She has been taking antibiotics as instructed and applying medihoney twice per day. Denies fevers and wound drainage. She and her significant other feel the wound is improving.       HPI 1/11/22  Jamilah French is a 31 y.o. female who presents to clinic today for 2 week wound check, s/p chiari decompression with Dr. Reis on 12/23/21.  Denies fevers, chills, night sweats or N/V. Further denies wound drainage or swelling. Pt has been taking Oxy IR 5mg and valium 5mg sparingly for pain relief. Post-op recovery was complicated by PE. She was treated in the ED and is now using supplemental home O2.      Physical Exam:   General: well developed, well nourished, no distress  Neurologic: Alert and oriented. Thought content appropriate.   GCS: Motor: 6/Verbal: 5/Eyes: 4 GCS Total: 15   Mental  Status: Awake, Alert, Oriented x3   Pulmonary: SOB  Cranial nerves: face symmetric, tongue midline, pupils equal, round, reactive to light with accomodation, EOMI.   Motor Strength: moves upper extremities with good strength and tone       Incision is clean, dry and intact with no signs of erythema, swelling or purulent drainage. Previous area of superficial breakdown is improving with decreased area of dehiscence and less granular tissue.  See picture in media       There were no vitals filed for this visit. (virtual visit)           Assessment/Plan:   Jamilah French is a 31 y.o. female who presents via virtual visit for repeat wound check, 3 weeks s/p chiari decompression on 12/23/21. Mild wound breakdown at cervicothoracic junction is improving with Abx and medihoney.     -finish antibiotics as ordered. No refill needed  -continue medihoney twice per day until incision is fully healed and skin edges are fully intact.   -Advised to call with any fevers or negative changes to the wound.   -Patient will send a picture of the wound in 1 week.    -Keep incision open to air   -start applying medihoney twice per day to incision.   -Can shower and get incision wet, just pat dry and no vigorous scrubbing. Do not submerge incision for another 4 weeks.   -No lifting more than 10 lbs or excessive bending/twisting.   -Can drive after 4 weeks when no longer taking narcotics   -Follow up with Dr. Reis in 3 weeks   -Encouraged patient to call if they have any questions or concerns prior to next follow up appt        Tracy Hurd PA-C  [facility]  Department of Neurosurgery  [phone]

## 2022-01-18 NOTE — PATIENT INSTRUCTIONS
Thank you for sending the picture via [employer]. Incision is improving.     -finish antibiotics as ordered. No refill needed  -continue medihoney twice per day until incision is fully healed and skin edges are fully intact.   -call with any fevers or negative changes to the wound.   -Please send a picture of the wound in 1 week.    -Keep incision open to air   -start applying medihoney twice per day to incision.   -Can shower and get incision wet, just pat dry and no scrubbing. Do not submerge incision for another 4 weeks.   -No lifting more than 10 lbs or excessive bending/twisting.   -Can drive after 4 weeks when no longer taking narcotics   -Follow up with Dr. Reis in 3 weeks

## 2022-02-02 ENCOUNTER — OFFICE VISIT (OUTPATIENT)
Dept: NEUROSURGERY | Facility: CLINIC | Age: 32
End: 2022-02-02
Payer: COMMERCIAL

## 2022-02-02 VITALS
HEART RATE: 92 BPM | DIASTOLIC BLOOD PRESSURE: 88 MMHG | TEMPERATURE: 100 F | HEIGHT: 65 IN | OXYGEN SATURATION: 100 % | SYSTOLIC BLOOD PRESSURE: 151 MMHG | WEIGHT: 272.94 LBS | BODY MASS INDEX: 45.47 KG/M2

## 2022-02-02 DIAGNOSIS — G95.0 SYRINX OF SPINAL CORD: ICD-10-CM

## 2022-02-02 DIAGNOSIS — G93.5 CHIARI MALFORMATION TYPE I: Primary | ICD-10-CM

## 2022-02-02 PROCEDURE — 3008F PR BODY MASS INDEX (BMI) DOCUMENTED: ICD-10-PCS | Mod: CPTII,S$GLB,, | Performed by: NEUROLOGICAL SURGERY

## 2022-02-02 PROCEDURE — 1159F MED LIST DOCD IN RCRD: CPT | Mod: CPTII,S$GLB,, | Performed by: NEUROLOGICAL SURGERY

## 2022-02-02 PROCEDURE — 3079F PR MOST RECENT DIASTOLIC BLOOD PRESSURE 80-89 MM HG: ICD-10-PCS | Mod: CPTII,S$GLB,, | Performed by: NEUROLOGICAL SURGERY

## 2022-02-02 PROCEDURE — 1159F PR MEDICATION LIST DOCUMENTED IN MEDICAL RECORD: ICD-10-PCS | Mod: CPTII,S$GLB,, | Performed by: NEUROLOGICAL SURGERY

## 2022-02-02 PROCEDURE — 3008F BODY MASS INDEX DOCD: CPT | Mod: CPTII,S$GLB,, | Performed by: NEUROLOGICAL SURGERY

## 2022-02-02 PROCEDURE — 3077F PR MOST RECENT SYSTOLIC BLOOD PRESSURE >= 140 MM HG: ICD-10-PCS | Mod: CPTII,S$GLB,, | Performed by: NEUROLOGICAL SURGERY

## 2022-02-02 PROCEDURE — 99024 PR POST-OP FOLLOW-UP VISIT: ICD-10-PCS | Mod: S$GLB,,, | Performed by: NEUROLOGICAL SURGERY

## 2022-02-02 PROCEDURE — 99999 PR PBB SHADOW E&M-EST. PATIENT-LVL IV: ICD-10-PCS | Mod: PBBFAC,,, | Performed by: NEUROLOGICAL SURGERY

## 2022-02-02 PROCEDURE — 3077F SYST BP >= 140 MM HG: CPT | Mod: CPTII,S$GLB,, | Performed by: NEUROLOGICAL SURGERY

## 2022-02-02 PROCEDURE — 3079F DIAST BP 80-89 MM HG: CPT | Mod: CPTII,S$GLB,, | Performed by: NEUROLOGICAL SURGERY

## 2022-02-02 PROCEDURE — 99024 POSTOP FOLLOW-UP VISIT: CPT | Mod: S$GLB,,, | Performed by: NEUROLOGICAL SURGERY

## 2022-02-02 PROCEDURE — 99999 PR PBB SHADOW E&M-EST. PATIENT-LVL IV: CPT | Mod: PBBFAC,,, | Performed by: NEUROLOGICAL SURGERY

## 2022-02-02 NOTE — PATIENT INSTRUCTIONS
I recommend following up with cardiology for PE.     Pt provided with the following instructions:  1. Refrain from lifting, bending, stooping, or straining.  2. Do not lift anything heavier than 10 pounds.   3. Stay as active as possible; minimize the period of time spent sitting down.  4. Encouraged to walk daily while gradually increasing the length of time/distance.    I will schedule the patient for 6 week follow up with MRI cervical spine with and without contrast.

## 2022-02-02 NOTE — PROGRESS NOTES
Subjective:   I, Anu Irving, attest that this documentation has been prepared under the direction and in the presence of Kirk Reis MD.     Patient ID: Jamilah French is a 31 y.o. female     Chief Complaint: Post-op Evaluation (Chiari Malformation Type 1 )        HPI  Ms. Jamilah French is a 31 y.o. woman with cervical radiculopathy, who presents today for 6 week post-op of chiari malformation decompression done on 12/23/2021.  This is a pt who presented with atypical right-sided facial pain which she developed in early October 2021. Pain is sharp in nature but also faded into a dull ache when not as intense. The pain is constant but waxes and wanes in intensity throughout the day. Aggravating factors included coughing and sneezing. She was seen by ENT and placed on prednisone without any improvement. She also had no improvement with antibiotics. Stated of some difficulty with her gait at times. She denied urinary or bowel incontinence. MRI Thoracic Spine W WO Contrast (11/10/21) showed large cervical cord syrinx extending down thoracic spine. MRI Cervical Spine W WO Contrast (10/19/21) showed very large cervical cord syrinx extending into the visualized upper thoracic spinal cord. 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    Today the pt reports of having headaches that have been tolerated with acetaminophen. On Saturday she experienced a migraine with significant pain to her entire head. Otherwise pt with no further complaints. She is on Eliquis for recent diagnosis of PE.      Review of Systems   Constitutional: Negative for activity change, appetite change, fatigue, fever and unexpected weight change.   HENT: Negative for facial swelling.    Eyes: Negative.    Respiratory: Negative.    Cardiovascular: Negative.    Gastrointestinal: Negative for diarrhea, nausea and vomiting.    Endocrine: Negative.    Genitourinary: Negative.    Musculoskeletal: Negative for back pain, joint swelling, myalgias and neck pain.   Neurological: Positive for headaches. Negative for dizziness, seizures, weakness and numbness.   Psychiatric/Behavioral: Negative.       Past Medical History:   Diagnosis Date    Bilateral pulmonary embolism 1/6/2022    Current moderate episode of major depressive disorder without prior episode 6/15/2018    Radiculopathy, cervical region 10/12/2021       Objective:      Vitals:    02/02/22 1141   BP: (!) 151/88   Pulse: 92   Temp: 99.9 °F (37.7 °C)      Physical Exam  Constitutional:       General: She is not in acute distress.     Appearance: Normal appearance.   HENT:      Head: Normocephalic and atraumatic.      Comments: Incision site is dry, clean, and intact. No obvious signs of infection or drainage.  Pulmonary:      Effort: Pulmonary effort is normal.   Musculoskeletal:      Cervical back: Neck supple.   Neurological:      Mental Status: She is alert and oriented to person, place, and time.      GCS: GCS eye subscore is 4. GCS verbal subscore is 5. GCS motor subscore is 6.      Cranial Nerves: No cranial nerve deficit.            I, Dr. Kirk Reis, personally performed the services described in this documentation. All medical record entries made by the scribe, Anu Irving, were at my direction and in my presence.  I have reviewed the chart and agree that the record reflects my personal performance and is accurate and complete. Kirk Reis MD. 02/02/2022    Assessment:       1. Chiari malformation type I    2. Syrinx of spinal cord         Plan:   I recommend following up with cardiology for PE.     Pt provided with the following instructions:  1. Refrain from lifting, bending, stooping, or straining.  2. Do not lift anything heavier than 10 pounds.   3. Stay as active as possible; minimize the period of time spent sitting down.  4. Encouraged to walk daily while  gradually increasing the length of time/distance.    I will schedule the patient for 6 week follow up with MRI cervical spine with and without contrast.

## 2022-02-04 ENCOUNTER — TELEPHONE (OUTPATIENT)
Dept: NEUROSURGERY | Facility: CLINIC | Age: 32
End: 2022-02-04
Payer: COMMERCIAL

## 2022-02-17 ENCOUNTER — PATIENT MESSAGE (OUTPATIENT)
Dept: ADMINISTRATIVE | Facility: OTHER | Age: 32
End: 2022-02-17
Payer: COMMERCIAL

## 2022-03-14 ENCOUNTER — TELEPHONE (OUTPATIENT)
Dept: NEUROSURGERY | Facility: CLINIC | Age: 32
End: 2022-03-14
Payer: COMMERCIAL

## 2022-03-14 NOTE — TELEPHONE ENCOUNTER
Spoke with pt and let her know appt date and time for rescheduled f/u appt.   She is aware and has verbalized her understanding.Pt has been encouraged to call clinic should she have any additional questions or concerns.

## 2022-03-16 ENCOUNTER — HOSPITAL ENCOUNTER (OUTPATIENT)
Dept: RADIOLOGY | Facility: HOSPITAL | Age: 32
Discharge: HOME | End: 2022-03-16
Attending: NEUROLOGICAL SURGERY
Payer: COMMERCIAL

## 2022-03-16 DIAGNOSIS — G95.0 SYRINX OF SPINAL CORD: ICD-10-CM

## 2022-03-16 DIAGNOSIS — G93.5 CHIARI MALFORMATION TYPE I: ICD-10-CM

## 2022-03-16 PROCEDURE — 72156 MRI CERVICAL SPINE W WO CONTRAST: ICD-10-PCS | Mod: 26,,, | Performed by: RADIOLOGY

## 2022-03-16 PROCEDURE — 72156 MRI NECK SPINE W/O & W/DYE: CPT | Mod: 26,,, | Performed by: RADIOLOGY

## 2022-03-16 PROCEDURE — 25500020 PHARM REV CODE 255: Performed by: NEUROLOGICAL SURGERY

## 2022-03-16 PROCEDURE — 72156 MRI NECK SPINE W/O & W/DYE: CPT | Mod: TC

## 2022-03-16 PROCEDURE — A9585 GADOBUTROL INJECTION: HCPCS | Performed by: NEUROLOGICAL SURGERY

## 2022-03-16 RX ORDER — GADOBUTROL 604.72 MG/ML
10 INJECTION INTRAVENOUS
Status: COMPLETED | OUTPATIENT
Start: 2022-03-16 | End: 2022-03-16

## 2022-03-16 RX ADMIN — GADOBUTROL 10 ML: 604.72 INJECTION INTRAVENOUS at 01:03

## 2022-03-23 ENCOUNTER — OFFICE VISIT (OUTPATIENT)
Dept: NEUROSURGERY | Facility: CLINIC | Age: 32
End: 2022-03-23
Payer: COMMERCIAL

## 2022-03-23 VITALS
TEMPERATURE: 99 F | HEART RATE: 87 BPM | DIASTOLIC BLOOD PRESSURE: 67 MMHG | OXYGEN SATURATION: 98 % | BODY MASS INDEX: 46.87 KG/M2 | HEIGHT: 65 IN | SYSTOLIC BLOOD PRESSURE: 136 MMHG | WEIGHT: 281.31 LBS

## 2022-03-23 DIAGNOSIS — G93.5 CHIARI MALFORMATION TYPE I: Primary | ICD-10-CM

## 2022-03-23 PROCEDURE — 1160F RVW MEDS BY RX/DR IN RCRD: CPT | Mod: CPTII,S$GLB,, | Performed by: NEUROLOGICAL SURGERY

## 2022-03-23 PROCEDURE — 3008F PR BODY MASS INDEX (BMI) DOCUMENTED: ICD-10-PCS | Mod: CPTII,S$GLB,, | Performed by: NEUROLOGICAL SURGERY

## 2022-03-23 PROCEDURE — 1159F MED LIST DOCD IN RCRD: CPT | Mod: CPTII,S$GLB,, | Performed by: NEUROLOGICAL SURGERY

## 2022-03-23 PROCEDURE — 99999 PR PBB SHADOW E&M-EST. PATIENT-LVL IV: ICD-10-PCS | Mod: PBBFAC,,, | Performed by: NEUROLOGICAL SURGERY

## 2022-03-23 PROCEDURE — 3075F SYST BP GE 130 - 139MM HG: CPT | Mod: CPTII,S$GLB,, | Performed by: NEUROLOGICAL SURGERY

## 2022-03-23 PROCEDURE — 99024 POSTOP FOLLOW-UP VISIT: CPT | Mod: S$GLB,,, | Performed by: NEUROLOGICAL SURGERY

## 2022-03-23 PROCEDURE — 3075F PR MOST RECENT SYSTOLIC BLOOD PRESS GE 130-139MM HG: ICD-10-PCS | Mod: CPTII,S$GLB,, | Performed by: NEUROLOGICAL SURGERY

## 2022-03-23 PROCEDURE — 99999 PR PBB SHADOW E&M-EST. PATIENT-LVL IV: CPT | Mod: PBBFAC,,, | Performed by: NEUROLOGICAL SURGERY

## 2022-03-23 PROCEDURE — 3008F BODY MASS INDEX DOCD: CPT | Mod: CPTII,S$GLB,, | Performed by: NEUROLOGICAL SURGERY

## 2022-03-23 PROCEDURE — 99024 PR POST-OP FOLLOW-UP VISIT: ICD-10-PCS | Mod: S$GLB,,, | Performed by: NEUROLOGICAL SURGERY

## 2022-03-23 PROCEDURE — 1159F PR MEDICATION LIST DOCUMENTED IN MEDICAL RECORD: ICD-10-PCS | Mod: CPTII,S$GLB,, | Performed by: NEUROLOGICAL SURGERY

## 2022-03-23 PROCEDURE — 3078F PR MOST RECENT DIASTOLIC BLOOD PRESSURE < 80 MM HG: ICD-10-PCS | Mod: CPTII,S$GLB,, | Performed by: NEUROLOGICAL SURGERY

## 2022-03-23 PROCEDURE — 3078F DIAST BP <80 MM HG: CPT | Mod: CPTII,S$GLB,, | Performed by: NEUROLOGICAL SURGERY

## 2022-03-23 PROCEDURE — 1160F PR REVIEW ALL MEDS BY PRESCRIBER/CLIN PHARMACIST DOCUMENTED: ICD-10-PCS | Mod: CPTII,S$GLB,, | Performed by: NEUROLOGICAL SURGERY

## 2022-03-23 NOTE — PROGRESS NOTES
Subjective:   I, Anu Irving, attest that this documentation has been prepared under the direction and in the presence of Kirk Reis MD.     Patient ID: Jamilah French is a 31 y.o. female     Chief Complaint: No chief complaint on file.      HPI  Ms. Jamilah French is a 31 y.o. woman with cervical radiculopathy, and is s/p chiari malformation decompression done on 12/23/2021, who presents today for 6 week follow up with MRI cervical spine. This is a pt who presented with atypical right-sided facial pain which she developed in early October 2021. Pain is sharp in nature but also faded into a dull ache when not as intense. The pain is constant but waxes and wanes in intensity throughout the day. Aggravating factors included coughing and sneezing. She was seen by ENT and placed on prednisone without any improvement. She also had no improvement with antibiotics. Stated of some difficulty with her gait at times. She denied urinary or bowel incontinence. MRI Thoracic Spine W WO Contrast (11/10/21) showed large cervical cord syrinx extending down thoracic spine. MRI Cervical Spine W WO Contrast (10/19/21) showed very large cervical cord syrinx extending into the visualized upper thoracic spinal cord. 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  At the time of our last visit on 2/2/22, pt complained of migraine headaches. She is on Eliquis for recent diagnosis of PE.      Today the pt reports she has been experiencing pain across her upper chest every time she sneezes or cough. States when she coughs or sneezes a lot she develops numbness to both her hands and finger tips. Reports her headaches have significantly improved and has not had any headaches for a while now.    Review of Systems   Constitutional: Negative for activity change, appetite change, fatigue, fever and unexpected weight change.    HENT: Negative for facial swelling.    Eyes: Negative.    Respiratory: Negative.    Cardiovascular: Negative.    Gastrointestinal: Negative for diarrhea, nausea and vomiting.   Endocrine: Negative.    Genitourinary: Negative.    Musculoskeletal: Negative for back pain, joint swelling, myalgias and neck pain.   Neurological: Positive for numbness. Negative for dizziness, seizures, weakness and headaches.   Psychiatric/Behavioral: Negative.       Past Medical History:   Diagnosis Date    Bilateral pulmonary embolism 1/6/2022    Current moderate episode of major depressive disorder without prior episode 6/15/2018    Radiculopathy, cervical region 10/12/2021       Objective:      Vitals:    03/23/22 0954   BP: 136/67   Pulse: 87   Temp: 98.7 °F (37.1 °C)      Physical Exam  Constitutional:       General: She is not in acute distress.     Appearance: Normal appearance.   HENT:      Head: Normocephalic and atraumatic.   Pulmonary:      Effort: Pulmonary effort is normal.   Musculoskeletal:      Cervical back: Neck supple.   Neurological:      Mental Status: She is alert and oriented to person, place, and time.      GCS: GCS eye subscore is 4. GCS verbal subscore is 5. GCS motor subscore is 6.      Cranial Nerves: No cranial nerve deficit.      Motor: Motor function is intact.            IMAGING:  MRI Cervical Spine W WO Cont (3/16/2022): Postsurgical changes of posterior fossa craniectomy and C1 laminectomy for Chiari decompression. Abnormal enhancement and T2 signal within the surgical bed, likely granulation tissue and edema. No organized fluid collection to suggest abscess. Unchanged appearance of a large syrinx extending from the C2 level to most inferior visualized level, T4, although it is known to extend to T8.      I have personally reviewed the images with the pt.      I, Dr. Kirk Resi, personally performed the services described in this documentation. All medical record entries made by the Anu black  Aristides, were at my direction and in my presence.  I have reviewed the chart and agree that the record reflects my personal performance and is accurate and complete. Kirk Reis MD. 03/23/2022    Assessment:       1. Chiari malformation.  2. Syrinx.       Plan:   I have personally reviewed the MRI cervical with the pt which shows Postsurgical changes of posterior fossa craniectomy and C1 laminectomy for Chiari decompression. Abnormal enhancement and T2 signal within the surgical bed, likely granulation tissue and edema. No organized fluid collection to suggest abscess. Unchanged appearance of a large syrinx extending from the C2 level to most inferior visualized level, T4, although it is known to extend to T8.    I recommend ventriculoperitoneal () shunt placement. I have discussed the risks/benefits, indications, and alternatives for the proposed procedure in detail.  I have answered all of their questions and the pt would like some time to consider whether or not they would like to proceed with the surgery, pt will contact us when they have reached a final decision.  I will schedule the patient for follow up in 2 weeks.

## 2022-03-23 NOTE — PATIENT INSTRUCTIONS
I have personally reviewed the MRI cervical with the pt which shows Postsurgical changes of posterior fossa craniectomy and C1 laminectomy for Chiari decompression. Abnormal enhancement and T2 signal within the surgical bed, likely granulation tissue and edema. No organized fluid collection to suggest abscess. Unchanged appearance of a large syrinx extending from the C2 level to most inferior visualized level, T4, although it is known to extend to T8.    I recommend ventriculoperitoneal () shunt placement. I have discussed the risks/benefits, indications, and alternatives for the proposed procedure in detail.  I have answered all of their questions and the pt would like some time to consider whether or not they would like to proceed with the surgery, pt will contact us when they have reached a final decision.  I will schedule the patient for follow up in 2 weeks.

## 2022-04-06 ENCOUNTER — OFFICE VISIT (OUTPATIENT)
Dept: NEUROSURGERY | Facility: CLINIC | Age: 32
End: 2022-04-06
Payer: COMMERCIAL

## 2022-04-06 VITALS
WEIGHT: 280.88 LBS | DIASTOLIC BLOOD PRESSURE: 64 MMHG | HEART RATE: 77 BPM | SYSTOLIC BLOOD PRESSURE: 129 MMHG | BODY MASS INDEX: 46.8 KG/M2 | OXYGEN SATURATION: 99 % | TEMPERATURE: 98 F | HEIGHT: 65 IN

## 2022-04-06 DIAGNOSIS — G95.0 SYRINX OF SPINAL CORD: ICD-10-CM

## 2022-04-06 DIAGNOSIS — G93.5 CHIARI MALFORMATION TYPE I: Primary | ICD-10-CM

## 2022-04-06 PROCEDURE — 3008F BODY MASS INDEX DOCD: CPT | Mod: CPTII,S$GLB,, | Performed by: NEUROLOGICAL SURGERY

## 2022-04-06 PROCEDURE — 99999 PR PBB SHADOW E&M-EST. PATIENT-LVL IV: ICD-10-PCS | Mod: PBBFAC,,, | Performed by: NEUROLOGICAL SURGERY

## 2022-04-06 PROCEDURE — 1160F RVW MEDS BY RX/DR IN RCRD: CPT | Mod: CPTII,S$GLB,, | Performed by: NEUROLOGICAL SURGERY

## 2022-04-06 PROCEDURE — 3008F PR BODY MASS INDEX (BMI) DOCUMENTED: ICD-10-PCS | Mod: CPTII,S$GLB,, | Performed by: NEUROLOGICAL SURGERY

## 2022-04-06 PROCEDURE — 99999 PR PBB SHADOW E&M-EST. PATIENT-LVL IV: CPT | Mod: PBBFAC,,, | Performed by: NEUROLOGICAL SURGERY

## 2022-04-06 PROCEDURE — 1160F PR REVIEW ALL MEDS BY PRESCRIBER/CLIN PHARMACIST DOCUMENTED: ICD-10-PCS | Mod: CPTII,S$GLB,, | Performed by: NEUROLOGICAL SURGERY

## 2022-04-06 PROCEDURE — 99214 PR OFFICE/OUTPT VISIT, EST, LEVL IV, 30-39 MIN: ICD-10-PCS | Mod: S$GLB,,, | Performed by: NEUROLOGICAL SURGERY

## 2022-04-06 PROCEDURE — 1159F MED LIST DOCD IN RCRD: CPT | Mod: CPTII,S$GLB,, | Performed by: NEUROLOGICAL SURGERY

## 2022-04-06 PROCEDURE — 3078F DIAST BP <80 MM HG: CPT | Mod: CPTII,S$GLB,, | Performed by: NEUROLOGICAL SURGERY

## 2022-04-06 PROCEDURE — 3078F PR MOST RECENT DIASTOLIC BLOOD PRESSURE < 80 MM HG: ICD-10-PCS | Mod: CPTII,S$GLB,, | Performed by: NEUROLOGICAL SURGERY

## 2022-04-06 PROCEDURE — 3074F SYST BP LT 130 MM HG: CPT | Mod: CPTII,S$GLB,, | Performed by: NEUROLOGICAL SURGERY

## 2022-04-06 PROCEDURE — 1159F PR MEDICATION LIST DOCUMENTED IN MEDICAL RECORD: ICD-10-PCS | Mod: CPTII,S$GLB,, | Performed by: NEUROLOGICAL SURGERY

## 2022-04-06 PROCEDURE — 3074F PR MOST RECENT SYSTOLIC BLOOD PRESSURE < 130 MM HG: ICD-10-PCS | Mod: CPTII,S$GLB,, | Performed by: NEUROLOGICAL SURGERY

## 2022-04-06 PROCEDURE — 99214 OFFICE O/P EST MOD 30 MIN: CPT | Mod: S$GLB,,, | Performed by: NEUROLOGICAL SURGERY

## 2022-04-06 NOTE — PATIENT INSTRUCTIONS
I have personally reviewed the MRI cervical with the pt which shows postsurgical changes of posterior fossa craniectomy and C1 laminectomy for Chiari decompression. Abnormal enhancement and T2 signal within the surgical bed, likely granulation tissue and edema. No organized fluid collection to suggest abscess. Unchanged appearance of a large syrinx extending from the C2 level to most inferior visualized level, T4, although it is known to extend to T8    I recommend ventriculoperitoneal () shunt placement. I have discussed the risks/benefits, indications, and alternatives for the proposed procedure in detail. I have answered all of their questions and the pt wishes to proceed with surgery. We will schedule the pt on the next available date.

## 2022-04-06 NOTE — PROGRESS NOTES
Subjective:   I, Anu Irving, attest that this documentation has been prepared under the direction and in the presence of Kirk Reis MD.     Patient ID: Jamilah French is a 31 y.o. female     Chief Complaint: Post-op Evaluation        HPI  MsDelmar French is a 31 y.o. woman with cervical radiculopathy, and is s/p chiari malformation decompression done on 12/23/2021, who presents today for 2 week follow up to discuss surgery. The pt reports that she want to proceed with  placement of the syrinx.     Review of Systems   Constitutional: Negative for activity change, appetite change, fatigue, fever and unexpected weight change.   HENT: Negative for facial swelling.    Eyes: Negative.    Respiratory: Negative.    Cardiovascular: Negative.    Gastrointestinal: Negative for diarrhea, nausea and vomiting.   Endocrine: Negative.    Genitourinary: Negative.    Musculoskeletal: Negative for back pain, joint swelling, myalgias and neck pain.   Neurological: Negative for dizziness, seizures, weakness, numbness and headaches.   Psychiatric/Behavioral: Negative.       Past Medical History:   Diagnosis Date    Bilateral pulmonary embolism 1/6/2022    Current moderate episode of major depressive disorder without prior episode 6/15/2018    Radiculopathy, cervical region 10/12/2021       Objective:      Vitals:    04/06/22 1136   BP: 129/64   Pulse: 77   Temp: 98.2 °F (36.8 °C)      Physical Exam  Constitutional:       General: She is not in acute distress.     Appearance: Normal appearance.   HENT:      Head: Normocephalic and atraumatic.   Pulmonary:      Effort: Pulmonary effort is normal.   Musculoskeletal:      Cervical back: Neck supple.   Neurological:      Mental Status: She is alert and oriented to person, place, and time.      GCS: GCS eye subscore is 4. GCS verbal subscore is 5. GCS motor subscore is 6.      Cranial Nerves: No cranial nerve deficit.            IMAGING:  MRI Cervical Spine W WO  Cont (3/16/2022): Postsurgical changes of posterior fossa craniectomy and C1 laminectomy for Chiari decompression. Abnormal enhancement and T2 signal within the surgical bed, likely granulation tissue and edema. No organized fluid collection to suggest abscess. Unchanged appearance of a large syrinx extending from the C2 level to most inferior visualized level, T4, although it is known to extend to T8.      I have personally reviewed the images with the pt.      I, Dr. Kirk Reis, personally performed the services described in this documentation. All medical record entries made by the scribe, Anu Irving, were at my direction and in my presence.  I have reviewed the chart and agree that the record reflects my personal performance and is accurate and complete. Kirk Reis MD. 04/06/2022    Assessment:       Chiari malformation.  Syrinx of the cervical and thoracic spinal chord.     Plan:   I have personally reviewed the MRI cervical with the pt which shows postsurgical changes of posterior fossa craniectomy and C1 laminectomy for Chiari decompression. Abnormal enhancement and T2 signal within the surgical bed, likely granulation tissue and edema. No organized fluid collection to suggest abscess. Unchanged appearance of a large syrinx extending from the C2 level to most inferior visualized level, T4, although it is known to extend to T8    I recommend ventriculoperitoneal () shunt placement. I have discussed the risks/benefits, indications, and alternatives for the proposed procedure in detail. I have answered all of their questions and the pt wishes to proceed with surgery. We will schedule the pt on the next available date.

## 2022-04-07 ENCOUNTER — TELEPHONE (OUTPATIENT)
Dept: NEUROSURGERY | Facility: CLINIC | Age: 32
End: 2022-04-07
Payer: COMMERCIAL

## 2022-04-07 ENCOUNTER — OFFICE VISIT (OUTPATIENT)
Dept: CARDIOLOGY | Facility: CLINIC | Age: 32
End: 2022-04-07
Payer: COMMERCIAL

## 2022-04-07 VITALS
BODY MASS INDEX: 46.68 KG/M2 | HEIGHT: 65 IN | WEIGHT: 280.19 LBS | HEART RATE: 67 BPM | DIASTOLIC BLOOD PRESSURE: 71 MMHG | SYSTOLIC BLOOD PRESSURE: 127 MMHG

## 2022-04-07 DIAGNOSIS — G95.0 SYRINX OF SPINAL CORD: Primary | ICD-10-CM

## 2022-04-07 DIAGNOSIS — G93.5 CHIARI MALFORMATION TYPE I: ICD-10-CM

## 2022-04-07 DIAGNOSIS — E66.01 MORBID OBESITY: ICD-10-CM

## 2022-04-07 DIAGNOSIS — Z79.01 ANTICOAGULATED: ICD-10-CM

## 2022-04-07 DIAGNOSIS — I26.99 BILATERAL PULMONARY EMBOLISM: Primary | ICD-10-CM

## 2022-04-07 PROCEDURE — 1160F PR REVIEW ALL MEDS BY PRESCRIBER/CLIN PHARMACIST DOCUMENTED: ICD-10-PCS | Mod: CPTII,S$GLB,, | Performed by: INTERNAL MEDICINE

## 2022-04-07 PROCEDURE — 99999 PR PBB SHADOW E&M-EST. PATIENT-LVL IV: CPT | Mod: PBBFAC,,, | Performed by: INTERNAL MEDICINE

## 2022-04-07 PROCEDURE — 99214 OFFICE O/P EST MOD 30 MIN: CPT | Mod: S$GLB,,, | Performed by: INTERNAL MEDICINE

## 2022-04-07 PROCEDURE — 1159F PR MEDICATION LIST DOCUMENTED IN MEDICAL RECORD: ICD-10-PCS | Mod: CPTII,S$GLB,, | Performed by: INTERNAL MEDICINE

## 2022-04-07 PROCEDURE — 99214 PR OFFICE/OUTPT VISIT, EST, LEVL IV, 30-39 MIN: ICD-10-PCS | Mod: S$GLB,,, | Performed by: INTERNAL MEDICINE

## 2022-04-07 PROCEDURE — 3078F DIAST BP <80 MM HG: CPT | Mod: CPTII,S$GLB,, | Performed by: INTERNAL MEDICINE

## 2022-04-07 PROCEDURE — 1160F RVW MEDS BY RX/DR IN RCRD: CPT | Mod: CPTII,S$GLB,, | Performed by: INTERNAL MEDICINE

## 2022-04-07 PROCEDURE — 3074F SYST BP LT 130 MM HG: CPT | Mod: CPTII,S$GLB,, | Performed by: INTERNAL MEDICINE

## 2022-04-07 PROCEDURE — 3078F PR MOST RECENT DIASTOLIC BLOOD PRESSURE < 80 MM HG: ICD-10-PCS | Mod: CPTII,S$GLB,, | Performed by: INTERNAL MEDICINE

## 2022-04-07 PROCEDURE — 3074F PR MOST RECENT SYSTOLIC BLOOD PRESSURE < 130 MM HG: ICD-10-PCS | Mod: CPTII,S$GLB,, | Performed by: INTERNAL MEDICINE

## 2022-04-07 PROCEDURE — 3008F PR BODY MASS INDEX (BMI) DOCUMENTED: ICD-10-PCS | Mod: CPTII,S$GLB,, | Performed by: INTERNAL MEDICINE

## 2022-04-07 PROCEDURE — 3008F BODY MASS INDEX DOCD: CPT | Mod: CPTII,S$GLB,, | Performed by: INTERNAL MEDICINE

## 2022-04-07 PROCEDURE — 1159F MED LIST DOCD IN RCRD: CPT | Mod: CPTII,S$GLB,, | Performed by: INTERNAL MEDICINE

## 2022-04-07 PROCEDURE — 99999 PR PBB SHADOW E&M-EST. PATIENT-LVL IV: ICD-10-PCS | Mod: PBBFAC,,, | Performed by: INTERNAL MEDICINE

## 2022-04-07 NOTE — PROGRESS NOTES
Subjective:   Patient ID:  Jamilah French is a 31 y.o. female who presents for evaluation of Bilateral pulmonary embolism      HPI: Very pleasant here with her  for evaluation.  She had an operation for decompression of a Chiari malformation on 21 and two weeks later had sudden onset of chest pain and dyspnea.  PE was diagnosed and she was given systemic anticoagulation with Lovenox and then Eliquis, which she's remained on since.    Now, she is doing well with no new symptoms or cardiovascular complaints and no change in exercise capacity.  She denies exertional chest discomfort, CHRISTINE, palpitations, PND/orthopnea, lightheadedness and syncope.    No significant DVT/PE family history.  No family history of repeated spontaneous abortions.  No personal history of DVT/PE prior to this event.    She has a repeat operation on her Chiari malformation scheduled for May 12.    Past Medical History:   Diagnosis Date    Bilateral pulmonary embolism 2022    Current moderate episode of major depressive disorder without prior episode 6/15/2018    Radiculopathy, cervical region 10/12/2021       Past Surgical History:   Procedure Laterality Date    DECOMPRESSION OF CHIARI MALFORMATION BY REMOVAL OF POSTERIOR ARCH OF FIRST CERVICAL VERTEBRA N/A 2021    Procedure: DECOMPRESSION, CHIARI MALFORMATION, BY 1ST CERVICAL VERTEBRA POSTERIOR ARCH REMOVAL;  Surgeon: Kirk Reis MD;  Location: [address];  Service: Neurosurgery;  Laterality: N/A;  ASA1  TOR1  T&Cross x 2 units  [place]    HYSTEROSCOPY WITH DILATION AND CURETTAGE OF UTERUS N/A 10/26/2020    Procedure: HYSTEROSCOPY, WITH DILATION AND CURETTAGE OF UTERUS;  Surgeon: Manjula Rivas MD;  Location: [facility];  Service: OB/GYN;  Laterality: N/A;       Social History     Tobacco Use    Smoking status: Former Smoker     Types: Vaping w/o nicotine     Quit date: 2018     Years since quittin.2    Smokeless tobacco: Never Used   Substance  Use Topics    Alcohol use: Yes     Comment: once in 3 months     Drug use: No       Family History   Problem Relation Age of Onset    Hypertension Mother     Mental illness Brother     Breast cancer Neg Hx     Cancer Neg Hx     Colon cancer Neg Hx     Diabetes Neg Hx     Eclampsia Neg Hx     Miscarriages / Stillbirths Neg Hx     Ovarian cancer Neg Hx      labor Neg Hx     Stroke Neg Hx        Current Outpatient Medications   Medication Sig    gabapentin (NEURONTIN) 300 MG capsule Take 1 capsule (300 mg total) by mouth 3 (three) times daily.    metoprolol tartrate (LOPRESSOR) 50 MG tablet Take 1 tablet (50 mg total) by mouth 2 (two) times daily.    apixaban (ELIQUIS) 5 mg Tab Take 2 tablets (10 mg total) by mouth 2 (two) times daily. Take 2 tablets (10mg) by mouth twice per day for 7 days.  After 7 days, take 1 tablet (5mg) by mouth twice per day.    metFORMIN (GLUCOPHAGE-XR) 750 MG ER 24hr tablet Take 1 tablet (750 mg total) by mouth 2 (two) times daily with meals. (Patient not taking: Reported on 2022)    -IRON-FOLATE 1-DSS-DHA ORAL Take 1 tablet by mouth once daily.     No current facility-administered medications for this visit.       Review of patient's allergies indicates:   Allergen Reactions    Sulfa (sulfonamide antibiotics) Hives             Review of Systems   Constitutional: Negative.   HENT: Negative.    Eyes: Negative.    Cardiovascular: Negative.  Negative for chest pain, dyspnea on exertion, near-syncope, orthopnea and palpitations.   Respiratory: Negative.  Negative for cough and shortness of breath.    Endocrine: Negative.    Hematologic/Lymphatic: Negative.    Skin: Negative.    Musculoskeletal: Negative.    Gastrointestinal: Negative.    Genitourinary: Negative.    Neurological: Negative.    Psychiatric/Behavioral: Negative.      Objective:   Physical Exam  Vitals reviewed.   Constitutional:       Appearance: She is well-developed. She is obese.   HENT:       Head: Normocephalic and atraumatic.   Eyes:      General: No scleral icterus.     Conjunctiva/sclera: Conjunctivae normal.   Neck:      Vascular: No JVD.   Cardiovascular:      Rate and Rhythm: Normal rate and regular rhythm.      Pulses: Intact distal pulses.      Heart sounds: Normal heart sounds. No murmur heard.    No friction rub. No gallop.   Pulmonary:      Effort: Pulmonary effort is normal.      Breath sounds: Normal breath sounds. No wheezing or rales.   Abdominal:      General: Bowel sounds are normal. There is no distension.      Palpations: Abdomen is soft.      Tenderness: There is no abdominal tenderness.   Musculoskeletal:         General: Normal range of motion.      Cervical back: Normal range of motion and neck supple.   Skin:     General: Skin is warm and dry.      Findings: No erythema or rash.   Neurological:      Mental Status: She is alert and oriented to person, place, and time.   Psychiatric:         Behavior: Behavior normal.         Thought Content: Thought content normal.         Judgment: Judgment normal.         Lab Results   Component Value Date    WBC 14.03 (H) 01/07/2022    HGB 13.4 01/07/2022    HCT 41.8 01/07/2022    MCV 84 01/07/2022     01/07/2022         Chemistry        Component Value Date/Time     01/07/2022 0401    K 3.6 01/07/2022 0401     01/07/2022 0401    CO2 23 01/07/2022 0401    BUN 16 01/07/2022 0401    CREATININE 0.9 01/07/2022 0401     01/07/2022 0401        Component Value Date/Time    CALCIUM 9.1 01/07/2022 0401    ALKPHOS 61 01/07/2022 0401    AST 13 01/07/2022 0401    ALT 15 01/07/2022 0401    BILITOT 1.3 (H) 01/07/2022 0401    ESTGFRAFRICA >60.0 01/07/2022 0401    EGFRNONAA >60.0 01/07/2022 0401            Lab Results   Component Value Date    CHOL 159 06/15/2018     Lab Results   Component Value Date    HDL 38 (L) 06/15/2018     Lab Results   Component Value Date    LDLCALC 105.2 06/15/2018     Lab Results   Component Value Date     TRIG 79 06/15/2018     Lab Results   Component Value Date    CHOLHDL 23.9 06/15/2018       Lab Results   Component Value Date    TSH 1.605 10/04/2021       Lab Results   Component Value Date    HGBA1C 5.4 10/12/2021         Assessment:     1. Bilateral pulmonary embolism    2. Anticoagulated    3. Morbid obesity        Plan:     I would deem this a provoked PE but I'd still like to get her seen by Hematology to make sure there's not a reason that this young woman might be at ongoing risk for VTE.    Continue Eliquis through at least the end of June.  OK to hold for 2 days as needed for the May 12 operation, though I would reinstitute as soon as safe.    F/U 6 months

## 2022-04-12 ENCOUNTER — ANESTHESIA EVENT (OUTPATIENT)
Dept: SURGERY | Facility: HOSPITAL | Age: 32
DRG: 028 | End: 2022-04-12
Payer: COMMERCIAL

## 2022-04-12 DIAGNOSIS — Z01.818 PREOPERATIVE TESTING: Primary | ICD-10-CM

## 2022-04-12 NOTE — ANESTHESIA PAT ROS NOTE
04/12/2022  Jamilah French is a 31 y.o., female.      Pre-op Assessment          Review of Systems  Anesthesia Hx:  No problems with previous Anesthesia  Denies Family Hx of Anesthesia complications.   Denies Personal Hx of Anesthesia complications.   Social:  Social Alcohol Use, Non-Smoker Stated she is a nonsmoker and stopped vaping without nicotine in about 2016.   Hematology/Oncology:     Oncology Normal   Hematology Comments: To see Hem/Onc for workup since had a pulmonary embolism 2 weeks post op.    EENT/Dental:EENT/Dental Normal   Cardiovascular:    Denies Angina.  Functional Capacity 4 METS, able to climb 2 flights of stairs    Pulmonary:  Pulmonary Embolism (2 week post op from decompression of Chiari Malformation ), recent < 6 months    Renal/:  Renal/ Normal     Hepatic/GI:  Hepatic/GI Normal    Musculoskeletal:  Musculoskeletal General/Symptoms: Functional capacity is ambulatory without assistance.    OB/GYN/PEDS:  PCOS   Neurological:   Had decompression of Chiari Malformation by removal of Posterior arch of 1st cervical vertebrae   Endocrine:   PCOS Metabolic Disorders, Morbid Obesity / BMI > 40  Dermatological:   ECZEMA   Psych:  Depression.             Anesthesia Assessment: Preoperative EQUATION    Planned Procedure: Procedure(s) (LRB):  REVISION, PROCEDURE INVOLVING SYRINGOPLEURAL SHUNT Insertion T4 Laminectomy (N/A)  Requested Anesthesia Type:General  Surgeon: Kirk Reis MD  Service: Neurosurgery  Known or anticipated Date of Surgery:5/12/2022,Date change 5/19/2022  Surgeon notes: reviewed    Electronic QUestionnaire Assessment completed via nurse interview with patient.        Triage considerations:     The patient has no apparent active cardiac condition (No unstable coronary Syndrome such as severe unstable angina or recent [<1 month] myocardial infarction, decompensated  CHF, severe valvular   disease or significant arrhythmia)    Previous anesthesia records:GETA and No problems   12/23/2021 DECOMPRESSION, CHIARI MALFORMATION, BY 1ST CERVICAL VERTEBRA POSTERIOR ARCH REMOVAL (N/A Spine Cervical)  Airway/Jaw/Neck:  Airway Findings: Mouth Opening: Normal Tongue: Normal  General Airway Assessment: Adult  Mallampati: II  TM Distance: Normal, at least 6 cm  Jaw/Neck Findings:  Neck ROM: Normal ROM  Neck Findings: Normal    n-Surgical Placement Date: 12/23/21; Placement Time: 1630 (created via procedure documentation); Method of Intubation: Direct laryngoscopy; Mask Ventilation: Easy; Intubated: Postinduction; Blade: Salmeron #2; Airway Device Size: 7.0; Placement Verified By: Capnometry; Complicating Factors: None; Intubation Findings: Bilateral breath sounds, Atraumatic/Condition of teeth unchanged; Securment: Lips; Complications: None; Removal Date: 12/23/21;  Removal Time: 2027       Last PCP note: outside [facility]   Subspecialty notes: Cardiology: General, ENT, Neurology, Neurosurgery    Other important co-morbidities: Morbid Obesity and SYRINX OF SPINAL CORD, CHIARI MALFORMATION TYPE 1, H/O PULMONARY EMBOLISM, PCOS      Tests already available:  Available tests,  within 3 months , 3-6 months ago , within [facility] .3/16/2022 MRI CERVICAL SPINE W W/O CONTRAST, 1/6/2022 TTE EF 60%, 1/5/2022 EKG          Instructions given. (See in Nurse's note)    Optimization:  Anesthesia Preop Clinic Assessment-not  Indicated for this surgery( recent surgery)    Medical Opinion Indicated           Plan:    Testing:  BMP, CBC, PT/INR, PTT, T&S and UA      Consultation:IM Perioperative Hospitalist or NP     Patient  has previously scheduled Medical Appointment:5/9 DR. LOMAS-HEM/ONC, [facility]REZ [facility] -OB/GYN    Navigation: Tests Scheduled. TBD             Consults scheduled.TBD             Results will be tracked by Preop Clinic.  4/12 Discussed case with Dr. Lopez. Torin as Joan 2.  5/6 Labs and UA  resulted and noted by Dr.Allison Lutz. Lina Charles NP notified by [facility] of UTI and urine C&S in process .  5/10 Dr. Blackwell's note 5/9( Hem/Onc):    She was inactive after surgery on 12/23.  She likely had provoked PE. She has been on eliquis for > 4 months. She has tolerated it well. She has another surgery coming up. I advised her to resume elqiuis as soon as it is safe from hemostasis stand point, until she resumes normal activity. She can stop eliquis once her activity levels are normnal, and start aspirin 81mg daily.     She has never been pregnant. She post snot smoke. Not on hormonal birth control. No family h/o miscarriage/ DVT/PE.  She will have anti-phospholipid antibodies checked today.  5/10 Medical optimization by Lina Charles NP on 5/9. In her note:Discussed UA with Dr. Munguia: since patient is asymptomatic,  will not repeat UA and will not treat based on culture results. Urology visit does not have to be done before surgery. Will get Urology appt. set up about 6- 8 weeks from surgery.  Christina Duarte RN BSN

## 2022-04-12 NOTE — PRE-PROCEDURE INSTRUCTIONS
Patient stated has not had any problem with anesthesia in the past. Will need medical optimization by  or NP, labs, and ua. Our  will call to set up these appts.  Preop instructions given. Hold aspirin, aspirin containing products, nsaids(aleve, advil, motrin, ibuprofen, naprosyn, naproxen, voltaren, diclofenac), vitamins and supplements one week prior to surgery.     May take Tylenol. Hold Eliquis 2 days prior to surgery per Dr. Eber Contreras. ( Last dose 5/9)  Your surgery has been scheduled for:_Thursday 5/12/2022_________________________________________     You should report to:  ____[facility], located on the [place] side of the first floor of the           [facility] ([phone])  __x__[facility], located on the [facility] side of the            Second floor of the [facility] ([phone])  ____3rd Floor [facility] located on the [facility] side of the [facility] [phone]  Please Note   · Tell your doctor if you take Aspirin, products containing Aspirin, herbal medications  or blood thinners, such as Coumadin, Ticlid, or Plavix.  (Consult your provider regarding holding or stopping before surgery).  · Arrange for someone to drive you home following surgery.  You will not be allowed to leave the surgical facility alone or drive yourself home following sedation and anesthesia.  Before Surgery  · Stop taking all vitamins/ herbal medications 14days prior to surgery  · No Motrin/Advil (Ibuprofen) 7 days before surgery  · No Aleve (Naproxen) 7 days before surgery  · Stop Taking Asprin, products containing Asprin __7___days before surgery  · Stop taking blood thinners_______days before surgery  · No Goody's/BC  Powder 7 days before surgery  · Refrain from drinking alcoholic beverages for 24hours before and after surgery  · Stop or limit smoking ____7_____days before surgery  · You may take  Tylenol for pain  Night before Surgery  · Nothing to eat or drink after midnight, the night before surgery.  · Take a shower or bath (shower is recommended).  Bathe with Hibiclens soap or an antibacterial soap from the neck down.  If not supplied by your surgeon, hibiclens soap will need to be purchased over the counter in pharmacy.  Rinse soap off thoroughly.  · Shampoo your hair with your regular shampoo  The Day of Surgery  · NOTHING TO  DRINK 2 hours before arrival time. If you are told to take medication on the morning of surgery, it may be taken with a sip of water.   · Take another bath or shower with hibiclens or any antibacterial soap, to reduce the chance of infection.  · Take heart and blood pressure medications with a small sip of water, as advised by the perioperative team.  · Do not take fluid pills  · You may brush your teeth and rinse your mouth, but do not swall any additional water.   · Do not apply perfumes, powder, body lotions or deodorant on the day of surgery.  · Nail polish should be removed.  · Do not wear makeup or moisturizer  · Wear comfortable clothes, such as a button front shirt and loose fitting pants.  · Leave all jewelry, including body piercings, and valuables at home.    · Bring any devices you will neeed after surgery such as crutches or canes.  · If you have sleep apnea, please bring your CPAP machine  In the event that your physical condition changes including the onset of a cold or respiratory illness, or if you have to delay or cancel your surgery, please notify your surgeon.    Medication instructions given:  Take if needed: Gabapentin     Take in the pm before surgery: Metoprolol     Take in the am of surgery: Metoprolol    Stated knows where the surgery center is located. Verbalizes understanding.Preop and medication instructions sent to my chart portal.

## 2022-04-13 ENCOUNTER — TELEPHONE (OUTPATIENT)
Dept: PREADMISSION TESTING | Facility: HOSPITAL | Age: 32
End: 2022-04-13
Payer: COMMERCIAL

## 2022-04-13 NOTE — TELEPHONE ENCOUNTER
----- Message from Christina Duarte RN sent at 4/12/2022  1:29 PM CDT -----  Surgery 5/12  Please schedule Nilda Betts or NP, labs, and ua.  Thanks!

## 2022-05-05 NOTE — ASSESSMENT & PLAN NOTE
Not currently treated; tried medication in past but did not help. Was managed per PCP.   Denies Suicidal/Homicidal ideations  Recommend good sleep (7-8 hrs), healthy eating, and limit use of alcohol.

## 2022-05-05 NOTE — ASSESSMENT & PLAN NOTE
Lack of sensation to right side of body; attributes to syrinx of spinal cord     MRI C-Spine 3/16/22  Impression:     Postsurgical changes of posterior fossa craniectomy and C1 laminectomy for Chiari decompression.  Abnormal enhancement and T2 signal within the surgical bed, likely granulation tissue and edema.  No organized fluid collection to suggest abscess.     Unchanged appearance of a large syrinx extending from the C2 level to most inferior visualized level, T4, although it is known to extend to T8.

## 2022-05-05 NOTE — ASSESSMENT & PLAN NOTE
No longer treated with Metformin  Seeing GYN on Monday 5/9 for further eval and possible treatment

## 2022-05-05 NOTE — ASSESSMENT & PLAN NOTE
Provoked event 1/5/22; chiari decompression 12/23/21   Initially treated with Lovenox and now on Eliquis. Seen by Cardiology with instructions to hold Eliquis two days before surgery. Last dose will be 5/9/22  Hem-Onc appt. 5/9/22  Increased risk of thrombosis in the perioperative period.

## 2022-05-05 NOTE — ASSESSMENT & PLAN NOTE
Patient would benefit from weight loss and has not set goals to achieve success. Lifestyle changes should be made by eating healthy, exercising at least 150 minutes weekly, and avoiding sedentary behavior.

## 2022-05-05 NOTE — ASSESSMENT & PLAN NOTE
New UA shows RBCs/WBCs in urine; awaiting urine culture.  Did not see Urology yet- surgeries, PE  Denies symptoms of dysuria/visible hematuria/abdominal pain/frequency/burning  Will get Urology appt. made for continued hematuria

## 2022-05-06 ENCOUNTER — OFFICE VISIT (OUTPATIENT)
Dept: INTERNAL MEDICINE | Facility: CLINIC | Age: 32
End: 2022-05-06
Payer: COMMERCIAL

## 2022-05-06 ENCOUNTER — LAB VISIT (OUTPATIENT)
Dept: LAB | Facility: HOSPITAL | Age: 32
End: 2022-05-06
Attending: ANESTHESIOLOGY
Payer: COMMERCIAL

## 2022-05-06 VITALS
OXYGEN SATURATION: 100 % | HEART RATE: 89 BPM | WEIGHT: 278 LBS | HEIGHT: 65 IN | TEMPERATURE: 98 F | BODY MASS INDEX: 46.32 KG/M2 | DIASTOLIC BLOOD PRESSURE: 86 MMHG | SYSTOLIC BLOOD PRESSURE: 149 MMHG

## 2022-05-06 DIAGNOSIS — E28.2 PCOS (POLYCYSTIC OVARIAN SYNDROME): ICD-10-CM

## 2022-05-06 DIAGNOSIS — I26.99 BILATERAL PULMONARY EMBOLISM: ICD-10-CM

## 2022-05-06 DIAGNOSIS — F32.1 CURRENT MODERATE EPISODE OF MAJOR DEPRESSIVE DISORDER WITHOUT PRIOR EPISODE: ICD-10-CM

## 2022-05-06 DIAGNOSIS — M54.12 RADICULOPATHY, CERVICAL REGION: ICD-10-CM

## 2022-05-06 DIAGNOSIS — R31.29 MICROSCOPIC HEMATURIA: ICD-10-CM

## 2022-05-06 DIAGNOSIS — Z01.818 PREOPERATIVE TESTING: ICD-10-CM

## 2022-05-06 DIAGNOSIS — E66.01 MORBID OBESITY: ICD-10-CM

## 2022-05-06 DIAGNOSIS — G93.5 CHIARI MALFORMATION TYPE I: ICD-10-CM

## 2022-05-06 DIAGNOSIS — R03.0 ELEVATED BP WITHOUT DIAGNOSIS OF HYPERTENSION: ICD-10-CM

## 2022-05-06 DIAGNOSIS — R06.83 SNORING: ICD-10-CM

## 2022-05-06 LAB
BACTERIA #/AREA URNS AUTO: ABNORMAL /HPF
BILIRUB UR QL STRIP: NEGATIVE
CLARITY UR REFRACT.AUTO: ABNORMAL
COLOR UR AUTO: YELLOW
GLUCOSE UR QL STRIP: NEGATIVE
HGB UR QL STRIP: ABNORMAL
HYALINE CASTS UR QL AUTO: 0 /LPF
KETONES UR QL STRIP: ABNORMAL
LEUKOCYTE ESTERASE UR QL STRIP: ABNORMAL
MICROSCOPIC COMMENT: ABNORMAL
NITRITE UR QL STRIP: NEGATIVE
PH UR STRIP: 5 [PH] (ref 5–8)
PROT UR QL STRIP: ABNORMAL
RBC #/AREA URNS AUTO: 6 /HPF (ref 0–4)
SP GR UR STRIP: >=1.03 (ref 1–1.03)
SQUAMOUS #/AREA URNS AUTO: 10 /HPF
URN SPEC COLLECT METH UR: ABNORMAL
WBC #/AREA URNS AUTO: 30 /HPF (ref 0–5)

## 2022-05-06 PROCEDURE — 99999 PR PBB SHADOW E&M-EST. PATIENT-LVL IV: ICD-10-PCS | Mod: PBBFAC,,, | Performed by: NURSE PRACTITIONER

## 2022-05-06 PROCEDURE — 1159F PR MEDICATION LIST DOCUMENTED IN MEDICAL RECORD: ICD-10-PCS | Mod: CPTII,S$GLB,, | Performed by: NURSE PRACTITIONER

## 2022-05-06 PROCEDURE — 81001 URINALYSIS AUTO W/SCOPE: CPT | Performed by: ANESTHESIOLOGY

## 2022-05-06 PROCEDURE — 3008F BODY MASS INDEX DOCD: CPT | Mod: CPTII,S$GLB,, | Performed by: NURSE PRACTITIONER

## 2022-05-06 PROCEDURE — 99214 PR OFFICE/OUTPT VISIT, EST, LEVL IV, 30-39 MIN: ICD-10-PCS | Mod: S$GLB,,, | Performed by: NURSE PRACTITIONER

## 2022-05-06 PROCEDURE — 3079F DIAST BP 80-89 MM HG: CPT | Mod: CPTII,S$GLB,, | Performed by: NURSE PRACTITIONER

## 2022-05-06 PROCEDURE — 99214 OFFICE O/P EST MOD 30 MIN: CPT | Mod: S$GLB,,, | Performed by: NURSE PRACTITIONER

## 2022-05-06 PROCEDURE — 1160F PR REVIEW ALL MEDS BY PRESCRIBER/CLIN PHARMACIST DOCUMENTED: ICD-10-PCS | Mod: CPTII,S$GLB,, | Performed by: NURSE PRACTITIONER

## 2022-05-06 PROCEDURE — 99999 PR PBB SHADOW E&M-EST. PATIENT-LVL IV: CPT | Mod: PBBFAC,,, | Performed by: NURSE PRACTITIONER

## 2022-05-06 PROCEDURE — 3008F PR BODY MASS INDEX (BMI) DOCUMENTED: ICD-10-PCS | Mod: CPTII,S$GLB,, | Performed by: NURSE PRACTITIONER

## 2022-05-06 PROCEDURE — 87086 URINE CULTURE/COLONY COUNT: CPT | Performed by: ANESTHESIOLOGY

## 2022-05-06 PROCEDURE — 3079F PR MOST RECENT DIASTOLIC BLOOD PRESSURE 80-89 MM HG: ICD-10-PCS | Mod: CPTII,S$GLB,, | Performed by: NURSE PRACTITIONER

## 2022-05-06 PROCEDURE — 1160F RVW MEDS BY RX/DR IN RCRD: CPT | Mod: CPTII,S$GLB,, | Performed by: NURSE PRACTITIONER

## 2022-05-06 PROCEDURE — 3077F PR MOST RECENT SYSTOLIC BLOOD PRESSURE >= 140 MM HG: ICD-10-PCS | Mod: CPTII,S$GLB,, | Performed by: NURSE PRACTITIONER

## 2022-05-06 PROCEDURE — 3077F SYST BP >= 140 MM HG: CPT | Mod: CPTII,S$GLB,, | Performed by: NURSE PRACTITIONER

## 2022-05-06 PROCEDURE — 1159F MED LIST DOCD IN RCRD: CPT | Mod: CPTII,S$GLB,, | Performed by: NURSE PRACTITIONER

## 2022-05-06 NOTE — HPI
"This is a 32 y.o. female  who presents today for a preoperative evaluation in preparation for a Neurosurgery  procedure.  Scheduled for  insertion of syringopleural shunt; T4 laminectomy.   Surgery is indicated for .  Patient is new to me but has seen my collaborative physician Dr. Munguia in December 2021 for surgical optimization of Chiari decompression.   Details of current problem: The duration of problem is  two years. She has history of RUE numbness and later to RLE. Chiari decompression done 12/23/21. Recent MRI C-Spine reveals "abnormal enhancement and T2 signal within the surgical bed, likely granulation tissue and edema and unchanged appearance of a large syrinx extending from C2 level to T4."  Reports symptoms of  loss of sensation to entire right side of body  Aggravating factors include:  sneezing/coughing exacerbates symptoms  There are no aggravating factors.  Denies pain today.    The history has been obtained by speaking with the patient and reviewing the electronic medical record and/or outside health information. Significant health conditions for the perioperative period are discussed below in assessment and plan.     Patient reports current health status to be Good.  Denies any new symptoms before surgery.    "
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
## 2022-05-06 NOTE — Clinical Note
Hi Doc,  Patient is UA abnormal reflexing to culture with results revealing multiple organisms present. She is asymptomatic. You saw this patient is December 2021 and she had hematuria at that time. Now with hematuria/large WBC count; negative nitrite.  Never made it to Urology. Do we repeat UA and does Urology appt. need to be before new surgery- revision of syringopleural shunt on 5/12  Thanks, Lina

## 2022-05-06 NOTE — ASSESSMENT & PLAN NOTE
Denies NATALIE. Possible sleep apnea: recommend caution with sedating medication in the perioperative period.   Interested in having Sleep Study done after surgery.
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
## 2022-05-09 ENCOUNTER — OFFICE VISIT (OUTPATIENT)
Dept: OBSTETRICS AND GYNECOLOGY | Facility: CLINIC | Age: 32
End: 2022-05-09
Payer: COMMERCIAL

## 2022-05-09 ENCOUNTER — LAB VISIT (OUTPATIENT)
Dept: LAB | Facility: HOSPITAL | Age: 32
End: 2022-05-09
Attending: INTERNAL MEDICINE
Payer: COMMERCIAL

## 2022-05-09 ENCOUNTER — OFFICE VISIT (OUTPATIENT)
Dept: HEMATOLOGY/ONCOLOGY | Facility: CLINIC | Age: 32
End: 2022-05-09
Payer: COMMERCIAL

## 2022-05-09 VITALS
OXYGEN SATURATION: 100 % | SYSTOLIC BLOOD PRESSURE: 142 MMHG | HEIGHT: 65 IN | DIASTOLIC BLOOD PRESSURE: 69 MMHG | RESPIRATION RATE: 18 BRPM | BODY MASS INDEX: 46.8 KG/M2 | TEMPERATURE: 98 F | WEIGHT: 280.88 LBS | HEART RATE: 85 BPM

## 2022-05-09 VITALS
DIASTOLIC BLOOD PRESSURE: 94 MMHG | HEIGHT: 65 IN | SYSTOLIC BLOOD PRESSURE: 132 MMHG | WEIGHT: 281.5 LBS | BODY MASS INDEX: 46.9 KG/M2

## 2022-05-09 DIAGNOSIS — G93.5 CHIARI MALFORMATION TYPE I: Primary | ICD-10-CM

## 2022-05-09 DIAGNOSIS — E28.2 PCOS (POLYCYSTIC OVARIAN SYNDROME): ICD-10-CM

## 2022-05-09 DIAGNOSIS — M54.10 RADICULOPATHY, UNSPECIFIED SPINAL REGION: ICD-10-CM

## 2022-05-09 DIAGNOSIS — R53.83 FATIGUE, UNSPECIFIED TYPE: ICD-10-CM

## 2022-05-09 DIAGNOSIS — Z86.711 HISTORY OF PULMONARY EMBOLISM: Primary | ICD-10-CM

## 2022-05-09 DIAGNOSIS — E66.01 MORBID OBESITY: ICD-10-CM

## 2022-05-09 DIAGNOSIS — G93.5 CHIARI I MALFORMATION: ICD-10-CM

## 2022-05-09 DIAGNOSIS — I26.99 BILATERAL PULMONARY EMBOLISM: ICD-10-CM

## 2022-05-09 DIAGNOSIS — Z31.9 PROCREATIVE MANAGEMENT: ICD-10-CM

## 2022-05-09 LAB
BACTERIA UR CULT: NORMAL
BACTERIA UR CULT: NORMAL
ERYTHROCYTE [DISTWIDTH] IN BLOOD BY AUTOMATED COUNT: 12.5 % (ref 11.5–14.5)
HCT VFR BLD AUTO: 42.1 % (ref 37–48.5)
HGB BLD-MCNC: 13.3 G/DL (ref 12–16)
IMM GRANULOCYTES # BLD AUTO: 0.03 K/UL (ref 0–0.04)
MCH RBC QN AUTO: 27.3 PG (ref 27–31)
MCHC RBC AUTO-ENTMCNC: 31.6 G/DL (ref 32–36)
MCV RBC AUTO: 86 FL (ref 82–98)
NEUTROPHILS # BLD AUTO: 5.9 K/UL (ref 1.8–7.7)
PLATELET # BLD AUTO: 386 K/UL (ref 150–450)
PMV BLD AUTO: 9.6 FL (ref 9.2–12.9)
RBC # BLD AUTO: 4.88 M/UL (ref 4–5.4)
WBC # BLD AUTO: 8.53 K/UL (ref 3.9–12.7)

## 2022-05-09 PROCEDURE — 1159F MED LIST DOCD IN RCRD: CPT | Mod: CPTII,S$GLB,, | Performed by: OBSTETRICS & GYNECOLOGY

## 2022-05-09 PROCEDURE — 3077F PR MOST RECENT SYSTOLIC BLOOD PRESSURE >= 140 MM HG: ICD-10-PCS | Mod: CPTII,S$GLB,, | Performed by: INTERNAL MEDICINE

## 2022-05-09 PROCEDURE — 86146 BETA-2 GLYCOPROTEIN ANTIBODY: CPT | Mod: 59 | Performed by: INTERNAL MEDICINE

## 2022-05-09 PROCEDURE — 1159F MED LIST DOCD IN RCRD: CPT | Mod: CPTII,S$GLB,, | Performed by: INTERNAL MEDICINE

## 2022-05-09 PROCEDURE — 36415 COLL VENOUS BLD VENIPUNCTURE: CPT | Performed by: INTERNAL MEDICINE

## 2022-05-09 PROCEDURE — 3008F PR BODY MASS INDEX (BMI) DOCUMENTED: ICD-10-PCS | Mod: CPTII,S$GLB,, | Performed by: INTERNAL MEDICINE

## 2022-05-09 PROCEDURE — 85027 COMPLETE CBC AUTOMATED: CPT | Performed by: INTERNAL MEDICINE

## 2022-05-09 PROCEDURE — 1159F PR MEDICATION LIST DOCUMENTED IN MEDICAL RECORD: ICD-10-PCS | Mod: CPTII,S$GLB,, | Performed by: INTERNAL MEDICINE

## 2022-05-09 PROCEDURE — 3008F BODY MASS INDEX DOCD: CPT | Mod: CPTII,S$GLB,, | Performed by: INTERNAL MEDICINE

## 2022-05-09 PROCEDURE — 99205 OFFICE O/P NEW HI 60 MIN: CPT | Mod: S$GLB,,, | Performed by: INTERNAL MEDICINE

## 2022-05-09 PROCEDURE — 99999 PR PBB SHADOW E&M-EST. PATIENT-LVL IV: ICD-10-PCS | Mod: PBBFAC,,, | Performed by: OBSTETRICS & GYNECOLOGY

## 2022-05-09 PROCEDURE — 1160F PR REVIEW ALL MEDS BY PRESCRIBER/CLIN PHARMACIST DOCUMENTED: ICD-10-PCS | Mod: CPTII,S$GLB,, | Performed by: OBSTETRICS & GYNECOLOGY

## 2022-05-09 PROCEDURE — 99205 PR OFFICE/OUTPT VISIT, NEW, LEVL V, 60-74 MIN: ICD-10-PCS | Mod: S$GLB,,, | Performed by: INTERNAL MEDICINE

## 2022-05-09 PROCEDURE — 3080F DIAST BP >= 90 MM HG: CPT | Mod: CPTII,S$GLB,, | Performed by: OBSTETRICS & GYNECOLOGY

## 2022-05-09 PROCEDURE — 1159F PR MEDICATION LIST DOCUMENTED IN MEDICAL RECORD: ICD-10-PCS | Mod: CPTII,S$GLB,, | Performed by: OBSTETRICS & GYNECOLOGY

## 2022-05-09 PROCEDURE — 3078F DIAST BP <80 MM HG: CPT | Mod: CPTII,S$GLB,, | Performed by: INTERNAL MEDICINE

## 2022-05-09 PROCEDURE — 3077F SYST BP >= 140 MM HG: CPT | Mod: CPTII,S$GLB,, | Performed by: INTERNAL MEDICINE

## 2022-05-09 PROCEDURE — 3080F PR MOST RECENT DIASTOLIC BLOOD PRESSURE >= 90 MM HG: ICD-10-PCS | Mod: CPTII,S$GLB,, | Performed by: OBSTETRICS & GYNECOLOGY

## 2022-05-09 PROCEDURE — 3075F PR MOST RECENT SYSTOLIC BLOOD PRESS GE 130-139MM HG: ICD-10-PCS | Mod: CPTII,S$GLB,, | Performed by: OBSTETRICS & GYNECOLOGY

## 2022-05-09 PROCEDURE — 3008F BODY MASS INDEX DOCD: CPT | Mod: CPTII,S$GLB,, | Performed by: OBSTETRICS & GYNECOLOGY

## 2022-05-09 PROCEDURE — 99999 PR PBB SHADOW E&M-EST. PATIENT-LVL IV: CPT | Mod: PBBFAC,,, | Performed by: OBSTETRICS & GYNECOLOGY

## 2022-05-09 PROCEDURE — 99999 PR PBB SHADOW E&M-EST. PATIENT-LVL III: CPT | Mod: PBBFAC,,, | Performed by: INTERNAL MEDICINE

## 2022-05-09 PROCEDURE — 99999 PR PBB SHADOW E&M-EST. PATIENT-LVL III: ICD-10-PCS | Mod: PBBFAC,,, | Performed by: INTERNAL MEDICINE

## 2022-05-09 PROCEDURE — 3075F SYST BP GE 130 - 139MM HG: CPT | Mod: CPTII,S$GLB,, | Performed by: OBSTETRICS & GYNECOLOGY

## 2022-05-09 PROCEDURE — 99215 PR OFFICE/OUTPT VISIT, EST, LEVL V, 40-54 MIN: ICD-10-PCS | Mod: S$GLB,,, | Performed by: OBSTETRICS & GYNECOLOGY

## 2022-05-09 PROCEDURE — 3078F PR MOST RECENT DIASTOLIC BLOOD PRESSURE < 80 MM HG: ICD-10-PCS | Mod: CPTII,S$GLB,, | Performed by: INTERNAL MEDICINE

## 2022-05-09 PROCEDURE — 99215 OFFICE O/P EST HI 40 MIN: CPT | Mod: S$GLB,,, | Performed by: OBSTETRICS & GYNECOLOGY

## 2022-05-09 PROCEDURE — 3008F PR BODY MASS INDEX (BMI) DOCUMENTED: ICD-10-PCS | Mod: CPTII,S$GLB,, | Performed by: OBSTETRICS & GYNECOLOGY

## 2022-05-09 PROCEDURE — 86038 ANTINUCLEAR ANTIBODIES: CPT | Performed by: INTERNAL MEDICINE

## 2022-05-09 PROCEDURE — 1160F RVW MEDS BY RX/DR IN RCRD: CPT | Mod: CPTII,S$GLB,, | Performed by: OBSTETRICS & GYNECOLOGY

## 2022-05-09 PROCEDURE — 86147 CARDIOLIPIN ANTIBODY EA IG: CPT | Performed by: INTERNAL MEDICINE

## 2022-05-09 NOTE — PROGRESS NOTES
Fertility management      Jamilah French is a 32 y.o. female  presents for a discussion on fertility.  Her cycles are regular, every 28 days  Lasting three days.  She stopped metformin around the time of surgery in Dec.  Predicted ovulation but then she did not predict ovulation after the initial ovulation kits  Partner has not gone for SA      Past Medical History:   Diagnosis Date    Bilateral pulmonary embolism 2022    Current moderate episode of major depressive disorder without prior episode 6/15/2018    Radiculopathy, cervical region 10/12/2021       Past Surgical History:   Procedure Laterality Date    DECOMPRESSION OF CHIARI MALFORMATION BY REMOVAL OF POSTERIOR ARCH OF FIRST CERVICAL VERTEBRA N/A 2021    Procedure: DECOMPRESSION, CHIARI MALFORMATION, BY 1ST CERVICAL VERTEBRA POSTERIOR ARCH REMOVAL;  Surgeon: Kirk Reis MD;  Location: [address];  Service: Neurosurgery;  Laterality: N/A;  ASA1  TOR1  T&Cross x 2 units  [place]    HYSTEROSCOPY WITH DILATION AND CURETTAGE OF UTERUS N/A 10/26/2020    Procedure: HYSTEROSCOPY, WITH DILATION AND CURETTAGE OF UTERUS;  Surgeon: Manjula Rivas MD;  Location: [facility];  Service: OB/GYN;  Laterality: N/A;       OB History    Para Term  AB Living   0 0 0 0 0 0   SAB IAB Ectopic Multiple Live Births   0 0 0 0 0       Family History   Problem Relation Age of Onset    Hypertension Mother     No Known Problems Father     Mental illness Brother     Breast cancer Neg Hx     Cancer Neg Hx     Colon cancer Neg Hx     Diabetes Neg Hx     Eclampsia Neg Hx     Miscarriages / Stillbirths Neg Hx     Ovarian cancer Neg Hx      labor Neg Hx     Stroke Neg Hx        Social History     Tobacco Use    Smoking status: Former Smoker     Types: Vaping w/o nicotine     Quit date:      Years since quittin.3    Smokeless tobacco: Never Used   Substance Use Topics    Alcohol use: Yes     Comment: social      "Drug use: No       BP (!) 132/94   Ht 5' 5" (1.651 m)   Wt 127.7 kg (281 lb 8.4 oz)   LMP 04/30/2022 (Exact Date)   BMI 46.85 kg/m²     ROS:  GENERAL: Denies weight gain or weight loss. Feeling well overall.   SKIN: Denies rash or lesions.   HEAD: Denies head injury or headache.   NODES: Denies enlarged lymph nodes.   CHEST: Denies chest pain or shortness of breath.   CARDIOVASCULAR: Denies palpitations or left sided chest pain.   ABDOMEN: No abdominal pain, constipation, diarrhea, nausea, vomiting or rectal bleeding.   URINARY: No frequency, dysuria, hematuria, or burning on urination.  REPRODUCTIVE: See HPI.   BREASTS: The patient performs breast self-examination and denies pain, lumps, or nipple discharge.   HEMATOLOGIC: No easy bruisability or excessive bleeding.  MUSCULOSKELETAL: Denies joint pain or swelling.   NEUROLOGIC: Denies syncope or weakness.   PSYCHIATRIC: Denies depression, anxiety or mood swings.    Physical Exam:    APPEARANCE: Well nourished, well developed, in no acute distress.  AFFECT: WNL, alert and oriented x 3  SKIN: No acne or hirsutism      ASSESSMENT AND PLAN  1. History of pulmonary embolism  Ambulatory referral/consult to Infertility    Ambulatory referral/consult to Perinatology   2. Chiari I malformation  Ambulatory referral/consult to Infertility    Ambulatory referral/consult to Perinatology   3. Radiculopathy, unspecified spinal region  Ambulatory referral/consult to Infertility    Ambulatory referral/consult to Perinatology   4. Procreative management  Ambulatory referral/consult to Infertility    Ambulatory referral/consult to Perinatology   5. PCOS (polycystic ovarian syndrome)  Ambulatory referral/consult to Infertility     Discussed charting and the stress of surgery on the body and causing oligo ovulation.  She has another surgery planned and will hold off on pregnancy until after cleared from surgery   PNV daily     Patient was counseled today on ferility    Will follow " up with infertility for possible fertility treatment  After surgery.  Refer to MFM for consult for high risk pregnancy

## 2022-05-09 NOTE — PROGRESS NOTES
, 31, is here for hematology consultation for bilateral PE diagnosed in 2022. She underwent elective decompression of type 1 Chiari malformation on 21. She presented to ER on 22  with acute onset shortness of breath and chest pain and was found to have bilateral pulmonary embolism.  CTA was initially concerning for right-sided heart dysfunction. ECHO was limited due to body habitus.  LV was normal in size with normal systolic function, but RV was not visualized. .  Patient started on metoprolol tartrate and tolerated the medication well.  She was transitioned from therapeutic Lovenox to oral Eliquis the day of discharge.       Past Medical History:   Diagnosis Date    Bilateral pulmonary embolism 2022    Current moderate episode of major depressive disorder without prior episode 6/15/2018    Radiculopathy, cervical region 10/12/2021         Past Surgical History:   Procedure Laterality Date    DECOMPRESSION OF CHIARI MALFORMATION BY REMOVAL OF POSTERIOR ARCH OF FIRST CERVICAL VERTEBRA N/A 2021    Procedure: DECOMPRESSION, CHIARI MALFORMATION, BY 1ST CERVICAL VERTEBRA POSTERIOR ARCH REMOVAL;  Surgeon: Kirk Reis MD;  Location: [address];  Service: Neurosurgery;  Laterality: N/A;  ASA1  TOR1  T&Cross x 2 units  [place]    HYSTEROSCOPY WITH DILATION AND CURETTAGE OF UTERUS N/A 10/26/2020    Procedure: HYSTEROSCOPY, WITH DILATION AND CURETTAGE OF UTERUS;  Surgeon: Manjula Rivas MD;  Location: [facility];  Service: OB/GYN;  Laterality: N/A;       Social History     Socioeconomic History    Marital status:      Spouse name: eNil    Number of children: 0   Tobacco Use    Smoking status: Former Smoker     Types: Vaping w/o nicotine     Quit date: 2018     Years since quittin.3    Smokeless tobacco: Never Used   Substance and Sexual Activity    Alcohol use: Yes     Comment: social     Drug use: No    Sexual activity: Yes     Partners: Male      Birth control/protection: None     Social Determinants of Health     Financial Resource Strain: Medium Risk    Difficulty of Paying Living Expenses: Somewhat hard   Food Insecurity: Food Insecurity Present    Worried About Running Out of Food in the Last Year: Sometimes true    Ran Out of Food in the Last Year: Sometimes true   Transportation Needs: No Transportation Needs    Lack of Transportation (Medical): No    Lack of Transportation (Non-Medical): No   Physical Activity: Insufficiently Active    Days of Exercise per Week: 2 days    Minutes of Exercise per Session: 20 min   Stress: Stress Concern Present    Feeling of Stress : To some extent   Social Connections: Unknown    Frequency of Communication with Friends and Family: More than three times a week    Frequency of Social Gatherings with Friends and Family: Twice a week    Active Member of Clubs or Organizations: No    Attends Club or Organization Meetings: Never    Marital Status:    Housing Stability: Low Risk     Unable to Pay for Housing in the Last Year: No    Number of Places Lived in the Last Year: 1    Unstable Housing in the Last Year: No       Review of patient's allergies indicates:   Allergen Reactions    Sulfa (sulfonamide antibiotics) Hives           Current Outpatient Medications   Medication Sig    apixaban (ELIQUIS) 5 mg Tab Take 2 tablets (10 mg total) by mouth 2 (two) times daily. Take 2 tablets (10mg) by mouth twice per day for 7 days.  After 7 days, take 1 tablet (5mg) by mouth twice per day.    gabapentin (NEURONTIN) 300 MG capsule Take 1 capsule (300 mg total) by mouth 3 (three) times daily.    metoprolol tartrate (LOPRESSOR) 50 MG tablet Take 1 tablet (50 mg total) by mouth 2 (two) times daily.    -IRON-FOLATE 1-DSS-DHA ORAL Take 1 tablet by mouth once daily.     No current facility-administered medications for this visit.       1/5/22 CTA chest    IMPRESSION:  Extensive bilateral pulmonary embolisms  with findings of right heart strain.  Clear lungs.      1/5/22 US doppler of legs    Impression:     Negative for DVT throughout bilateral lower extremities.      Assessment:    1. Provoked PE  2. Morbid obesity      Plan:    1. She was inactive after surgery on 12/23.  She likely had provoked PE. She has been on eliquis for > 4 months. She has tolerated it well. She has another surgery coming up. I advised her to resume elqiuis as soon as it is safe from hemostasis stand point, until she resumes normal activity. She can stop eliquis once her activity levels are normnal, and start aspirin 81mg daily.     She has never been pregnant. She post snot smoke. Not on hormonal birth control. No family h/o miscarriage/ DVT/PE.  She will have anti-phospholipid antibodies checked today.     Explained measures to minimize risk of thrombosis:   --Adequate hydration ( with water), especially in the summer and during long distance travel  -- Frequent ambulation during long distance air/car travel  -- consider using compression stockings during long distance travel  --Avoid estrogen supplement use  --Avoid smoking  --Prompt use of heparin prophylaxis recommended during hospitalizations and after surgeries        2. Recommend diet/ exercise/life style modification      BMT Chart Routing      Follow up with physician    Follow up with [facility]    Labs CBC   Lab interval:  Anti cardiolipin antibody, [id number]  antibody, edgar   Imaging    Pharmacy appointment    Other referrals            Answers for HPI/ROS submitted by the patient on 5/5/2022  appetite change : No  unexpected weight change: No  mouth sores: No  visual disturbance: No  cough: No  shortness of breath: No  chest pain: No  abdominal pain: No  diarrhea: No  frequency: No  back pain: No  rash: No  headaches: No  adenopathy: No  nervous/ anxious: No

## 2022-05-10 LAB — ANA SER QL IF: NORMAL

## 2022-05-11 ENCOUNTER — TELEPHONE (OUTPATIENT)
Dept: NEUROSURGERY | Facility: CLINIC | Age: 32
End: 2022-05-11
Payer: COMMERCIAL

## 2022-05-11 NOTE — TELEPHONE ENCOUNTER
Spoke with pt  ,Neil, and let him know unfortunately surgery has been postponed a week due to emergency surgery on a brain tumor Dr Reis has been called into .   Pt  was very understanding and gave him the new date ( 05/19/22.)  Has been encouraged to call clinic should any questions or concerns arise .

## 2022-05-12 ENCOUNTER — ANESTHESIA (OUTPATIENT)
Dept: SURGERY | Facility: HOSPITAL | Age: 32
DRG: 028 | End: 2022-05-12
Payer: COMMERCIAL

## 2022-05-12 LAB
B2 GLYCOPROT1 IGA SER QL: <9 SAU
B2 GLYCOPROT1 IGG SER QL: <9 SGU
B2 GLYCOPROT1 IGM SER QL: <9 SMU
CARDIOLIPIN IGG SER IA-ACNC: <9.4 GPL (ref 0–14.99)
CARDIOLIPIN IGM SER IA-ACNC: 13.1 MPL (ref 0–12.49)

## 2022-05-18 ENCOUNTER — TELEPHONE (OUTPATIENT)
Dept: NEUROSURGERY | Facility: CLINIC | Age: 32
End: 2022-05-18
Payer: COMMERCIAL

## 2022-05-18 NOTE — ANESTHESIA PREPROCEDURE EVALUATION
[facility]  Anesthesia Pre-Operative Evaluation         Patient Name: Jamilah French  YOB: 1990  MRN: [id number]    SUBJECTIVE:     Pre-operative evaluation for Procedure(s) (LRB):  REVISION, PROCEDURE INVOLVING SYRINGOPLEURAL SHUNT Insertion T4 Laminectomy (N/A)     05/18/2022    Jamilah French is a 32 y.o. female with cervical radiculopathy, and is s/p chiari malformation decompression done on 12/23/2021. Postop complicated by bilateral pulmonary embolism discovered on 1/5/22 for which she has been on metoprolol and eliquis. TTE from that time below.     Summary    · The left ventricle is normal in size with normal systolic function.  · The estimated ejection fraction is 60%.  · Mild tricuspid regurgitation.  · Unable to visualize right atrium and right ventricle, clinical concerns persist consider alternative cardiac imaging.        Patient now presents for the above procedure(s).      LDA: None documented.       Prev airway: Placement Date: 12/23/21; Placement Time: 1630 (created via procedure documentation); Method of Intubation: Direct laryngoscopy; Mask Ventilation: Easy; Intubated: Postinduction; Blade: Salmeron #2; Airway Device Size: 7.0; Placement Verified By: Capnometry; Complicating Factors: None; Intubation Findings: Bilateral breath sounds, Atraumatic/Condition of teeth unchanged; Securment: Lips; Complications: None; Removal Date: 12/23/21;  Removal Time: 2027    Drips: None documented.      Patient Active Problem List   Diagnosis    Irregular periods/menstrual cycles    Infertility, female    Current moderate episode of major depressive disorder without prior episode    Fatigue    Acanthosis nigricans    Eczema    Morbid obesity    BMI 45.0-49.9, adult    PCOS (polycystic ovarian syndrome)    Infertility associated with anovulation    Abnormal uterine bleeding    Status post hysteroscope D&C    Right facial pain    Radiculopathy, cervical  region    Babinski reflex    Syrinx of spinal cord    Allergies    Microscopic hematuria    Cerebellar tonsillar ectopia    Chiari malformation type I    Bilateral pulmonary embolism    Anticoagulated    Snoring    Elevated BP without diagnosis of hypertension       Review of patient's allergies indicates:   Allergen Reactions    Sulfa (sulfonamide antibiotics) Hives             Current Inpatient Medications:      No current facility-administered medications on file prior to encounter.     Current Outpatient Medications on File Prior to Encounter   Medication Sig Dispense Refill    apixaban (ELIQUIS) 5 mg Tab Take 2 tablets (10 mg total) by mouth 2 (two) times daily. Take 2 tablets (10mg) by mouth twice per day for 7 days.  After 7 days, take 1 tablet (5mg) by mouth twice per day. 70 tablet 2    gabapentin (NEURONTIN) 300 MG capsule Take 1 capsule (300 mg total) by mouth 3 (three) times daily. 90 capsule 5    metoprolol tartrate (LOPRESSOR) 50 MG tablet Take 1 tablet (50 mg total) by mouth 2 (two) times daily. 60 tablet 11    -IRON-FOLATE 1-DSS-DHA ORAL Take 1 tablet by mouth once daily.         Past Surgical History:   Procedure Laterality Date    DECOMPRESSION OF CHIARI MALFORMATION BY REMOVAL OF POSTERIOR ARCH OF FIRST CERVICAL VERTEBRA N/A 12/23/2021    Procedure: DECOMPRESSION, CHIARI MALFORMATION, BY 1ST CERVICAL VERTEBRA POSTERIOR ARCH REMOVAL;  Surgeon: Kirk Reis MD;  Location: [address];  Service: Neurosurgery;  Laterality: N/A;  ASA1  TOR1  T&Cross x 2 units  [place]    HYSTEROSCOPY WITH DILATION AND CURETTAGE OF UTERUS N/A 10/26/2020    Procedure: HYSTEROSCOPY, WITH DILATION AND CURETTAGE OF UTERUS;  Surgeon: Manjula Rivas MD;  Location: [facility];  Service: OB/GYN;  Laterality: N/A;       Social History     Socioeconomic History    Marital status:      Spouse name: Neil    Number of children: 0   Tobacco Use    Smoking status: Former Smoker     Types:  Vaping w/o nicotine     Quit date: 2018     Years since quittin.3    Smokeless tobacco: Never Used   Substance and Sexual Activity    Alcohol use: Yes     Comment: social     Drug use: No    Sexual activity: Yes     Partners: Male     Birth control/protection: None     Social Determinants of Health     Financial Resource Strain: Medium Risk    Difficulty of Paying Living Expenses: Somewhat hard   Food Insecurity: Food Insecurity Present    Worried About Running Out of Food in the Last Year: Sometimes true    Ran Out of Food in the Last Year: Sometimes true   Transportation Needs: No Transportation Needs    Lack of Transportation (Medical): No    Lack of Transportation (Non-Medical): No   Physical Activity: Insufficiently Active    Days of Exercise per Week: 2 days    Minutes of Exercise per Session: 20 min   Stress: Stress Concern Present    Feeling of Stress : To some extent   Social Connections: Unknown    Frequency of Communication with Friends and Family: More than three times a week    Frequency of Social Gatherings with Friends and Family: Twice a week    Active Member of Clubs or Organizations: No    Attends Club or Organization Meetings: Never    Marital Status:    Housing Stability: Low Risk     Unable to Pay for Housing in the Last Year: No    Number of Places Lived in the Last Year: 1    Unstable Housing in the Last Year: No       OBJECTIVE:     Vital Signs Range (Last 24H):         Significant Labs:  Lab Results   Component Value Date    WBC 8.53 2022    HGB 13.3 2022    HCT 42.1 2022     2022    CHOL 159 06/15/2018    TRIG 79 06/15/2018    HDL 38 (L) 06/15/2018    ALT 15 2022    AST 13 2022     2022    K 3.8 2022     2022    CREATININE 0.7 2022    BUN 10 2022    CO2 25 2022    TSH 1.605 10/04/2021    INR 1.1 2022    HGBA1C 5.4 10/12/2021       Diagnostic Studies: No relevant  studies.    EKG:   Results for orders placed or performed during the hospital encounter of 01/05/22   EKG 12-lead    Collection Time: 01/05/22  5:27 PM    Narrative    Test Reason : R06.02,    Vent. Rate : 137 BPM     Atrial Rate : 137 BPM     P-R Int : 124 ms          QRS Dur : 106 ms      QT Int : 354 ms       P-R-T Axes : 056 028 008 degrees     QTc Int : 534 ms    Sinus tachycardia  Incomplete right bundle branch block  Nonspecific T wave abnormality  Abnormal ECG  When compared with ECG of 05-JAN-2022 17:26,  No significant change was found  Confirmed by Toby Camacho MD ([id number]) on 1/9/2022 8:51:27 PM    Referred By: [email or web address]   SELF           Confirmed By:Toby Camacho MD       2D ECHO:  TTE:  Results for orders placed or performed during the hospital encounter of 01/05/22   Echo   Result Value Ref Range    BSA 2.41 m2    TDI SEPTAL 0.07 m/s    LV LATERAL E/E' RATIO 2.71 m/s    LV SEPTAL E/E' RATIO 5.43 m/s    AORTIC VALVE CUSP SEPERATION 1.50 cm    TDI LATERAL 0.14 m/s    PV PEAK VELOCITY 69.67 cm/s    LVIDd 3.62 3.5 - 6.0 cm    IVS 0.80 0.6 - 1.1 cm    Posterior Wall 0.80 0.6 - 1.1 cm    Ao root annulus 2.63 cm    LVIDs 1.76 (A) 2.1 - 4.0 cm    FS 51 28 - 44 %    LV mass 79.47 g    RVDD 251.00 cm    Left Ventricle Relative Wall Thickness 0.44 cm    AV mean gradient 2 mmHg    AV valve area 4.83 cm2    AV Velocity Ratio 100.72     AV index (prosthetic) 1.52     E/A ratio 0.83     Mean e' 0.11 m/s    E wave deceleration time 177.73 msec    IVRT 74.59 msec    LVOT diameter 2.01 cm    LVOT area 3.2 cm2    LVOT peak sohan 83.60 m/s    LVOT peak VTI 13.53 cm    Ao peak sohan 0.83 m/s    Ao VTI 8.88 cm    LVOT stroke volume 42.91 cm3    AV peak gradient 3 mmHg    E/E' ratio 3.62 m/s    MV Peak E Sohan 0.38 m/s    TR Max Sohan 2.51 m/s    MV Peak A Sohan 0.46 m/s    LV Mass Index 35 g/m2    Triscuspid Valve Regurgitation Peak Gradient 25 mmHg    EF 60 %    Narrative    · The left ventricle is normal in size with normal  systolic function.  · The estimated ejection fraction is 60%.  · Mild tricuspid regurgitation.  · Unable to visualize right atrium and right ventricle, clinical concerns   persist consider alternative cardiac imaging.          ASTRID:  No results found for this or any previous visit.    ASSESSMENT/PLAN:           Pre-op Assessment    I have reviewed the Patient Summary Reports.       I have reviewed the Medications.     Review of Systems  Anesthesia Hx:  No problems with previous Anesthesia Denies Hx of Anesthetic complications  Neg history of prior surgery. Denies Family Hx of Anesthesia complications.   Denies Personal Hx of Anesthesia complications.   Social:  Social Alcohol Use, Non-Smoker    Hematology/Oncology:     Oncology Normal   Hematology Comments: Last eliquis dose on 5/9   EENT/Dental:EENT/Dental Normal   Cardiovascular:  Cardiovascular Normal Exercise tolerance: good  ECG has been reviewed.    Pulmonary:   Hx of PE   Renal/:  Renal/ Normal     Hepatic/GI:   GERD, well controlled    Musculoskeletal:   Cervical radiculopathy Spine Disorders: cervical    OB/GYN/PEDS:  PCOS on metformin   Neurological:   Neuromuscular Disease,   chiari malformation type 1  large cervical syrinx  cerebellar tonsillar ectopia  cervical radiculopathy with right-sided facial pain   Endocrine:   Denies Diabetes.  Morbid Obesity / BMI > 40  Dermatological:  Skin Normal    Psych:   depression          Physical Exam  General: Well nourished, Cooperative, Alert and Oriented    Airway:  Mallampati: I   Mouth Opening: Normal  Tongue: Normal  Neck ROM: Normal ROM    Dental:  Intact        Anesthesia Plan  Type of Anesthesia, risks & benefits discussed:    Anesthesia Type: Gen ETT  Intra-op Monitoring Plan: Standard ASA Monitors and Art Line  Post Op Pain Control Plan: multimodal analgesia and IV/PO Opioids PRN  Induction:  IV  Airway Plan: Direct  Informed Consent: Informed consent signed with the Patient and all parties understand  the risks and agree with anesthesia plan.  All questions answered.   ASA Score: 3  Day of Surgery Review of History & Physical: H&P Update referred to the surgeon/provider.    Ready For Surgery From Anesthesia Perspective.     .

## 2022-05-18 NOTE — TELEPHONE ENCOUNTER
Called pt. S/W . Advised to arrive for surgery at 0745, DOSC, NPO after MN the night before, shower x 2 with Hibiclens or Dial antibacterial soap. Understanding verbalized by .

## 2022-05-19 ENCOUNTER — HOSPITAL ENCOUNTER (INPATIENT)
Facility: HOSPITAL | Age: 32
LOS: 3 days | Discharge: HOME | DRG: 028 | End: 2022-05-22
Attending: NEUROLOGICAL SURGERY | Admitting: NEUROLOGICAL SURGERY
Payer: COMMERCIAL

## 2022-05-19 DIAGNOSIS — G95.0 SYRINX OF SPINAL CORD: Primary | ICD-10-CM

## 2022-05-19 DIAGNOSIS — G95.0 SYRINGOMYELIA: ICD-10-CM

## 2022-05-19 LAB
ANION GAP SERPL CALC-SCNC: 9 MMOL/L (ref 8–16)
APTT BLDCRRT: 25.9 SEC (ref 21–32)
B-HCG UR QL: NEGATIVE
BASOPHILS # BLD AUTO: 0.05 K/UL (ref 0–0.2)
BASOPHILS NFR BLD: 0.5 % (ref 0–1.9)
BUN SERPL-MCNC: 12 MG/DL (ref 6–20)
CALCIUM SERPL-MCNC: 9.8 MG/DL (ref 8.7–10.5)
CHLORIDE SERPL-SCNC: 102 MMOL/L (ref 95–110)
CO2 SERPL-SCNC: 26 MMOL/L (ref 23–29)
CREAT SERPL-MCNC: 0.8 MG/DL (ref 0.5–1.4)
CTP QC/QA: YES
DIFFERENTIAL METHOD: ABNORMAL
EOSINOPHIL # BLD AUTO: 0.2 K/UL (ref 0–0.5)
EOSINOPHIL NFR BLD: 1.9 % (ref 0–8)
ERYTHROCYTE [DISTWIDTH] IN BLOOD BY AUTOMATED COUNT: 12.8 % (ref 11.5–14.5)
EST. GFR  (AFRICAN AMERICAN): >60 ML/MIN/1.73 M^2
EST. GFR  (NON AFRICAN AMERICAN): >60 ML/MIN/1.73 M^2
GLUCOSE SERPL-MCNC: 81 MG/DL (ref 70–110)
HCT VFR BLD AUTO: 45.4 % (ref 37–48.5)
HGB BLD-MCNC: 14.6 G/DL (ref 12–16)
IMM GRANULOCYTES # BLD AUTO: 0.04 K/UL (ref 0–0.04)
IMM GRANULOCYTES NFR BLD AUTO: 0.4 % (ref 0–0.5)
INR PPP: 1.4 (ref 0.8–1.2)
LYMPHOCYTES # BLD AUTO: 1.7 K/UL (ref 1–4.8)
LYMPHOCYTES NFR BLD: 19.1 % (ref 18–48)
MCH RBC QN AUTO: 27 PG (ref 27–31)
MCHC RBC AUTO-ENTMCNC: 32.2 G/DL (ref 32–36)
MCV RBC AUTO: 84 FL (ref 82–98)
MONOCYTES # BLD AUTO: 0.4 K/UL (ref 0.3–1)
MONOCYTES NFR BLD: 4.5 % (ref 4–15)
NEUTROPHILS # BLD AUTO: 6.7 K/UL (ref 1.8–7.7)
NEUTROPHILS NFR BLD: 73.6 % (ref 38–73)
NRBC BLD-RTO: 0 /100 WBC
PLATELET # BLD AUTO: 444 K/UL (ref 150–450)
PMV BLD AUTO: 9.9 FL (ref 9.2–12.9)
POTASSIUM SERPL-SCNC: 3.8 MMOL/L (ref 3.5–5.1)
PROTHROMBIN TIME: 14.2 SEC (ref 9–12.5)
RBC # BLD AUTO: 5.41 M/UL (ref 4–5.4)
SODIUM SERPL-SCNC: 137 MMOL/L (ref 136–145)
WBC # BLD AUTO: 9.13 K/UL (ref 3.9–12.7)

## 2022-05-19 PROCEDURE — 63600175 PHARM REV CODE 636 W HCPCS: Performed by: STUDENT IN AN ORGANIZED HEALTH CARE EDUCATION/TRAINING PROGRAM

## 2022-05-19 PROCEDURE — 25000003 PHARM REV CODE 250: Performed by: STUDENT IN AN ORGANIZED HEALTH CARE EDUCATION/TRAINING PROGRAM

## 2022-05-19 PROCEDURE — 81025 URINE PREGNANCY TEST: CPT | Performed by: NEUROLOGICAL SURGERY

## 2022-05-19 PROCEDURE — 36000709 HC OR TIME LEV III EA ADD 15 MIN: Performed by: NEUROLOGICAL SURGERY

## 2022-05-19 PROCEDURE — 71000015 HC POSTOP RECOV 1ST HR: Performed by: NEUROLOGICAL SURGERY

## 2022-05-19 PROCEDURE — 63173 DRAINAGE OF SPINAL CYST: CPT | Mod: ,,, | Performed by: NEUROLOGICAL SURGERY

## 2022-05-19 PROCEDURE — 63600175 PHARM REV CODE 636 W HCPCS: Performed by: NEUROLOGICAL SURGERY

## 2022-05-19 PROCEDURE — 37000008 HC ANESTHESIA 1ST 15 MINUTES: Performed by: NEUROLOGICAL SURGERY

## 2022-05-19 PROCEDURE — 94761 N-INVAS EAR/PLS OXIMETRY MLT: CPT

## 2022-05-19 PROCEDURE — 85025 COMPLETE CBC W/AUTO DIFF WBC: CPT | Performed by: STUDENT IN AN ORGANIZED HEALTH CARE EDUCATION/TRAINING PROGRAM

## 2022-05-19 PROCEDURE — 99900035 HC TECH TIME PER 15 MIN (STAT)

## 2022-05-19 PROCEDURE — 27800903 OPTIME MED/SURG SUP & DEVICES OTHER IMPLANTS: Performed by: NEUROLOGICAL SURGERY

## 2022-05-19 PROCEDURE — 25000003 PHARM REV CODE 250: Performed by: NEUROLOGICAL SURGERY

## 2022-05-19 PROCEDURE — 27201037 HC PRESSURE MONITORING SET UP

## 2022-05-19 PROCEDURE — D9220A PRA ANESTHESIA: ICD-10-PCS | Mod: ,,, | Performed by: ANESTHESIOLOGY

## 2022-05-19 PROCEDURE — 36000708 HC OR TIME LEV III 1ST 15 MIN: Performed by: NEUROLOGICAL SURGERY

## 2022-05-19 PROCEDURE — 80048 BASIC METABOLIC PNL TOTAL CA: CPT | Performed by: STUDENT IN AN ORGANIZED HEALTH CARE EDUCATION/TRAINING PROGRAM

## 2022-05-19 PROCEDURE — 71000039 HC RECOVERY, EACH ADD'L HOUR: Performed by: NEUROLOGICAL SURGERY

## 2022-05-19 PROCEDURE — 27000221 HC OXYGEN, UP TO 24 HOURS

## 2022-05-19 PROCEDURE — 11000001 HC ACUTE MED/SURG PRIVATE ROOM

## 2022-05-19 PROCEDURE — 85730 THROMBOPLASTIN TIME PARTIAL: CPT | Performed by: STUDENT IN AN ORGANIZED HEALTH CARE EDUCATION/TRAINING PROGRAM

## 2022-05-19 PROCEDURE — 51798 US URINE CAPACITY MEASURE: CPT

## 2022-05-19 PROCEDURE — 63173: ICD-10-PCS | Mod: ,,, | Performed by: NEUROLOGICAL SURGERY

## 2022-05-19 PROCEDURE — D9220A PRA ANESTHESIA: Mod: ,,, | Performed by: ANESTHESIOLOGY

## 2022-05-19 PROCEDURE — 85610 PROTHROMBIN TIME: CPT | Performed by: STUDENT IN AN ORGANIZED HEALTH CARE EDUCATION/TRAINING PROGRAM

## 2022-05-19 PROCEDURE — 63600175 PHARM REV CODE 636 W HCPCS

## 2022-05-19 PROCEDURE — 27201423 OPTIME MED/SURG SUP & DEVICES STERILE SUPPLY: Performed by: NEUROLOGICAL SURGERY

## 2022-05-19 PROCEDURE — 71000033 HC RECOVERY, INTIAL HOUR: Performed by: NEUROLOGICAL SURGERY

## 2022-05-19 PROCEDURE — 37000009 HC ANESTHESIA EA ADD 15 MINS: Performed by: NEUROLOGICAL SURGERY

## 2022-05-19 RX ORDER — MORPHINE SULFATE 2 MG/ML
2 INJECTION, SOLUTION INTRAMUSCULAR; INTRAVENOUS
Status: DISCONTINUED | OUTPATIENT
Start: 2022-05-19 | End: 2022-05-20

## 2022-05-19 RX ORDER — METOPROLOL TARTRATE 25 MG/1
50 TABLET, FILM COATED ORAL 2 TIMES DAILY
Status: DISCONTINUED | OUTPATIENT
Start: 2022-05-19 | End: 2022-05-22 | Stop reason: HOSPADM

## 2022-05-19 RX ORDER — LIDOCAINE HCL/PF 100 MG/5ML
SYRINGE (ML) INTRAVENOUS
Status: DISCONTINUED | OUTPATIENT
Start: 2022-05-19 | End: 2022-05-19

## 2022-05-19 RX ORDER — SODIUM CHLORIDE 9 MG/ML
INJECTION, SOLUTION INTRAVENOUS CONTINUOUS PRN
Status: DISCONTINUED | OUTPATIENT
Start: 2022-05-19 | End: 2022-05-19

## 2022-05-19 RX ORDER — ONDANSETRON 2 MG/ML
INJECTION INTRAMUSCULAR; INTRAVENOUS
Status: DISCONTINUED | OUTPATIENT
Start: 2022-05-19 | End: 2022-05-19

## 2022-05-19 RX ORDER — PROPOFOL 10 MG/ML
VIAL (ML) INTRAVENOUS CONTINUOUS PRN
Status: DISCONTINUED | OUTPATIENT
Start: 2022-05-19 | End: 2022-05-19

## 2022-05-19 RX ORDER — DIPHENHYDRAMINE HYDROCHLORIDE 50 MG/ML
25 INJECTION INTRAMUSCULAR; INTRAVENOUS EVERY 6 HOURS PRN
Status: DISCONTINUED | OUTPATIENT
Start: 2022-05-19 | End: 2022-05-19 | Stop reason: HOSPADM

## 2022-05-19 RX ORDER — DEXAMETHASONE SODIUM PHOSPHATE 4 MG/ML
INJECTION, SOLUTION INTRA-ARTICULAR; INTRALESIONAL; INTRAMUSCULAR; INTRAVENOUS; SOFT TISSUE
Status: DISCONTINUED | OUTPATIENT
Start: 2022-05-19 | End: 2022-05-19

## 2022-05-19 RX ORDER — ACETAMINOPHEN 325 MG/1
650 TABLET ORAL EVERY 6 HOURS PRN
Status: DISCONTINUED | OUTPATIENT
Start: 2022-05-19 | End: 2022-05-22 | Stop reason: HOSPADM

## 2022-05-19 RX ORDER — ACETAMINOPHEN 500 MG
1000 TABLET ORAL
Status: COMPLETED | OUTPATIENT
Start: 2022-05-19 | End: 2022-05-19

## 2022-05-19 RX ORDER — SUCCINYLCHOLINE CHLORIDE 20 MG/ML
INJECTION INTRAMUSCULAR; INTRAVENOUS
Status: DISCONTINUED | OUTPATIENT
Start: 2022-05-19 | End: 2022-05-19

## 2022-05-19 RX ORDER — HYDROMORPHONE HYDROCHLORIDE 1 MG/ML
0.2 INJECTION, SOLUTION INTRAMUSCULAR; INTRAVENOUS; SUBCUTANEOUS EVERY 5 MIN PRN
Status: COMPLETED | OUTPATIENT
Start: 2022-05-19 | End: 2022-05-19

## 2022-05-19 RX ORDER — MUPIROCIN 20 MG/G
OINTMENT TOPICAL
Status: DISCONTINUED | OUTPATIENT
Start: 2022-05-19 | End: 2022-05-19 | Stop reason: HOSPADM

## 2022-05-19 RX ORDER — CEFAZOLIN SODIUM/WATER 2 G/20 ML
2 SYRINGE (ML) INTRAVENOUS
Status: DISCONTINUED | OUTPATIENT
Start: 2022-05-19 | End: 2022-05-19 | Stop reason: HOSPADM

## 2022-05-19 RX ORDER — ROCURONIUM BROMIDE 10 MG/ML
INJECTION, SOLUTION INTRAVENOUS
Status: DISCONTINUED | OUTPATIENT
Start: 2022-05-19 | End: 2022-05-19

## 2022-05-19 RX ORDER — FENTANYL CITRATE 50 UG/ML
INJECTION, SOLUTION INTRAMUSCULAR; INTRAVENOUS
Status: DISCONTINUED | OUTPATIENT
Start: 2022-05-19 | End: 2022-05-19

## 2022-05-19 RX ORDER — HYDROMORPHONE HYDROCHLORIDE 1 MG/ML
INJECTION, SOLUTION INTRAMUSCULAR; INTRAVENOUS; SUBCUTANEOUS
Status: COMPLETED
Start: 2022-05-19 | End: 2022-05-19

## 2022-05-19 RX ORDER — GABAPENTIN 300 MG/1
300 CAPSULE ORAL 3 TIMES DAILY
Status: DISCONTINUED | OUTPATIENT
Start: 2022-05-19 | End: 2022-05-22 | Stop reason: HOSPADM

## 2022-05-19 RX ORDER — PROPOFOL 10 MG/ML
VIAL (ML) INTRAVENOUS
Status: DISCONTINUED | OUTPATIENT
Start: 2022-05-19 | End: 2022-05-19

## 2022-05-19 RX ORDER — IPRATROPIUM BROMIDE AND ALBUTEROL SULFATE 2.5; .5 MG/3ML; MG/3ML
3 SOLUTION RESPIRATORY (INHALATION) EVERY 4 HOURS PRN
Status: DISCONTINUED | OUTPATIENT
Start: 2022-05-19 | End: 2022-05-19 | Stop reason: HOSPADM

## 2022-05-19 RX ORDER — LIDOCAINE HYDROCHLORIDE AND EPINEPHRINE 15; 5 MG/ML; UG/ML
INJECTION, SOLUTION EPIDURAL
Status: DISCONTINUED | OUTPATIENT
Start: 2022-05-19 | End: 2022-05-19 | Stop reason: HOSPADM

## 2022-05-19 RX ORDER — FENTANYL CITRATE 50 UG/ML
25 INJECTION, SOLUTION INTRAMUSCULAR; INTRAVENOUS EVERY 5 MIN PRN
Status: DISCONTINUED | OUTPATIENT
Start: 2022-05-19 | End: 2022-05-19 | Stop reason: HOSPADM

## 2022-05-19 RX ORDER — MUPIROCIN 20 MG/G
1 OINTMENT TOPICAL 2 TIMES DAILY
Status: DISCONTINUED | OUTPATIENT
Start: 2022-05-19 | End: 2022-05-19 | Stop reason: HOSPADM

## 2022-05-19 RX ORDER — OXYCODONE AND ACETAMINOPHEN 5; 325 MG/1; MG/1
1 TABLET ORAL EVERY 4 HOURS PRN
Status: DISCONTINUED | OUTPATIENT
Start: 2022-05-19 | End: 2022-05-22 | Stop reason: HOSPADM

## 2022-05-19 RX ORDER — BUPIVACAINE HYDROCHLORIDE AND EPINEPHRINE 5; 5 MG/ML; UG/ML
INJECTION, SOLUTION EPIDURAL; INTRACAUDAL; PERINEURAL
Status: DISCONTINUED | OUTPATIENT
Start: 2022-05-19 | End: 2022-05-19 | Stop reason: HOSPADM

## 2022-05-19 RX ORDER — PHENYLEPHRINE HYDROCHLORIDE 10 MG/ML
INJECTION INTRAVENOUS
Status: DISCONTINUED | OUTPATIENT
Start: 2022-05-19 | End: 2022-05-19

## 2022-05-19 RX ORDER — CEFAZOLIN SODIUM 1 G/50ML
1 SOLUTION INTRAVENOUS
Status: COMPLETED | OUTPATIENT
Start: 2022-05-19 | End: 2022-05-20

## 2022-05-19 RX ORDER — ONDANSETRON 2 MG/ML
4 INJECTION INTRAMUSCULAR; INTRAVENOUS DAILY PRN
Status: DISCONTINUED | OUTPATIENT
Start: 2022-05-19 | End: 2022-05-19 | Stop reason: HOSPADM

## 2022-05-19 RX ADMIN — DEXTROSE 1 G: 50 INJECTION, SOLUTION INTRAVENOUS at 07:05

## 2022-05-19 RX ADMIN — ACETAMINOPHEN 650 MG: 325 TABLET ORAL at 02:05

## 2022-05-19 RX ADMIN — PROPOFOL 200 MG: 10 INJECTION, EMULSION INTRAVENOUS at 10:05

## 2022-05-19 RX ADMIN — HYDROMORPHONE HYDROCHLORIDE 0.2 MG: 1 INJECTION, SOLUTION INTRAMUSCULAR; INTRAVENOUS; SUBCUTANEOUS at 02:05

## 2022-05-19 RX ADMIN — SODIUM CHLORIDE: 0.9 INJECTION, SOLUTION INTRAVENOUS at 09:05

## 2022-05-19 RX ADMIN — FENTANYL CITRATE 100 MCG: 50 INJECTION, SOLUTION INTRAMUSCULAR; INTRAVENOUS at 10:05

## 2022-05-19 RX ADMIN — CEFTRIAXONE SODIUM 3 G: 1 INJECTION, SOLUTION INTRAVENOUS at 10:05

## 2022-05-19 RX ADMIN — REMIFENTANIL HYDROCHLORIDE 0.2 MCG/KG/MIN: 1 INJECTION, POWDER, LYOPHILIZED, FOR SOLUTION INTRAVENOUS at 10:05

## 2022-05-19 RX ADMIN — PHENYLEPHRINE HYDROCHLORIDE 100 MCG: 10 INJECTION INTRAVENOUS at 10:05

## 2022-05-19 RX ADMIN — HYDROMORPHONE HYDROCHLORIDE 0.2 MG: 1 INJECTION, SOLUTION INTRAMUSCULAR; INTRAVENOUS; SUBCUTANEOUS at 01:05

## 2022-05-19 RX ADMIN — PROPOFOL 150 MCG/KG/MIN: 10 INJECTION, EMULSION INTRAVENOUS at 10:05

## 2022-05-19 RX ADMIN — FENTANYL CITRATE 50 MCG: 50 INJECTION, SOLUTION INTRAMUSCULAR; INTRAVENOUS at 11:05

## 2022-05-19 RX ADMIN — MORPHINE SULFATE 2 MG: 2 INJECTION, SOLUTION INTRAMUSCULAR; INTRAVENOUS at 10:05

## 2022-05-19 RX ADMIN — LIDOCAINE HYDROCHLORIDE 80 MG: 20 INJECTION, SOLUTION INTRAVENOUS at 10:05

## 2022-05-19 RX ADMIN — GABAPENTIN 300 MG: 300 CAPSULE ORAL at 02:05

## 2022-05-19 RX ADMIN — SODIUM CHLORIDE 0.25 MCG/KG/MIN: 9 INJECTION, SOLUTION INTRAVENOUS at 10:05

## 2022-05-19 RX ADMIN — FENTANYL CITRATE 25 MCG: 50 INJECTION, SOLUTION INTRAMUSCULAR; INTRAVENOUS at 01:05

## 2022-05-19 RX ADMIN — FENTANYL CITRATE 25 MCG: 50 INJECTION, SOLUTION INTRAMUSCULAR; INTRAVENOUS at 10:05

## 2022-05-19 RX ADMIN — FENTANYL CITRATE 25 MCG: 50 INJECTION INTRAMUSCULAR; INTRAVENOUS at 07:05

## 2022-05-19 RX ADMIN — ACETAMINOPHEN 1000 MG: 500 TABLET ORAL at 08:05

## 2022-05-19 RX ADMIN — ONDANSETRON HYDROCHLORIDE 8 MG: 2 INJECTION INTRAMUSCULAR; INTRAVENOUS at 12:05

## 2022-05-19 RX ADMIN — HYDROMORPHONE HYDROCHLORIDE 0.2 MG: 1 INJECTION, SOLUTION INTRAMUSCULAR; INTRAVENOUS; SUBCUTANEOUS at 03:05

## 2022-05-19 RX ADMIN — FENTANYL CITRATE 25 MCG: 50 INJECTION INTRAMUSCULAR; INTRAVENOUS at 08:05

## 2022-05-19 RX ADMIN — OXYCODONE HYDROCHLORIDE AND ACETAMINOPHEN 1 TABLET: 5; 325 TABLET ORAL at 02:05

## 2022-05-19 RX ADMIN — ROCURONIUM BROMIDE 5 MG: 10 INJECTION, SOLUTION INTRAVENOUS at 10:05

## 2022-05-19 RX ADMIN — METOPROLOL TARTRATE 50 MG: 25 TABLET, FILM COATED ORAL at 09:05

## 2022-05-19 RX ADMIN — OXYCODONE HYDROCHLORIDE AND ACETAMINOPHEN 1 TABLET: 5; 325 TABLET ORAL at 06:05

## 2022-05-19 RX ADMIN — GLYCOPYRROLATE 0.2 MG: 0.2 INJECTION, SOLUTION INTRAMUSCULAR; INTRAVITREAL at 10:05

## 2022-05-19 RX ADMIN — DEXAMETHASONE SODIUM PHOSPHATE 12 MG: 4 INJECTION, SOLUTION INTRAMUSCULAR; INTRAVENOUS at 10:05

## 2022-05-19 RX ADMIN — SUCCINYLCHOLINE CHLORIDE 120 MG: 20 INJECTION, SOLUTION INTRAMUSCULAR; INTRAVENOUS at 10:05

## 2022-05-19 RX ADMIN — MUPIROCIN: 20 OINTMENT TOPICAL at 08:05

## 2022-05-19 RX ADMIN — GABAPENTIN 300 MG: 300 CAPSULE ORAL at 09:05

## 2022-05-19 RX ADMIN — SODIUM CHLORIDE, SODIUM GLUCONATE, SODIUM ACETATE, POTASSIUM CHLORIDE, MAGNESIUM CHLORIDE, SODIUM PHOSPHATE, DIBASIC, AND POTASSIUM PHOSPHATE: .53; .5; .37; .037; .03; .012; .00082 INJECTION, SOLUTION INTRAVENOUS at 09:05

## 2022-05-19 NOTE — HPI
Ms. Jamilah French is a 31 y.o. woman with cervical radiculopathy, and is s/p chiari malformation decompression done on 12/23/2021, who presents today for 2 week follow up to discuss surgery. The pt reports that she want to proceed with shunt placement of the syrinx.

## 2022-05-19 NOTE — PROGRESS NOTES
Patient has completed imaging. She is verbalizing complaints of pins and needles to L lower extremity (not present prior to procedure). Neurosurgery team called and unavailable.

## 2022-05-19 NOTE — H&P
[facility] - Surgery ([facility])  Neuorsurgery  History and Physical     Patient Name: Jamilah French  MRN: [id number]  Admission Date: 5/19/2022  Attending Physician: Kirk Reis MD   Primary Care Physician: Sirena Hess DNP    Patient information was obtained from patient and ER records.     Subjective:     Chief Complaint/Reason for Admission:  Syrinx shunt      HPI:    Ms. Jamilah French is a 31 y.o. woman with cervical radiculopathy, and is s/p chiari malformation decompression done on 12/23/2021, who presents today for 2 week follow up to discuss surgery. The pt reports that she want to proceed with shunt placement of the syrinx.              Medications:  Continuous Infusions:  Scheduled Meds:   acetaminophen  1,000 mg Oral Once Pre-Op    mupirocin  1 g Nasal BID     PRN Meds:ceFAZolin (ANCEF) IVPB, mupirocin     Review of Systems  Objective:        There is no height or weight on file to calculate BMI.  Vital Signs (Most Recent):    Vital Signs (24h Range):                        Physical Exam  Neurosurgery Physical Exam  Aox3  incision cdi      Significant Labs:  No results for input(s): GLU, NA, K, CL, CO2, BUN, CREATININE, CALCIUM, MG in the last 48 hours.  No results for input(s): WBC, HGB, HCT, PLT in the last 48 hours.  No results for input(s): LABPT, INR, APTT in the last 48 hours.  Microbiology Results (last 7 days)       ** No results found for the last 168 hours. **          All pertinent labs from the last 24 hours have been reviewed.    Significant Diagnostics:  I have reviewed all pertinent imaging results/findings within the past 24 hours.    Assessment and Plan:     Radiculopathy, cervical region    33 y/o F with syrinx s/p chiari  Presenting for syringopleural shunt     To OR today              Severino Acosta MD  Neurosurgery  [facility] ([facility])

## 2022-05-19 NOTE — TRANSFER OF CARE
"Anesthesia Transfer of Care Note    Patient: Jamilah French    Procedure(s) Performed: Procedure(s) (LRB):  SYRINGOPLEURAL SHUNT Insertion T4 Laminectomy (N/A)    Patient location: PACU    Anesthesia Type: general    Transport from OR: Transported from OR on 6-10 L/min O2 by face mask with adequate spontaneous ventilation    Post pain: pain needs to be addressed    Post assessment: no apparent anesthetic complications and tolerated procedure well    Post vital signs: stable    Level of consciousness: awake and alert    Nausea/Vomiting: no nausea/vomiting    Complications: none    Transfer of care protocol was followed      Last vitals:   Visit Vitals  BP (!) 144/86 (BP Location: Left arm, Patient Position: Lying)   Pulse 94   Temp 36.9 °C (98.5 °F) (Oral)   Resp 17   Ht 5' 5" (1.651 m)   Wt 127 kg (280 lb)   LMP 04/30/2022 (Exact Date)   SpO2 99%   Breastfeeding No   BMI 46.59 kg/m²     "

## 2022-05-19 NOTE — SUBJECTIVE & OBJECTIVE
Medications:  Continuous Infusions:  Scheduled Meds:   acetaminophen  1,000 mg Oral Once Pre-Op    mupirocin  1 g Nasal BID     PRN Meds:ceFAZolin (ANCEF) IVPB, mupirocin     Review of Systems  Objective:        There is no height or weight on file to calculate BMI.  Vital Signs (Most Recent):    Vital Signs (24h Range):                        Physical Exam  Neurosurgery Physical Exam  Aox3  incision cdi      Significant Labs:  No results for input(s): GLU, NA, K, CL, CO2, BUN, CREATININE, CALCIUM, MG in the last 48 hours.  No results for input(s): WBC, HGB, HCT, PLT in the last 48 hours.  No results for input(s): LABPT, INR, APTT in the last 48 hours.  Microbiology Results (last 7 days)       ** No results found for the last 168 hours. **          All pertinent labs from the last 24 hours have been reviewed.    Significant Diagnostics:  I have reviewed all pertinent imaging results/findings within the past 24 hours.

## 2022-05-19 NOTE — ANESTHESIA POSTPROCEDURE EVALUATION
Anesthesia Post Evaluation    Patient: Jamilah French    Procedure(s) Performed: Procedure(s) (LRB):  SYRINGOPLEURAL SHUNT Insertion T4 Laminectomy (N/A)    Final Anesthesia Type: general      Patient location during evaluation: PACU  Patient participation: Yes- Able to Participate  Level of consciousness: awake and alert  Post-procedure vital signs: reviewed and stable  Pain management: adequate  Airway patency: patent  NATALIE mitigation strategies: Extubation while patient is awake and Multimodal analgesia  PONV status at discharge: No PONV  Anesthetic complications: no      Cardiovascular status: stable  Respiratory status: unassisted and spontaneous ventilation  Hydration status: euvolemic  Follow-up not needed.          Vitals Value Taken Time   /72 05/19/22 1616   Temp 36.6 °C (97.9 °F) 05/19/22 1343   Pulse 103 05/19/22 1624   Resp 23 05/19/22 1624   SpO2 96 % 05/19/22 1624   Vitals shown include unvalidated device data.      No case tracking events are documented in the log.      Pain/Sidney Score: Pain Rating Prior to Med Admin: 8 (5/19/2022  3:07 PM)  Sidney Score: 9 (5/19/2022  2:45 PM)

## 2022-05-19 NOTE — ANESTHESIA PROCEDURE NOTES
Intubation    Date/Time: 5/19/2022 10:08 AM  Performed by: Tamir Cook MD  Authorized by: Timur Crespo MD     Intubation:     Induction:  Intravenous    Intubated:  Postinduction    Mask Ventilation:  Easy mask    Attempts:  1    Attempted By:  Resident anesthesiologist    Method of Intubation:  Direct    Blade:  Salmeron 2    Laryngeal View Grade: Grade I - full view of cords      Difficult Airway Encountered?: No      Complications:  None    Airway Device:  Oral endotracheal tube    Airway Device Size:  7.0    Style/Cuff Inflation:  Cuffed (inflated to minimal occlusive pressure)    Tube secured:  20    Secured at:  The lips    Placement Verified By:  Capnometry    Complicating Factors:  None, short neck and obesity    Findings Post-Intubation:  BS equal bilateral and atraumatic/condition of teeth unchanged

## 2022-05-19 NOTE — OP NOTE
DATE OF PROCEDURE: 5/19/2022     SURGEON:  Kirk Reis M.D., Ph.D.     ASSISTANT:  PARAMJIT Houser M.D. (the assistant is a [facility] resident)     PREOPERATIVE DIAGNOSES:  1.  Chiari malformation.  2.  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  3.  Cervical myelopathy.  4.  Atypical face pain.      POSTOPERATIVE DIAGNOSES:  1.  Chiari malformation.  2.  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  3.  Cervical myelopathy.  4.  Atypical face pain.      PROCEDURES PERFORMED:  1.  T4 laminectomy.  2.  Myelotomy with placement of syringopleural shunt.   3.  Neuromonitoring with SSEPs, MEPs and EMG.     INDICATIONS IN DETAIL:    Ms. Jamilah French is a 31 y.o. woman with cervical radiculopathy, and is s/p chiari malformation decompression done on 12/23/2021, who presents today for placement of syringopleural shunt. This is a pt who presented with atypical right-sided facial pain which she developed in early October 2021. Pain is sharp in nature but also faded into a dull ache when not as intense. The pain is constant but waxes and wanes in intensity throughout the day. Aggravating factors included coughing and sneezing. Stated of some difficulty with her gait at times. She denied urinary or bowel incontinence. MRI Thoracic Spine W WO Contrast (11/10/21) showed large cervical cord syrinx extending down thoracic spine. MRI Cervical Spine W WO Contrast (10/19/21) showed very large cervical cord syrinx extending into the visualized upper thoracic spinal cord. 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  MRI Cervical Spine W WO Cont (3/16/2022): Postsurgical changes of posterior fossa craniectomy and C1 laminectomy for Chiari decompression. Abnormal enhancement and T2 signal within the surgical bed, likely granulation tissue and edema. No organized fluid collection to suggest abscess. Unchanged appearance of a large syrinx extending from  the C2 level to most inferior visualized level, T4, although it is known to extend to T8.  I have recommended syringopleural shunt placement. I have discussed the risks/benefits, indications, and alternatives for the proposed procedure in detail.  At this point, they wish to proceed.      PROCEDURE IN DETAIL:      The patient was brought to the Operating Room and a general anesthetic was administered.  All proper lines were placed.  The patient was turned to the prone position on a Javan table.  All pressure points were padded.  AP fluoroscopy was performed in the T4 level was marked.  We then marked a position just underneath the rib on the patient's right side.  A midline incision was made at the T4 level and carried down to the fascia.  Subperiosteal dissection was made of the spinous process and lamina bilaterally.  Deep Gelpis  were used for retraction.  The spinous process was removed and laminectomy was performed at the T4 level.  This was done by drilling it then we high-speed GetBulb drill with an M8 bur and completing this using a Kerrison.  The dura could be seen.  The microscope was brought to the field and the dura was opened the dural edges were tacked upward.  Under microscopic visualization we made incision in the spinal cord between 2 vessels near the midline.  We dissected down to the area of the syrinx.  We then opened this up so that we could place the shunt.  Once this was performed we obtain a Abhishek/Liao shunt and placed the drainage portion in the myotomy itself.  We performed SSEP use and motor evoked potentials prior to and after the placement.  There were no changes.  Once this was into the spinal cord we glued the myelotomy closed with Hawthorne Seal.  The dura was then closed using a 5-0 Prolene in a running fashion.  We achieved a watertight closure at this level.  We then turned our attention to placement the pleural portion of the shunt.  Incision was made in the aforementioned area  just below where.  This was carried down through the soft tissue until we were able to see the rib.  We then divided the musculature between the ribs and to we could see the parietal pleura.  The parietal and visceral pleura were both cut and we could see areas of fat and lung.  The catheter was placed into the epidural space.  We were able to irrigate into this space without any resistance.  Using the shunt Passer we made a subcutaneous path between the 2 incisions.  The pleural catheter was passed up to the area of the midline incision at its T4.  The reservoir was connected and shunt was further assembled.  We could see the flow of fluid into the shunt.  We removed the hair from the reservoir using a 30 gauge needle and flushed distally.  We made a pocket to the left of the incision and placed the reservoir.  Both incisions were then closed in layers with staples in the skin.  Clean dressings were placed.  Patient was then turned into the supine position on an operative table.  The patient was awakened by the anesthesia staff.    The patient was awakened by the Anesthesia staff.  At the point the patient was following commands, she was extubated.     EBL was 100  mL.     There were no intraprocedural complications.     All counts were correct at end of surgery.    Dr. Kirk Reis was present during the entire surgery.

## 2022-05-19 NOTE — OR NURSING
Pt noted to be consistently having periods of apnea. On face mask sating well. Oral airway placed with ease. PT arousable with vigorous stimulation. Pt quickly drifts  Back off. Anest resident notified to come to bedside to look at pt. Will continue monitor. Oral airway still in place

## 2022-05-20 LAB
ANION GAP SERPL CALC-SCNC: 7 MMOL/L (ref 8–16)
BASOPHILS # BLD AUTO: 0.03 K/UL (ref 0–0.2)
BASOPHILS NFR BLD: 0.2 % (ref 0–1.9)
BUN SERPL-MCNC: 10 MG/DL (ref 6–20)
CALCIUM SERPL-MCNC: 9.4 MG/DL (ref 8.7–10.5)
CHLORIDE SERPL-SCNC: 106 MMOL/L (ref 95–110)
CO2 SERPL-SCNC: 23 MMOL/L (ref 23–29)
CREAT SERPL-MCNC: 0.7 MG/DL (ref 0.5–1.4)
DIFFERENTIAL METHOD: ABNORMAL
EOSINOPHIL # BLD AUTO: 0 K/UL (ref 0–0.5)
EOSINOPHIL NFR BLD: 0.1 % (ref 0–8)
ERYTHROCYTE [DISTWIDTH] IN BLOOD BY AUTOMATED COUNT: 12.9 % (ref 11.5–14.5)
EST. GFR  (AFRICAN AMERICAN): >60 ML/MIN/1.73 M^2
EST. GFR  (NON AFRICAN AMERICAN): >60 ML/MIN/1.73 M^2
GLUCOSE SERPL-MCNC: 110 MG/DL (ref 70–110)
HCT VFR BLD AUTO: 40.7 % (ref 37–48.5)
HGB BLD-MCNC: 13.3 G/DL (ref 12–16)
IMM GRANULOCYTES # BLD AUTO: 0.08 K/UL (ref 0–0.04)
IMM GRANULOCYTES NFR BLD AUTO: 0.4 % (ref 0–0.5)
LYMPHOCYTES # BLD AUTO: 1.1 K/UL (ref 1–4.8)
LYMPHOCYTES NFR BLD: 5.9 % (ref 18–48)
MCH RBC QN AUTO: 26.8 PG (ref 27–31)
MCHC RBC AUTO-ENTMCNC: 32.7 G/DL (ref 32–36)
MCV RBC AUTO: 82 FL (ref 82–98)
MONOCYTES # BLD AUTO: 1.8 K/UL (ref 0.3–1)
MONOCYTES NFR BLD: 9.2 % (ref 4–15)
NEUTROPHILS # BLD AUTO: 16.2 K/UL (ref 1.8–7.7)
NEUTROPHILS NFR BLD: 84.2 % (ref 38–73)
NRBC BLD-RTO: 0 /100 WBC
PLATELET # BLD AUTO: 420 K/UL (ref 150–450)
PMV BLD AUTO: 9.4 FL (ref 9.2–12.9)
POTASSIUM SERPL-SCNC: 4.6 MMOL/L (ref 3.5–5.1)
RBC # BLD AUTO: 4.97 M/UL (ref 4–5.4)
SODIUM SERPL-SCNC: 136 MMOL/L (ref 136–145)
WBC # BLD AUTO: 19.27 K/UL (ref 3.9–12.7)

## 2022-05-20 PROCEDURE — 11000001 HC ACUTE MED/SURG PRIVATE ROOM

## 2022-05-20 PROCEDURE — 99024 POSTOP FOLLOW-UP VISIT: CPT | Mod: ,,, | Performed by: PHYSICIAN ASSISTANT

## 2022-05-20 PROCEDURE — 94761 N-INVAS EAR/PLS OXIMETRY MLT: CPT

## 2022-05-20 PROCEDURE — 99024 PR POST-OP FOLLOW-UP VISIT: ICD-10-PCS | Mod: ,,, | Performed by: PHYSICIAN ASSISTANT

## 2022-05-20 PROCEDURE — 27000221 HC OXYGEN, UP TO 24 HOURS

## 2022-05-20 PROCEDURE — 63600175 PHARM REV CODE 636 W HCPCS: Performed by: PHYSICIAN ASSISTANT

## 2022-05-20 PROCEDURE — 63600175 PHARM REV CODE 636 W HCPCS: Performed by: STUDENT IN AN ORGANIZED HEALTH CARE EDUCATION/TRAINING PROGRAM

## 2022-05-20 PROCEDURE — 25000003 PHARM REV CODE 250: Performed by: PHYSICIAN ASSISTANT

## 2022-05-20 PROCEDURE — 51798 US URINE CAPACITY MEASURE: CPT

## 2022-05-20 PROCEDURE — 36415 COLL VENOUS BLD VENIPUNCTURE: CPT | Performed by: STUDENT IN AN ORGANIZED HEALTH CARE EDUCATION/TRAINING PROGRAM

## 2022-05-20 PROCEDURE — 25000003 PHARM REV CODE 250: Performed by: STUDENT IN AN ORGANIZED HEALTH CARE EDUCATION/TRAINING PROGRAM

## 2022-05-20 PROCEDURE — 80048 BASIC METABOLIC PNL TOTAL CA: CPT | Performed by: STUDENT IN AN ORGANIZED HEALTH CARE EDUCATION/TRAINING PROGRAM

## 2022-05-20 PROCEDURE — 85025 COMPLETE CBC W/AUTO DIFF WBC: CPT | Performed by: STUDENT IN AN ORGANIZED HEALTH CARE EDUCATION/TRAINING PROGRAM

## 2022-05-20 RX ORDER — OXYCODONE AND ACETAMINOPHEN 10; 325 MG/1; MG/1
1 TABLET ORAL EVERY 4 HOURS PRN
Qty: 60 TABLET | Refills: 0 | Status: ON HOLD | OUTPATIENT
Start: 2022-05-20 | End: 2022-06-01

## 2022-05-20 RX ORDER — HEPARIN SODIUM 5000 [USP'U]/ML
5000 INJECTION, SOLUTION INTRAVENOUS; SUBCUTANEOUS EVERY 8 HOURS
Status: DISCONTINUED | OUTPATIENT
Start: 2022-05-20 | End: 2022-05-22 | Stop reason: HOSPADM

## 2022-05-20 RX ORDER — OXYCODONE AND ACETAMINOPHEN 10; 325 MG/1; MG/1
1 TABLET ORAL EVERY 4 HOURS PRN
Status: DISCONTINUED | OUTPATIENT
Start: 2022-05-20 | End: 2022-05-22 | Stop reason: HOSPADM

## 2022-05-20 RX ORDER — METHOCARBAMOL 750 MG/1
750 TABLET, FILM COATED ORAL 4 TIMES DAILY
Status: DISCONTINUED | OUTPATIENT
Start: 2022-05-20 | End: 2022-05-22 | Stop reason: HOSPADM

## 2022-05-20 RX ORDER — MORPHINE SULFATE 2 MG/ML
2 INJECTION, SOLUTION INTRAMUSCULAR; INTRAVENOUS
Status: DISCONTINUED | OUTPATIENT
Start: 2022-05-20 | End: 2022-05-21

## 2022-05-20 RX ORDER — METHOCARBAMOL 750 MG/1
750 TABLET, FILM COATED ORAL 4 TIMES DAILY
Qty: 40 TABLET | Refills: 0 | Status: ON HOLD | OUTPATIENT
Start: 2022-05-20 | End: 2022-06-02 | Stop reason: HOSPADM

## 2022-05-20 RX ADMIN — GABAPENTIN 300 MG: 300 CAPSULE ORAL at 03:05

## 2022-05-20 RX ADMIN — OXYCODONE HYDROCHLORIDE AND ACETAMINOPHEN 1 TABLET: 5; 325 TABLET ORAL at 09:05

## 2022-05-20 RX ADMIN — OXYCODONE HYDROCHLORIDE AND ACETAMINOPHEN 1 TABLET: 10; 325 TABLET ORAL at 01:05

## 2022-05-20 RX ADMIN — DEXTROSE 1 G: 50 INJECTION, SOLUTION INTRAVENOUS at 03:05

## 2022-05-20 RX ADMIN — METHOCARBAMOL 750 MG: 750 TABLET ORAL at 01:05

## 2022-05-20 RX ADMIN — DEXTROSE 1 G: 50 INJECTION, SOLUTION INTRAVENOUS at 02:05

## 2022-05-20 RX ADMIN — OXYCODONE HYDROCHLORIDE AND ACETAMINOPHEN 1 TABLET: 10; 325 TABLET ORAL at 09:05

## 2022-05-20 RX ADMIN — METHOCARBAMOL 750 MG: 750 TABLET ORAL at 05:05

## 2022-05-20 RX ADMIN — METOPROLOL TARTRATE 50 MG: 25 TABLET, FILM COATED ORAL at 09:05

## 2022-05-20 RX ADMIN — GABAPENTIN 300 MG: 300 CAPSULE ORAL at 09:05

## 2022-05-20 RX ADMIN — METHOCARBAMOL 750 MG: 750 TABLET ORAL at 09:05

## 2022-05-20 RX ADMIN — OXYCODONE HYDROCHLORIDE AND ACETAMINOPHEN 1 TABLET: 5; 325 TABLET ORAL at 05:05

## 2022-05-20 RX ADMIN — OXYCODONE HYDROCHLORIDE AND ACETAMINOPHEN 1 TABLET: 5; 325 TABLET ORAL at 03:05

## 2022-05-20 RX ADMIN — HEPARIN SODIUM 5000 UNITS: 5000 INJECTION INTRAVENOUS; SUBCUTANEOUS at 09:05

## 2022-05-20 NOTE — ASSESSMENT & PLAN NOTE
33 y/o F with syrinx s/p chiari now s/p syringopleural shunt on 5/19/22 by Dr. Reis     - Left leg paresthesia post op, no motor deficits  - XR chest difficult to visualize catheter due to body habitus. CT chest ordered for better visualization - catheter in continuity and terminates in pleural space.   - Okay to liberalize head of bed, monitor for positional headaches  - Pain not well controlled, muscle relaxer added and pain medication increased  - Incisions: remove bandages tomorrow, then keep open to air.   - PT/OT/OOB    Plan for discharge tomorrow pending improvement in pain and PT/OT recs     Discussed with Dr. Reis

## 2022-05-20 NOTE — NURSING TRANSFER
Nursing Transfer Note      5/19/2022         Transfer To: rm [id number]    Transfer via bed    Transfer with  t o O2    Transported by PCTs    Medicines sent: none    Any special needs or follow-up needed: stay flat for 24 hrs    Chart send with patient: Yes    Notified: spouse    Patient reassessed at: 05/19@ 2045    Upon arrival to floor: The patient was transferred to the floor via bed. Report given to NPU LNIUS Gibbs..

## 2022-05-20 NOTE — PLAN OF CARE
Problem: Adult Inpatient Plan of Care  Goal: Plan of Care Review  Outcome: Ongoing, Progressing  Goal: Patient-Specific Goal (Individualized)  Outcome: Ongoing, Progressing  Goal: Absence of Hospital-Acquired Illness or Injury  Outcome: Ongoing, Progressing       POC updated and reviewed with patient at the bedside. Questions regarding POC encouraged and addressed with the patient. VSS, see flow-sheets. Patient is Aox4 at  this time. Fall and safety precautions maintained, no signs of  injury noted during the shift. No events noted during shift. Patient comfortable with bed locked in low position, side rails up x2, bed alarm on and call light within reach. Instructed patient to call staff for mobility, verbalized understanding. No acute signs of distress noted at this time.

## 2022-05-20 NOTE — PLAN OF CARE
[facility] - Neurosurgery ([facility])  Initial Discharge Assessment       Primary Care Provider: Sirena Hess DNP    Admission Diagnosis: Syrinx of spinal cord [G95.0]  Chiari malformation type I [G93.5]  Syringomyelia [G95.0]    Admission Date: 5/19/2022  Expected Discharge Date: 5/21/2022    SW met with patient/ at bedside for Discharge Planning Assessment.  Per patient, patient lives with her  and his parents in a single story home with 5 step(s) to enter.   Per patient, patient was independent with ADLs and used no dme for ambulation prior to admit.  Patient will have assistance from  upon discharge.  Patient does not attend a coumadin clinic and is not on dialysis.     Discharge Planning Booklet given to patient/ and discussed.  All questions addressed.     SW/CM to follow and assist with d/c needs as needed.    Discharge Barriers Identified: None    Payor: BLUE CROSS BLUE SHIELD / Plan: [place] / Product Type: Commercial /     Extended Emergency Contact Information  Primary Emergency Contact: Neil French  Address: [address] [place] [address]           [place] [address] [place]  Mobile Phone: [phone]  Relation: Spouse  Preferred language: English   needed? No    Discharge Plan A: Home Health  Discharge Plan B: Home with family      [id number] - [place] - [address] [facility].  [address] [place] [address]  [place] [address]  Phone: [phone] Fax: [phone]      Initial Assessment (most recent)     Adult Discharge Assessment - 05/20/22 1444        Discharge Assessment    Assessment Type Discharge Planning Assessment     Confirmed/corrected address, phone number and insurance Yes     Confirmed Demographics Correct on Facesheet     Source of Information patient;family     Communicated LORETO with patient/caregiver Date not available/Unable to determine     Lives With spouse;other relative(s)     Facility Arrived From: home     Do you  expect to return to your current living situation? Yes     Do you have help at home or someone to help you manage your care at home? Yes     Who are your caregiver(s) and their phone number(s)?  Neil     Prior to hospitilization cognitive status: Unable to Assess     Current cognitive status: Alert/Oriented     Walking or Climbing Stairs Difficulty none     Dressing/Bathing Difficulty none     Home Accessibility stairs to enter home     Number of Stairs, Main Entrance five     Home Layout Able to live on 1st floor     Equipment Currently Used at Home none     Do you currently have service(s) that help you manage your care at home? No     Who is going to help you get home at discharge?      How do you get to doctors appointments? family or friend will provide     Are you on dialysis? No     Do you take coumadin? No     Discharge Plan A Home Health     Discharge Plan B Home with family     Discharge Plan discussed with: Spouse/sig other;Patient     Discharge Barriers Identified None        Relationship/Environment    Name(s) of Who Lives With Patient  Neil and patient's in-laws                      [place] C[place], [place]  [phone]

## 2022-05-20 NOTE — DISCHARGE INSTRUCTIONS
Neurosurgery Patient Information. Please follow ONLY the instructions that are checked below.    Activity Restrictions:  [x]  Return to work will be determined on an individual basis.  [x]  No lifting greater than 5-10 pounds.  [x]  Avoid bending and twisting the area of your surgery more than 45 degrees from neutral position in any direction.  [x]  No driving or operating machinery:  [x]  until cleared by your surgeon.  [x]  while taking narcotic pain medications or muscle relaxants.  [x]  Slowly increase your ambulation [walking] over the next 2 weeks as tolerated. The goal is to be walking 1-2 miles by the time of your 2 week post op appointment.   [x]  Walk on paved surfaces only. It is okay to walk up and down stairs while holding onto a side rail.  [x]  No sexual activity for 6 weeks.    Discharge Medication/Follow-up:    Please hold your Eliquis, okay to resume on 5/24; 5 days from surgery  [x]  Please refer to discharge medication reconciliation form.  [x]  Do not take ANY Aspirin or Aspirin containing products for 2 weeks after surgery.   [x]  Do not take ANY herbal supplements for 2 weeks after surgery.    [x]  Do not take ANY non-steroidal anti-inflammatory drugs (NSAIDS), including the following: ibuprofen, naprosyn, Aleve, Advil, Indocin, Mobic, or Celebrex for 6 weeks after surgery.   [x]  Prescriptions for appropriate medication will be given upon discharge.  [x]  Take docusate (Colace 100 mg): take one capsule a day as needed for constipation. You can get this over the counter.  [x]  Follow-up appointment:  [x]  10-14 days post-op for wound check by physician assistant/nurse  [x]  4-6 weeks with MD:  [x]  with x-rays  [x]  An appointment will be mailed to you.    Wound Care:  [x]  No bandage required. Keep your incision open to the air.  [x]  You may shower on the 2nd day after your surgery. Keep the incision clean and dry at all times. Do not allow the force of water to hit the incision. If the  incision gets damp, pat it dry. Do not rub or scrub the incision.  [x]  You cannot take a bath until 8 weeks after surgery.  [x]  The incision does not need to be cleaned with any water, soap, alcohol, peroxide, or other substance.  [x]  Apply bacitracin to incision twice a day for 2 weeks.    Call your doctor or go to the Emergency Room for any signs of infection, including: increased redness, drainage, pain, or fever (temperature ?101.5 for 24 hours). Call your doctor or go to the Emergency Room if there are any localized neurological changes; problems with speech, vision, numbness, tingling, weakness, or severe headache; or for other concerns.    Special Instructions:  [x]  No use of tobacco products.  [x]  Diet: Please eat a regular diet as tolerated.      Physicians need 3 days' notice for pain medicine to be refilled. Pain medicine will only be refilled between 8 AM and 5 PM, Monday through Friday, due to Food and Drug Administration regulation of documentation.        [facility] Neurosurgery Office: [phone]              [facility] Office: [phone]   [facility] Office: [phone]

## 2022-05-20 NOTE — SUBJECTIVE & OBJECTIVE
Interval History: Patient complaining of left leg tingling and decreased sensation onset yesterday. Denies weakness, positional headaches, or bowel/bladder issues. Sensation is intact, but slightly decreased in left leg. Proprioception of left toe intact. CT chest pending to better visualize shunt and catheter. PT/OT to work with patient to get her out of bed. Pain poorly controlled, muscle relaxer added along with increase in po medication     Medications:  Continuous Infusions:  Scheduled Meds:   ceFAZolin (ANCEF) IVPB  1 g Intravenous Q8H    gabapentin  300 mg Oral TID    methocarbamoL  750 mg Oral QID    metoprolol tartrate  50 mg Oral BID     PRN Meds:acetaminophen, morphine, oxyCODONE-acetaminophen, oxyCODONE-acetaminophen     Review of Systems  Objective:     Weight: 127 kg (280 lb)  Body mass index is 46.59 kg/m².  Vital Signs (Most Recent):  Temp: 98.3 °F (36.8 °C) (05/20/22 0713)  Pulse: 100 (05/19/22 2100)  Resp: 20 (05/20/22 0938)  BP: (!) 145/74 (05/19/22 2100)  SpO2: 97 % (05/19/22 2100)   Vital Signs (24h Range):  Temp:  [97.3 °F (36.3 °C)-98.6 °F (37 °C)] 98.3 °F (36.8 °C)  Pulse:  [] 100  Resp:  [6-33] 20  SpO2:  [91 %-100 %] 97 %  BP: (112-146)/(61-84) 145/74                          Physical Exam    Neurosurgery Physical Exam  General: well developed, well nourished, no distress.   Head: normocephalic, atraumatic  Neurologic: Alert and oriented. Thought content appropriate.  GCS: Motor: 6/Verbal: 5/Eyes: 4 GCS Total: 15  Mental Status: Awake, Alert, Oriented x 4  Language: No aphasia  Speech: No dysarthria  Cranial nerves: face symmetric, tongue midline, CN II-XII grossly intact.   Eyes: pupils equal, round, reactive to light with accommodation, EOMI.   Pulmonary: normal respirations, no signs of respiratory distress. NC in place.   Abdomen: soft, non-distended, not tender to palpation  Skin: Skin is warm, dry and intact.  Sensory: intact to light touch throughout, slightly decreased in left  lower extremity     Motor Strength:Moves all extremities spontaneously with good tone.  Full strength upper and lower extremities. No abnormal movements seen.     Strength  Deltoids Triceps Biceps Wrist Extension Wrist Flexion Hand    Upper: R 5/5 5/5 5/5 5/5 5/5 5/5    L 5/5 5/5 5/5 5/5 5/5 5/5     Iliopsoas Quadriceps Knee  Flexion Tibialis  anterior Gastro- cnemius EHL   Lower: R 5/5 5/5 5/5 5/5 5/5 5/5    L 5/5 5/5 5/5 5/5 5/5 5/5     Bilateral hallux proprioception intact     Incision with dressing c/d/i    Significant Labs:  Recent Labs   Lab 05/19/22 0824 05/20/22  0815   GLU 81 110    136   K 3.8 4.6    106   CO2 26 23   BUN 12 10   CREATININE 0.8 0.7   CALCIUM 9.8 9.4     Recent Labs   Lab 05/19/22  0824 05/20/22  0815   WBC 9.13 19.27*   HGB 14.6 13.3   HCT 45.4 40.7    420     Recent Labs   Lab 05/19/22  0824   INR 1.4*   APTT 25.9     Microbiology Results (last 7 days)       ** No results found for the last 168 hours. **          All pertinent labs from the last 24 hours have been reviewed.    Significant Diagnostics:  X-Ray Chest PA And Lateral    Result Date: 5/19/2022  Postsurgical changes.  Very low lung volumes perhaps exaggerating the pulmonary vascularity. Electronically signed by: Susanne Khan Date:    05/19/2022 Time:    16:17    CT Chest Without Contrast    Result Date: 5/20/2022  1.  Postsurgical changes related to T4 laminectomy and insertion of syringo pleural shunt.  A short segment of the proximal shunt as it traverses the soft tissues of the back are not well visualized by technical factors described above.  If the shunt is suspected to be malfunctioning, consider CT of thoracic spine (with bone/sharp kernal algorithm) following resolution of subcutaneous emphysema.  2.  Other findings as above unremarkable. Electronically signed by: Susanne Khan Date:    05/20/2022 Time:    11:14

## 2022-05-20 NOTE — HOSPITAL COURSE
5/20: Patient complaining of left leg tingling and decreased sensation onset yesterday. Denies weakness, positional headaches, or bowel/bladder issues. Sensation is intact, but slightly decreased in left leg. Proprioception of left toe intact. CT chest pending to better visualize shunt and catheter. PT/OT to work with patient to get her out of bed. Pain poorly controlled, muscle relaxer added along with increase in po medication   5/21: NAEON. AFVSS. Pt reports intermittent HA, worse when upright, some associated nausea, improved with rest. Tolerating PO. Reports ongoing postop LLE tingling/paresthesias mostly in foot, denies focal weakness. Ambulated today, feels unsteady due to sensory deficit. Start Decadron 2mg BID.

## 2022-05-20 NOTE — PROGRESS NOTES
[facility] - Neurosurgery ([facility])  Neurosurgery  Progress Note    Subjective:     History of Present Illness:   Ms. Jamilah French is a 31 y.o. woman with cervical radiculopathy, and is s/p chiari malformation decompression done on 12/23/2021, who presents today for 2 week follow up to discuss surgery. The pt reports that she want to proceed with shunt placement of the syrinx.          Post-Op Info:  Procedure(s) (LRB):  SYRINGOPLEURAL SHUNT Insertion T4 Laminectomy (N/A)   1 Day Post-Op     Interval History: Patient complaining of left leg tingling and decreased sensation onset yesterday. Denies weakness, positional headaches, or bowel/bladder issues. Sensation is intact, but slightly decreased in left leg. Proprioception of left toe intact. CT chest pending to better visualize shunt and catheter. PT/OT to work with patient to get her out of bed. Pain poorly controlled, muscle relaxer added along with increase in po medication     Medications:  Continuous Infusions:  Scheduled Meds:   ceFAZolin (ANCEF) IVPB  1 g Intravenous Q8H    gabapentin  300 mg Oral TID    methocarbamoL  750 mg Oral QID    metoprolol tartrate  50 mg Oral BID     PRN Meds:acetaminophen, morphine, oxyCODONE-acetaminophen, oxyCODONE-acetaminophen     Review of Systems  Objective:     Weight: 127 kg (280 lb)  Body mass index is 46.59 kg/m².  Vital Signs (Most Recent):  Temp: 98.3 °F (36.8 °C) (05/20/22 0713)  Pulse: 100 (05/19/22 2100)  Resp: 20 (05/20/22 0938)  BP: (!) 145/74 (05/19/22 2100)  SpO2: 97 % (05/19/22 2100)   Vital Signs (24h Range):  Temp:  [97.3 °F (36.3 °C)-98.6 °F (37 °C)] 98.3 °F (36.8 °C)  Pulse:  [] 100  Resp:  [6-33] 20  SpO2:  [91 %-100 %] 97 %  BP: (112-146)/(61-84) 145/74                          Physical Exam    Neurosurgery Physical Exam  General: well developed, well nourished, no distress.   Head: normocephalic, atraumatic  Neurologic: Alert and oriented. Thought content appropriate.  GCS: Motor:  6/Verbal: 5/Eyes: 4 GCS Total: 15  Mental Status: Awake, Alert, Oriented x 4  Language: No aphasia  Speech: No dysarthria  Cranial nerves: face symmetric, tongue midline, CN II-XII grossly intact.   Eyes: pupils equal, round, reactive to light with accommodation, EOMI.   Pulmonary: normal respirations, no signs of respiratory distress. NC in place.   Abdomen: soft, non-distended, not tender to palpation  Skin: Skin is warm, dry and intact.  Sensory: intact to light touch throughout, slightly decreased in left lower extremity     Motor Strength:Moves all extremities spontaneously with good tone.  Full strength upper and lower extremities. No abnormal movements seen.     Strength  Deltoids Triceps Biceps Wrist Extension Wrist Flexion Hand    Upper: R 5/5 5/5 5/5 5/5 5/5 5/5    L 5/5 5/5 5/5 5/5 5/5 5/5     Iliopsoas Quadriceps Knee  Flexion Tibialis  anterior Gastro- cnemius EHL   Lower: R 5/5 5/5 5/5 5/5 5/5 5/5    L 5/5 5/5 5/5 5/5 5/5 5/5     Bilateral hallux proprioception intact     Incision with dressing c/d/i    Significant Labs:  Recent Labs   Lab 05/19/22  0824 05/20/22  0815   GLU 81 110    136   K 3.8 4.6    106   CO2 26 23   BUN 12 10   CREATININE 0.8 0.7   CALCIUM 9.8 9.4     Recent Labs   Lab 05/19/22  0824 05/20/22  0815   WBC 9.13 19.27*   HGB 14.6 13.3   HCT 45.4 40.7    420     Recent Labs   Lab 05/19/22  0824   INR 1.4*   APTT 25.9     Microbiology Results (last 7 days)       ** No results found for the last 168 hours. **          All pertinent labs from the last 24 hours have been reviewed.    Significant Diagnostics:  X-Ray Chest PA And Lateral    Result Date: 5/19/2022  Postsurgical changes.  Very low lung volumes perhaps exaggerating the pulmonary vascularity. Electronically signed by: Susanne Khan Date:    05/19/2022 Time:    16:17    CT Chest Without Contrast    Result Date: 5/20/2022  1.  Postsurgical changes related to T4 laminectomy and insertion of syringo pleural  shunt.  A short segment of the proximal shunt as it traverses the soft tissues of the back are not well visualized by technical factors described above.  If the shunt is suspected to be malfunctioning, consider CT of thoracic spine (with bone/sharp kernal algorithm) following resolution of subcutaneous emphysema.  2.  Other findings as above unremarkable. Electronically signed by: Susanne Khan Date:    05/20/2022 Time:    11:14       Assessment/Plan:     Radiculopathy, cervical region    33 y/o F with syrinx s/p chiari now s/p syringopleural shunt on 5/19/22 by Dr. Reis     - Left leg paresthesia post op, no motor deficits  - XR chest difficult to visualize catheter due to body habitus. CT chest ordered for better visualization - catheter in continuity and terminates in pleural space.   - Okay to liberalize head of bed, monitor for positional headaches  - Pain not well controlled, muscle relaxer added and pain medication increased  - Incisions: remove bandages tomorrow, then keep open to air.   - PT/OT/OOB  - Bilateral Pulmonary embolism: hold Eliquis at this time. Okay to resume POD5    Plan for discharge tomorrow pending improvement in pain and PT/OT recs     Discussed with Dr. Smiley Ramon PA-C  Neurosurgery  [facility] - Neurosurgery ([facility])

## 2022-05-20 NOTE — PLAN OF CARE
POC reviewed with the patient and they verbalized understanding. All comments and concerns addressed. Bed locked in lowest position with bed alarm set, call light within reach. Safety precautions maintained. VSS, see flowsheets. No events this shift. Will continue to monitor for changes to POC and clinical condition.    Problem: Adult Inpatient Plan of Care  Goal: Plan of Care Review  Outcome: Ongoing, Progressing  Goal: Patient-Specific Goal (Individualized)  Outcome: Ongoing, Progressing  Goal: Absence of Hospital-Acquired Illness or Injury  Outcome: Ongoing, Progressing  Goal: Optimal Comfort and Wellbeing  Outcome: Ongoing, Progressing     Problem: Bariatric Environmental Safety  Goal: Safety Maintained with Care  Outcome: Ongoing, Progressing     Problem: Skin Injury Risk Increased  Goal: Skin Health and Integrity  Outcome: Ongoing, Progressing

## 2022-05-21 LAB
ANION GAP SERPL CALC-SCNC: 9 MMOL/L (ref 8–16)
BASOPHILS # BLD AUTO: 0.08 K/UL (ref 0–0.2)
BASOPHILS NFR BLD: 0.5 % (ref 0–1.9)
BUN SERPL-MCNC: 14 MG/DL (ref 6–20)
CALCIUM SERPL-MCNC: 9.1 MG/DL (ref 8.7–10.5)
CHLORIDE SERPL-SCNC: 107 MMOL/L (ref 95–110)
CO2 SERPL-SCNC: 22 MMOL/L (ref 23–29)
CREAT SERPL-MCNC: 0.7 MG/DL (ref 0.5–1.4)
DIFFERENTIAL METHOD: ABNORMAL
EOSINOPHIL # BLD AUTO: 0 K/UL (ref 0–0.5)
EOSINOPHIL NFR BLD: 0.2 % (ref 0–8)
ERYTHROCYTE [DISTWIDTH] IN BLOOD BY AUTOMATED COUNT: 13.1 % (ref 11.5–14.5)
EST. GFR  (AFRICAN AMERICAN): >60 ML/MIN/1.73 M^2
EST. GFR  (NON AFRICAN AMERICAN): >60 ML/MIN/1.73 M^2
GLUCOSE SERPL-MCNC: 95 MG/DL (ref 70–110)
HCT VFR BLD AUTO: 40.4 % (ref 37–48.5)
HGB BLD-MCNC: 12.9 G/DL (ref 12–16)
IMM GRANULOCYTES # BLD AUTO: 0.09 K/UL (ref 0–0.04)
IMM GRANULOCYTES NFR BLD AUTO: 0.6 % (ref 0–0.5)
LYMPHOCYTES # BLD AUTO: 2.4 K/UL (ref 1–4.8)
LYMPHOCYTES NFR BLD: 15.2 % (ref 18–48)
MCH RBC QN AUTO: 27 PG (ref 27–31)
MCHC RBC AUTO-ENTMCNC: 31.9 G/DL (ref 32–36)
MCV RBC AUTO: 85 FL (ref 82–98)
MONOCYTES # BLD AUTO: 1.7 K/UL (ref 0.3–1)
MONOCYTES NFR BLD: 10.3 % (ref 4–15)
NEUTROPHILS # BLD AUTO: 11.8 K/UL (ref 1.8–7.7)
NEUTROPHILS NFR BLD: 73.2 % (ref 38–73)
NRBC BLD-RTO: 0 /100 WBC
PLATELET # BLD AUTO: 316 K/UL (ref 150–450)
PMV BLD AUTO: 9.7 FL (ref 9.2–12.9)
POCT GLUCOSE: 130 MG/DL (ref 70–110)
POCT GLUCOSE: 99 MG/DL (ref 70–110)
POTASSIUM SERPL-SCNC: 4.9 MMOL/L (ref 3.5–5.1)
RBC # BLD AUTO: 4.78 M/UL (ref 4–5.4)
SODIUM SERPL-SCNC: 138 MMOL/L (ref 136–145)
WBC # BLD AUTO: 16.08 K/UL (ref 3.9–12.7)

## 2022-05-21 PROCEDURE — 63600175 PHARM REV CODE 636 W HCPCS: Performed by: PHYSICIAN ASSISTANT

## 2022-05-21 PROCEDURE — 97161 PT EVAL LOW COMPLEX 20 MIN: CPT

## 2022-05-21 PROCEDURE — 99024 PR POST-OP FOLLOW-UP VISIT: ICD-10-PCS | Mod: ,,, | Performed by: PHYSICIAN ASSISTANT

## 2022-05-21 PROCEDURE — 97116 GAIT TRAINING THERAPY: CPT

## 2022-05-21 PROCEDURE — 85025 COMPLETE CBC W/AUTO DIFF WBC: CPT | Performed by: STUDENT IN AN ORGANIZED HEALTH CARE EDUCATION/TRAINING PROGRAM

## 2022-05-21 PROCEDURE — 80048 BASIC METABOLIC PNL TOTAL CA: CPT | Performed by: STUDENT IN AN ORGANIZED HEALTH CARE EDUCATION/TRAINING PROGRAM

## 2022-05-21 PROCEDURE — 97535 SELF CARE MNGMENT TRAINING: CPT

## 2022-05-21 PROCEDURE — 99024 POSTOP FOLLOW-UP VISIT: CPT | Mod: ,,, | Performed by: PHYSICIAN ASSISTANT

## 2022-05-21 PROCEDURE — 25000003 PHARM REV CODE 250: Performed by: STUDENT IN AN ORGANIZED HEALTH CARE EDUCATION/TRAINING PROGRAM

## 2022-05-21 PROCEDURE — 25000003 PHARM REV CODE 250: Performed by: PHYSICIAN ASSISTANT

## 2022-05-21 PROCEDURE — 11000001 HC ACUTE MED/SURG PRIVATE ROOM

## 2022-05-21 PROCEDURE — 97166 OT EVAL MOD COMPLEX 45 MIN: CPT

## 2022-05-21 PROCEDURE — 36415 COLL VENOUS BLD VENIPUNCTURE: CPT | Performed by: STUDENT IN AN ORGANIZED HEALTH CARE EDUCATION/TRAINING PROGRAM

## 2022-05-21 RX ORDER — FAMOTIDINE 20 MG/1
20 TABLET, FILM COATED ORAL 2 TIMES DAILY
Qty: 60 TABLET | Refills: 0 | Status: SHIPPED | OUTPATIENT
Start: 2022-05-21 | End: 2022-06-10

## 2022-05-21 RX ORDER — DEXAMETHASONE 2 MG/1
2 TABLET ORAL EVERY 12 HOURS
Qty: 60 TABLET | Refills: 0 | Status: SHIPPED | OUTPATIENT
Start: 2022-05-21 | End: 2022-06-07

## 2022-05-21 RX ORDER — ONDANSETRON 4 MG/1
4 TABLET, ORALLY DISINTEGRATING ORAL EVERY 6 HOURS PRN
Status: DISCONTINUED | OUTPATIENT
Start: 2022-05-21 | End: 2022-05-22 | Stop reason: HOSPADM

## 2022-05-21 RX ORDER — BACITRACIN 500 [USP'U]/G
OINTMENT TOPICAL 2 TIMES DAILY
Status: DISCONTINUED | OUTPATIENT
Start: 2022-05-21 | End: 2022-05-22 | Stop reason: HOSPADM

## 2022-05-21 RX ORDER — DEXAMETHASONE 1 MG/1
2 TABLET ORAL EVERY 12 HOURS
Status: DISCONTINUED | OUTPATIENT
Start: 2022-05-21 | End: 2022-05-22 | Stop reason: HOSPADM

## 2022-05-21 RX ORDER — ONDANSETRON 4 MG/1
4 TABLET, ORALLY DISINTEGRATING ORAL EVERY 6 HOURS PRN
Qty: 20 TABLET | Refills: 0 | Status: ON HOLD | OUTPATIENT
Start: 2022-05-21 | End: 2022-06-01

## 2022-05-21 RX ORDER — FAMOTIDINE 20 MG/1
20 TABLET, FILM COATED ORAL 2 TIMES DAILY
Status: DISCONTINUED | OUTPATIENT
Start: 2022-05-21 | End: 2022-05-22 | Stop reason: HOSPADM

## 2022-05-21 RX ADMIN — METHOCARBAMOL 750 MG: 750 TABLET ORAL at 01:05

## 2022-05-21 RX ADMIN — GABAPENTIN 300 MG: 300 CAPSULE ORAL at 09:05

## 2022-05-21 RX ADMIN — FAMOTIDINE 20 MG: 20 TABLET, FILM COATED ORAL at 09:05

## 2022-05-21 RX ADMIN — METOPROLOL TARTRATE 50 MG: 25 TABLET, FILM COATED ORAL at 09:05

## 2022-05-21 RX ADMIN — OXYCODONE HYDROCHLORIDE AND ACETAMINOPHEN 1 TABLET: 5; 325 TABLET ORAL at 09:05

## 2022-05-21 RX ADMIN — HEPARIN SODIUM 5000 UNITS: 5000 INJECTION INTRAVENOUS; SUBCUTANEOUS at 03:05

## 2022-05-21 RX ADMIN — OXYCODONE HYDROCHLORIDE AND ACETAMINOPHEN 1 TABLET: 10; 325 TABLET ORAL at 12:05

## 2022-05-21 RX ADMIN — DEXAMETHASONE 2 MG: 1 TABLET ORAL at 01:05

## 2022-05-21 RX ADMIN — FAMOTIDINE 20 MG: 20 TABLET, FILM COATED ORAL at 01:05

## 2022-05-21 RX ADMIN — METHOCARBAMOL 750 MG: 750 TABLET ORAL at 09:05

## 2022-05-21 RX ADMIN — DEXAMETHASONE 2 MG: 1 TABLET ORAL at 09:05

## 2022-05-21 RX ADMIN — METHOCARBAMOL 750 MG: 750 TABLET ORAL at 05:05

## 2022-05-21 RX ADMIN — HEPARIN SODIUM 5000 UNITS: 5000 INJECTION INTRAVENOUS; SUBCUTANEOUS at 06:05

## 2022-05-21 RX ADMIN — OXYCODONE HYDROCHLORIDE AND ACETAMINOPHEN 1 TABLET: 10; 325 TABLET ORAL at 06:05

## 2022-05-21 RX ADMIN — GABAPENTIN 300 MG: 300 CAPSULE ORAL at 03:05

## 2022-05-21 RX ADMIN — HEPARIN SODIUM 5000 UNITS: 5000 INJECTION INTRAVENOUS; SUBCUTANEOUS at 09:05

## 2022-05-21 RX ADMIN — BACITRACIN: 500 OINTMENT TOPICAL at 01:05

## 2022-05-21 RX ADMIN — MORPHINE SULFATE 2 MG: 2 INJECTION, SOLUTION INTRAMUSCULAR; INTRAVENOUS at 09:05

## 2022-05-21 NOTE — PROGRESS NOTES
[facility] - Neurosurgery ([facility])  Neurosurgery  Progress Note    Subjective:     History of Present Illness:   Ms. Jamilah French is a 31 y.o. woman with cervical radiculopathy, and is s/p chiari malformation decompression done on 12/23/2021, who presents today for 2 week follow up to discuss surgery. The pt reports that she want to proceed with shunt placement of the syrinx.          Post-Op Info:  Procedure(s) (LRB):  SYRINGOPLEURAL SHUNT Insertion T4 Laminectomy (N/A)   2 Days Post-Op     Interval History: NAEON. AFVSS. Pt reports intermittent HA, worse when upright, some associated nausea, improved with rest. Tolerating PO. Reports ongoing postop LLE tingling/paresthesias mostly in foot, denies focal weakness. Ambulated today, feels unsteady due to sensory deficit. Start Decadron 2mg BID.      Medications:  Continuous Infusions:  Scheduled Meds:   dexAMETHasone  2 mg Oral Q12H    famotidine  20 mg Oral BID    gabapentin  300 mg Oral TID    heparin (porcine)  5,000 Units Subcutaneous Q8H    methocarbamoL  750 mg Oral QID    metoprolol tartrate  50 mg Oral BID     PRN Meds:acetaminophen, ondansetron, oxyCODONE-acetaminophen, oxyCODONE-acetaminophen     Review of Systems  Objective:     Weight: 127 kg (280 lb)  Body mass index is 46.59 kg/m².  Vital Signs (Most Recent):  Temp: 98.6 °F (37 °C) (05/21/22 0751)  Pulse: 88 (05/21/22 0007)  Resp: 18 (05/21/22 0936)  BP: (!) 102/53 (05/21/22 0007)  SpO2: 95 % (05/21/22 0007)   Vital Signs (24h Range):  Temp:  [98.1 °F (36.7 °C)-98.6 °F (37 °C)] 98.6 °F (37 °C)  Pulse:  [] 88  Resp:  [18-26] 18  SpO2:  [95 %-97 %] 95 %  BP: (102-121)/(53-56) 102/53                          Physical Exam    Neurosurgery Physical Exam    General: well developed, well nourished, no distress.   Head: normocephalic, atraumatic  Neurologic: Alert and oriented. Thought content appropriate.  GCS: Motor: 6/Verbal: 5/Eyes: 4 GCS Total: 15  Mental Status: Awake, Alert, Oriented x  4  Language: No aphasia  Speech: No dysarthria  Cranial nerves: face symmetric, tongue midline, CN II-XII grossly intact.   Eyes: pupils equal, round, reactive to light with accommodation, EOMI.   Pulmonary: normal respirations, no signs of respiratory distress. NC in place.   Abdomen: soft, non-distended, not tender to palpation  Skin: Skin is warm, dry and intact.  Sensory: intact to light touch throughout, slightly decreased in left lower extremity      Motor Strength: Moves all extremities spontaneously with good tone.  Full strength upper and lower extremities. No abnormal movements seen.      Strength   Deltoids Triceps Biceps Wrist Extension Wrist Flexion Hand    Upper: R 5/5 5/5 5/5 5/5 5/5 5/5     L 5/5 5/5 5/5 5/5 5/5 5/5       Iliopsoas Quadriceps Knee  Flexion Tibialis  anterior Gastro- cnemius EHL   Lower: R 5/5 5/5 5/5 5/5 5/5 5/5     L 5/5 5/5 5/5 5/5 5/5 5/5      Bilateral hallux proprioception intact      Thoracic Incisions: Clean, dry, staples intact. Skin edges well approximated. No surrounding erythema or edema. No drainage or TTP.       Significant Labs:  Recent Labs   Lab 05/20/22  0815 05/21/22  0614    95    138   K 4.6 4.9    107   CO2 23 22*   BUN 10 14   CREATININE 0.7 0.7   CALCIUM 9.4 9.1     Recent Labs   Lab 05/20/22  0815 05/21/22  0614   WBC 19.27* 16.08*   HGB 13.3 12.9   HCT 40.7 40.4    316     No results for input(s): LABPT, INR, APTT in the last 48 hours.  Microbiology Results (last 7 days)       ** No results found for the last 168 hours. **          All pertinent labs from the last 24 hours have been reviewed.    Significant Diagnostics:  I have reviewed and interpreted all pertinent imaging results/findings within the past 24 hours.    Assessment/Plan:     Radiculopathy, cervical region  33 y/o F with syrinx s/p chiari now s/p syringopleural shunt on 5/19/22 by Dr. Reis     - Neuro checks q4h  - Left leg paresthesias post op, no motor deficits  -  XR chest difficult to visualize catheter due to body habitus. CT chest completed for better visualization - catheter in continuity and terminates in pleural space.   - Activity: As tolerated, no HOB restrictions. PT/OT.  - Pain control: muscle relaxer added and pain medication increased   - Start Decadron 2 mg BID, continue until follow-up outpatient. Pepcid for GI ppx  - Nausea: Zofran PRN  - Incisions: keep open to air, bacitracin BID  - Bilateral PE's: present prior to admission. Resume home Eliquis on POD#5, 5/24.    Dispo: Plan for discharge home pending improvement in pain and mobility, PT/OT evaluations    Discussed with IRINA RomanoC  Neurosurgery  [facility] Alvarez - Neurosurgery ([facility])

## 2022-05-21 NOTE — PT/OT/SLP EVAL
"Physical Therapy Evaluation/co treat with OT    Patient Name:  Jamilah French   MRN:  [id number]    Recommendations:     Discharge Recommendations:  home with home health   Discharge Equipment Recommendations:  (TBD)   Barriers to discharge: Inaccessible home 5 AMANDA    Assessment:     Jamilah French is a 32 y.o. female admitted with a medical diagnosis of Syrinx of spinal cord.  She presents with the following impairments/functional limitations:  weakness, gait instability, impaired endurance, impaired balance, impaired functional mobilty, pain . Pt  requires minimal assist for bed mobility, minimal assist for transfers, and moderate assist for gait with B UE HHA due to c/o dizziness and nausea. Pt is motivated to progress with functional mobility but was limited due to dizziness and nausea (/60).    Rehab Prognosis: Good; patient would benefit from acute skilled PT services to address these deficits and reach maximum level of function.    Recent Surgery: Procedure(s) (LRB):  SYRINGOPLEURAL SHUNT Insertion T4 Laminectomy (N/A) 2 Days Post-Op    Plan:     During this hospitalization, patient to be seen 4 x/week to address the identified rehab impairments via gait training, therapeutic activities, therapeutic exercises and progress toward the following goals:    · Plan of Care Expires:  06/20/22    Subjective   "I need to use the bathroom"    Pain/Comfort:  · Pain Rating 1: 3/10  · Location - Side 1: Left  · Location - Orientation 1: generalized  · Location 1: abdomen ("incision")  · Pain Addressed 1: Pre-medicate for activity, Reposition, Cessation of Activity, Nurse notified  · Pain Rating Post-Intervention 1: 2/10 (headache)    Patients cultural, spiritual, Cheondoism conflicts given the current situation: no    Living Environment:  Pt lives with her  and in laws in a 1 story home with 5 AMANDA with B UE rails  Prior to admission, patients level of function was independent and working.  " Equipment used at home: none.  Upon discharge, patient will have assistance from family.    Objective:     Communicated with nurse prior to session.  Patient found HOB elevated with bed alarm, PureWick, telemetry (visi monitor)  upon PT entry to room.    General Precautions: Standard, fall   Orthopedic Precautions:N/A   Braces: N/A  Respiratory Status: Nasal cannula, flow 2 L/min    Exams:  · Cognitive Exam:  Patient is oriented to Person, Place and Time  · Sensation:    · -       Intact  light/touch B LE  · RLE ROM: WFL  · RLE Strength: WFL  · LLE ROM: WFL  · LLE Strength: WFL    Functional Mobility:  · Bed Mobility:     · Rolling Left:  moderate assistance  · Supine to Sit: moderate assistance  · Sit to Supine: minimum assistance  · Transfers:     · Sit to Stand:  minimum assistance with hand-held assist  · Gait: 6 steps with B UE HHA (pt dizzy throughout) with moderate assist. pt then ambulated 8ft x 2 to/from bathon with HHA of 1 with minimal assist. pt performed gait with wide LADARIUS, decreased step length, and at slow pace Limited gait distance due to dizziness and nausea    Therapeutic Activities and Exercises:   pt urinated on the commode and required set up assist to clean herself. Nurse notifed  Co-treat with OT due to medical complexity of pt and pt unable to tolerate 2 separate sessions..    AM-PAC 6 CLICK MOBILITY  Total Score:16     Patient left HOB elevated with all lines intact, call button in reach, bed alarm on, nurse notified and  present.    GOALS:   Multidisciplinary Problems     Physical Therapy Goals        Problem: Physical Therapy    Goal Priority Disciplines Outcome Goal Variances Interventions   Physical Therapy Goal     PT, PT/OT Ongoing, Progressing     Description: PT goals until 5/28/22    1. Pt supine to sit with mod  independent-not met  2. Pt sit to supine with mod independent-not met  3. Pt sit to stand with no AD with mod independent-not met  4. Pt to perform gait 100ft with  no AD with mod independent.-not met  5. Pt to up/down 5 steps with B UE rail with CGA.-not met                       History:     Past Medical History:   Diagnosis Date    Bilateral pulmonary embolism 1/6/2022    Current moderate episode of major depressive disorder without prior episode 6/15/2018    Radiculopathy, cervical region 10/12/2021       Past Surgical History:   Procedure Laterality Date    DECOMPRESSION OF CHIARI MALFORMATION BY REMOVAL OF POSTERIOR ARCH OF FIRST CERVICAL VERTEBRA N/A 12/23/2021    Procedure: DECOMPRESSION, CHIARI MALFORMATION, BY 1ST CERVICAL VERTEBRA POSTERIOR ARCH REMOVAL;  Surgeon: Krik Reis MD;  Location: [address];  Service: Neurosurgery;  Laterality: N/A;  ASA1  TOR1  T&Cross x 2 units  [place]    HYSTEROSCOPY WITH DILATION AND CURETTAGE OF UTERUS N/A 10/26/2020    Procedure: HYSTEROSCOPY, WITH DILATION AND CURETTAGE OF UTERUS;  Surgeon: Manjula Rivas MD;  Location: [facility];  Service: OB/GYN;  Laterality: N/A;    REVISION OF PROCEDURE INVOLVING SYRINGOPLEURAL SHUNT N/A 5/19/2022    Procedure: SYRINGOPLEURAL SHUNT Insertion T4 Laminectomy;  Surgeon: Kirk Reis MD;  Location: [address];  Service: Neurosurgery;  Laterality: N/A;       Time Tracking:     PT Received On: 05/21/22  PT Start Time: 0843     PT Stop Time: 0908  PT Total Time (min): 25 min     Billable Minutes: Evaluation 15 and Gait Training 10      05/21/2022

## 2022-05-21 NOTE — PT/OT/SLP EVAL
Occupational Therapy  Co- Evaluation and Treatment w/ PT    Additional staff present: PT for co-eval/tx due to patient's medical complexities requiring two skilled therapists in order to appropriately assess patient's functional deficits as well as ensure patient safety, accommodate for limited activity tolerance, and provide appropriate, skilled assistance to maximize functional potential during evaluation.    Name: Jamilah French  MRN: [id number]  Admitting Diagnosis:  <principal problem not specified> 2 Days Post-Op  Length of Stay: 2 days    Procedure(s):  SYRINGOPLEURAL SHUNT Insertion T4 Laminectomy     Recommendations:     Discharge Recommendations: home with home health  Discharge Equipment Recommendations:   (TBD)  Barriers to discharge:  None    Plan:     Patient to be seen 3 x/week to address the above listed problems via self-care/home management, therapeutic activities, therapeutic exercises, neuromuscular re-education  · Plan of Care Expires: 06/20/22  · Plan of Care Reviewed with: patient, spouse    Assessment:     Jamilah French is a 32 y.o. female with a medical diagnosis of Syrinx of spinal cord, shunt placement of the syrinx.  She presents with the following performance deficits affecting function: weakness, impaired endurance, impaired self care skills, impaired functional mobilty, gait instability, impaired balance, impaired cardiopulmonary response to activity, decreased coordination.  Pt would benefit from skilled OT services in order to maximize independence with ADLs and facilitate safe discharge. Pt would benefit from home health OT once medically stable for discharge. Pt pleasant and agreeable to treatment session this date; pt closely monitored for positional headaches and signs of dizziness; pt w/ complaint of dizziness when sitting upright, BP stable; pt performed short 2x3' ambulation then complaint of nausea, observed low SBP, ~104. Pt rested seated EOB and able to recover,  "pt then requested ambulation to toilet and once seated on toilet, pt w/ noted drop in SBP as seen on Visi monitor. Patient req'd increased time for seated rest break before ambulating back to bed -pt safely returned to supine and NSG informed of concerns    Time spent evaluating pt, performing bed mobility, EOB activity, self-care and ambulation in room    Rehab Prognosis: Good; patient would benefit from acute skilled OT services to address these deficits and reach maximum level of function.       Subjective   Communicated with: LINUS Barrera prior to session.  Patient found HOB elevated with telemetry, PureWick, bed alarm (Visi monitor) upon OT entry to room.    Chief Complaint: No chief complaint on file.    Patient/Family Comments/goals: get better    Pain/Comfort:  · Pain Rating 1: 3/10 (abdominal incision site)  · Pain Rating Post-Intervention 1: 2/10 (generalized headache post therapy session)    Patients cultural, spiritual, Rastafarian conflicts given the current situation: no    Occupational Profile:  Living Environment: lives w/ spouse and in-laws in [facility] w/ 5 AMANDA [facility]; t/s combo  Prior Level of Function: Patient reports being Independent with mobility & with ADLs.   Roles/Repsonsibilities:   Hand Dominance: right   Work: yes.    Drive: yes.   Managing Medicines/Managing Home: yes.   Equipment Used at Home:  none    Patient reports they will have assistance from family upon discharge.      Objective:     Patient found with: telemetry, PureWick, bed alarm (Visi monitor)   General Precautions: fall   Orthopedic Precautions:N/A   Braces: N/A   Oxygen Device: Nasal Cannula 2L  Vitals: BP (!) 102/53 (BP Location: Left arm, Patient Position: Lying)   Pulse 88   Temp 98.6 °F (37 °C) (Oral)   Resp 18   Ht 5' 5" (1.651 m)   Wt 127 kg (280 lb)   LMP 04/30/2022 (Exact Date)   SpO2 95%   Breastfeeding No   BMI 46.59 kg/m²     Cognitive and Psychosocial Function:   · AOx4 -- Person, Place, Time and Situation "   · Follows Commands/attention:follows multi-step commands  · Communication:  clear/fluent  · Memory: No Deficits noted  · Safety awareness/insight to disability: intact     Hearing: Intact    Vision:  wears glasses     Physical Exam:     Left UE Right UE   UE Edema N/A N/A   UE ROM AROM: WFL AROM: WFL   UE Strength WFL WFL    Strength WFL WFL   Sensation normal normal   Fine Motor Coordination:  Intact Intact   Gross Motor Coordination: Intact Intact     Occupational Performance:  Bed Mobility:       Rolling left: stand by assistance   Scooting: towards EOB with stand by assistance   Supine to Sit: minimum assistance   Sit to Supine: minimum assistance    Functional Mobility/Transfers:     Sit to Stand: moderate assistance using bilateral hand-held assist , x2 EOB, x1 toilet   Toilet Transfer: Step Transfer with minimum assistance using  hand-held assist to toilet   Pt ambulated 6 steps and 2x8' Mod A that progressed to Min A with B HHA to simulate household and/or community distances in order to maximize functional activity tolerance and dynamic standing balance required for engagement in occupations of choice.   o LOB: No  o SOB: Yes - w/ sats decreasing to 89% (req'd seated rest break)  o Dizziness: Yes - rest break, ankle pumps and visual target    Activities of Daily Living:     Grooming: stand by assistance to perform facial hygiene w/ washcloth    Upper Body Dressing: stand by assistance to doff/don hospital gown backwards sitting EOB   Lower Body Dressing: maximal assistance to don/doff socks and brief sitting EOB   Toileting: minimum assistance to perform perineal hygiene and clothing management from toilet; pt able to perform pericare and partial clothing management  o req'd A for brief around ankles and advancing over waist    AMPAC 6 Click ADL:  AMPAC Total Score: 16    Treatment & Education:   Therapist provided facilitation and instruction of proper body mechanics, energy conservation,  and fall prevention strategies during tasks listed above.   Pt educated on role of OT, POC and goals for therapy   Pt educated on importance of OOB activities with staff member assistance and sitting OOB majority of the day.    Updated communication board with level of assist required (Min A ) & educated RN/patient that pt is appropriate for transfers and mobility with RN/PCT.     Patient left HOB elevated with all lines intact, call button in reach, bed alarm on and RN notified    GOALS:   Multidisciplinary Problems     Occupational Therapy Goals        Problem: Occupational Therapy    Goal Priority Disciplines Outcome Interventions   Occupational Therapy Goal     OT, PT/OT Ongoing, Progressing    Description: Goals to be met by: 6/18/2022     Patient will increase functional independence with ADLs by performing:    Feeding with Set-up Assistance.  UE Dressing with Modified [place].  LE Dressing with Modified [place].  Grooming while standing at sink with Modified [place].  Toileting from toilet with Modified [place] for hygiene and clothing management.   Toilet transfer to toilet with Modified [place].                     History:     Past Medical History:   Diagnosis Date    Bilateral pulmonary embolism 1/6/2022    Current moderate episode of major depressive disorder without prior episode 6/15/2018    Radiculopathy, cervical region 10/12/2021       Past Surgical History:   Procedure Laterality Date    DECOMPRESSION OF CHIARI MALFORMATION BY REMOVAL OF POSTERIOR ARCH OF FIRST CERVICAL VERTEBRA N/A 12/23/2021    Procedure: DECOMPRESSION, CHIARI MALFORMATION, BY 1ST CERVICAL VERTEBRA POSTERIOR ARCH REMOVAL;  Surgeon: Kirk Reis MD;  Location: [facility] OR [address] [place] [address];  Service: Neurosurgery;  Laterality: N/A;  ASA1  TOR1  T&Cross x 2 units  [place]    HYSTEROSCOPY WITH DILATION AND CURETTAGE OF UTERUS N/A 10/26/2020    Procedure: HYSTEROSCOPY, WITH DILATION AND CURETTAGE OF UTERUS;   Surgeon: Manjula Rivas MD;  Location: [facility];  Service: OB/GYN;  Laterality: N/A;    REVISION OF PROCEDURE INVOLVING SYRINGOPLEURAL SHUNT N/A 5/19/2022    Procedure: SYRINGOPLEURAL SHUNT Insertion T4 Laminectomy;  Surgeon: Kirk Reis MD;  [address]  Service: Neurosurgery;  Laterality: N/A;       Time Tracking:       OT Date of Treatment: 05/21/22  OT Start Time: 0840  OT Stop Time: 0908  OT Total Time (min): 28 min    Additional staff present: PT     Billable Minutes:  Evaluation 16  Self Care/Home Management 12      Sara (Ali), OTR/L  [phone] (Pager #)  5/21/2022

## 2022-05-21 NOTE — ASSESSMENT & PLAN NOTE
33 y/o F with syrinx s/p chiari now s/p syringopleural shunt on 5/19/22 by Dr. Reis     - Neuro checks q4h  - Left leg paresthesias post op, no motor deficits  - XR chest difficult to visualize catheter due to body habitus. CT chest completed for better visualization - catheter in continuity and terminates in pleural space.   - Activity: As tolerated, no HOB restrictions. PT/OT.  - Pain control: muscle relaxer added and pain medication increased   - Start Decadron 2 mg BID, continue until follow-up outpatient. Pepcid for GI ppx  - Nausea: Zofran PRN  - Incisions: keep open to air, bacitracin BID  - Bilateral PE's: present prior to admission. Resume home Eliquis on POD#5, 5/24.    Dispo: Plan for discharge home pending improvement in pain and mobility, PT/OT evaluations    Discussed with Dr. Reis

## 2022-05-21 NOTE — PLAN OF CARE
Problem: Adult Inpatient Plan of Care  Goal: Plan of Care Review  Outcome: Ongoing, Progressing  Goal: Patient-Specific Goal (Individualized)  Outcome: Ongoing, Progressing  Goal: Absence of Hospital-Acquired Illness or Injury  Outcome: Ongoing, Progressing  Goal: Optimal Comfort and Wellbeing  Outcome: Ongoing, Progressing   POC updated and reviewed with patient at the bedside. Questions regarding POC encouraged and addressed with the patient. VSS, see flow-sheets. Patient is Aox4  at  this time. Fall and safety precautions maintained, no signs of  injury noted during the shift. Patient repositioned for comfort with bed locked in low position, side rails up x2, bed alarm on, and call light within reach. Instructed patient to call staff for mobility, verbalized understanding. No acute signs of distress noted at this time.

## 2022-05-21 NOTE — PLAN OF CARE
Problem: Occupational Therapy  Goal: Occupational Therapy Goal  Description: Goals to be met by: 6/18/2022     Patient will increase functional independence with ADLs by performing:    Feeding with Set-up Assistance.  UE Dressing with Modified [place].  LE Dressing with Modified [place].  Grooming while standing at sink with Modified [place].  Toileting from toilet with Modified [place] for hygiene and clothing management.   Toilet transfer to toilet with Modified [place].    Outcome: Ongoing, Progressing     Evaluation complete-see note for details. Goals and POC established.

## 2022-05-21 NOTE — SUBJECTIVE & OBJECTIVE
Interval History: NAEON. AFVSS. Pt reports intermittent HA, worse when upright, some associated nausea, improved with rest. Tolerating PO. Reports ongoing postop LLE tingling/paresthesias mostly in foot, denies focal weakness. Ambulated today, feels unsteady due to sensory deficit. Start Decadron 2mg BID.      Medications:  Continuous Infusions:  Scheduled Meds:   dexAMETHasone  2 mg Oral Q12H    famotidine  20 mg Oral BID    gabapentin  300 mg Oral TID    heparin (porcine)  5,000 Units Subcutaneous Q8H    methocarbamoL  750 mg Oral QID    metoprolol tartrate  50 mg Oral BID     PRN Meds:acetaminophen, ondansetron, oxyCODONE-acetaminophen, oxyCODONE-acetaminophen     Review of Systems  Objective:     Weight: 127 kg (280 lb)  Body mass index is 46.59 kg/m².  Vital Signs (Most Recent):  Temp: 98.6 °F (37 °C) (05/21/22 0751)  Pulse: 88 (05/21/22 0007)  Resp: 18 (05/21/22 0936)  BP: (!) 102/53 (05/21/22 0007)  SpO2: 95 % (05/21/22 0007)   Vital Signs (24h Range):  Temp:  [98.1 °F (36.7 °C)-98.6 °F (37 °C)] 98.6 °F (37 °C)  Pulse:  [] 88  Resp:  [18-26] 18  SpO2:  [95 %-97 %] 95 %  BP: (102-121)/(53-56) 102/53                          Physical Exam    Neurosurgery Physical Exam    General: well developed, well nourished, no distress.   Head: normocephalic, atraumatic  Neurologic: Alert and oriented. Thought content appropriate.  GCS: Motor: 6/Verbal: 5/Eyes: 4 GCS Total: 15  Mental Status: Awake, Alert, Oriented x 4  Language: No aphasia  Speech: No dysarthria  Cranial nerves: face symmetric, tongue midline, CN II-XII grossly intact.   Eyes: pupils equal, round, reactive to light with accommodation, EOMI.   Pulmonary: normal respirations, no signs of respiratory distress. NC in place.   Abdomen: soft, non-distended, not tender to palpation  Skin: Skin is warm, dry and intact.  Sensory: intact to light touch throughout, slightly decreased in left lower extremity      Motor Strength: Moves all extremities  spontaneously with good tone.  Full strength upper and lower extremities. No abnormal movements seen.      Strength   Deltoids Triceps Biceps Wrist Extension Wrist Flexion Hand    Upper: R 5/5 5/5 5/5 5/5 5/5 5/5     L 5/5 5/5 5/5 5/5 5/5 5/5       Iliopsoas Quadriceps Knee  Flexion Tibialis  anterior Gastro- cnemius EHL   Lower: R 5/5 5/5 5/5 5/5 5/5 5/5     L 5/5 5/5 5/5 5/5 5/5 5/5      Bilateral hallux proprioception intact      Thoracic Incisions: Clean, dry, staples intact. Skin edges well approximated. No surrounding erythema or edema. No drainage or TTP.       Significant Labs:  Recent Labs   Lab 05/20/22  0815 05/21/22  0614    95    138   K 4.6 4.9    107   CO2 23 22*   BUN 10 14   CREATININE 0.7 0.7   CALCIUM 9.4 9.1     Recent Labs   Lab 05/20/22  0815 05/21/22  0614   WBC 19.27* 16.08*   HGB 13.3 12.9   HCT 40.7 40.4    316     No results for input(s): LABPT, INR, APTT in the last 48 hours.  Microbiology Results (last 7 days)       ** No results found for the last 168 hours. **          All pertinent labs from the last 24 hours have been reviewed.    Significant Diagnostics:  I have reviewed and interpreted all pertinent imaging results/findings within the past 24 hours.

## 2022-05-21 NOTE — PLAN OF CARE
Problem: Physical Therapy  Goal: Physical Therapy Goal  Description: PT goals until 5/28/22    1. Pt supine to sit with mod  independent-not met  2. Pt sit to supine with mod independent-not met  3. Pt sit to stand with no AD with mod independent-not met  4. Pt to perform gait 100ft with no AD with mod independent.-not met  5. Pt to up/down 5 steps with B UE rail with CGA.-not met      Outcome: Ongoing, Progressing   Pt's goals set and pt will benefit from skilled PT services to work towards improved functional mobility including: bed mobility, transfers, up/down steps, and gait.   5/21/2022

## 2022-05-22 VITALS
HEART RATE: 87 BPM | WEIGHT: 280 LBS | HEIGHT: 65 IN | OXYGEN SATURATION: 97 % | DIASTOLIC BLOOD PRESSURE: 60 MMHG | RESPIRATION RATE: 18 BRPM | TEMPERATURE: 98 F | SYSTOLIC BLOOD PRESSURE: 129 MMHG | BODY MASS INDEX: 46.65 KG/M2

## 2022-05-22 LAB
ANION GAP SERPL CALC-SCNC: 8 MMOL/L (ref 8–16)
BASOPHILS # BLD AUTO: 0.03 K/UL (ref 0–0.2)
BASOPHILS NFR BLD: 0.2 % (ref 0–1.9)
BUN SERPL-MCNC: 13 MG/DL (ref 6–20)
CALCIUM SERPL-MCNC: 9.4 MG/DL (ref 8.7–10.5)
CHLORIDE SERPL-SCNC: 101 MMOL/L (ref 95–110)
CO2 SERPL-SCNC: 27 MMOL/L (ref 23–29)
CREAT SERPL-MCNC: 0.7 MG/DL (ref 0.5–1.4)
DIFFERENTIAL METHOD: ABNORMAL
EOSINOPHIL # BLD AUTO: 0 K/UL (ref 0–0.5)
EOSINOPHIL NFR BLD: 0.2 % (ref 0–8)
ERYTHROCYTE [DISTWIDTH] IN BLOOD BY AUTOMATED COUNT: 12.9 % (ref 11.5–14.5)
EST. GFR  (AFRICAN AMERICAN): >60 ML/MIN/1.73 M^2
EST. GFR  (NON AFRICAN AMERICAN): >60 ML/MIN/1.73 M^2
GLUCOSE SERPL-MCNC: 90 MG/DL (ref 70–110)
HCT VFR BLD AUTO: 38.1 % (ref 37–48.5)
HGB BLD-MCNC: 12 G/DL (ref 12–16)
IMM GRANULOCYTES # BLD AUTO: 0.04 K/UL (ref 0–0.04)
IMM GRANULOCYTES NFR BLD AUTO: 0.3 % (ref 0–0.5)
LYMPHOCYTES # BLD AUTO: 1.6 K/UL (ref 1–4.8)
LYMPHOCYTES NFR BLD: 11.6 % (ref 18–48)
MCH RBC QN AUTO: 26.8 PG (ref 27–31)
MCHC RBC AUTO-ENTMCNC: 31.5 G/DL (ref 32–36)
MCV RBC AUTO: 85 FL (ref 82–98)
MONOCYTES # BLD AUTO: 0.9 K/UL (ref 0.3–1)
MONOCYTES NFR BLD: 6.6 % (ref 4–15)
NEUTROPHILS # BLD AUTO: 11.4 K/UL (ref 1.8–7.7)
NEUTROPHILS NFR BLD: 81.1 % (ref 38–73)
NRBC BLD-RTO: 0 /100 WBC
PLATELET # BLD AUTO: 383 K/UL (ref 150–450)
PMV BLD AUTO: 9.4 FL (ref 9.2–12.9)
POCT GLUCOSE: 95 MG/DL (ref 70–110)
POTASSIUM SERPL-SCNC: 4.7 MMOL/L (ref 3.5–5.1)
RBC # BLD AUTO: 4.48 M/UL (ref 4–5.4)
SODIUM SERPL-SCNC: 136 MMOL/L (ref 136–145)
WBC # BLD AUTO: 14.02 K/UL (ref 3.9–12.7)

## 2022-05-22 PROCEDURE — 80048 BASIC METABOLIC PNL TOTAL CA: CPT | Performed by: STUDENT IN AN ORGANIZED HEALTH CARE EDUCATION/TRAINING PROGRAM

## 2022-05-22 PROCEDURE — 85025 COMPLETE CBC W/AUTO DIFF WBC: CPT | Performed by: STUDENT IN AN ORGANIZED HEALTH CARE EDUCATION/TRAINING PROGRAM

## 2022-05-22 PROCEDURE — 25000003 PHARM REV CODE 250: Performed by: STUDENT IN AN ORGANIZED HEALTH CARE EDUCATION/TRAINING PROGRAM

## 2022-05-22 PROCEDURE — 25000003 PHARM REV CODE 250: Performed by: PHYSICIAN ASSISTANT

## 2022-05-22 PROCEDURE — 63600175 PHARM REV CODE 636 W HCPCS: Performed by: PHYSICIAN ASSISTANT

## 2022-05-22 PROCEDURE — 36415 COLL VENOUS BLD VENIPUNCTURE: CPT | Performed by: STUDENT IN AN ORGANIZED HEALTH CARE EDUCATION/TRAINING PROGRAM

## 2022-05-22 RX ADMIN — FAMOTIDINE 20 MG: 20 TABLET, FILM COATED ORAL at 09:05

## 2022-05-22 RX ADMIN — DEXAMETHASONE 2 MG: 1 TABLET ORAL at 09:05

## 2022-05-22 RX ADMIN — HEPARIN SODIUM 5000 UNITS: 5000 INJECTION INTRAVENOUS; SUBCUTANEOUS at 07:05

## 2022-05-22 RX ADMIN — METHOCARBAMOL 750 MG: 750 TABLET ORAL at 09:05

## 2022-05-22 RX ADMIN — BACITRACIN: 500 OINTMENT TOPICAL at 09:05

## 2022-05-22 RX ADMIN — GABAPENTIN 300 MG: 300 CAPSULE ORAL at 09:05

## 2022-05-22 RX ADMIN — METHOCARBAMOL 750 MG: 750 TABLET ORAL at 02:05

## 2022-05-22 RX ADMIN — GABAPENTIN 300 MG: 300 CAPSULE ORAL at 02:05

## 2022-05-22 RX ADMIN — METOPROLOL TARTRATE 50 MG: 25 TABLET, FILM COATED ORAL at 09:05

## 2022-05-22 RX ADMIN — HEPARIN SODIUM 5000 UNITS: 5000 INJECTION INTRAVENOUS; SUBCUTANEOUS at 02:05

## 2022-05-22 NOTE — PLAN OF CARE
Problem: Adult Inpatient Plan of Care  Goal: Plan of Care Review  Outcome: Ongoing, Progressing  Goal: Patient-Specific Goal (Individualized)  Outcome: Ongoing, Progressing  Goal: Absence of Hospital-Acquired Illness or Injury  Outcome: Ongoing, Progressing  Goal: Optimal Comfort and Wellbeing  Outcome: Ongoing, Progressing  Goal: Readiness for Transition of Care  Outcome: Ongoing, Progressing     POC updated and reviewed with patient at the bedside. Questions regarding POC encouraged and addressed with the patient. VSS, see flow-sheets. Patient is AOx4 at  this time. Fall and safety precautions maintained, no signs of  injury noted during the shift. No events noted during shift. Patient repositioned for comfort with bed locked in low position, side rails up x2, bed alarm on, and call light within reach. Instructed patient to call staff for mobility, verbalized understanding. No acute signs of distress noted at this time.

## 2022-05-22 NOTE — DISCHARGE SUMMARY
[facility] - Neurosurgery ([facility])  Neurosurgery  Discharge Summary      Patient Name: Jamilah French  MRN: [id number]  Admission Date: 5/19/2022  Hospital Length of Stay: 3 days  Discharge Date and Time:  05/22/2022 11:33 AM  Attending Physician: Kirk Reis MD   Discharging Provider: Jalen Paredes MD  Primary Care Provider: Sirena Hess DNP    HPI:     Ms. Jamilah French is a 31 y.o. woman with cervical radiculopathy, and is s/p chiari malformation decompression done on 12/23/2021, who presents today for 2 week follow up to discuss surgery. The pt reports that she want to proceed with shunt placement of the syrinx.          Procedure(s) (LRB):  SYRINGOPLEURAL SHUNT Insertion T4 Laminectomy (N/A)     Hospital Course: 5/20: Patient complaining of left leg tingling and decreased sensation onset yesterday. Denies weakness, positional headaches, or bowel/bladder issues. Sensation is intact, but slightly decreased in left leg. Proprioception of left toe intact. CT chest pending to better visualize shunt and catheter. PT/OT to work with patient to get her out of bed. Pain poorly controlled, muscle relaxer added along with increase in po medication   5/21: NAEON. AFVSS. Pt reports intermittent HA, worse when upright, some associated nausea, improved with rest. Tolerating PO. Reports ongoing postop LLE tingling/paresthesias mostly in foot, denies focal weakness. Ambulated today, feels unsteady due to sensory deficit. Start Decadron 2mg BID.      5/22: Doing well overnight, stable LLE symptoms. We will plan to discharge home today with anticoagulation starting 5 days after surgery and follow up with Dr. Reis in clinic.     Goals of Care Treatment Preferences:  Code Status: Full Code      Consults:     Significant Diagnostic Studies: Labs:   BMP:   Recent Labs   Lab 05/21/22  0614 05/22/22  0750   GLU 95 90    136   K 4.9 4.7    101   CO2 22* 27   BUN 14 13   CREATININE 0.7 0.7   CALCIUM 9.1  9.4   , CMP   Recent Labs   Lab 05/21/22  0614 05/22/22  0750    136   K 4.9 4.7    101   CO2 22* 27   GLU 95 90   BUN 14 13   CREATININE 0.7 0.7   CALCIUM 9.1 9.4   ANIONGAP 9 8   ESTGFRAFRICA >60.0 >60.0   EGFRNONAA >60.0 >60.0    and CBC   Recent Labs   Lab 05/21/22  0614 05/22/22  0750   WBC 16.08* 14.02*   HGB 12.9 12.0   HCT 40.4 38.1    383       Pending Diagnostic Studies:     None        Final Active Diagnoses:    Diagnosis Date Noted POA    PRINCIPAL PROBLEM:  Syrinx of spinal cord [G95.0] 10/21/2021 Yes    Radiculopathy, cervical region [M54.12] 10/12/2021 Yes      Problems Resolved During this Admission:      Discharged Condition: good     Disposition: Home or Self Care    Follow Up:   Follow-up Information     Tracy Hurd PA-C Follow up on 5/31/2022.    Specialty: Neurosurgery  Why: Postop follow-up, at 11:20 am  Contact information:  [address]  [address]  [facility] [address]  [phone]                       Patient Instructions:      Ambulatory referral/consult to Physical/Occupational Therapy   Standing Status: Future   Referral Priority: Routine Referral Type: Physical Medicine   Referral Reason: Specialty Services Required   Number of Visits Requested: 1     Diet Adult Regular     Medications:  Reconciled Home Medications:      Medication List      START taking these medications    dexAMETHasone 2 MG tablet  Commonly known as: DECADRON  Take 1 tablet (2 mg total) by mouth every 12 (twelve) hours.     famotidine 20 MG tablet  Commonly known as: PEPCID  Take 1 tablet (20 mg total) by mouth 2 (two) times daily.     methocarbamoL 750 MG Tab  Commonly known as: ROBAXIN  Take 1 tablet (750 mg total) by mouth 4 (four) times daily. for 10 days     ondansetron 4 MG Tbdl  Commonly known as: ZOFRAN-ODT  Take 1 tablet (4 mg total) by mouth every 6 (six) hours as needed (nausea).     oxyCODONE-acetaminophen  mg per tablet  Commonly known as: PERCOCET  Take 1 tablet by  mouth every 4 (four) hours as needed for Pain.        CONTINUE taking these medications    gabapentin 300 MG capsule  Commonly known as: NEURONTIN  Take 1 capsule (300 mg total) by mouth 3 (three) times daily.     metoprolol tartrate 50 MG tablet  Commonly known as: LOPRESSOR  Take 1 tablet (50 mg total) by mouth 2 (two) times daily.        STOP taking these medications    apixaban 5 mg Tab  Commonly known as: ELIQUIS     -IRON-FOLATE 1-DSS-DHA ORAL            Jalen Paredes MD  Neurosurgery  [facility] - Neurosurgery ([facility])

## 2022-05-24 ENCOUNTER — PATIENT OUTREACH (OUTPATIENT)
Dept: ADMINISTRATIVE | Facility: CLINIC | Age: 32
End: 2022-05-24
Payer: COMMERCIAL

## 2022-05-24 NOTE — PROGRESS NOTES
C3 nurse spoke with Jamilah French for a TCC post hospital discharge follow up call. The patient does not have a scheduled HOSFU appointment with Sirena Hess DNP within 7 days post hospital discharge date 05/22/22. C3 nurse was unable to schedule HOSFU appointment in [facility].    Message sent to PCP staff requesting they contact patient and schedule follow up appointment.    Patient's  declined NP @ Home visit.

## 2022-05-24 NOTE — PATIENT INSTRUCTIONS
Angelia teaching reviewed with Jamilah Jaime Manolo's  Neil . He verbalized understanding.    Education was provided based on the patient's discharge diagnosis using the attached Angelia patient education as a reference.

## 2022-05-31 ENCOUNTER — HOSPITAL ENCOUNTER (INPATIENT)
Facility: HOSPITAL | Age: 32
LOS: 1 days | Discharge: HOME | DRG: 176 | End: 2022-06-02
Attending: EMERGENCY MEDICINE | Admitting: STUDENT IN AN ORGANIZED HEALTH CARE EDUCATION/TRAINING PROGRAM
Payer: COMMERCIAL

## 2022-05-31 DIAGNOSIS — I26.99 ACUTE PULMONARY EMBOLISM: ICD-10-CM

## 2022-05-31 DIAGNOSIS — R07.9 CHEST PAIN: ICD-10-CM

## 2022-05-31 DIAGNOSIS — I26.99 OTHER ACUTE PULMONARY EMBOLISM, UNSPECIFIED WHETHER ACUTE COR PULMONALE PRESENT: Primary | ICD-10-CM

## 2022-05-31 DIAGNOSIS — R06.02 SOB (SHORTNESS OF BREATH): ICD-10-CM

## 2022-05-31 LAB
ALBUMIN SERPL BCP-MCNC: 3.1 G/DL (ref 3.5–5.2)
ALP SERPL-CCNC: 81 U/L (ref 55–135)
ALT SERPL W/O P-5'-P-CCNC: 22 U/L (ref 10–44)
ANION GAP SERPL CALC-SCNC: 12 MMOL/L (ref 8–16)
AST SERPL-CCNC: 15 U/L (ref 10–40)
BASOPHILS # BLD AUTO: 0.07 K/UL (ref 0–0.2)
BASOPHILS NFR BLD: 0.3 % (ref 0–1.9)
BILIRUB SERPL-MCNC: 0.5 MG/DL (ref 0.1–1)
BNP SERPL-MCNC: <10 PG/ML (ref 0–99)
BUN SERPL-MCNC: 22 MG/DL (ref 6–20)
CALCIUM SERPL-MCNC: 9.4 MG/DL (ref 8.7–10.5)
CHLORIDE SERPL-SCNC: 105 MMOL/L (ref 95–110)
CO2 SERPL-SCNC: 24 MMOL/L (ref 23–29)
CREAT SERPL-MCNC: 0.8 MG/DL (ref 0.5–1.4)
D DIMER PPP IA.FEU-MCNC: 8.28 MG/L FEU
DIFFERENTIAL METHOD: ABNORMAL
EOSINOPHIL # BLD AUTO: 0.6 K/UL (ref 0–0.5)
EOSINOPHIL NFR BLD: 2.6 % (ref 0–8)
ERYTHROCYTE [DISTWIDTH] IN BLOOD BY AUTOMATED COUNT: 13.3 % (ref 11.5–14.5)
EST. GFR  (AFRICAN AMERICAN): >60 ML/MIN/1.73 M^2
EST. GFR  (NON AFRICAN AMERICAN): >60 ML/MIN/1.73 M^2
GLUCOSE SERPL-MCNC: 147 MG/DL (ref 70–110)
HCT VFR BLD AUTO: 39.5 % (ref 37–48.5)
HGB BLD-MCNC: 13 G/DL (ref 12–16)
IMM GRANULOCYTES # BLD AUTO: 0.12 K/UL (ref 0–0.04)
IMM GRANULOCYTES NFR BLD AUTO: 0.5 % (ref 0–0.5)
LYMPHOCYTES # BLD AUTO: 2.9 K/UL (ref 1–4.8)
LYMPHOCYTES NFR BLD: 13.2 % (ref 18–48)
MCH RBC QN AUTO: 28.1 PG (ref 27–31)
MCHC RBC AUTO-ENTMCNC: 32.9 G/DL (ref 32–36)
MCV RBC AUTO: 85 FL (ref 82–98)
MONOCYTES # BLD AUTO: 1 K/UL (ref 0.3–1)
MONOCYTES NFR BLD: 4.6 % (ref 4–15)
NEUTROPHILS # BLD AUTO: 17.3 K/UL (ref 1.8–7.7)
NEUTROPHILS NFR BLD: 78.8 % (ref 38–73)
NRBC BLD-RTO: 0 /100 WBC
PLATELET # BLD AUTO: 318 K/UL (ref 150–450)
PMV BLD AUTO: 9 FL (ref 9.2–12.9)
POTASSIUM SERPL-SCNC: 3.5 MMOL/L (ref 3.5–5.1)
PROT SERPL-MCNC: 7.7 G/DL (ref 6–8.4)
RBC # BLD AUTO: 4.63 M/UL (ref 4–5.4)
SODIUM SERPL-SCNC: 141 MMOL/L (ref 136–145)
TROPONIN I SERPL DL<=0.01 NG/ML-MCNC: 0.12 NG/ML (ref 0–0.03)
WBC # BLD AUTO: 21.97 K/UL (ref 3.9–12.7)

## 2022-05-31 PROCEDURE — 63600175 PHARM REV CODE 636 W HCPCS: Performed by: EMERGENCY MEDICINE

## 2022-05-31 PROCEDURE — 85379 FIBRIN DEGRADATION QUANT: CPT | Performed by: EMERGENCY MEDICINE

## 2022-05-31 PROCEDURE — 83880 ASSAY OF NATRIURETIC PEPTIDE: CPT | Performed by: EMERGENCY MEDICINE

## 2022-05-31 PROCEDURE — 84484 ASSAY OF TROPONIN QUANT: CPT | Mod: 91 | Performed by: EMERGENCY MEDICINE

## 2022-05-31 PROCEDURE — 93005 ELECTROCARDIOGRAM TRACING: CPT

## 2022-05-31 PROCEDURE — 99285 EMERGENCY DEPT VISIT HI MDM: CPT | Mod: 25

## 2022-05-31 PROCEDURE — 99291 CRITICAL CARE FIRST HOUR: CPT | Mod: 25

## 2022-05-31 PROCEDURE — 93010 ELECTROCARDIOGRAM REPORT: CPT | Mod: ,,, | Performed by: GENERAL PRACTICE

## 2022-05-31 PROCEDURE — 80053 COMPREHEN METABOLIC PANEL: CPT | Performed by: EMERGENCY MEDICINE

## 2022-05-31 PROCEDURE — 25500020 PHARM REV CODE 255: Performed by: EMERGENCY MEDICINE

## 2022-05-31 PROCEDURE — 96372 THER/PROPH/DIAG INJ SC/IM: CPT | Performed by: EMERGENCY MEDICINE

## 2022-05-31 PROCEDURE — 36415 COLL VENOUS BLD VENIPUNCTURE: CPT | Performed by: EMERGENCY MEDICINE

## 2022-05-31 PROCEDURE — 25000003 PHARM REV CODE 250: Performed by: EMERGENCY MEDICINE

## 2022-05-31 PROCEDURE — 93010 EKG 12-LEAD: ICD-10-PCS | Mod: ,,, | Performed by: GENERAL PRACTICE

## 2022-05-31 PROCEDURE — 85025 COMPLETE CBC W/AUTO DIFF WBC: CPT | Performed by: EMERGENCY MEDICINE

## 2022-05-31 RX ORDER — ENOXAPARIN SODIUM 150 MG/ML
1 INJECTION SUBCUTANEOUS
Status: COMPLETED | OUTPATIENT
Start: 2022-05-31 | End: 2022-05-31

## 2022-05-31 RX ORDER — ASPIRIN 325 MG
325 TABLET ORAL
Status: COMPLETED | OUTPATIENT
Start: 2022-05-31 | End: 2022-05-31

## 2022-05-31 RX ADMIN — ENOXAPARIN SODIUM 120 MG: 120 INJECTION SUBCUTANEOUS at 11:05

## 2022-05-31 RX ADMIN — ASPIRIN 325 MG ORAL TABLET 325 MG: 325 PILL ORAL at 08:05

## 2022-05-31 RX ADMIN — IOHEXOL 100 ML: 350 INJECTION, SOLUTION INTRAVENOUS at 10:05

## 2022-06-01 PROBLEM — T78.40XA ALLERGIES: Status: RESOLVED | Noted: 2021-12-07 | Resolved: 2022-06-01

## 2022-06-01 PROBLEM — N97.0 INFERTILITY ASSOCIATED WITH ANOVULATION: Status: RESOLVED | Noted: 2020-08-20 | Resolved: 2022-06-01

## 2022-06-01 PROBLEM — Z98.890 STATUS POST D&C: Status: RESOLVED | Noted: 2020-10-26 | Resolved: 2022-06-01

## 2022-06-01 PROBLEM — Z79.01 ANTICOAGULATED: Status: RESOLVED | Noted: 2022-04-07 | Resolved: 2022-06-01

## 2022-06-01 PROBLEM — L30.9 ECZEMA: Status: RESOLVED | Noted: 2018-06-15 | Resolved: 2022-06-01

## 2022-06-01 PROBLEM — R29.2 BABINSKI REFLEX: Status: RESOLVED | Noted: 2021-10-12 | Resolved: 2022-06-01

## 2022-06-01 PROBLEM — M54.12 RADICULOPATHY, CERVICAL REGION: Status: RESOLVED | Noted: 2021-10-12 | Resolved: 2022-06-01

## 2022-06-01 PROBLEM — L83 ACANTHOSIS NIGRICANS: Status: RESOLVED | Noted: 2018-06-15 | Resolved: 2022-06-01

## 2022-06-01 PROBLEM — N93.9 ABNORMAL UTERINE BLEEDING: Status: RESOLVED | Noted: 2020-09-30 | Resolved: 2022-06-01

## 2022-06-01 PROBLEM — N97.9 INFERTILITY, FEMALE: Status: RESOLVED | Noted: 2018-06-15 | Resolved: 2022-06-01

## 2022-06-01 PROBLEM — R31.29 MICROSCOPIC HEMATURIA: Status: RESOLVED | Noted: 2021-12-15 | Resolved: 2022-06-01

## 2022-06-01 PROBLEM — R06.83 SNORING: Status: RESOLVED | Noted: 2022-05-06 | Resolved: 2022-06-01

## 2022-06-01 PROBLEM — R51.9 RIGHT FACIAL PAIN: Status: RESOLVED | Noted: 2021-10-12 | Resolved: 2022-06-01

## 2022-06-01 PROBLEM — R53.83 FATIGUE: Status: RESOLVED | Noted: 2018-06-15 | Resolved: 2022-06-01

## 2022-06-01 LAB
ALBUMIN SERPL BCP-MCNC: 2.8 G/DL (ref 3.5–5.2)
ALP SERPL-CCNC: 70 U/L (ref 55–135)
ALT SERPL W/O P-5'-P-CCNC: 20 U/L (ref 10–44)
ANION GAP SERPL CALC-SCNC: 8 MMOL/L (ref 8–16)
AST SERPL-CCNC: 11 U/L (ref 10–40)
BASOPHILS # BLD AUTO: 0.07 K/UL (ref 0–0.2)
BASOPHILS NFR BLD: 0.4 % (ref 0–1.9)
BILIRUB SERPL-MCNC: 0.8 MG/DL (ref 0.1–1)
BUN SERPL-MCNC: 18 MG/DL (ref 6–20)
CALCIUM SERPL-MCNC: 9 MG/DL (ref 8.7–10.5)
CHLORIDE SERPL-SCNC: 105 MMOL/L (ref 95–110)
CO2 SERPL-SCNC: 25 MMOL/L (ref 23–29)
CREAT SERPL-MCNC: 0.8 MG/DL (ref 0.5–1.4)
DIFFERENTIAL METHOD: ABNORMAL
EOSINOPHIL # BLD AUTO: 0.6 K/UL (ref 0–0.5)
EOSINOPHIL NFR BLD: 3 % (ref 0–8)
ERYTHROCYTE [DISTWIDTH] IN BLOOD BY AUTOMATED COUNT: 13.4 % (ref 11.5–14.5)
EST. GFR  (AFRICAN AMERICAN): >60 ML/MIN/1.73 M^2
EST. GFR  (NON AFRICAN AMERICAN): >60 ML/MIN/1.73 M^2
GLUCOSE SERPL-MCNC: 79 MG/DL (ref 70–110)
HCT VFR BLD AUTO: 37.2 % (ref 37–48.5)
HGB BLD-MCNC: 12.2 G/DL (ref 12–16)
IMM GRANULOCYTES # BLD AUTO: 0.13 K/UL (ref 0–0.04)
IMM GRANULOCYTES NFR BLD AUTO: 0.7 % (ref 0–0.5)
LYMPHOCYTES # BLD AUTO: 3.5 K/UL (ref 1–4.8)
LYMPHOCYTES NFR BLD: 17.9 % (ref 18–48)
MAGNESIUM SERPL-MCNC: 2 MG/DL (ref 1.6–2.6)
MCH RBC QN AUTO: 27.6 PG (ref 27–31)
MCHC RBC AUTO-ENTMCNC: 32.8 G/DL (ref 32–36)
MCV RBC AUTO: 84 FL (ref 82–98)
MONOCYTES # BLD AUTO: 1.1 K/UL (ref 0.3–1)
MONOCYTES NFR BLD: 5.5 % (ref 4–15)
NEUTROPHILS # BLD AUTO: 14.4 K/UL (ref 1.8–7.7)
NEUTROPHILS NFR BLD: 72.5 % (ref 38–73)
NRBC BLD-RTO: 0 /100 WBC
PHOSPHATE SERPL-MCNC: 4 MG/DL (ref 2.7–4.5)
PLATELET # BLD AUTO: 317 K/UL (ref 150–450)
PMV BLD AUTO: 9.1 FL (ref 9.2–12.9)
POTASSIUM SERPL-SCNC: 3.9 MMOL/L (ref 3.5–5.1)
PROT SERPL-MCNC: 6.5 G/DL (ref 6–8.4)
RBC # BLD AUTO: 4.42 M/UL (ref 4–5.4)
SARS-COV-2 RDRP RESP QL NAA+PROBE: NEGATIVE
SODIUM SERPL-SCNC: 138 MMOL/L (ref 136–145)
TROPONIN I SERPL DL<=0.01 NG/ML-MCNC: 0.18 NG/ML (ref 0–0.03)
TROPONIN I SERPL DL<=0.01 NG/ML-MCNC: 0.23 NG/ML (ref 0–0.03)
WBC # BLD AUTO: 19.83 K/UL (ref 3.9–12.7)

## 2022-06-01 PROCEDURE — 63600175 PHARM REV CODE 636 W HCPCS: Performed by: HOSPITALIST

## 2022-06-01 PROCEDURE — 99900035 HC TECH TIME PER 15 MIN (STAT)

## 2022-06-01 PROCEDURE — 25000003 PHARM REV CODE 250: Performed by: NURSE PRACTITIONER

## 2022-06-01 PROCEDURE — U0002 COVID-19 LAB TEST NON-CDC: HCPCS | Performed by: EMERGENCY MEDICINE

## 2022-06-01 PROCEDURE — 85025 COMPLETE CBC W/AUTO DIFF WBC: CPT | Performed by: NURSE PRACTITIONER

## 2022-06-01 PROCEDURE — 99223 1ST HOSP IP/OBS HIGH 75: CPT | Mod: ,,, | Performed by: INTERNAL MEDICINE

## 2022-06-01 PROCEDURE — 83735 ASSAY OF MAGNESIUM: CPT | Performed by: NURSE PRACTITIONER

## 2022-06-01 PROCEDURE — 25000242 PHARM REV CODE 250 ALT 637 W/ HCPCS: Performed by: NURSE PRACTITIONER

## 2022-06-01 PROCEDURE — 97535 SELF CARE MNGMENT TRAINING: CPT

## 2022-06-01 PROCEDURE — 63600175 PHARM REV CODE 636 W HCPCS: Performed by: NURSE PRACTITIONER

## 2022-06-01 PROCEDURE — 99223 PR INITIAL HOSPITAL CARE,LEVL III: ICD-10-PCS | Mod: ,,, | Performed by: INTERNAL MEDICINE

## 2022-06-01 PROCEDURE — 97165 OT EVAL LOW COMPLEX 30 MIN: CPT

## 2022-06-01 PROCEDURE — 27000221 HC OXYGEN, UP TO 24 HOURS

## 2022-06-01 PROCEDURE — 84484 ASSAY OF TROPONIN QUANT: CPT | Performed by: NURSE PRACTITIONER

## 2022-06-01 PROCEDURE — 84100 ASSAY OF PHOSPHORUS: CPT | Performed by: NURSE PRACTITIONER

## 2022-06-01 PROCEDURE — 36415 COLL VENOUS BLD VENIPUNCTURE: CPT | Performed by: NURSE PRACTITIONER

## 2022-06-01 PROCEDURE — 97161 PT EVAL LOW COMPLEX 20 MIN: CPT

## 2022-06-01 PROCEDURE — 94761 N-INVAS EAR/PLS OXIMETRY MLT: CPT

## 2022-06-01 PROCEDURE — 12000002 HC ACUTE/MED SURGE SEMI-PRIVATE ROOM

## 2022-06-01 PROCEDURE — 80053 COMPREHEN METABOLIC PANEL: CPT | Performed by: NURSE PRACTITIONER

## 2022-06-01 RX ORDER — IBUPROFEN 200 MG
24 TABLET ORAL
Status: DISCONTINUED | OUTPATIENT
Start: 2022-06-01 | End: 2022-06-02 | Stop reason: HOSPADM

## 2022-06-01 RX ORDER — GLUCAGON 1 MG
1 KIT INJECTION
Status: DISCONTINUED | OUTPATIENT
Start: 2022-06-01 | End: 2022-06-02 | Stop reason: HOSPADM

## 2022-06-01 RX ORDER — FAMOTIDINE 20 MG/1
20 TABLET, FILM COATED ORAL 2 TIMES DAILY
Status: DISCONTINUED | OUTPATIENT
Start: 2022-06-01 | End: 2022-06-02 | Stop reason: HOSPADM

## 2022-06-01 RX ORDER — LANOLIN ALCOHOL/MO/W.PET/CERES
800 CREAM (GRAM) TOPICAL
Status: DISCONTINUED | OUTPATIENT
Start: 2022-06-01 | End: 2022-06-02 | Stop reason: HOSPADM

## 2022-06-01 RX ORDER — DEXAMETHASONE 2 MG/1
2 TABLET ORAL EVERY 12 HOURS
Status: DISCONTINUED | OUTPATIENT
Start: 2022-06-01 | End: 2022-06-02 | Stop reason: HOSPADM

## 2022-06-01 RX ORDER — IPRATROPIUM BROMIDE AND ALBUTEROL SULFATE 2.5; .5 MG/3ML; MG/3ML
3 SOLUTION RESPIRATORY (INHALATION) EVERY 4 HOURS PRN
Status: DISCONTINUED | OUTPATIENT
Start: 2022-06-01 | End: 2022-06-02 | Stop reason: HOSPADM

## 2022-06-01 RX ORDER — ENOXAPARIN SODIUM 150 MG/ML
1 INJECTION SUBCUTANEOUS
Status: DISCONTINUED | OUTPATIENT
Start: 2022-06-01 | End: 2022-06-02 | Stop reason: HOSPADM

## 2022-06-01 RX ORDER — AMOXICILLIN 250 MG
1 CAPSULE ORAL 2 TIMES DAILY
Status: DISCONTINUED | OUTPATIENT
Start: 2022-06-01 | End: 2022-06-02 | Stop reason: HOSPADM

## 2022-06-01 RX ORDER — ACETAMINOPHEN 500 MG
1000 TABLET ORAL EVERY 6 HOURS PRN
Status: DISCONTINUED | OUTPATIENT
Start: 2022-06-01 | End: 2022-06-02 | Stop reason: HOSPADM

## 2022-06-01 RX ORDER — SODIUM,POTASSIUM PHOSPHATES 280-250MG
2 POWDER IN PACKET (EA) ORAL
Status: DISCONTINUED | OUTPATIENT
Start: 2022-06-01 | End: 2022-06-02 | Stop reason: HOSPADM

## 2022-06-01 RX ORDER — ONDANSETRON 2 MG/ML
4 INJECTION INTRAMUSCULAR; INTRAVENOUS EVERY 8 HOURS PRN
Status: DISCONTINUED | OUTPATIENT
Start: 2022-06-01 | End: 2022-06-02 | Stop reason: HOSPADM

## 2022-06-01 RX ORDER — ACETAMINOPHEN 325 MG/1
650 TABLET ORAL EVERY 6 HOURS PRN
Status: DISCONTINUED | OUTPATIENT
Start: 2022-06-01 | End: 2022-06-01

## 2022-06-01 RX ORDER — SODIUM CHLORIDE 0.9 % (FLUSH) 0.9 %
10 SYRINGE (ML) INJECTION EVERY 8 HOURS PRN
Status: DISCONTINUED | OUTPATIENT
Start: 2022-06-01 | End: 2022-06-02 | Stop reason: HOSPADM

## 2022-06-01 RX ORDER — SIMETHICONE 80 MG
1 TABLET,CHEWABLE ORAL 4 TIMES DAILY PRN
Status: DISCONTINUED | OUTPATIENT
Start: 2022-06-01 | End: 2022-06-02 | Stop reason: HOSPADM

## 2022-06-01 RX ORDER — HYDROCODONE BITARTRATE AND ACETAMINOPHEN 5; 325 MG/1; MG/1
1 TABLET ORAL EVERY 6 HOURS PRN
Status: DISCONTINUED | OUTPATIENT
Start: 2022-06-01 | End: 2022-06-02 | Stop reason: HOSPADM

## 2022-06-01 RX ORDER — ACETAMINOPHEN 325 MG/1
650 TABLET ORAL EVERY 4 HOURS PRN
Status: DISCONTINUED | OUTPATIENT
Start: 2022-06-01 | End: 2022-06-01

## 2022-06-01 RX ORDER — MAG HYDROX/ALUMINUM HYD/SIMETH 200-200-20
30 SUSPENSION, ORAL (FINAL DOSE FORM) ORAL 4 TIMES DAILY PRN
Status: DISCONTINUED | OUTPATIENT
Start: 2022-06-01 | End: 2022-06-02 | Stop reason: HOSPADM

## 2022-06-01 RX ORDER — IBUPROFEN 200 MG
16 TABLET ORAL
Status: DISCONTINUED | OUTPATIENT
Start: 2022-06-01 | End: 2022-06-02 | Stop reason: HOSPADM

## 2022-06-01 RX ORDER — TALC
9 POWDER (GRAM) TOPICAL NIGHTLY PRN
Status: DISCONTINUED | OUTPATIENT
Start: 2022-06-01 | End: 2022-06-02 | Stop reason: HOSPADM

## 2022-06-01 RX ORDER — NALOXONE HCL 0.4 MG/ML
0.02 VIAL (ML) INJECTION
Status: DISCONTINUED | OUTPATIENT
Start: 2022-06-01 | End: 2022-06-02 | Stop reason: HOSPADM

## 2022-06-01 RX ADMIN — DEXAMETHASONE 2 MG: 2 TABLET ORAL at 09:06

## 2022-06-01 RX ADMIN — DOCUSATE SODIUM AND SENNOSIDES 1 TABLET: 8.6; 5 TABLET, FILM COATED ORAL at 08:06

## 2022-06-01 RX ADMIN — ENOXAPARIN SODIUM 120 MG: 150 INJECTION SUBCUTANEOUS at 01:06

## 2022-06-01 RX ADMIN — DOCUSATE SODIUM AND SENNOSIDES 1 TABLET: 8.6; 5 TABLET, FILM COATED ORAL at 09:06

## 2022-06-01 RX ADMIN — ENOXAPARIN SODIUM 120 MG: 150 INJECTION SUBCUTANEOUS at 11:06

## 2022-06-01 RX ADMIN — FAMOTIDINE 20 MG: 20 TABLET ORAL at 08:06

## 2022-06-01 RX ADMIN — FAMOTIDINE 20 MG: 20 TABLET ORAL at 09:06

## 2022-06-01 RX ADMIN — DEXAMETHASONE 2 MG: 2 TABLET ORAL at 08:06

## 2022-06-01 NOTE — ASSESSMENT & PLAN NOTE
Provoked bilateral pulmonary emboli (1/6/22) s/p elective decompression of Chiari I malformation in 12/21  Hematology: Dr. Blackwell  TTE (1/6/22): LVEF 60%, unable to comment on R heart strain, though evidence of R heart strain on subsequent CTA  Current regimen (06/01/2022):   - Apixiban 10 mg BID  Antiphospholipid labs (5/9/22): negative to date; anticardiolipin IgM 13 (equivocal), DRVVT not completed     Pt stopped apixiban for shunt placement secondary to postoperative syrinx in May 2022, did not restart postoperatively as she had misplaced her prescription. Currently admitted to [facility] due to recurrence of PE symptoms with acute on chronic bilateral PE. Scheduled to undergo repeat echocardiogram today.    Recommendations:  1. As the diagnosis of antiphospholipid would modify pregnancy management, recommend repeating anticardiolipin IgM in late August (>12w from initial test), as well as ordering DRRVT for lupus anticoagulant assessment  2. Agree with TTE today - R heart strain during pregnancy would significantly modify her risk for adverse pregnancy outcome if present. If abnormal, strongly recommend cardiology consultation prior to pregnancy.   3. Restart apixiban for now, though oral anticoagulants are contraindicated in pregnancy. If she becomes pregnant within 6 months of this acute PE, will recommend transition to therapeutic dose enoxaparin; if beyond that time and negative for APLS labs, start intermediate dose enoxaparin, based on her current weight. See [facility] Guidelines for further detail regarding monitoring.  4. Please re-consult MFM for 1st trimester visit should patient become pregnant.  5. Otherwise continue care with Dr. Blackwell

## 2022-06-01 NOTE — SUBJECTIVE & OBJECTIVE
Past Medical History:   Diagnosis Date    Bilateral pulmonary embolism 1/6/2022    Current moderate episode of major depressive disorder without prior episode 6/15/2018    Radiculopathy, cervical region 10/12/2021       Past Surgical History:   Procedure Laterality Date    DECOMPRESSION OF CHIARI MALFORMATION BY REMOVAL OF POSTERIOR ARCH OF FIRST CERVICAL VERTEBRA N/A 12/23/2021    Procedure: DECOMPRESSION, CHIARI MALFORMATION, BY 1ST CERVICAL VERTEBRA POSTERIOR ARCH REMOVAL;  Surgeon: Kirk Reis MD;  Location: [facility] OR [address] [place] [address];  Service: Neurosurgery;  Laterality: N/A;  ASA1  TOR1  T&Cross x 2 units  [place]    HYSTEROSCOPY WITH DILATION AND CURETTAGE OF UTERUS N/A 10/26/2020    Procedure: HYSTEROSCOPY, WITH DILATION AND CURETTAGE OF UTERUS;  Surgeon: Manjula Rivas MD;  Location: [facility];  Service: OB/GYN;  Laterality: N/A;    REVISION OF PROCEDURE INVOLVING SYRINGOPLEURAL SHUNT N/A 5/19/2022    Procedure: SYRINGOPLEURAL SHUNT Insertion T4 Laminectomy;  Surgeon: Kirk Reis MD;  Location: [facility] OR [address] [place] [address];  Service: Neurosurgery;  Laterality: N/A;       Review of patient's allergies indicates:   Allergen Reactions    Sulfa (sulfonamide antibiotics) Hives             No current facility-administered medications on file prior to encounter.     Current Outpatient Medications on File Prior to Encounter   Medication Sig    dexAMETHasone (DECADRON) 2 MG tablet Take 1 tablet (2 mg total) by mouth every 12 (twelve) hours.    famotidine (PEPCID) 20 MG tablet Take 1 tablet (20 mg total) by mouth 2 (two) times daily.    gabapentin (NEURONTIN) 300 MG capsule Take 1 capsule (300 mg total) by mouth 3 (three) times daily. (Patient not taking: Reported on 5/24/2022)    methocarbamoL (ROBAXIN) 750 MG Tab Take 1 tablet (750 mg total) by mouth 4 (four) times daily. for 10 days    metoprolol tartrate (LOPRESSOR) 50 MG tablet Take 1 tablet (50 mg total) by mouth 2 (two) times daily. (Patient not taking: Reported on  2022)    ondansetron (ZOFRAN-ODT) 4 MG TbDL Take 1 tablet (4 mg total) by mouth every 6 (six) hours as needed (nausea).    oxyCODONE-acetaminophen (PERCOCET)  mg per tablet Take 1 tablet by mouth every 4 (four) hours as needed for Pain.     Family History       Problem Relation (Age of Onset)    Hypertension Mother    Mental illness Brother    No Known Problems Father          Tobacco Use    Smoking status: Former Smoker     Types: Vaping w/o nicotine     Quit date:      Years since quittin.4    Smokeless tobacco: Never Used   Substance and Sexual Activity    Alcohol use: Yes     Comment: social     Drug use: No    Sexual activity: Yes     Partners: Male     Birth control/protection: None     Review of Systems   Constitutional:  Positive for activity change. Negative for chills, diaphoresis and fever.   HENT:  Negative for congestion, nosebleeds and tinnitus.    Eyes:  Negative for photophobia and visual disturbance.   Respiratory:  Positive for chest tightness and shortness of breath. Negative for cough and wheezing.    Cardiovascular:  Negative for chest pain, palpitations and leg swelling.   Gastrointestinal:  Negative for abdominal distention, abdominal pain, constipation, diarrhea, nausea and vomiting.   Endocrine: Negative for cold intolerance and heat intolerance.   Genitourinary:  Negative for difficulty urinating, dysuria, frequency, hematuria and urgency.   Musculoskeletal:  Positive for arthralgias. Negative for back pain and myalgias.   Skin:  Negative for pallor, rash and wound.   Allergic/Immunologic: Negative for immunocompromised state.   Neurological:  Negative for dizziness, tremors, facial asymmetry, speech difficulty and weakness.   Hematological:  Negative for adenopathy. Does not bruise/bleed easily.   Psychiatric/Behavioral:  Negative for confusion and sleep disturbance. The patient is not nervous/anxious.    Objective:     Vital Signs (Most Recent):  Temp: 99.5 °F (37.5 °C)  (05/31/22 2028)  Pulse: 100 (05/31/22 2309)  Resp: (!) 24 (05/31/22 2028)  BP: (!) 108/50 (05/31/22 2200)  SpO2: 99 % (05/31/22 2309)   Vital Signs (24h Range):  Temp:  [99.5 °F (37.5 °C)] 99.5 °F (37.5 °C)  Pulse:  [100-105] 100  Resp:  [24] 24  SpO2:  [94 %-99 %] 99 %  BP: (108-113)/(50-72) 108/50     Weight: 127 kg (280 lb)  Body mass index is 46.59 kg/m².    Physical Exam  Vitals and nursing note reviewed.   Constitutional:       General: She is not in acute distress.     Appearance: She is well-developed. She is obese. She is ill-appearing. She is not diaphoretic.   HENT:      Head: Normocephalic.      Mouth/Throat:      Mouth: Mucous membranes are moist.   Eyes:      General: No scleral icterus.     Conjunctiva/sclera: Conjunctivae normal.      Pupils: Pupils are equal, round, and reactive to light.   Neck:      Vascular: No JVD.   Cardiovascular:      Rate and Rhythm: Regular rhythm. Tachycardia present.      Heart sounds: Normal heart sounds. No murmur heard.    No friction rub. No gallop.   Pulmonary:      Effort: Pulmonary effort is normal. No respiratory distress.      Breath sounds: Normal breath sounds. No wheezing or rales.   Abdominal:      General: Bowel sounds are normal. There is no distension.      Palpations: Abdomen is soft.      Tenderness: There is no abdominal tenderness. There is no guarding or rebound.   Musculoskeletal:         General: No tenderness. Normal range of motion.      Cervical back: Normal range of motion and neck supple.   Lymphadenopathy:      Cervical: No cervical adenopathy.   Skin:     General: Skin is warm and dry.      Capillary Refill: Capillary refill takes less than 2 seconds.      Coloration: Skin is not pale.      Findings: No erythema or rash.   Neurological:      Mental Status: She is alert and oriented to person, place, and time.      Cranial Nerves: No cranial nerve deficit.      Sensory: No sensory deficit.      Coordination: Coordination normal.      Deep  Tendon Reflexes: Reflexes normal.   Psychiatric:         Mood and Affect: Mood normal.         Behavior: Behavior normal.         Thought Content: Thought content normal.         Judgment: Judgment normal.         CRANIAL NERVES     CN III, IV, VI   Pupils are equal, round, and reactive to light.     Significant Labs: All pertinent labs within the past 24 hours have been reviewed.  CBC:   Recent Labs   Lab 05/31/22 2040   WBC 21.97*   HGB 13.0   HCT 39.5        CMP:   Recent Labs   Lab 05/31/22 2040      K 3.5      CO2 24   *   BUN 22*   CREATININE 0.8   CALCIUM 9.4   PROT 7.7   ALBUMIN 3.1*   BILITOT 0.5   ALKPHOS 81   AST 15   ALT 22   ANIONGAP 12   EGFRNONAA >60     Cardiac Markers:   Recent Labs   Lab 05/31/22 2040   BNP <10       Significant Imaging: I have reviewed all pertinent imaging results/findings within the past 24 hours.  CTA chest:  Impression:     1. Bilateral occlusive and nonocclusive pulmonary emboli as above with evidence of right heart strain.  2. Syringopleural shunt with multiple fluid collections along the course of the catheter and a small left pleural effusion.  3. Several mild peripherally based patchy ground-glass opacities in the right lower lobe suspicious for small pulmonary infarcts in the setting of multiple PEs.  A mild infectious or inflammatory process is also possible

## 2022-06-01 NOTE — H&P
[facility] Emergency Dept  [facility] Medicine  History & Physical    Patient Name: Jamilah French  MRN: [id number]  Patient Class: IP- Inpatient  Admission Date: 5/31/2022  Attending Physician: Jalen Will MD   Primary Care Provider: Sirena Hess DNP         Patient information was obtained from patient, relative(s), past medical records and ER records.     Subjective:     Principal Problem:Acute pulmonary embolism    Chief Complaint:   Chief Complaint   Patient presents with    Shortness of Breath     With chest discomfort. PMH blood clots in the lungs.         HPI: Jamilah French is a 32-year-old female presents emergency room for evaluation of shortness of breath.Patient reports a Hx of PE bilaterally and notes that her current Sx are similar to when she was diagnosed on 01/06/22.  She states that initial PE presented 2 weeks s/p surgery (decompression of Chiari malformation by removal of posterior arch of 1st cervical vertebra) performed on 12/23/21.  Patient is currently 12 days s/p syringopleural shunt insertion T4 laminectomy secondary to syrinx of spinal cord and syringomyelia.  She is currently prescribed Eliquis but notes a halt in compliancy due to recent surgery and specifies her 1st dose since stopping was today.  Patient confirms associated central CP as well as calf pain that onset 3 days ago bilaterally.  Previous medical history includes obesity, Chiari malformation, PCOS, syrinx of spinal cord, and previous PE.  ER workup:  CBC with 22,000 white count.  CMP with glucose 147 BUN of 22 otherwise unremarkable.  Troponin elevated 0.125.  D-dimer 8.28.  CTA of chest demonstrates bilateral occlusive and nonocclusive pulmonary emboli with right heart strain with ground-glass opacities in the right lung suspicious for small pulmonary infarcts.  Patient admitted to Hospital Medicine for observation management.  Patient be placed in ICU out of an abundance of caution.  Patient will be started on  Lovenox 1 mg/kg subQ q.12.  Pulmonary consult in a.m..      Past Medical History:   Diagnosis Date    Bilateral pulmonary embolism 1/6/2022    Current moderate episode of major depressive disorder without prior episode 6/15/2018    Radiculopathy, cervical region 10/12/2021       Past Surgical History:   Procedure Laterality Date    DECOMPRESSION OF CHIARI MALFORMATION BY REMOVAL OF POSTERIOR ARCH OF FIRST CERVICAL VERTEBRA N/A 12/23/2021    Procedure: DECOMPRESSION, CHIARI MALFORMATION, BY 1ST CERVICAL VERTEBRA POSTERIOR ARCH REMOVAL;  Surgeon: Kirk Reis MD;  Location: [facility] OR [address] [place] [address];  Service: Neurosurgery;  Laterality: N/A;  ASA1  TOR1  T&Cross x 2 units  [place]    HYSTEROSCOPY WITH DILATION AND CURETTAGE OF UTERUS N/A 10/26/2020    Procedure: HYSTEROSCOPY, WITH DILATION AND CURETTAGE OF UTERUS;  Surgeon: Manjula Rivas MD;  Location: [facility];  Service: OB/GYN;  Laterality: N/A;    REVISION OF PROCEDURE INVOLVING SYRINGOPLEURAL SHUNT N/A 5/19/2022    Procedure: SYRINGOPLEURAL SHUNT Insertion T4 Laminectomy;  Surgeon: Kirk Reis MD;  Location: [address];  Service: Neurosurgery;  Laterality: N/A;       Review of patient's allergies indicates:   Allergen Reactions    Sulfa (sulfonamide antibiotics) Hives             No current facility-administered medications on file prior to encounter.     Current Outpatient Medications on File Prior to Encounter   Medication Sig    dexAMETHasone (DECADRON) 2 MG tablet Take 1 tablet (2 mg total) by mouth every 12 (twelve) hours.    famotidine (PEPCID) 20 MG tablet Take 1 tablet (20 mg total) by mouth 2 (two) times daily.    gabapentin (NEURONTIN) 300 MG capsule Take 1 capsule (300 mg total) by mouth 3 (three) times daily. (Patient not taking: Reported on 5/24/2022)    methocarbamoL (ROBAXIN) 750 MG Tab Take 1 tablet (750 mg total) by mouth 4 (four) times daily. for 10 days    metoprolol tartrate (LOPRESSOR) 50 MG tablet Take 1 tablet (50 mg total)  by mouth 2 (two) times daily. (Patient not taking: Reported on 2022)    ondansetron (ZOFRAN-ODT) 4 MG TbDL Take 1 tablet (4 mg total) by mouth every 6 (six) hours as needed (nausea).    oxyCODONE-acetaminophen (PERCOCET)  mg per tablet Take 1 tablet by mouth every 4 (four) hours as needed for Pain.     Family History       Problem Relation (Age of Onset)    Hypertension Mother    Mental illness Brother    No Known Problems Father          Tobacco Use    Smoking status: Former Smoker     Types: Vaping w/o nicotine     Quit date:      Years since quittin.4    Smokeless tobacco: Never Used   Substance and Sexual Activity    Alcohol use: Yes     Comment: social     Drug use: No    Sexual activity: Yes     Partners: Male     Birth control/protection: None     Review of Systems   Constitutional:  Positive for activity change. Negative for chills, diaphoresis and fever.   HENT:  Negative for congestion, nosebleeds and tinnitus.    Eyes:  Negative for photophobia and visual disturbance.   Respiratory:  Positive for chest tightness and shortness of breath. Negative for cough and wheezing.    Cardiovascular:  Negative for chest pain, palpitations and leg swelling.   Gastrointestinal:  Negative for abdominal distention, abdominal pain, constipation, diarrhea, nausea and vomiting.   Endocrine: Negative for cold intolerance and heat intolerance.   Genitourinary:  Negative for difficulty urinating, dysuria, frequency, hematuria and urgency.   Musculoskeletal:  Positive for arthralgias. Negative for back pain and myalgias.   Skin:  Negative for pallor, rash and wound.   Allergic/Immunologic: Negative for immunocompromised state.   Neurological:  Negative for dizziness, tremors, facial asymmetry, speech difficulty and weakness.   Hematological:  Negative for adenopathy. Does not bruise/bleed easily.   Psychiatric/Behavioral:  Negative for confusion and sleep disturbance. The patient is not nervous/anxious.     Objective:     Vital Signs (Most Recent):  Temp: 99.5 °F (37.5 °C) (05/31/22 2028)  Pulse: 100 (05/31/22 2309)  Resp: (!) 24 (05/31/22 2028)  BP: (!) 108/50 (05/31/22 2200)  SpO2: 99 % (05/31/22 2309)   Vital Signs (24h Range):  Temp:  [99.5 °F (37.5 °C)] 99.5 °F (37.5 °C)  Pulse:  [100-105] 100  Resp:  [24] 24  SpO2:  [94 %-99 %] 99 %  BP: (108-113)/(50-72) 108/50     Weight: 127 kg (280 lb)  Body mass index is 46.59 kg/m².    Physical Exam  Vitals and nursing note reviewed.   Constitutional:       General: She is not in acute distress.     Appearance: She is well-developed. She is obese. She is ill-appearing. She is not diaphoretic.   HENT:      Head: Normocephalic.      Mouth/Throat:      Mouth: Mucous membranes are moist.   Eyes:      General: No scleral icterus.     Conjunctiva/sclera: Conjunctivae normal.      Pupils: Pupils are equal, round, and reactive to light.   Neck:      Vascular: No JVD.   Cardiovascular:      Rate and Rhythm: Regular rhythm. Tachycardia present.      Heart sounds: Normal heart sounds. No murmur heard.    No friction rub. No gallop.   Pulmonary:      Effort: Pulmonary effort is normal. No respiratory distress.      Breath sounds: Normal breath sounds. No wheezing or rales.   Abdominal:      General: Bowel sounds are normal. There is no distension.      Palpations: Abdomen is soft.      Tenderness: There is no abdominal tenderness. There is no guarding or rebound.   Musculoskeletal:         General: No tenderness. Normal range of motion.      Cervical back: Normal range of motion and neck supple.   Lymphadenopathy:      Cervical: No cervical adenopathy.   Skin:     General: Skin is warm and dry.      Capillary Refill: Capillary refill takes less than 2 seconds.      Coloration: Skin is not pale.      Findings: No erythema or rash.   Neurological:      Mental Status: She is alert and oriented to person, place, and time.      Cranial Nerves: No cranial nerve deficit.      Sensory: No  sensory deficit.      Coordination: Coordination normal.      Deep Tendon Reflexes: Reflexes normal.   Psychiatric:         Mood and Affect: Mood normal.         Behavior: Behavior normal.         Thought Content: Thought content normal.         Judgment: Judgment normal.         CRANIAL NERVES     CN III, IV, VI   Pupils are equal, round, and reactive to light.     Significant Labs: All pertinent labs within the past 24 hours have been reviewed.  CBC:   Recent Labs   Lab 05/31/22 2040   WBC 21.97*   HGB 13.0   HCT 39.5        CMP:   Recent Labs   Lab 05/31/22 2040      K 3.5      CO2 24   *   BUN 22*   CREATININE 0.8   CALCIUM 9.4   PROT 7.7   ALBUMIN 3.1*   BILITOT 0.5   ALKPHOS 81   AST 15   ALT 22   ANIONGAP 12   EGFRNONAA >60     Cardiac Markers:   Recent Labs   Lab 05/31/22 2040   BNP <10       Significant Imaging: I have reviewed all pertinent imaging results/findings within the past 24 hours.  CTA chest:  Impression:     1. Bilateral occlusive and nonocclusive pulmonary emboli as above with evidence of right heart strain.  2. Syringopleural shunt with multiple fluid collections along the course of the catheter and a small left pleural effusion.  3. Several mild peripherally based patchy ground-glass opacities in the right lower lobe suspicious for small pulmonary infarcts in the setting of multiple PEs.  A mild infectious or inflammatory process is also possible    Assessment/Plan:     * Acute pulmonary embolism  Acute problem  Pulmonology consult in a.m.  Lovenox 1 milligram/kilogram subQ q.12  ICU admit  Echo in a.m.      Syrinx of spinal cord  Chronic problem  Recent surgical repair      Morbid obesity  Chronic  Body mass index is 46.59 kg/m². Morbid obesity complicates all aspects of disease management from diagnostic modalities to treatment. Weight loss encouraged and health benefits explained to patient.        VTE Risk Mitigation (From admission, onward)         Ordered      enoxaparin injection 120 mg  Every 12 hours (non-standard times)         06/01/22 0037     IP VTE HIGH RISK PATIENT  Once         06/01/22 0041     Place sequential compression device  Until discontinued         06/01/22 0041     Place KRISSY hose  Until discontinued         06/01/22 0041               Critical care time spent on the evaluation and treatment of severe organ dysfunction, review of pertinent labs and imaging studies, discussions with consulting providers and discussions with patient/family 45 minutes      Francis Sam NP  Department of Hospital Medicine   [facility] - Emergency Dept

## 2022-06-01 NOTE — PT/OT/SLP EVAL
Occupational Therapy   Evaluation    Name: Jamilah French  MRN: [id number]  Admitting Diagnosis:  Acute pulmonary embolism  Recent Surgery: * No surgery found *      Recommendations:     Discharge Recommendations: home health OT  Discharge Equipment Recommendations:  none  Barriers to discharge:  None    Assessment:     Jamilah French is a 32 y.o. female with a medical diagnosis of Acute pulmonary embolism.  She presents with c/o LLE numbness s/p syringopleural shunt insertion T4 laminectomy sx this May 2022. Patient was able to perform supine<>sit requiring SBA. Patient performed grooming tasks at EOB and toileting on BSC requiring SBA. Patient stated her activity tolerance is poor particularly when ambulating. Performance deficits affecting function: weakness, impaired endurance, impaired self care skills, impaired functional mobilty, gait instability, impaired balance, decreased lower extremity function, decreased coordination, impaired sensation.      Rehab Prognosis: Good; patient would benefit from acute skilled OT services to address these deficits and reach maximum level of function.       Plan:     Patient to be seen 4 x/week to address the above listed problems via self-care/home management, therapeutic activities, therapeutic exercises  · Plan of Care Expires: 06/22/22  · Plan of Care Reviewed with: patient    Subjective     Chief Complaint: LLE numbness  Patient/Family Comments/goals: none    Occupational Profile:  Living Environment: Patient lives with  and in-laws in a [facility] with 5 AMANDA and B rails.   Previous level of function: Patient required assistance with dressing and bathing since sx this past May 2022. Patient was ambulatory w/o AD in home, but used rollator with community mobility.  Equipment Used at Home:  rollator, oxygen  Assistance upon Discharge: Patient will receive assistance from family.     Pain/Comfort:  · Pain Rating 1: 0/10  · Pain Rating Post-Intervention 1:  0/10    Patients cultural, spiritual, Restorationism conflicts given the current situation:      Objective:     Communicated with: nurse Polanco prior to session.  Patient found HOB elevated with bed alarm, blood pressure cuff, oxygen, peripheral IV, pulse ox (continuous), telemetry upon OT entry to room.    General Precautions: Standard,  (Standard)   Orthopedic Precautions:N/A   Braces: N/A  Respiratory Status: Nasal cannula, flow 2 L/min    Occupational Performance:    Bed Mobility:    · Patient completed Scooting/Bridging with stand by assistance  · Patient completed Supine to Sit with stand by assistance  · Patient completed Sit to Supine with stand by assistance    Functional Mobility/Transfers:  · Patient completed Sit <> Stand Transfer with stand by assistance  with  rolling walker   · Patient completed Bed <> Bedside commode Transfer using Stand Pivot technique with stand by assistance with rolling walker    Activities of Daily Living:  · Feeding:  independence eating breakfast at bed level  · Grooming: stand by assistance with oral and facial hygiene while seated EOB  · Upper Body Dressing: modified independence   · Lower Body Dressing: maximal assistance to don/doff socks while seated EOB  · Toileting: stand by assistance with performing BM hygiene while seated on BSC    Cognitive/Visual Perceptual:  Cognitive/Psychosocial Skills:     -       Oriented to: x4   -       Follows Commands/attention:Follows multistep  commands  -       Communication: clear/fluent  -       Safety awareness/insight to disability: intact   -       Mood/Affect/Coping skills/emotional control: Appropriate to situation and Cooperative  Visual/Perceptual:      -Intact     Physical Exam:  Postural examination/scapula alignment:    -       Rounded shoulders  -       Forward head  Upper Extremity Range of Motion:     -       Right Upper Extremity: WFL  -       Left Upper Extremity: WFL  Upper Extremity Strength:    -       Right Upper  Extremity: WFL  -       Left Upper Extremity: WFL   Strength:    -       Right Upper Extremity: WFL  -       Left Upper Extremity: WFL  Fine Motor Coordination:    -       Intact  Gross motor coordination:   WFL in BUE    AMPAC 6 Click ADL:  AMPA Total Score: 19    Treatment & Education:  OT ed pt on OT role & POC as well as discharge recommendations.  OT ed patient on safety with walker use for functional mobility with cues for hand placement & sequencing.   OT ed pt on use of adaptive equipment for LB dressing & safe item retrieval with reacher with demonstration provided.    Education:    Patient left HOB elevated with all lines intact, call button in reach, bed alarm on and nurse notified    GOALS:   Multidisciplinary Problems     Occupational Therapy Goals        Problem: Occupational Therapy    Goal Priority Disciplines Outcome Interventions   Occupational Therapy Goal     OT, PT/OT Ongoing, Progressing    Description: Goals to be met by: 6/22/2022     Patient will increase functional independence with ADLs by performing:    LE Dressing with Modified [place] and Assistive Devices as needed.  Grooming while standing at sink with Modified [place].  Toileting from toilet with Modified [place] for hygiene and clothing management.   Supine to sit with Modified [place].  Toilet transfer to toilet with Modified [place].                     History:     Past Medical History:   Diagnosis Date    Bilateral pulmonary embolism 1/6/2022    Current moderate episode of major depressive disorder without prior episode 6/15/2018    Radiculopathy, cervical region 10/12/2021       Past Surgical History:   Procedure Laterality Date    DECOMPRESSION OF CHIARI MALFORMATION BY REMOVAL OF POSTERIOR ARCH OF FIRST CERVICAL VERTEBRA N/A 12/23/2021    Procedure: DECOMPRESSION, CHIARI MALFORMATION, BY 1ST CERVICAL VERTEBRA POSTERIOR ARCH REMOVAL;  Surgeon: Kirk Reis MD;  Location: [facility] OR [address] [place] [address];   Service: Neurosurgery;  Laterality: N/A;  ASA1  TOR1  T&Cross x 2 units  [place]    HYSTEROSCOPY WITH DILATION AND CURETTAGE OF UTERUS N/A 10/26/2020    Procedure: HYSTEROSCOPY, WITH DILATION AND CURETTAGE OF UTERUS;  Surgeon: Manjula Rivas MD;  Location: [facility];  Service: OB/GYN;  Laterality: N/A;    REVISION OF PROCEDURE INVOLVING SYRINGOPLEURAL SHUNT N/A 5/19/2022    Procedure: SYRINGOPLEURAL SHUNT Insertion T4 Laminectomy;  Surgeon: Kirk Reis MD;  Location: [address];  Service: Neurosurgery;  Laterality: N/A;       Time Tracking:     OT Date of Treatment: 06/01/22  OT Start Time: 0948  OT Stop Time: 1028  OT Total Time (min): 40 min    Billable Minutes:Evaluation 10  Self Care/Home Management 30    6/1/2022

## 2022-06-01 NOTE — PLAN OF CARE
Recommendations  Recommendation/Intervention: 1.) Continue with Cardiac diet  Goals: 1.) pt will meet >50% EEN during admit  Nutrition Goal Status: new  Communication of RD Recs: other (comment) (POC)

## 2022-06-01 NOTE — PLAN OF CARE
Problem: Occupational Therapy  Goal: Occupational Therapy Goal  Description: Goals to be met by: 6/22/2022     Patient will increase functional independence with ADLs by performing:    LE Dressing with Modified [place] and Assistive Devices as needed.  Grooming while standing at sink with Modified [place].  Toileting from toilet with Modified [place] for hygiene and clothing management.   Supine to sit with Modified [place].  Toilet transfer to toilet with Modified [place].    Outcome: Ongoing, Progressing

## 2022-06-01 NOTE — ASSESSMENT & PLAN NOTE
Chronic  Body mass index is 46.59 kg/m². Morbid obesity complicates all aspects of disease management from diagnostic modalities to treatment. Weight loss encouraged and health benefits explained to patient.

## 2022-06-01 NOTE — HPI
Jamilah French is a 32-year-old female presents emergency room for evaluation of shortness of breath.Patient reports a Hx of PE bilaterally and notes that her current Sx are similar to when she was diagnosed on 01/06/22.  She states that initial PE presented 2 weeks s/p surgery (decompression of Chiari malformation by removal of posterior arch of 1st cervical vertebra) performed on 12/23/21.  Patient is currently 12 days s/p syringopleural shunt insertion T4 laminectomy secondary to syrinx of spinal cord and syringomyelia.  She is currently prescribed Eliquis but notes a halt in compliancy due to recent surgery and specifies her 1st dose since stopping was today.  Patient confirms associated central CP as well as calf pain that onset 3 days ago bilaterally.  Previous medical history includes obesity, Chiari malformation, PCOS, syrinx of spinal cord, and previous PE.  ER workup:  CBC with 22,000 white count.  CMP with glucose 147 BUN of 22 otherwise unremarkable.  Troponin elevated 0.125.  D-dimer 8.28.  CTA of chest demonstrates bilateral occlusive and nonocclusive pulmonary emboli with right heart strain with ground-glass opacities in the right lung suspicious for small pulmonary infarcts.  Patient admitted to Hospital Medicine for observation management.  Patient be placed in ICU out of an abundance of caution.  Patient will be started on Lovenox 1 mg/kg subQ q.12.  Pulmonary consult in a.m..

## 2022-06-01 NOTE — PLAN OF CARE
[facility]  Initial Discharge Assessment       Primary Care Provider: Sirena Hess DNP    Admission Diagnosis: SOB (shortness of breath) [R06.02]  Chest pain [R07.9]  Other acute pulmonary embolism, unspecified whether acute cor pulmonale present [I26.99]    Admission Date: 5/31/2022  Expected Discharge Date:     Discharge Barriers Identified: None    Payor: BLUE CROSS BLUE SHIELD / Plan: [place] / Product Type: Commercial /     Extended Emergency Contact Information  Primary Emergency Contact: Neil French  Address: [address] [place] [address] [place]  Mobile Phone: [phone]  Relation: Spouse  Preferred language: English   needed? No    Discharge Plan A: Home with family  Discharge Plan B: Home      [facility] [place] - [address]  [address]  [facility] [address]  Phone: [phone] Fax: [phone]    Completed DC assessment with pt at bedside. Verified information on facesheet as correct. Denies POA. Reports living at listed address with her spouse and in laws. Listed address is her mother's address which she uses for mailing. [place] [address] [facility] [address]). Verified PCP and pharm. Normally independent with activities. Spouse to provide transportation home upon DC. Denies HH/HD. DME- o2 (does not know company, portable/concentrator, 2L PRN), rollator. Pt is requesting cane for DC. Verified insurance on file. DC plan is home.     Initial Assessment (most recent)     Adult Discharge Assessment - 06/01/22 1512        Discharge Assessment    Assessment Type Discharge Planning Assessment     Confirmed/corrected address, phone number and insurance Yes     Confirmed Demographics Correct on Facesheet     Source of Information patient     Communicated LORETO with patient/caregiver Yes     Reason For Admission acute PE     Lives With spouse     Do you expect to return to  your current living situation? No     Do you have help at home or someone to help you manage your care at home? Yes     Who are your caregiver(s) and their phone number(s)? spouse     Prior to hospitilization cognitive status: Alert/Oriented     Current cognitive status: Alert/Oriented     Walking or Climbing Stairs Difficulty ambulation difficulty, requires equipment     Equipment Currently Used at Home rollator;oxygen     Readmission within 30 days? Yes     Patient currently being followed by outpatient case management? No     Do you currently have service(s) that help you manage your care at home? No     Do you take prescription medications? Yes     Do you have prescription coverage? Yes     Coverage BCBS     Do you have any problems affording any of your prescribed medications? No     Is the patient taking medications as prescribed? yes     How do you get to doctors appointments? family or friend will provide     Are you on dialysis? No     Do you take coumadin? No     Discharge Plan A Home with family     Discharge Plan B Home     DME Needed Upon Discharge  cane, straight     Discharge Plan discussed with: Patient     Discharge Barriers Identified None

## 2022-06-01 NOTE — EICU
New Patient Evaluation    HPI:  32 F obese (BMI47), history of provoked PE in January after decompression of Chiari malformation. She is now 12 days s/p syringopleural shunt insertion T4 laminectomy secondary to syrinx of spinal cord and syringomyelia where apixaban was held and was only resumed today. She presents with 3 day history of chest and calf pain. CTA of chest demonstrates bilateral occlusive and nonocclusive pulmonary emboli with right heart strain with ground-glass opacities in the right lung suspicious for small pulmonary infarcts. Started on full dose Lovenox.    Camera Assessment:  /67  HR 94  O2 98%  Patient seen awake not in acute distress    Data:  WBC 22, H/H 13/39, platelets 318  Na 141, K 3.5, creatinine 0.8  Trop I 0.226  MAUREEN (5/9/22) negative    Assessment and Plans:  Pulmonary embolism with evidence of right heart strain started on full dose enoxaparin. Close ICU monitoring.for now.

## 2022-06-01 NOTE — ASSESSMENT & PLAN NOTE
Acute problem  Pulmonology consult in a.m.  Lovenox 1 milligram/kilogram subQ q.12  ICU admit  Echo in a.m.

## 2022-06-01 NOTE — ED PROVIDER NOTES
Encounter Date: 5/31/2022    SCRIBE #1 NOTE: I, Diana Cavanaugh, am scribing for, and in the presence of, Tamir Gilmore MD.       History     Chief Complaint   Patient presents with    Shortness of Breath     With chest discomfort. PMH blood clots in the lungs.      Time seen by provider: 8:50 PM on 05/31/2022    Jamilah French is a 32 y.o. female who presents to the ED for evaluation of SOB which onset 2-3 hours PTA.  Patient reports a Hx of PE bilaterally and notes that her current Sx are similar to when she was diagnosed on 01/06/22.  She states that initial PE presented 2 weeks s/p surgery (decompression of Chiari malformation by removal of posterior arch of 1st cervical vertebra) performed on 12/23/21.  Patient is currently 12 days s/p syringopleural shunt insertion T4 laminectomy secondary to syrinx of spinal cord and syringomyelia.  She is currently prescribed Eliquis but notes a halt in compliancy due to recent surgery and specifies her 1st dose since stopping was today.  Patient confirms associated central CP as well as calf pain that onset 3 days ago bilaterally.  The patient denies any other symptoms at this time.  Patient has a PMHx of radiculopathy (cervical region) and bilateral PE.  Additional PSHx includes revision of procedure involving syringopleural shunt.      The history is provided by the patient.     Review of patient's allergies indicates:   Allergen Reactions    Sulfa (sulfonamide antibiotics) Hives           Past Medical History:   Diagnosis Date    Bilateral pulmonary embolism 1/6/2022    Current moderate episode of major depressive disorder without prior episode 6/15/2018    Radiculopathy, cervical region 10/12/2021     Past Surgical History:   Procedure Laterality Date    DECOMPRESSION OF CHIARI MALFORMATION BY REMOVAL OF POSTERIOR ARCH OF FIRST CERVICAL VERTEBRA N/A 12/23/2021    Procedure: DECOMPRESSION, CHIARI MALFORMATION, BY 1ST CERVICAL VERTEBRA POSTERIOR ARCH  REMOVAL;  Surgeon: Kirk Reis MD;  [facility];  Service: Neurosurgery;  Laterality: N/A;  ASA1  TOR1  T&Cross x 2 units  [place]    HYSTEROSCOPY WITH DILATION AND CURETTAGE OF UTERUS N/A 10/26/2020    Procedure: HYSTEROSCOPY, WITH DILATION AND CURETTAGE OF UTERUS;  Surgeon: Manjula Rivas MD;  Location: [facility] OR;  Service: OB/GYN;  Laterality: N/A;    REVISION OF PROCEDURE INVOLVING SYRINGOPLEURAL SHUNT N/A 2022    Procedure: SYRINGOPLEURAL SHUNT Insertion T4 Laminectomy;  Surgeon: Kirk Reis MD;  [facility];  Service: Neurosurgery;  Laterality: N/A;     Family History   Problem Relation Age of Onset    Hypertension Mother     No Known Problems Father     Mental illness Brother     Breast cancer Neg Hx     Cancer Neg Hx     Colon cancer Neg Hx     Diabetes Neg Hx     Eclampsia Neg Hx     Miscarriages / Stillbirths Neg Hx     Ovarian cancer Neg Hx      labor Neg Hx     Stroke Neg Hx      Social History     Tobacco Use    Smoking status: Former Smoker     Types: Vaping w/o nicotine     Quit date:      Years since quittin.4    Smokeless tobacco: Never Used   Substance Use Topics    Alcohol use: Yes     Comment: social     Drug use: No     Review of Systems   Constitutional: Negative for fever.   HENT: Negative for sore throat.    Respiratory: Positive for shortness of breath.    Cardiovascular: Positive for chest pain.   Gastrointestinal: Negative for nausea.   Genitourinary: Negative for dysuria.   Musculoskeletal: Positive for myalgias. Negative for back pain.   Skin: Negative for rash.   Neurological: Negative for weakness.   Hematological: Does not bruise/bleed easily.       Physical Exam     Initial Vitals [22]   BP Pulse Resp Temp SpO2   113/72 105 (!) 24 99.5 °F (37.5 °C) (!) 94 %      MAP       --         Physical Exam    Nursing note and vitals reviewed.  Constitutional: Vital signs are normal. She appears  well-developed and well-nourished.  Non-toxic appearance. No distress.   HENT:   Head: Normocephalic and atraumatic.   Eyes: EOM are normal. Pupils are equal, round, and reactive to light.   Neck: Neck supple. No JVD present.   Normal range of motion.  Cardiovascular: Regular rhythm, normal heart sounds and intact distal pulses. Tachycardia present.  Exam reveals no gallop and no friction rub.    No murmur heard.  Pulmonary/Chest: Breath sounds normal. She has no decreased breath sounds. She has no wheezes. She has no rhonchi. She has no rales.   Clear breath sounds bilaterally.     Abdominal: Abdomen is soft. Bowel sounds are normal. There is no abdominal tenderness.   Left flank incision with staples in place.  Wound is clean and intact without drainage.   There is no rebound and no guarding.   Musculoskeletal:         General: Normal range of motion.      Cervical back: Normal range of motion and neck supple. No rigidity.      Comments: Midline thoracic incision noted on exam with staples in place.  Wound is clean and intact without drainage.       Neurological: She is alert and oriented to person, place, and time. She has normal strength and normal reflexes. No cranial nerve deficit or sensory deficit. She exhibits normal muscle tone. Coordination normal. GCS eye subscore is 4. GCS verbal subscore is 5. GCS motor subscore is 6.   Skin: Skin is warm and dry.   Psychiatric: She has a normal mood and affect. Her speech is normal and behavior is normal. She is not actively hallucinating.         ED Course   Procedures  Labs Reviewed   CBC W/ AUTO DIFFERENTIAL - Abnormal; Notable for the following components:       Result Value    WBC 21.97 (*)     MPV 9.0 (*)     Gran # (ANC) 17.3 (*)     Immature Grans (Abs) 0.12 (*)     Eos # 0.6 (*)     Gran % 78.8 (*)     Lymph % 13.2 (*)     All other components within normal limits   COMPREHENSIVE METABOLIC PANEL - Abnormal; Notable for the following components:    Glucose 147  (*)     BUN 22 (*)     Albumin 3.1 (*)     All other components within normal limits   TROPONIN I - Abnormal; Notable for the following components:    Troponin I 0.125 (*)     All other components within normal limits   D DIMER, QUANTITATIVE - Abnormal; Notable for the following components:    D-Dimer 8.28 (*)     All other components within normal limits   TROPONIN I - Abnormal; Notable for the following components:    Troponin I 0.226 (*)     All other components within normal limits   B-TYPE NATRIURETIC PEPTIDE   SARS-COV-2 RNA AMPLIFICATION, QUAL   COMPREHENSIVE METABOLIC PANEL   MAGNESIUM   PHOSPHORUS   TROPONIN I   CBC W/ AUTO DIFFERENTIAL     EKG Readings: (Independently Interpreted)   Sinus tachycardia at a rate of 102 bpm with normal axis and normal intervals.         Imaging Results           CTA Chest Non-Coronary(PE Studies) (Final result)  Result time 05/31/22 23:18:35    Final result by Sachin Polanco DO (05/31/22 23:18:35)                 Impression:      1. Bilateral occlusive and nonocclusive pulmonary emboli as above with evidence of right heart strain.  2. Syringopleural shunt with multiple fluid collections along the course of the catheter and a small left pleural effusion.  3. Several mild peripherally based patchy ground-glass opacities in the right lower lobe suspicious for small pulmonary infarcts in the setting of multiple PEs.  A mild infectious or inflammatory process is also possible.  This report was flagged in Epic as abnormal.    Dr. Polanco discussed critical findings with the [facility] ER, with message left for Dr. Gilmore by telephone at 23:10 on 05/31/2022.      Electronically signed by: Sachin Polanco  Date:    05/31/2022  Time:    23:18             Narrative:    EXAMINATION:  CTA CHEST NON CORONARY    CLINICAL HISTORY:  Pulmonary embolism (PE) suspected, high prob;    TECHNIQUE:  Low dose axial images, sagittal and coronal reformations were obtained from the thoracic inlet to the  lung bases following the IV administration of 100 mL of Omnipaque 350.  Contrast timing was optimized to evaluate the pulmonary arteries.  Maximum intensity projection images were provided for review.    COMPARISON:  CT of the chest from 05/20/2022.    FINDINGS:  Pulmonary vasculature: Satisfactory opacification of the pulmonary arterial system.  There are bilateral pulmonary emboli, with occlusive and nonocclusive filling defects noted within the left upper lobe, left main, left inter lobar, and left lower lobe pulmonary arteries and within the right upper lobe and right inter lobar pulmonary arteries.  There is enlargement of the main pulmonary artery, measuring up to 3.2 cm, nonspecific but can be seen with pulmonary hypertension.    Aorta: Left-sided aortic arch.  No aneurysm and no significant atherosclerosis.    Base of Neck: No significant abnormality.    Thoracic soft tissues: There is a syringo-pleural shunt noted extending from the spinal canal at the level of T4 along the left posterior chest wall and terminating in the left medial posterior pleural space.  There is a fluid collection along the course of the catheter within the left lower posterior chest wall measuring approximately 7.6 x 5.4 x 3.3 cm.  There is an access port more superiorly within the left posterior soft tissues with a surrounding fluid collection measuring approximately 4.2 x 4.5 x 1.5 cm, increased in size from prior.  There has been interval resolution of the associated free air in the soft tissues.    Heart: There is enlargement of the right ventricle and right atrium compatible with right heart strain.  No pericardial effusion.    Annalisa/Mediastinum: No pathologic steve enlargement.    Airways: The large airways are patent. No foci of endobronchial filling.    Lungs/Pleura: There are several peripherally based patchy ground-glass opacities in the right lower lobe.  There is a small left pleural effusion.  No pleural thickening or  pneumothorax.    Esophagus: Normal.    Upper Abdomen: No abnormality of the partially imaged upper abdomen.    Bones: No acute fracture. No suspicious lytic or sclerotic lesions.  There are postop changes of T4 laminectomy.                               X-Ray Chest AP Portable (Final result)  Result time 05/31/22 21:11:01    Final result by Sachin Polanco DO (05/31/22 21:11:01)                 Impression:      No acute abnormality.      Electronically signed by: Sachin Polanco  Date:    05/31/2022  Time:    21:11             Narrative:    EXAMINATION:  XR CHEST AP PORTABLE    CLINICAL HISTORY:  Shortness of breath    TECHNIQUE:  Single frontal view of the chest was performed.    COMPARISON:  Chest radiograph from 05/19/2022.  CT of the chest from 05/20/2022.    FINDINGS:  The lungs are hypoexpanded and clear. No focal opacities are seen. The pleural spaces are clear.    The cardiac silhouette is unremarkable.    The visualized osseous structures are unremarkable.    There are surgical skin staples                                 Medications   enoxaparin injection 120 mg (has no administration in time range)   sodium chloride 0.9% flush 10 mL (has no administration in time range)   albuterol-ipratropium 2.5 mg-0.5 mg/3 mL nebulizer solution 3 mL (has no administration in time range)   melatonin tablet 9 mg (has no administration in time range)   ondansetron injection 4 mg (has no administration in time range)   senna-docusate 8.6-50 mg per tablet 1 tablet (has no administration in time range)   simethicone chewable tablet 80 mg (has no administration in time range)   aluminum-magnesium hydroxide-simethicone 200-200-20 mg/5 mL suspension 30 mL (has no administration in time range)   acetaminophen tablet 650 mg (has no administration in time range)   naloxone 0.4 mg/mL injection 0.02 mg (has no administration in time range)   potassium bicarbonate disintegrating tablet 50 mEq (has no administration in time range)    potassium bicarbonate disintegrating tablet 35 mEq (has no administration in time range)   potassium bicarbonate disintegrating tablet 60 mEq (has no administration in time range)   magnesium oxide tablet 800 mg (has no administration in time range)   magnesium oxide tablet 800 mg (has no administration in time range)   potassium, sodium phosphates 280-160-250 mg packet 2 packet (has no administration in time range)   potassium, sodium phosphates 280-160-250 mg packet 2 packet (has no administration in time range)   potassium, sodium phosphates 280-160-250 mg packet 2 packet (has no administration in time range)   glucose chewable tablet 16 g (has no administration in time range)   glucose chewable tablet 24 g (has no administration in time range)   glucagon (human recombinant) injection 1 mg (has no administration in time range)   acetaminophen tablet 650 mg (has no administration in time range)   HYDROcodone-acetaminophen 5-325 mg per tablet 1 tablet (has no administration in time range)   dexAMETHasone tablet 2 mg (has no administration in time range)   famotidine tablet 20 mg (has no administration in time range)   dextrose 10% bolus 125 mL (has no administration in time range)   dextrose 10% bolus 250 mL (has no administration in time range)   aspirin tablet 325 mg (325 mg Oral Given 5/31/22 2055)   iohexoL (OMNIPAQUE 350) injection 100 mL (100 mLs Intravenous Given 5/31/22 2256)   enoxaparin injection 120 mg (120 mg Subcutaneous Given 5/31/22 2330)     Medical Decision Making:   History:   Old Medical Records: I decided to obtain old medical records.  Initial Assessment:   32-year-old woman with history of PEs presents emergency department for recurrent shortness of breath and chest pain.  He she says she was recently off of her anticoagulation for surgical procedure and began taking it again today.  Patient is tachypneic, slightly hypoxic, tachycardic.  She has evidence of right heart strain with elevated  troponin and bilateral occlusive and nonocclusive pulmonary emboli she on PE study.  She is started on full-dose Lovenox in the ED.  Discussed Hospital Medicine who agrees to admit the patient to the intensive care unit.  Independently Interpreted Test(s):   I have ordered and independently interpreted X-rays - see prior notes.  I have ordered and independently interpreted EKG Reading(s) - see prior notes  Clinical Tests:   Lab Tests: Ordered and Reviewed  Radiological Study: Ordered and Reviewed  Medical Tests: Ordered and Reviewed  ED Management:  Critical Care Time MDM    The high probability of sudden, clinically significant deterioration in the patient's condition required the highest level of my preparedness to intervene urgently.    The services I provided to this patient were to treat and/or prevent clinically significant deterioration that could result in permanent disability, chronic pain and/or death.     Services included the following: chart data review, reviewing nursing notes and/or old charts, documentation time, consultant collaboration regarding findings and treatment options, medication orders and management, direct patient care, vital sign assessments and ordering, interpreting and reviewing diagnostic studies/lab tests.    Aggregate critical care time was 50 minutes, which includes only time during which I was engaged in work directly related to the patient's care, as described above, whether at the bedside or elsewhere in the Emergency Department.     Tamir Gilmore M.D.                 Scribe Attestation:   Scribe #1: I performed the above scribed service and the documentation accurately describes the services I performed. I attest to the accuracy of the note.               I, Mando Elam, personally performed the services described in this documentation. All medical record entries made by the scribe were at my direction and in my presence.  I have reviewed the chart and agree that  the record reflects my personal performance and is accurate and complete. Tamir Gilmore MD.  3:28 AM 06/01/2022       DISCLAIMER: This note was prepared with [employer] Direct voice recognition transcription software. Garbled syntax, mangled pronouns, and other bizarre constructions may be attributed to that software system.      Clinical Impression:   Final diagnoses:  [R07.9] Chest pain  [R06.02] SOB (shortness of breath)  [I26.99] Other acute pulmonary embolism, unspecified whether acute cor pulmonale present (Primary)          ED Disposition Condition    Admit               Tamir Gilmore MD  06/01/22 0328

## 2022-06-01 NOTE — ED NOTES
Call from radiologist Julito Polanco reporting CTA showing bilateral PE's and right heart strain from PE's.

## 2022-06-01 NOTE — CONSULTS
06/01/2022      Admit Date: 5/31/2022  Jamilah French  New Patient Consult    Chief Complaint   Patient presents with    Shortness of Breath     With chest discomfort. PMH blood clots in the lungs.        History of Present Illness:  Pt is a 31 yo female with morbid obesity, chiari malformation, PE 1/2022 on eliquis who presented to ED on 6/1/22 with shortness of breath, calf pain and chest pain.  She reports she had surgery 5/19/22 and the plan was to restart eliquis 5d after surgery. She had just restarted yesterday evening because she was unable to find her med at home for a few days this week. Last night she noted dizziness, sob and felt same as last time when she had PE so she came to the ED. She noted sharp left chest pain once yesterday which resolved with changing position. She has noticed left calf cramping ever since having surgery. CTA was done which again shows multiple pulmonary emboli of similar appearance compared to last scan. Pt was restarted on anticoagulation with therapeutic lovenox. She denies smoking, hx of lung disease, coughing, bronchitis or pneumonia. She denies family hx of clotting disorders. She has been told during past hospitalizations she likely has sleep apnea but has not gotten sleep study yet.      PFSH:  Past Medical History:   Diagnosis Date    Bilateral pulmonary embolism 1/6/2022    Current moderate episode of major depressive disorder without prior episode 6/15/2018    Radiculopathy, cervical region 10/12/2021     Past Surgical History:   Procedure Laterality Date    DECOMPRESSION OF CHIARI MALFORMATION BY REMOVAL OF POSTERIOR ARCH OF FIRST CERVICAL VERTEBRA N/A 12/23/2021    Procedure: DECOMPRESSION, CHIARI MALFORMATION, BY 1ST CERVICAL VERTEBRA POSTERIOR ARCH REMOVAL;  Surgeon: Kirk Reis MD;  Location: [facility] OR [address] [place] [address];  Service: Neurosurgery;  Laterality: N/A;  ASA1  TOR1  T&Cross x 2 units  [place]    HYSTEROSCOPY WITH DILATION AND CURETTAGE OF UTERUS  "N/A 10/26/2020    Procedure: HYSTEROSCOPY, WITH DILATION AND CURETTAGE OF UTERUS;  Surgeon: Manjula Rivas MD;  Location: [facility];  Service: OB/GYN;  Laterality: N/A;    REVISION OF PROCEDURE INVOLVING SYRINGOPLEURAL SHUNT N/A 2022    Procedure: SYRINGOPLEURAL SHUNT Insertion T4 Laminectomy;  Surgeon: Kirk Reis MD;  [facility];  Service: Neurosurgery;  Laterality: N/A;     Social History     Tobacco Use    Smoking status: Former Smoker     Types: Vaping w/o nicotine     Quit date:      Years since quittin.4    Smokeless tobacco: Never Used   Substance Use Topics    Alcohol use: Yes     Comment: social     Drug use: No     Family History   Problem Relation Age of Onset    Hypertension Mother     No Known Problems Father     Mental illness Brother     Breast cancer Neg Hx     Cancer Neg Hx     Colon cancer Neg Hx     Diabetes Neg Hx     Eclampsia Neg Hx     Miscarriages / Stillbirths Neg Hx     Ovarian cancer Neg Hx      labor Neg Hx     Stroke Neg Hx      Review of patient's allergies indicates:   Allergen Reactions    Sulfa (sulfonamide antibiotics) Hives             Performance Status:Performance Status:The patient's activity level is no limits with regular activity.    Review of Systems:  a review of eleven systems covering constitutional, Psych, Eye, HEENT, Respiratory, Cardiac, GI, , Musculoskeletal, Endocrine, Dermatologicwas negative except the above mentioned abnormalities and for any pertinent findings as listed below:  All negative with pertinent positives as above          Exam:Comprehensive exam done. BP (!) 142/83   Pulse 110   Temp 98 °F (36.7 °C) (Oral)   Resp (!) 25   Ht 5' 5" (1.651 m)   Wt 129.5 kg (285 lb 7.9 oz)   LMP 2022   SpO2 99%   Breastfeeding No   BMI 47.51 kg/m²   Exam included Vitals as listed, and patient's appearance and affect and alertness and mood, oral exam for yeast and hygiene and pharynx lesions and " Mallapatti (M) score, neck with inspection for jvd and masses and thyroid abnormalities and lymph nodes (supraclavicular and infraclavicular nodes also examined and noted if abn), chest exam included symmetry and effort and fremitus and percussion and auscultation, cardiac exam included rhythm and gallops and murmur and rubs and jvd and edema, abdominal exam for mass and hepatosplenomegaly and tenderness and hernias and bowel sounds, Musculoskeletal exam with muscle tone and posture and mobility/gait and  strenght, and skin for rashes and cyanosis and pallor and turgor, extremity for clubbing.  Findings were normal except as listed below:  M4  Obese  [place] in place 2 incisions mid thoracic spine and left lateral back  Clear breath sounds bilaterally  HR regular  No calf tenderness/edema    Radiographs reviewed: view by direct vision   CTA chest 5/31/22- bilateral pulmonary emboli in the left and right main pulmonary arteries and subsegmental branches, similar appearance compared to 1/2022 scan. New small L pleural effusion, several small pleural based round GGOs suggests possible wedge infarcts    TTE 1/6/22-   · The left ventricle is normal in size with normal systolic function.  · The estimated ejection fraction is 60%.  · Mild tricuspid regurgitation.  · Unable to visualize right atrium and right ventricle, clinical concerns persist consider alternative cardiac imaging.      Labs     Recent Labs   Lab 06/01/22  0616   WBC 19.83*   HGB 12.2   HCT 37.2        Recent Labs   Lab 05/31/22  2040 05/31/22  2344 06/01/22  0616     --  138   K 3.5  --  3.9     --  105   CO2 24  --  25   BUN 22*  --  18   CREATININE 0.8  --  0.8   *  --  79   CALCIUM 9.4  --  9.0   MG  --   --  2.0   PHOS  --   --  4.0   AST 15  --  11   ALT 22  --  20   ALKPHOS 81  --  70   BILITOT 0.5  --  0.8   PROT 7.7  --  6.5   ALBUMIN 3.1*  --  2.8*   TROPONINI 0.125*   < > 0.179*   BNP <10  --   --     < > = values  in this interval not displayed.   No results for input(s): PH, PCO2, PO2, HCO3 in the last 24 hours.  Microbiology Results (last 7 days)     ** No results found for the last 168 hours. **          Impression:  Active Hospital Problems    Diagnosis  POA    *Acute pulmonary embolism [I26.99]  Yes    Syrinx of spinal cord [G95.0]  Yes    Morbid obesity [E66.01]  Yes      Resolved Hospital Problems   No resolved problems to display.               Plan:     - continue anticoagulation  - restart eliquis on discharge  - repeat echo attempt to assess RV and PA pressure this time  - cpap at night while hospitalized  - needs sleep study after dc  - transfer out of ICU    Blank Veloz MD  Pulmonary & Critical Care Medicine

## 2022-06-01 NOTE — CONSULTS
"  [facility]  Adult Nutrition  Consult Note    SUMMARY     Recommendations  Recommendation/Intervention: 1.) Continue with Cardiac diet  Goals: 1.) pt will meet >50% EEN during admit  Nutrition Goal Status: new  Communication of RD Recs: other (comment) (POC)     1. Other acute pulmonary embolism, unspecified whether acute cor pulmonale present    2. Chest pain    3. SOB (shortness of breath)      Past Medical History:   Diagnosis Date    Bilateral pulmonary embolism 1/6/2022    Current moderate episode of major depressive disorder without prior episode 6/15/2018    Radiculopathy, cervical region 10/12/2021         Assessment and Plan  Nutrition Problem  Obesity    Related to (etiology):   Excessive energy intake    Signs and Symptoms (as evidenced by):   Body mass index is 47.51 kg/m².      Interventions/Recommendations (treatment strategy):  Continue with cardiac diet     Nutrition Diagnosis Status:   New           Malnutrition Assessment  Subcutaneous Fat Loss (Final Summary): well nourished  Muscle Loss Evaluation (Final Summary): well nourished         Reason for Assessment  Reason For Assessment: consult  General Information Comments: admits with acute pulmonary embolism, SOB. Pt up at bedside eating lunch: appears well nourished. States her appetite is mildy decreased, but prior to admit was adequate. Reports a UBW of ~280 lbs. Does not meet criteria for malnutrition.     Nutrition Risk Screen  Nutrition Risk Screen: no indicators present    Nutrition/Diet History  Spiritual, Cultural Beliefs, Scientology Practices, Values that Affect Care: no  Food Allergies: NKFA  Factors Affecting Nutritional Intake: decreased appetite    Anthropometrics  Temp: 98 °F (36.7 °C)  Height Method: Stated  Height: 5' 5"  Height (inches): 65 in  Weight Method: Bed Scale  Weight: 129.5 kg (285 lb 7.9 oz)  Weight (lb): 285.5 lb  Ideal Body Weight (IBW), Female: 125 lb  % Ideal Body Weight, Female (lb): 228.4 " %  BMI (Calculated): 47.5  BMI Grade: greater than 40 - morbid obesity  Usual Body Weight (UBW), k.27 kg  % Usual Body Weight: 101.96  % Weight Change From Usual Weight: 1.75 %       Lab/Procedures/Meds  Pertinent Labs Reviewed: reviewed  BMP  Lab Results   Component Value Date     2022    K 3.9 2022     2022    CO2 25 2022    BUN 18 2022    CREATININE 0.8 2022    CALCIUM 9.0 2022    ANIONGAP 8 2022    ESTGFRAFRICA >60 2022    EGFRNONAA >60 2022     Lab Results   Component Value Date    ALBUMIN 2.8 (L) 2022     Lab Results   Component Value Date    CALCIUM 9.0 2022    PHOS 4.0 2022     No results for input(s): POCTGLUCOSE in the last 24 hours.    Pertinent Medications Reviewed: reviewed  Scheduled Meds:   dexAMETHasone  2 mg Oral Q12H    enoxaparin  1 mg/kg Subcutaneous Q12H    famotidine  20 mg Oral BID    senna-docusate 8.6-50 mg  1 tablet Oral BID         Estimated/Assessed Needs  Weight Used For Calorie Calculations: 129.5 kg (285 lb 7.9 oz)  Energy Calorie Requirements (kcal):   Energy Need Method: [place]-St Jeor (no af)  Protein Requirements: 85g  Weight Used For Protein Calculations: 56.8 kg (125 lb 4.3 oz) (IBW)  Estimated Fluid Requirement Method: RDA Method  RDA Method (mL):       Nutrition Prescription Ordered  Current Diet Order: Cardiac diet    Evaluation of Received Nutrient/Fluid Intake  Energy Calories Required: meeting needs  Tolerance: tolerating  % Intake of Estimated Energy Needs: 50 - 75 %  % Meal Intake: 50 - 75 %    Nutrition Risk  Level of Risk/Frequency of Follow-up:  (x1 weekly)       Monitor and Evaluation  Food and Nutrient Intake: energy intake, food and beverage intake  Food and Nutrient Adminstration: diet order  Anthropometric Measurements: weight, weight change, body mass index  Biochemical Data, Medical Tests and Procedures: glucose/endocrine profile, lipid profile,  electrolyte and renal panel  Nutrition-Focused Physical Findings: overall appearance       Nutrition Follow-Up  RD Follow-up?: Yes

## 2022-06-01 NOTE — PT/OT/SLP EVAL
"Physical Therapy Evaluation    Patient Name:  Jamilah French   MRN:  [id number]    Recommendations:     Discharge Recommendations:  patient reports she is waiting until surgery follow-up appointment on 6/7/22 to discuss outpatient PT with surgeon   Discharge Equipment Recommendations: cane, straight   Barriers to discharge: None    Assessment:     Jamilah French is a 32 y.o. female admitted with a medical diagnosis of Acute pulmonary embolism.  She presents with the following impairments/functional limitations:  weakness, impaired endurance, impaired functional mobilty, gait instability, impaired balance, decreased lower extremity function, impaired cardiopulmonary response to activity, edema, impaired sensation. Patient cleared for participation with PT evaluation by Dr. Harris and LINUS Polanco. Patient is agreeable to participation with PT evaluation. She reports living with spouse and in laws in a [facility] with 5 AMANDA and B rails. She uses a rollator for community ambulation and no AD for household ambulation. She has prn home O2. She endorses impaired LLE sensation since surgery 2 weeks ago described as "pins and needles" and "cold". Vitals at rest: HR 98 bpm, SpO2 100% on 2L, /80. She requires SBA for supine to sit and CGA for sit to stand with patient declining use of RW. She ambulated 10' forward/backward x2 trials with CGA and 2L. She is agreeable to sit up in chair with vitals post-ambulation: HR 95 bpm, SpO2 99%.     Rehab Prognosis: Good; patient would benefit from acute skilled PT services to address these deficits and reach maximum level of function.    Recent Surgery: * No surgery found *      Plan:     During this hospitalization, patient to be seen 6 x/week to address the identified rehab impairments via gait training, therapeutic activities, therapeutic exercises and progress toward the following goals:    · Plan of Care Expires:  07/01/22    Subjective     Chief Complaint: LLE impaired " "sensation   Patient/Family Comments/goals: agrees to sit up in chair   Pain/Comfort:  · Pain Rating 1: 0/10    Patients cultural, spiritual, Advent conflicts given the current situation:      Living Environment:  Patient lives with spouse and in laws in a [facility] with 5 AMANDA and B rails. She reports that spouse is with her or she will go to work with him.   Prior to admission, patients level of function was rollator for community ambulation and no AD for household ambulation. She denies any recent falls or PT. 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  Equipment used at home: rollator, oxygen.  DME owned (not currently used): none.  Upon discharge, patient will have assistance from spouse.    Objective:     Communicated with LINUS Polanco and MD Dr. Harris prior to session.  Patient found HOB elevated with bed alarm, blood pressure cuff, oxygen, peripheral IV, pulse ox (continuous), telemetry  upon PT entry to room.    General Precautions: Standard, fall, respiratory   Orthopedic Precautions:N/A   Braces: N/A  Respiratory Status: Nasal cannula, flow 2 L/min    Exams:  · RLE Strength: WFL  · LLE Strength: WFL    Functional Mobility:  · Bed Mobility:     · Supine to Sit: stand by assistance  · Transfers:     · Sit to Stand:  contact guard assistance with no AD  · Gait: 10' forward/backward x2 trials with CGA and 2L    Therapeutic Activities and Exercises:   Patient was educated on the importance of OOB activity and functional mobility to negate negative effects of prolonged bed rest during hospitalization, safe transfers and ambulation, SPC use for safe household ambulation with impaired LLE sensation, and D/C planning     AM-PAC 6 CLICK MOBILITY  Total Score:22     Patient left up in chair with all lines intact, call button in reach and patient imformed to call RN for assistance when ready to return to bed.    GOALS:   Multidisciplinary Problems     Physical Therapy Goals        Problem: Physical Therapy "    Goal Priority Disciplines Outcome Goal Variances Interventions   Physical Therapy Goal     PT, PT/OT      Description: Goals to be met by: 22     Patient will increase functional independence with mobility by performin. Supine to sit with Supervision  2. Sit to stand transfer with Supervision  3. Gait  x 250 feet with Supervision using Single-point Cane or no assistive device.   4. Ascend/descend 5 stairs with bilateral Handrails Supervision using No Assistive Device.   5. Lower extremity exercise program x20 reps per handout, with supervision                     History:     Past Medical History:   Diagnosis Date    Bilateral pulmonary embolism 2022    Current moderate episode of major depressive disorder without prior episode 6/15/2018    Radiculopathy, cervical region 10/12/2021       Past Surgical History:   Procedure Laterality Date    DECOMPRESSION OF CHIARI MALFORMATION BY REMOVAL OF POSTERIOR ARCH OF FIRST CERVICAL VERTEBRA N/A 2021    Procedure: DECOMPRESSION, CHIARI MALFORMATION, BY 1ST CERVICAL VERTEBRA POSTERIOR ARCH REMOVAL;  Surgeon: Kirk Reis MD;  Location: [facility] OR [address] [place] [address];  Service: Neurosurgery;  Laterality: N/A;  ASA1  TOR1  T&Cross x 2 units  [place]    HYSTEROSCOPY WITH DILATION AND CURETTAGE OF UTERUS N/A 10/26/2020    Procedure: HYSTEROSCOPY, WITH DILATION AND CURETTAGE OF UTERUS;  Surgeon: Manjula Rivas MD;  Location: [facility];  Service: OB/GYN;  Laterality: N/A;    REVISION OF PROCEDURE INVOLVING SYRINGOPLEURAL SHUNT N/A 2022    Procedure: SYRINGOPLEURAL SHUNT Insertion T4 Laminectomy;  Surgeon: Kirk Reis MD;  Location: [address];  Service: Neurosurgery;  Laterality: N/A;       Time Tracking:     PT Received On: 22  PT Start Time: 1112     PT Stop Time: 1131  PT Total Time (min): 19 min     Billable Minutes: Evaluation 19      2022

## 2022-06-01 NOTE — CARE UPDATE
06/01/22 0740   Patient Assessment/Suction   Level of Consciousness (AVPU)   (pt asleep)   Respiratory Effort Unlabored   Expansion/Accessory Muscles/Retractions no retractions   All Lung Fields Breath Sounds diminished   Rhythm/Pattern, Respiratory unlabored   PRE-TX-O2   O2 Device (Oxygen Therapy) nasal cannula   $ Is the patient on Low Flow Oxygen? Yes   Flow (L/min) 2   Pulse Oximetry Type Continuous   $ Pulse Oximetry - Multiple Charge Pulse Oximetry - Multiple   Aerosol Therapy   $ Aerosol Therapy Charges PRN treatment not required   Respiratory Treatment Status (SVN) PRN treatment not required

## 2022-06-01 NOTE — PLAN OF CARE
Problem: Adult Inpatient Plan of Care  Goal: Plan of Care Review  Outcome: Ongoing, Progressing  Goal: Patient-Specific Goal (Individualized)  Outcome: Ongoing, Progressing  Goal: Absence of Hospital-Acquired Illness or Injury  Outcome: Ongoing, Progressing  Goal: Optimal Comfort and Wellbeing  Outcome: Ongoing, Progressing  Goal: Readiness for Transition of Care  Outcome: Ongoing, Progressing     Problem: Bariatric Environmental Safety  Goal: Safety Maintained with Care  Outcome: Ongoing, Progressing     Problem: Fall Injury Risk  Goal: Absence of Fall and Fall-Related Injury  Outcome: Ongoing, Progressing     Problem: Infection  Goal: Absence of Infection Signs and Symptoms  Outcome: Ongoing, Progressing

## 2022-06-02 ENCOUNTER — OFFICE VISIT (OUTPATIENT)
Dept: MATERNAL FETAL MEDICINE | Facility: CLINIC | Age: 32
End: 2022-06-02
Payer: COMMERCIAL

## 2022-06-02 VITALS
SYSTOLIC BLOOD PRESSURE: 130 MMHG | HEART RATE: 93 BPM | TEMPERATURE: 98 F | WEIGHT: 276 LBS | DIASTOLIC BLOOD PRESSURE: 71 MMHG | RESPIRATION RATE: 17 BRPM | HEIGHT: 65 IN | OXYGEN SATURATION: 98 % | BODY MASS INDEX: 45.98 KG/M2

## 2022-06-02 DIAGNOSIS — I26.99 OTHER ACUTE PULMONARY EMBOLISM WITHOUT ACUTE COR PULMONALE: ICD-10-CM

## 2022-06-02 DIAGNOSIS — R03.0 ELEVATED BP WITHOUT DIAGNOSIS OF HYPERTENSION: ICD-10-CM

## 2022-06-02 DIAGNOSIS — E28.2 PCOS (POLYCYSTIC OVARIAN SYNDROME): ICD-10-CM

## 2022-06-02 DIAGNOSIS — M54.10 RADICULOPATHY, UNSPECIFIED SPINAL REGION: ICD-10-CM

## 2022-06-02 DIAGNOSIS — G93.5 CHIARI I MALFORMATION: ICD-10-CM

## 2022-06-02 DIAGNOSIS — Z31.9 PROCREATIVE MANAGEMENT: ICD-10-CM

## 2022-06-02 DIAGNOSIS — Z86.711 HISTORY OF PULMONARY EMBOLISM: ICD-10-CM

## 2022-06-02 LAB
ALBUMIN SERPL BCP-MCNC: 2.9 G/DL (ref 3.5–5.2)
ALP SERPL-CCNC: 75 U/L (ref 55–135)
ALT SERPL W/O P-5'-P-CCNC: 23 U/L (ref 10–44)
ANION GAP SERPL CALC-SCNC: 8 MMOL/L (ref 8–16)
AORTIC ROOT ANNULUS: 2.62 CM
AORTIC VALVE CUSP SEPERATION: 1.46 CM
ASCENDING AORTA: 2.92 CM
AST SERPL-CCNC: 13 U/L (ref 10–40)
AV INDEX (PROSTH): 0.99
AV MEAN GRADIENT: 3 MMHG
AV PEAK GRADIENT: 6 MMHG
AV VALVE AREA: 3.6 CM2
AV VELOCITY RATIO: 0.93
BASOPHILS # BLD AUTO: 0.06 K/UL (ref 0–0.2)
BASOPHILS NFR BLD: 0.3 % (ref 0–1.9)
BILIRUB SERPL-MCNC: 0.7 MG/DL (ref 0.1–1)
BSA FOR ECHO PROCEDURE: 2.4 M2
BUN SERPL-MCNC: 11 MG/DL (ref 6–20)
CALCIUM SERPL-MCNC: 9.6 MG/DL (ref 8.7–10.5)
CHLORIDE SERPL-SCNC: 103 MMOL/L (ref 95–110)
CO2 SERPL-SCNC: 27 MMOL/L (ref 23–29)
CREAT SERPL-MCNC: 0.8 MG/DL (ref 0.5–1.4)
CV ECHO LV RWT: 0.41 CM
DIFFERENTIAL METHOD: ABNORMAL
DOP CALC AO PEAK VEL: 1.25 M/S
DOP CALC AO VTI: 19.86 CM
DOP CALC LVOT AREA: 3.6 CM2
DOP CALC LVOT DIAMETER: 2.15 CM
DOP CALC LVOT PEAK VEL: 1.16 M/S
DOP CALC LVOT STROKE VOLUME: 71.41 CM3
DOP CALC MV VTI: 18.38 CM
DOP CALCLVOT PEAK VEL VTI: 19.68 CM
E WAVE DECELERATION TIME: 207.09 MSEC
E/A RATIO: 0.78
E/E' RATIO: 9.13 M/S
ECHO LV POSTERIOR WALL: 0.77 CM (ref 0.6–1.1)
EJECTION FRACTION: 60 %
EOSINOPHIL # BLD AUTO: 0.2 K/UL (ref 0–0.5)
EOSINOPHIL NFR BLD: 0.7 % (ref 0–8)
ERYTHROCYTE [DISTWIDTH] IN BLOOD BY AUTOMATED COUNT: 13.2 % (ref 11.5–14.5)
EST. GFR  (AFRICAN AMERICAN): >60 ML/MIN/1.73 M^2
EST. GFR  (NON AFRICAN AMERICAN): >60 ML/MIN/1.73 M^2
FRACTIONAL SHORTENING: 32 % (ref 28–44)
GLUCOSE SERPL-MCNC: 114 MG/DL (ref 70–110)
HCT VFR BLD AUTO: 37.4 % (ref 37–48.5)
HGB BLD-MCNC: 12.2 G/DL (ref 12–16)
IMM GRANULOCYTES # BLD AUTO: 0.14 K/UL (ref 0–0.04)
IMM GRANULOCYTES NFR BLD AUTO: 0.6 % (ref 0–0.5)
INTERVENTRICULAR SEPTUM: 0.78 CM (ref 0.6–1.1)
IVRT: 82.78 MSEC
LA MAJOR: 3.72 CM
LA MINOR: 4.05 CM
LA WIDTH: 2.87 CM
LEFT ATRIUM SIZE: 3 CM
LEFT ATRIUM VOLUME INDEX MOD: 12 ML/M2
LEFT ATRIUM VOLUME INDEX: 12.5 ML/M2
LEFT ATRIUM VOLUME MOD: 27.33 CM3
LEFT ATRIUM VOLUME: 28.38 CM3
LEFT INTERNAL DIMENSION IN SYSTOLE: 2.57 CM (ref 2.1–4)
LEFT VENTRICLE DIASTOLIC VOLUME INDEX: 26.56 ML/M2
LEFT VENTRICLE DIASTOLIC VOLUME: 60.3 ML
LEFT VENTRICLE MASS INDEX: 36 G/M2
LEFT VENTRICLE SYSTOLIC VOLUME INDEX: 10.6 ML/M2
LEFT VENTRICLE SYSTOLIC VOLUME: 23.95 ML
LEFT VENTRICULAR INTERNAL DIMENSION IN DIASTOLE: 3.76 CM (ref 3.5–6)
LEFT VENTRICULAR MASS: 80.94 G
LV LATERAL E/E' RATIO: 8.11 M/S
LV SEPTAL E/E' RATIO: 10.43 M/S
LYMPHOCYTES # BLD AUTO: 1.8 K/UL (ref 1–4.8)
LYMPHOCYTES NFR BLD: 7.9 % (ref 18–48)
MAGNESIUM SERPL-MCNC: 2.3 MG/DL (ref 1.6–2.6)
MCH RBC QN AUTO: 27.7 PG (ref 27–31)
MCHC RBC AUTO-ENTMCNC: 32.6 G/DL (ref 32–36)
MCV RBC AUTO: 85 FL (ref 82–98)
MONOCYTES # BLD AUTO: 0.9 K/UL (ref 0.3–1)
MONOCYTES NFR BLD: 4 % (ref 4–15)
MV A" WAVE DURATION": 11.13 MSEC
MV MEAN GRADIENT: 1 MMHG
MV PEAK A VEL: 0.93 M/S
MV PEAK E VEL: 0.73 M/S
MV PEAK GRADIENT: 4 MMHG
MV STENOSIS PRESSURE HALF TIME: 60.06 MS
MV VALVE AREA BY CONTINUITY EQUATION: 3.89 CM2
MV VALVE AREA P 1/2 METHOD: 3.66 CM2
NEUTROPHILS # BLD AUTO: 19.4 K/UL (ref 1.8–7.7)
NEUTROPHILS NFR BLD: 86.5 % (ref 38–73)
NRBC BLD-RTO: 0 /100 WBC
PHOSPHATE SERPL-MCNC: 3.8 MG/DL (ref 2.7–4.5)
PISA MRMAX VEL: 0.04 M/S
PISA TR MAX VEL: 2.81 M/S
PLATELET # BLD AUTO: 351 K/UL (ref 150–450)
PMV BLD AUTO: 9.3 FL (ref 9.2–12.9)
POTASSIUM SERPL-SCNC: 4.8 MMOL/L (ref 3.5–5.1)
PROT SERPL-MCNC: 7.1 G/DL (ref 6–8.4)
PULM VEIN S/D RATIO: 1.17
PV PEAK D VEL: 0.53 M/S
PV PEAK S VEL: 0.62 M/S
PV PEAK VELOCITY: 0.91 CM/S
RA MAJOR: 3.66 CM
RA PRESSURE: 8 MMHG
RA WIDTH: 2.21 CM
RBC # BLD AUTO: 4.41 M/UL (ref 4–5.4)
RIGHT VENTRICULAR END-DIASTOLIC DIMENSION: 2.36 CM
RV TISSUE DOPPLER FREE WALL SYSTOLIC VELOCITY 1 (APICAL 4 CHAMBER VIEW): 20.05 CM/S
SINUS: 2.41 CM
SODIUM SERPL-SCNC: 138 MMOL/L (ref 136–145)
STJ: 2.08 CM
TDI LATERAL: 0.09 M/S
TDI SEPTAL: 0.07 M/S
TDI: 0.08 M/S
TR MAX PG: 32 MMHG
TRICUSPID ANNULAR PLANE SYSTOLIC EXCURSION: 2.69 CM
TV REST PULMONARY ARTERY PRESSURE: 40 MMHG
WBC # BLD AUTO: 22.47 K/UL (ref 3.9–12.7)

## 2022-06-02 PROCEDURE — 80053 COMPREHEN METABOLIC PANEL: CPT | Performed by: NURSE PRACTITIONER

## 2022-06-02 PROCEDURE — 36415 COLL VENOUS BLD VENIPUNCTURE: CPT | Performed by: NURSE PRACTITIONER

## 2022-06-02 PROCEDURE — 94660 CPAP INITIATION&MGMT: CPT

## 2022-06-02 PROCEDURE — 99231 SBSQ HOSP IP/OBS SF/LOW 25: CPT | Mod: ,,, | Performed by: INTERNAL MEDICINE

## 2022-06-02 PROCEDURE — 1111F PR DISCHARGE MEDS RECONCILED W/ CURRENT OUTPATIENT MED LIST: ICD-10-PCS | Mod: CPTII,95,, | Performed by: OBSTETRICS & GYNECOLOGY

## 2022-06-02 PROCEDURE — 25000003 PHARM REV CODE 250: Performed by: NURSE PRACTITIONER

## 2022-06-02 PROCEDURE — 1111F DSCHRG MED/CURRENT MED MERGE: CPT | Mod: CPTII,95,, | Performed by: OBSTETRICS & GYNECOLOGY

## 2022-06-02 PROCEDURE — 84100 ASSAY OF PHOSPHORUS: CPT | Performed by: NURSE PRACTITIONER

## 2022-06-02 PROCEDURE — 99215 OFFICE O/P EST HI 40 MIN: CPT | Mod: 95,,, | Performed by: OBSTETRICS & GYNECOLOGY

## 2022-06-02 PROCEDURE — 83735 ASSAY OF MAGNESIUM: CPT | Performed by: NURSE PRACTITIONER

## 2022-06-02 PROCEDURE — 99231 PR SUBSEQUENT HOSPITAL CARE,LEVL I: ICD-10-PCS | Mod: ,,, | Performed by: INTERNAL MEDICINE

## 2022-06-02 PROCEDURE — 85025 COMPLETE CBC W/AUTO DIFF WBC: CPT | Performed by: NURSE PRACTITIONER

## 2022-06-02 PROCEDURE — 25500020 PHARM REV CODE 255: Performed by: HOSPITALIST

## 2022-06-02 PROCEDURE — 25000242 PHARM REV CODE 250 ALT 637 W/ HCPCS: Performed by: NURSE PRACTITIONER

## 2022-06-02 PROCEDURE — 27000190 HC CPAP FULL FACE MASK W/VALVE

## 2022-06-02 PROCEDURE — 99900035 HC TECH TIME PER 15 MIN (STAT)

## 2022-06-02 PROCEDURE — 63600175 PHARM REV CODE 636 W HCPCS: Performed by: HOSPITALIST

## 2022-06-02 PROCEDURE — 94761 N-INVAS EAR/PLS OXIMETRY MLT: CPT

## 2022-06-02 PROCEDURE — 99215 PR OFFICE/OUTPT VISIT, EST, LEVL V, 40-54 MIN: ICD-10-PCS | Mod: 95,,, | Performed by: OBSTETRICS & GYNECOLOGY

## 2022-06-02 RX ADMIN — FAMOTIDINE 20 MG: 20 TABLET ORAL at 08:06

## 2022-06-02 RX ADMIN — DEXAMETHASONE 2 MG: 2 TABLET ORAL at 08:06

## 2022-06-02 RX ADMIN — ENOXAPARIN SODIUM 120 MG: 150 INJECTION SUBCUTANEOUS at 11:06

## 2022-06-02 RX ADMIN — DOCUSATE SODIUM AND SENNOSIDES 1 TABLET: 8.6; 5 TABLET, FILM COATED ORAL at 08:06

## 2022-06-02 RX ADMIN — SULFUR HEXAFLUORIDE 2.4 ML: KIT at 10:06

## 2022-06-02 NOTE — ASSESSMENT & PLAN NOTE
Hirsutism + anovulatory bleeding  No polycystic ovaries on US  Irregular cycles have subsequently resolved  Endocrinology: Dr. Roque  Previous regimen:  - Metformin 750 mg BID  Current regimen (06/02/2022):   - No meds     No Hgb A1c on file, some elevated blood glucoses on recent metabolic profiles. Despite regulation of cycles, I am concerned that recent hyperglycemia in the context of her BMI may indicate some underlying glucose intolerance.    Recommendations:  1. Check HbgA1c  2. Continue care otherwise with Evelio Roque and Rob

## 2022-06-02 NOTE — PLAN OF CARE
06/02/22 0930   Medicare Message   Important Message from Medicare regarding Discharge Appeal Rights Signed/date by patient/caregiver;Explained to patient/caregiver   Date IMM was signed 06/02/22   Time IMM was signed 0930

## 2022-06-02 NOTE — PLAN OF CARE
DC prior to CM clearance       06/02/22 1534   Final Note   Assessment Type Final Discharge Note   Anticipated Discharge Disposition Home

## 2022-06-02 NOTE — ASSESSMENT & PLAN NOTE
Patient with acute on chronic pulmonary emboli.  Continue treatment dose Lovenox and plan to switch to Eliquis.  Echocardiogram shows evidence of right heart strain with elevated cardiac biomarkers which represent high risk pulmonary embolus.  Follow-up bilateral lower extremity ultrasound.  Given that the patient was off of anticoagulation for 7 days after her procedure, I would not count this is a failure of apixaban.

## 2022-06-02 NOTE — PLAN OF CARE
[id number] - [place] - [address] [facility].  [address] [place] [address]  [place] [address]  Phone: [phone] Fax: [phone]    Called pharmacy to check coverage of eliquis. Per pharmacy- no copay

## 2022-06-02 NOTE — PLAN OF CARE
Pt had an uneventful night, denies any pain and respiration is even and unlabored.  Problem: Adult Inpatient Plan of Care  Goal: Plan of Care Review  Outcome: Ongoing, Progressing  Goal: Patient-Specific Goal (Individualized)  Outcome: Ongoing, Progressing  Goal: Absence of Hospital-Acquired Illness or Injury  Outcome: Ongoing, Progressing  Goal: Optimal Comfort and Wellbeing  Outcome: Ongoing, Progressing     Problem: Fall Injury Risk  Goal: Absence of Fall and Fall-Related Injury  Outcome: Ongoing, Progressing

## 2022-06-02 NOTE — PROGRESS NOTES
Progress Note  PULMONARY    Admit Date: 5/31/2022 06/02/2022  History of Present Illness:  Pt is a 33 yo female with morbid obesity, chiari malformation, PE 1/2022 on eliquis who presented to ED on 6/1/22 with shortness of breath, calf pain and chest pain.  She reports she had surgery 5/19/22 and the plan was to restart eliquis 5d after surgery. She had just restarted yesterday evening because she was unable to find her med at home for a few days this week. Last night she noted dizziness, sob and felt same as last time when she had PE so she came to the ED. She noted sharp left chest pain once yesterday which resolved with changing position. She has noticed left calf cramping ever since having surgery. CTA was done which again shows multiple pulmonary emboli of similar appearance compared to last scan. Pt was restarted on anticoagulation with therapeutic lovenox. She denies smoking, hx of lung disease, coughing, bronchitis or pneumonia. She denies family hx of clotting disorders. She has been told during past hospitalizations she likely has sleep apnea but has not gotten sleep study yet.    SUBJECTIVE:     6/2- vs stable, on room air now. Used cpap short time overnight      OBJECTIVE:     Vitals (Most recent):  Vitals:    06/02/22 0815   BP:    Pulse: 84   Resp: 16   Temp:        Vitals (24 hour range):  Temp:  [96.5 °F (35.8 °C)-98.3 °F (36.8 °C)]   Pulse:  []   Resp:  [16-44]   BP: (108-164)/(57-96)   SpO2:  [93 %-100 %]       Intake/Output Summary (Last 24 hours) at 6/2/2022 0825  Last data filed at 6/1/2022 2049  Gross per 24 hour   Intake 120 ml   Output --   Net 120 ml          Physical Exam:  The patient's neuro status (alertness,orientation,cognitive function,motor skills,), pharyngeal exam (oral lesions, hygiene, abn dentition,), Neck (jvd,mass,thyroid,nodes in neck and above/below clavicle),RESPIRATORY(symmetry,effort,fremitus,percussion,auscultation),  Cor(rhythm,heart tones including  gallops,perfusion,edema)ABD(distention,hepatic&splenomegaly,tenderness,masses), Skin(rash,cyanosis),Psyc(affect,judgement,).  Exam negative except for these pertinent findings:    M4  Obese  Parthenon in place 2 incisions mid thoracic spine and left lateral back  Clear breath sounds bilaterally  HR regular  No calf tenderness/edema     Radiographs reviewed: view by direct vision   CTA chest 5/31/22- bilateral pulmonary emboli in the left and right main pulmonary arteries and subsegmental branches, similar appearance compared to 1/2022 scan. New small L pleural effusion, several small pleural based round GGOs suggests possible wedge infarcts     TTE 1/6/22-   The left ventricle is normal in size with normal systolic function.  The estimated ejection fraction is 60%.  Mild tricuspid regurgitation.  Unable to visualize right atrium and right ventricle, clinical concerns persist consider alternative cardiac imaging.      Labs     Recent Labs   Lab 06/02/22  0329   WBC 22.47*   HGB 12.2   HCT 37.4        Recent Labs   Lab 06/02/22  0329      K 4.8      CO2 27   BUN 11   CREATININE 0.8   *   CALCIUM 9.6   MG 2.3   PHOS 3.8   AST 13   ALT 23   ALKPHOS 75   BILITOT 0.7   PROT 7.1   ALBUMIN 2.9*   No results for input(s): PH, PCO2, PO2, HCO3 in the last 24 hours.  Microbiology Results (last 7 days)       ** No results found for the last 168 hours. **            Impression:  Active Hospital Problems    Diagnosis  POA    *Acute pulmonary embolism [I26.99]  Yes    Syrinx of spinal cord [G95.0]  Yes    Morbid obesity [E66.01]  Yes      Resolved Hospital Problems   No resolved problems to display.               Plan:       - continue anticoagulation  - restart eliquis on discharge  - repeat echo attempt to assess RV and PA pressure this time  - cpap at night while hospitalized  - needs sleep study after dc  - f/u in pulm clinic next 1-2 weeks with myself or NP    Blank Veloz MD  Pulmonary & Critical  Care Medicine                                    .

## 2022-06-02 NOTE — PROGRESS NOTES
Dear Dr. Rivas,     Thank you for referring your patient, Jamilah French, to be seen in the [facility] clinic. As you know, she is a 32 y.o.  who presents for preconception counseling. Jamilah's history is complicated by obesity, PCOS and elevated BPs without the established dx of CHTN. In December she underwent decompression of Chiari I malformation due to neuropathic face pain; in the postoperative phase she developed bilateral pulmonary emboli and was started on apixiban. Pt today is admitted to [facility] with recurrent acute on chronic PE stemming from an interruption in her apixiban therapy following a shunt placed in May for a syrinx. Pt did not restart her anticoagulation postoperatively as she had misplaced her pill bottle. She presented with chest pain and imaging was consistent with acute on chronic PE. She has since been restarted on enoxaparin with plan to transition back to therapeutic dose apixiban on discharge.    The patient location is: [facility]  The chief complaint leading to consultation is: Pulmonary embolism, desires pregnancy    Visit type: audiovisual    Face to Face time with patient: 30  57 minutes of total time spent on the encounter, which includes face to face time and non-face to face time preparing to see the patient (eg, review of tests), Obtaining and/or reviewing separately obtained history, Documenting clinical information in the electronic or other health record, Independently interpreting results (not separately reported) and communicating results to the patient/family/caregiver, or Care coordination (not separately reported).         Each patient to whom he or she provides medical services by telemedicine is:  (1) informed of the relationship between the physician and patient and the respective role of any other health care provider with respect to management of the patient; and (2) notified that he or she may decline to receive medical services by  telemedicine and may withdraw from such care at any time.    Notes:      OB History    Para Term  AB Living   0 0 0 0 0 0   SAB IAB Ectopic Multiple Live Births   0 0 0 0 0        Past Medical History:  2022: Bilateral pulmonary embolism  6/15/2018: Current moderate episode of major depressive disorder   without prior episode  10/12/2021: Radiculopathy, cervical region     No current facility-administered medications for this visit.    Current Outpatient Medications:     apixaban (ELIQUIS) 5 mg Tab, Take 2 tablets (10 mg total) by mouth 2 (two) times daily for 7 days, THEN 1 tablet (5 mg total) 2 (two) times daily., Disp: 208 tablet, Rfl: 0    Facility-Administered Medications Ordered in Other Visits:     acetaminophen tablet 1,000 mg, 1,000 mg, Oral, Q6H PRN, Arnulfo Harris MD    albuterol-ipratropium 2.5 mg-0.5 mg/3 mL nebulizer solution 3 mL, 3 mL, Nebulization, Q4H PRN, Francis Sam NP    aluminum-magnesium hydroxide-simethicone 200-200-20 mg/5 mL suspension 30 mL, 30 mL, Oral, QID PRN, Francis Sam NP    dexAMETHasone tablet 2 mg, 2 mg, Oral, Q12H, Francis Sam NP, 2 mg at 22 0805    dextrose 10% bolus 125 mL, 12.5 g, Intravenous, PRN, Arnulfo Harris MD    dextrose 10% bolus 250 mL, 25 g, Intravenous, PRN, Arnulfo Harris MD    enoxaparin injection 120 mg, 1 mg/kg, Subcutaneous, Q12H, Arnulfo Harris MD, 120 mg at 22 2314    famotidine tablet 20 mg, 20 mg, Oral, BID, Francis Sam NP, 20 mg at 22 0805    glucagon (human recombinant) injection 1 mg, 1 mg, Intramuscular, PRN, Francis Sam NP    glucose chewable tablet 16 g, 16 g, Oral, PRN, Francsi Sam NP    glucose chewable tablet 24 g, 24 g, Oral, PRN, Francis Sam NP    HYDROcodone-acetaminophen 5-325 mg per tablet 1 tablet, 1 tablet, Oral, Q6H PRN, Francis Sam, NP    magnesium oxide tablet 800 mg, 800 mg, Oral, PRN, Francis Sam, NP    magnesium oxide tablet  800 mg, 800 mg, Oral, PRN, Francis Sam NP    melatonin tablet 9 mg, 9 mg, Oral, Nightly PRN, Francis Sam NP    naloxone 0.4 mg/mL injection 0.02 mg, 0.02 mg, Intravenous, PRN, Francis Sam NP    ondansetron injection 4 mg, 4 mg, Intravenous, Q8H PRN, Francis Sam NP    potassium bicarbonate disintegrating tablet 35 mEq, 35 mEq, Oral, PRN, Francis Sam NP    potassium bicarbonate disintegrating tablet 50 mEq, 50 mEq, Oral, PRN, Francis Sam NP    potassium bicarbonate disintegrating tablet 60 mEq, 60 mEq, Oral, PRN, Francis Sam NP    potassium, sodium phosphates 280-160-250 mg packet 2 packet, 2 packet, Oral, PRN, Francis Sam NP    potassium, sodium phosphates 280-160-250 mg packet 2 packet, 2 packet, Oral, PRN, Francis Sam NP    potassium, sodium phosphates 280-160-250 mg packet 2 packet, 2 packet, Oral, PRN, Francis Sam NP    senna-docusate 8.6-50 mg per tablet 1 tablet, 1 tablet, Oral, BID, Francis Sam NP, 1 tablet at 22 0805    simethicone chewable tablet 80 mg, 1 tablet, Oral, QID PRN, Francis Sam NP    sodium chloride 0.9% flush 10 mL, 10 mL, Intravenous, Q8H PRN, Francis Sam NP     Jamilah  has a past surgical history that includes Hysteroscopy with dilation and curettage of uterus (N/A, 10/26/2020); Decompression of Chiari malformation by removal of posterior arch of first cervical vertebra (N/A, 2021); and Revision of procedure involving syringopleural shunt (N/A, 2022).     Social history:   Jamilah  reports that she quit smoking about 4 years ago. Her smoking use included vaping w/o nicotine. She has never used smokeless tobacco. She reports current alcohol use. She reports that she does not use drugs.     Allergies:   Sulfa - hives    Objective:   Baseline BMI 45.93  General: Well appearing woman, no acute distress    Assessment & Recommendations:   Jamilah French is a 32 y.o.  with      Acute pulmonary embolism  Provoked bilateral pulmonary emboli (1/6/22) s/p elective decompression of Chiari I malformation in 12/21  Hematology: Dr. Blackwell  TTE (1/6/22): LVEF 60%, unable to comment on R heart strain, though evidence of R heart strain on subsequent CTA  Current regimen (06/01/2022):   - Apixiban 10 mg BID  Antiphospholipid labs (5/9/22): negative to date; anticardiolipin IgM 13 (equivocal), DRVVT not completed     Pt stopped apixiban for shunt placement secondary to postoperative syrinx in May 2022, did not restart postoperatively as she had misplaced her prescription. Currently admitted to [facility] due to recurrence of PE symptoms with acute on chronic bilateral PE. Scheduled to undergo repeat echocardiogram today.    Recommendations:  1. As the diagnosis of antiphospholipid would modify pregnancy management, recommend repeating anticardiolipin IgM in late August (>12w from initial test), as well as ordering DRRVT for lupus anticoagulant assessment  2. Agree with TTE today - R heart strain during pregnancy would significantly modify her risk for adverse pregnancy outcome if present. If abnormal, strongly recommend cardiology consultation prior to pregnancy.   3. Restart apixiban for now, though oral anticoagulants are contraindicated in pregnancy. If she becomes pregnant within 6 months of this acute PE, will recommend transition to therapeutic dose enoxaparin; if beyond that time and negative for APLS labs, start intermediate dose enoxaparin, based on her current weight. See [facility] Guidelines for further detail regarding monitoring.  4. Please re-consult MFM for 1st trimester visit should patient become pregnant.  5. Otherwise continue care with Dr. Blackwell    PCOS (polycystic ovarian syndrome)  Hirsutism + anovulatory bleeding  No polycystic ovaries on US  Irregular cycles have subsequently resolved  Endocrinology: Dr. Roque  Previous regimen:  - Metformin 750 mg BID  Current  regimen (06/02/2022):   - No meds     No Hgb A1c on file, some elevated blood glucoses on recent metabolic profiles. Despite regulation of cycles, I am concerned that recent hyperglycemia in the context of her BMI may indicate some underlying glucose intolerance.    Recommendations:  1. Check HbgA1c  2. Continue care otherwise with Evelio Roque and Rob    Elevated BP without diagnosis of hypertension   Has had a number of mild to severe range BP in recent weeks, including during current admission. I am reluctant to establish the diagnosis of CHTN during an admission for acute PE, though pt is certainly at risk, particularly with elevated in office BPs this spring.    Recommendations:  1. Continue to monitor       Thank you for the opportunity to participate in the care of Jamilah. Please let us know of any questions or concerns.      Sincerely,    PAULY Hess MD/MPH  Maternal Fetal Medicine

## 2022-06-02 NOTE — PT/OT/SLP PROGRESS
Physical Therapy      Patient Name:  Jamilah French   MRN:  [id number]    Patient not seen today secondary to patient declines participation stating she is comfortable in bed and does not want to get up because taking pressure off of staples in her back causes her increased pain. PT offered to assist her to chair for lunch, but she requests to remain in bed. PT reviewed home safety, follow-up with surgeon regarding OPPT, and appropriate cane use. Will follow-up 06/03/22 if D/C falls through.

## 2022-06-02 NOTE — CARE UPDATE
06/02/22 0815   Patient Assessment/Suction   Level of Consciousness (AVPU) alert   Respiratory Effort Normal;Unlabored   All Lung Fields Breath Sounds Anterior:;Lateral:;clear;diminished   Rhythm/Pattern, Respiratory unlabored;pattern regular   PRE-TX-O2   O2 Device (Oxygen Therapy) room air   SpO2 99 %   Pulse Oximetry Type Intermittent   $ Pulse Oximetry - Multiple Charge Pulse Oximetry - Multiple   Pulse 84   Resp 16

## 2022-06-02 NOTE — PROGRESS NOTES
[facility] Medicine  Progress Note    Patient Name: Jamilah French  MRN: [id number]  Patient Class: IP- Inpatient   Admission Date: 5/31/2022  Length of Stay: 0 days  Attending Physician: Arnulfo Harris MD  Primary Care Provider: Sirena Hess DNP        Subjective:     Principal Problem:Acute pulmonary embolism        HPI:  Jamilah French is a 32-year-old female presents emergency room for evaluation of shortness of breath.Patient reports a Hx of PE bilaterally and notes that her current Sx are similar to when she was diagnosed on 01/06/22.  She states that initial PE presented 2 weeks s/p surgery (decompression of Chiari malformation by removal of posterior arch of 1st cervical vertebra) performed on 12/23/21.  Patient is currently 12 days s/p syringopleural shunt insertion T4 laminectomy secondary to syrinx of spinal cord and syringomyelia.  She is currently prescribed Eliquis but notes a halt in compliancy due to recent surgery and specifies her 1st dose since stopping was today.  Patient confirms associated central CP as well as calf pain that onset 3 days ago bilaterally.  Previous medical history includes obesity, Chiari malformation, PCOS, syrinx of spinal cord, and previous PE.  ER workup:  CBC with 22,000 white count.  CMP with glucose 147 BUN of 22 otherwise unremarkable.  Troponin elevated 0.125.  D-dimer 8.28.  CTA of chest demonstrates bilateral occlusive and nonocclusive pulmonary emboli with right heart strain with ground-glass opacities in the right lung suspicious for small pulmonary infarcts.  Patient admitted to Hospital Medicine for observation management.  Patient be placed in ICU out of an abundance of caution.  Patient will be started on Lovenox 1 mg/kg subQ q.12.  Pulmonary consult in a.m..      Overview/Hospital Course:  No notes on file    Interval History:  Patient seen and examined.  Overnight, shortness of breath appears to have improved.  Patient  doing well in ICU.  Plan of care discussed with the patient at the bedside in detail.    Review of Systems   Constitutional:  Positive for fatigue.   Respiratory:  Positive for shortness of breath. Negative for cough.    Cardiovascular:  Negative for chest pain and leg swelling.   Gastrointestinal:  Negative for abdominal pain and nausea.   Neurological:  Negative for weakness.   Psychiatric/Behavioral:  Negative for confusion.    All other systems reviewed and are negative.  Objective:     Vital Signs (Most Recent):  Temp: 97.7 °F (36.5 °C) (06/01/22 2000)  Pulse: 101 (06/01/22 2000)  Resp: 18 (06/01/22 2000)  BP: 122/72 (06/01/22 2000)  SpO2: 100 % (06/01/22 2000) Vital Signs (24h Range):  Temp:  [97.6 °F (36.4 °C)-98.3 °F (36.8 °C)] 97.7 °F (36.5 °C)  Pulse:  [] 101  Resp:  [17-44] 18  SpO2:  [93 %-100 %] 100 %  BP: ()/(50-96) 122/72     Weight: 129.5 kg (285 lb 7.9 oz)  Body mass index is 47.51 kg/m².    Intake/Output Summary (Last 24 hours) at 6/1/2022 2124  Last data filed at 6/1/2022 2049  Gross per 24 hour   Intake 120 ml   Output --   Net 120 ml      Physical Exam  Vitals and nursing note reviewed.   Constitutional:       General: She is not in acute distress.  Eyes:      Pupils: Pupils are equal, round, and reactive to light.   Cardiovascular:      Rate and Rhythm: Normal rate and regular rhythm.      Heart sounds: No murmur heard.  Pulmonary:      Effort: Pulmonary effort is normal.      Breath sounds: Normal breath sounds.   Abdominal:      General: There is no distension.      Palpations: Abdomen is soft.      Tenderness: There is no abdominal tenderness.   Skin:     Coloration: Skin is not pale.      Findings: No rash.   Neurological:      Mental Status: She is alert and oriented to person, place, and time.       Significant Labs: All pertinent labs within the past 24 hours have been reviewed.    Significant Imaging: I have reviewed all pertinent imaging results/findings within the past 24  hours.      Assessment/Plan:      * Acute pulmonary embolism  Patient with acute on chronic pulmonary emboli.  Continue treatment dose Lovenox and plan to switch to Eliquis.  Echocardiogram shows evidence of right heart strain with elevated cardiac biomarkers which represent high risk pulmonary embolus.  Follow-up bilateral lower extremity ultrasound.  Given that the patient was off of anticoagulation for 7 days after her procedure, I would not count this is a failure of apixaban.      Syrinx of spinal cord  Chronic problem  Recent surgical repair      Morbid obesity    Body mass index is 47.51 kg/m². Morbid obesity complicates all aspects of disease management from diagnostic modalities to treatment. Weight loss encouraged and health benefits explained to patient.      VTE Risk Mitigation (From admission, onward)         Ordered     enoxaparin injection 120 mg  Every 12 hours (non-standard times)         06/01/22 0037     IP VTE HIGH RISK PATIENT  Once         06/01/22 0041     Place sequential compression device  Until discontinued         06/01/22 0041     Place KRISSY hose  Until discontinued         06/01/22 0041                Discharge Planning   LORETO:      Code Status: Full Code   Is the patient medically ready for discharge?:     Reason for patient still in hospital (select all that apply): Treatment  Discharge Plan A: Home with family                  Arnulfo Harris MD  Department of Hospital Medicine   [facility]

## 2022-06-02 NOTE — SUBJECTIVE & OBJECTIVE
Interval History:  Patient seen and examined.  Overnight, shortness of breath appears to have improved.  Patient doing well in ICU.  Plan of care discussed with the patient at the bedside in detail.    Review of Systems   Constitutional:  Positive for fatigue.   Respiratory:  Positive for shortness of breath. Negative for cough.    Cardiovascular:  Negative for chest pain and leg swelling.   Gastrointestinal:  Negative for abdominal pain and nausea.   Neurological:  Negative for weakness.   Psychiatric/Behavioral:  Negative for confusion.    All other systems reviewed and are negative.  Objective:     Vital Signs (Most Recent):  Temp: 97.7 °F (36.5 °C) (06/01/22 2000)  Pulse: 101 (06/01/22 2000)  Resp: 18 (06/01/22 2000)  BP: 122/72 (06/01/22 2000)  SpO2: 100 % (06/01/22 2000) Vital Signs (24h Range):  Temp:  [97.6 °F (36.4 °C)-98.3 °F (36.8 °C)] 97.7 °F (36.5 °C)  Pulse:  [] 101  Resp:  [17-44] 18  SpO2:  [93 %-100 %] 100 %  BP: ()/(50-96) 122/72     Weight: 129.5 kg (285 lb 7.9 oz)  Body mass index is 47.51 kg/m².    Intake/Output Summary (Last 24 hours) at 6/1/2022 2124  Last data filed at 6/1/2022 2049  Gross per 24 hour   Intake 120 ml   Output --   Net 120 ml      Physical Exam  Vitals and nursing note reviewed.   Constitutional:       General: She is not in acute distress.  Eyes:      Pupils: Pupils are equal, round, and reactive to light.   Cardiovascular:      Rate and Rhythm: Normal rate and regular rhythm.      Heart sounds: No murmur heard.  Pulmonary:      Effort: Pulmonary effort is normal.      Breath sounds: Normal breath sounds.   Abdominal:      General: There is no distension.      Palpations: Abdomen is soft.      Tenderness: There is no abdominal tenderness.   Skin:     Coloration: Skin is not pale.      Findings: No rash.   Neurological:      Mental Status: She is alert and oriented to person, place, and time.       Significant Labs: All pertinent labs within the past 24 hours have  been reviewed.    Significant Imaging: I have reviewed all pertinent imaging results/findings within the past 24 hours.

## 2022-06-02 NOTE — DISCHARGE INSTRUCTIONS
Discharge Instructions, [facility]    Thank you for choosing [facility] for your medical care. The primary doctor who is taking care of you at the time of your discharge is Arnulfo Harris MD.     You were admitted to the hospital with Acute pulmonary embolism.     Please note your discharge instructions, including diet/activity restrictions, follow-up appointments, and medication changes.  If you have any questions about your medical issues, prescriptions, or any other questions, please feel free to contact the [facility] Medicine Dept at [phone] and we will help.    If you are previously with Home health, outpatient PT/OT or under a therapy program, you are cleared to return to those programs.    Please direct all long term medication refills and follow up to your primary care provider, Sirena Hess DNP. Thank you again for letting us take care of your health care needs.

## 2022-06-02 NOTE — ASSESSMENT & PLAN NOTE
Has had a number of mild to severe range BP in recent weeks, including during current admission. I am reluctant to establish the diagnosis of CHTN during an admission for acute PE, though pt is certainly at risk, particularly with elevated in office BPs this spring.    Recommendations:  1. Continue to monitor

## 2022-06-02 NOTE — ASSESSMENT & PLAN NOTE
Body mass index is 47.51 kg/m². Morbid obesity complicates all aspects of disease management from diagnostic modalities to treatment. Weight loss encouraged and health benefits explained to patient.

## 2022-06-03 ENCOUNTER — TELEPHONE (OUTPATIENT)
Dept: MEDSURG UNIT | Facility: HOSPITAL | Age: 32
End: 2022-06-03
Payer: COMMERCIAL

## 2022-06-03 NOTE — DISCHARGE SUMMARY
[facility] Medicine  Discharge Summary      Patient Name: Jamilah French  MRN: [id number]  Patient Class: IP- Inpatient  Admission Date: 5/31/2022  Hospital Length of Stay: 1 days  Discharge Date and Time: 6/2/2022  1:13 PM  Attending Physician: No att. providers found   Discharging Provider: Arnulfo Harris MD  Primary Care Provider: Sirena Hess DNP      HPI:   Jamilah French is a 32-year-old female presents emergency room for evaluation of shortness of breath.Patient reports a Hx of PE bilaterally and notes that her current Sx are similar to when she was diagnosed on 01/06/22.  She states that initial PE presented 2 weeks s/p surgery (decompression of Chiari malformation by removal of posterior arch of 1st cervical vertebra) performed on 12/23/21.  Patient is currently 12 days s/p syringopleural shunt insertion T4 laminectomy secondary to syrinx of spinal cord and syringomyelia.  She is currently prescribed Eliquis but notes a halt in compliancy due to recent surgery and specifies her 1st dose since stopping was today.  Patient confirms associated central CP as well as calf pain that onset 3 days ago bilaterally.  Previous medical history includes obesity, Chiari malformation, PCOS, syrinx of spinal cord, and previous PE.  ER workup:  CBC with 22,000 white count.  CMP with glucose 147 BUN of 22 otherwise unremarkable.  Troponin elevated 0.125.  D-dimer 8.28.  CTA of chest demonstrates bilateral occlusive and nonocclusive pulmonary emboli with right heart strain with ground-glass opacities in the right lung suspicious for small pulmonary infarcts.  Patient admitted to Hospital Medicine for observation management.  Patient be placed in ICU out of an abundance of caution.  Patient will be started on Lovenox 1 mg/kg subQ q.12.  Pulmonary consult in a.m..      * No surgery found *      Hospital Course:   Patient is a 32-year-old  female who was admitted to the  hospital for severe bilateral pulmonary embolism with evidence of right heart strain.  She had positive biomarkers, so underwent further evaluation with echocardiogram which showed no evidence of right heart strain..  The patient had a history of a previous pulmonary embolism approximately 5 months prior to admission, and had been on chronic Eliquis but this was stopped for approximately 7 days following a recent procedure after which the patient developed acute PE.  DVT ultrasound was also done which showed bilateral new DVTs.  The patient was initially on Lovenox weight based dosing but was transition to apixaban at time of discharge.  Given that the patient had been off of apixaban for 7 days prior to development of her new pulmonary embolus, I do not consider this to be a treatment failure of apixaban.  The patient was doing well, initially in the ICU but was weaned off of oxygen and was back to her symptomatic baseline at time of discharge.  She will be discharged home with follow-up with primary care and Hematology/Oncology.       Goals of Care Treatment Preferences:  Code Status: Full Code      Consults:   Consults (From admission, onward)        Status Ordering Provider     Inpatient consult to Pulmonology  Once        Provider:  Blank Veloz MD    Completed ENRRIQUE WESLEY     IP consult to dietary  Once        Provider:  (Not yet assigned)    Completed ENRRIQUE WESLEY          No new Assessment & Plan notes have been filed under this hospital service since the last note was generated.  Service: Hospital Medicine    Final Active Diagnoses:    Diagnosis Date Noted POA    PRINCIPAL PROBLEM:  Acute pulmonary embolism [I26.99] 01/06/2022 Yes    Syrinx of spinal cord [G95.0] 10/21/2021 Yes    Morbid obesity [E66.01] 06/15/2018 Yes      Problems Resolved During this Admission:       Discharged Condition: stable    Disposition: Admitted as an Inpatient    Follow Up:   Follow-up Information      Sirena Hess DNP Follow up.    Specialty: Family Medicine  Contact information:  [place] [address]  [phone]                       Patient Instructions:      Ambulatory referral/consult to Hematology / Oncology   Standing Status: Future   Referral Priority: Routine Referral Type: Consultation   Referral Reason: Specialty Services Required   Requested Specialty: Hematology and Oncology   Number of Visits Requested: 1     Diet Adult Regular     Notify your health care provider if you experience any of the following:  temperature >100.4     Notify your health care provider if you experience any of the following:  persistent nausea and vomiting or diarrhea     Notify your health care provider if you experience any of the following:  severe uncontrolled pain     Activity as tolerated       Significant Diagnostic Studies: Labs:   BMP:   Recent Labs   Lab 06/01/22  0616 06/02/22  0329   GLU 79 114*    138   K 3.9 4.8    103   CO2 25 27   BUN 18 11   CREATININE 0.8 0.8   CALCIUM 9.0 9.6   MG 2.0 2.3    and CBC   Recent Labs   Lab 06/01/22  0616 06/02/22  0329   WBC 19.83* 22.47*   HGB 12.2 12.2   HCT 37.2 37.4    351       Pending Diagnostic Studies:     None         Medications:  Reconciled Home Medications:      Medication List      START taking these medications    apixaban 5 mg Tab  Commonly known as: ELIQUIS  Take 2 tablets (10 mg total) by mouth 2 (two) times daily for 7 days, THEN 1 tablet (5 mg total) 2 (two) times daily.  Start taking on: June 2, 2022        CONTINUE taking these medications    dexAMETHasone 2 MG tablet  Commonly known as: DECADRON  Take 1 tablet (2 mg total) by mouth every 12 (twelve) hours.     famotidine 20 MG tablet  Commonly known as: PEPCID  Take 1 tablet (20 mg total) by mouth 2 (two) times daily.        STOP taking these medications    gabapentin 300 MG capsule  Commonly known as: NEURONTIN     methocarbamoL 750 MG Tab  Commonly known as:  ROBAXIN     metoprolol tartrate 50 MG tablet  Commonly known as: LOPRESSOR     ondansetron 4 MG Tbdl  Commonly known as: ZOFRAN-ODT     oxyCODONE-acetaminophen  mg per tablet  Commonly known as: PERCOCET            Indwelling Lines/Drains at time of discharge:   Lines/Drains/Airways     None                 Time spent on the discharge of patient: 34 minutes         Arnulfo Harris MD  Department of Hospital Medicine  [facility]

## 2022-06-03 NOTE — HOSPITAL COURSE
Patient is a 32-year-old  female who was admitted to the hospital for severe bilateral pulmonary embolism with evidence of right heart strain.  She had positive biomarkers, so underwent further evaluation with echocardiogram which showed no evidence of right heart strain..  The patient had a history of a previous pulmonary embolism approximately 5 months prior to admission, and had been on chronic Eliquis but this was stopped for approximately 7 days following a recent procedure after which the patient developed acute PE.  DVT ultrasound was also done which showed bilateral new DVTs.  The patient was initially on Lovenox weight based dosing but was transition to apixaban at time of discharge.  Given that the patient had been off of apixaban for 7 days prior to development of her new pulmonary embolus, I do not consider this to be a treatment failure of apixaban.  The patient was doing well, initially in the ICU but was weaned off of oxygen and was back to her symptomatic baseline at time of discharge.  She will be discharged home with follow-up with primary care and Hematology/Oncology.

## 2022-06-06 ENCOUNTER — OFFICE VISIT (OUTPATIENT)
Dept: HEMATOLOGY/ONCOLOGY | Facility: CLINIC | Age: 32
End: 2022-06-06
Payer: COMMERCIAL

## 2022-06-06 VITALS
OXYGEN SATURATION: 97 % | RESPIRATION RATE: 16 BRPM | WEIGHT: 277.31 LBS | TEMPERATURE: 97 F | SYSTOLIC BLOOD PRESSURE: 160 MMHG | BODY MASS INDEX: 46.2 KG/M2 | DIASTOLIC BLOOD PRESSURE: 73 MMHG | HEART RATE: 115 BPM | HEIGHT: 65 IN

## 2022-06-06 DIAGNOSIS — I82.4Y9 ACUTE DEEP VEIN THROMBOSIS (DVT) OF PROXIMAL VEIN OF LOWER EXTREMITY, UNSPECIFIED LATERALITY: Primary | ICD-10-CM

## 2022-06-06 DIAGNOSIS — I26.99 ACUTE PULMONARY EMBOLISM: ICD-10-CM

## 2022-06-06 DIAGNOSIS — E66.01 MORBID OBESITY: ICD-10-CM

## 2022-06-06 PROCEDURE — 1159F PR MEDICATION LIST DOCUMENTED IN MEDICAL RECORD: ICD-10-PCS | Mod: CPTII,S$GLB,, | Performed by: INTERNAL MEDICINE

## 2022-06-06 PROCEDURE — 3008F BODY MASS INDEX DOCD: CPT | Mod: CPTII,S$GLB,, | Performed by: INTERNAL MEDICINE

## 2022-06-06 PROCEDURE — 3077F SYST BP >= 140 MM HG: CPT | Mod: CPTII,S$GLB,, | Performed by: INTERNAL MEDICINE

## 2022-06-06 PROCEDURE — 99999 PR PBB SHADOW E&M-EST. PATIENT-LVL IV: CPT | Mod: PBBFAC,,, | Performed by: INTERNAL MEDICINE

## 2022-06-06 PROCEDURE — 99215 OFFICE O/P EST HI 40 MIN: CPT | Mod: S$GLB,,, | Performed by: INTERNAL MEDICINE

## 2022-06-06 PROCEDURE — 3078F PR MOST RECENT DIASTOLIC BLOOD PRESSURE < 80 MM HG: ICD-10-PCS | Mod: CPTII,S$GLB,, | Performed by: INTERNAL MEDICINE

## 2022-06-06 PROCEDURE — 1111F DSCHRG MED/CURRENT MED MERGE: CPT | Mod: CPTII,S$GLB,, | Performed by: INTERNAL MEDICINE

## 2022-06-06 PROCEDURE — 99215 PR OFFICE/OUTPT VISIT, EST, LEVL V, 40-54 MIN: ICD-10-PCS | Mod: S$GLB,,, | Performed by: INTERNAL MEDICINE

## 2022-06-06 PROCEDURE — 1111F PR DISCHARGE MEDS RECONCILED W/ CURRENT OUTPATIENT MED LIST: ICD-10-PCS | Mod: CPTII,S$GLB,, | Performed by: INTERNAL MEDICINE

## 2022-06-06 PROCEDURE — 1159F MED LIST DOCD IN RCRD: CPT | Mod: CPTII,S$GLB,, | Performed by: INTERNAL MEDICINE

## 2022-06-06 PROCEDURE — 3078F DIAST BP <80 MM HG: CPT | Mod: CPTII,S$GLB,, | Performed by: INTERNAL MEDICINE

## 2022-06-06 PROCEDURE — 3008F PR BODY MASS INDEX (BMI) DOCUMENTED: ICD-10-PCS | Mod: CPTII,S$GLB,, | Performed by: INTERNAL MEDICINE

## 2022-06-06 PROCEDURE — 3077F PR MOST RECENT SYSTOLIC BLOOD PRESSURE >= 140 MM HG: ICD-10-PCS | Mod: CPTII,S$GLB,, | Performed by: INTERNAL MEDICINE

## 2022-06-06 PROCEDURE — 99999 PR PBB SHADOW E&M-EST. PATIENT-LVL IV: ICD-10-PCS | Mod: PBBFAC,,, | Performed by: INTERNAL MEDICINE

## 2022-06-06 NOTE — PROGRESS NOTES
HPI    32 years old female obese referred to Hematology Clinic for evaluation of pulmonary embolism.    Initially patient provoked pulmonary embolism after spine surgery in 2022. Patient states that she had spine surgery in December in January she was found to have bilateral pulmonary embolism with right heart strains.  At the time DVT of lower extremities negative.  Patient was treated with Lovenox transition to Eliquis.  In May of 2022 patient return to clinic for a shunt procedure which anticoagulation was stopped for that.  After completion of procedure patient start on Lovenox transition to Eliquis without loading dosage.  Time of anticoagulation interruption proximally about 2 and half weeks.  On  patient experienced chest pain return to ED which found to have lower extremity DVT and ongoing pulmonary embolism.  Patient was again treated with Lovenox started on Eliquis loading.  At the time of current clinical visit patient is still on Eliquis loading dosage 10 mg b.i.d..    She denies any history of blood clots.  She denies any family history of blood clots.  Patient denies any smoking or estrogen products use.      Past Medical History:   Diagnosis Date    Bilateral pulmonary embolism 2022    Current moderate episode of major depressive disorder without prior episode 6/15/2018    Radiculopathy, cervical region 10/12/2021     Social History     Socioeconomic History    Marital status:      Spouse name: Neil    Number of children: 0   Tobacco Use    Smoking status: Former Smoker     Types: Vaping w/o nicotine     Quit date: 2018     Years since quittin.4    Smokeless tobacco: Never Used   Substance and Sexual Activity    Alcohol use: Yes     Comment: social     Drug use: No    Sexual activity: Yes     Partners: Male     Birth control/protection: None     Social Determinants of Health     Financial Resource Strain: Low Risk     Difficulty of Paying Living  Expenses: Not very hard   Food Insecurity: Food Insecurity Present    Worried About Running Out of Food in the Last Year: Sometimes true    Ran Out of Food in the Last Year: Sometimes true   Transportation Needs: No Transportation Needs    Lack of Transportation (Medical): No    Lack of Transportation (Non-Medical): No   Physical Activity: Insufficiently Active    Days of Exercise per Week: 2 days    Minutes of Exercise per Session: 30 min   Stress: No Stress Concern Present    Feeling of Stress : Only a little   Social Connections: Unknown    Frequency of Communication with Friends and Family: Twice a week    Frequency of Social Gatherings with Friends and Family: Twice a week    Active Member of Clubs or Organizations: Yes    Attends Club or Organization Meetings: More than 4 times per year    Marital Status:    Housing Stability: Low Risk     Unable to Pay for Housing in the Last Year: No    Number of Places Lived in the Last Year: 1    Unstable Housing in the Last Year: No         Subjective      Review of Systems   Constitutional: Negative for appetite change, fatigue and unexpected weight change.   HENT: Negative for mouth sores.   Eyes: Negative for visual disturbance.   Respiratory: Negative for cough and shortness of breath.   Cardiovascular: Negative for chest pain.   Gastrointestinal: Negative for diarrhea.   Genitourinary: Negative for frequency.   Musculoskeletal: Negative for back pain.   Skin: Negative for rash.   Neurological: Negative for headaches.   Hematological: Negative for adenopathy.   Psychiatric/Behavioral: The patient is not nervous/anxious.   All other systems reviewed and are negative.     Objective    Physical Exam     Vitals:    06/06/22 1323   BP: (!) 160/73   Pulse: (!) 115   Resp: 16   Temp: 97.4 °F (36.3 °C)     Awake alert no acute distress  Normocephalic atraumatic  Pupils equal round reactive  Clear to auscultate  Tachycardic  Soft nontender  nondistended  Distal pulses intact  No rash no lesions  CMP  Sodium   Date Value Ref Range Status   06/02/2022 138 136 - 145 mmol/L Final     Potassium   Date Value Ref Range Status   06/02/2022 4.8 3.5 - 5.1 mmol/L Final     Chloride   Date Value Ref Range Status   06/02/2022 103 95 - 110 mmol/L Final     CO2   Date Value Ref Range Status   06/02/2022 27 23 - 29 mmol/L Final     Glucose   Date Value Ref Range Status   06/02/2022 114 (H) 70 - 110 mg/dL Final     BUN   Date Value Ref Range Status   06/02/2022 11 6 - 20 mg/dL Final     Creatinine   Date Value Ref Range Status   06/02/2022 0.8 0.5 - 1.4 mg/dL Final     Calcium   Date Value Ref Range Status   06/02/2022 9.6 8.7 - 10.5 mg/dL Final     Total Protein   Date Value Ref Range Status   06/02/2022 7.1 6.0 - 8.4 g/dL Final     Albumin   Date Value Ref Range Status   06/02/2022 2.9 (L) 3.5 - 5.2 g/dL Final   08/21/2020 3.9 3.6 - 5.1 g/dL Final     Comment:     For additional information, please refer to   http://education.[employer]/faq/RFP148 (This link is   being provided for informational/ educational purposes only.)  This test was developed and its analytical performance   characteristics have been determined by [facility]. It has not been cleared or approved by the   [place] Food and Drug Administration. This assay has been validated   pursuant to the CLIA regulations and is used for clinical   purposes.  @ Test Performed By:  [employer]  Catrachito Archer M.D., Ph.D.,   [address] [place] [id number]  CLIA  [id number]       Total Bilirubin   Date Value Ref Range Status   06/02/2022 0.7 0.1 - 1.0 mg/dL Final     Comment:     For infants and newborns, interpretation of results should be based  on gestational age, weight and in agreement with clinical  observations.    Premature Infant recommended reference ranges:  Up to 24 hours.............<8.0 mg/dL  Up to 48  hours............<12.0 mg/dL  3-5 days..................<15.0 mg/dL  6-29 days.................<15.0 mg/dL       Alkaline Phosphatase   Date Value Ref Range Status   06/02/2022 75 55 - 135 U/L Final     AST   Date Value Ref Range Status   06/02/2022 13 10 - 40 U/L Final     ALT   Date Value Ref Range Status   06/02/2022 23 10 - 44 U/L Final     Anion Gap   Date Value Ref Range Status   06/02/2022 8 8 - 16 mmol/L Final     eGFR if    Date Value Ref Range Status   06/02/2022 >60 >60 mL/min/1.73 m^2 Final     eGFR if non    Date Value Ref Range Status   06/02/2022 >60 >60 mL/min/1.73 m^2 Final     Comment:     Calculation used to obtain the estimated glomerular filtration  rate (eGFR) is the CKD-EPI equation.        Ultrasound lower extremity  06/02/2022  Thrombus within para left posterior tibial vein.  Compared to jan 22 new finding.    05/31/2022 CTA  Bilateral occlusive and nonocclusive pulmonary embolism with right heart strain    01/05/2022 ultrasound lower extremity bilateral  Negative for DVT on study    Assessment    Recurrence relapse extensive pulmonary embolism now involving lower extremity.    Patient has currently been rechallenged with Eliquis 10 mg p.o. b.i.d. loading dose and is planning on transition to 5 mg p.o. b.i.d. after 1 week.    Initial events thrombus pulmonary embolism without lower extremity DVT diagnosed January 2, 2022 treated with Lovenox transition to Eliquis at the time.  Due to need of spine surgery and shunt procedure anticoagulation was interrupted for 2 and half weeks and will resumed with Lovenox transition to Eliquis 5 mg p.o. b.i.d..  On January 2nd patient experienced chest pain shortness breath which prompted her to go to ED found to have pulmonary embolism in addition lower extremity DVT.  She was treated with Lovenox and was transition to Eliquis 10 mg p.o. b.i.d..    Polycystic ovarian syndrome    Plan    Given extensive blood clots I will  recommend continue ongoing Eliquis 10 mg p.o. b.i.d. x1 week then transition to 5 mg p.o. b.i.d..  I would like to perform a hypercoagulable workup prior to the next visit.  In addition I would like to perform lower extremity DVT studies as well as CTA for evaluation of treatment response.  Apparently the only risk factor is obesity in her case.  She has no history of blood clots nor she has any history of estrogen product use or pregnancy lost.    I will see her in about 8 weeks with above studies.    On today's visit I explained to her that due to extensive nature of pulmonary embolism and DVT it is likely that she will need a long-term anticoagulation.  She also understands that for any urgency concern/chest pain/shortness of breath she may contact our clinic for earlier visit or go to nearest emergency room for evaluation.    Acute pulmonary embolism  -     Ambulatory referral/consult to Hematology / Oncology

## 2022-06-07 ENCOUNTER — OFFICE VISIT (OUTPATIENT)
Dept: NEUROSURGERY | Facility: CLINIC | Age: 32
End: 2022-06-07
Payer: COMMERCIAL

## 2022-06-07 ENCOUNTER — TELEPHONE (OUTPATIENT)
Dept: PULMONOLOGY | Facility: CLINIC | Age: 32
End: 2022-06-07
Payer: COMMERCIAL

## 2022-06-07 ENCOUNTER — TELEPHONE (OUTPATIENT)
Dept: FAMILY MEDICINE | Facility: CLINIC | Age: 32
End: 2022-06-07
Payer: COMMERCIAL

## 2022-06-07 ENCOUNTER — PATIENT MESSAGE (OUTPATIENT)
Dept: NEUROSURGERY | Facility: CLINIC | Age: 32
End: 2022-06-07

## 2022-06-07 VITALS
OXYGEN SATURATION: 97 % | TEMPERATURE: 99 F | HEIGHT: 65 IN | WEIGHT: 276 LBS | DIASTOLIC BLOOD PRESSURE: 73 MMHG | BODY MASS INDEX: 45.98 KG/M2 | SYSTOLIC BLOOD PRESSURE: 121 MMHG | HEART RATE: 96 BPM

## 2022-06-07 DIAGNOSIS — R20.0 LOWER EXTREMITY NUMBNESS: Primary | ICD-10-CM

## 2022-06-07 PROCEDURE — 3078F DIAST BP <80 MM HG: CPT | Mod: CPTII,S$GLB,, | Performed by: PHYSICIAN ASSISTANT

## 2022-06-07 PROCEDURE — 3078F PR MOST RECENT DIASTOLIC BLOOD PRESSURE < 80 MM HG: ICD-10-PCS | Mod: CPTII,S$GLB,, | Performed by: PHYSICIAN ASSISTANT

## 2022-06-07 PROCEDURE — 3074F SYST BP LT 130 MM HG: CPT | Mod: CPTII,S$GLB,, | Performed by: PHYSICIAN ASSISTANT

## 2022-06-07 PROCEDURE — 99024 PR POST-OP FOLLOW-UP VISIT: ICD-10-PCS | Mod: S$GLB,,, | Performed by: PHYSICIAN ASSISTANT

## 2022-06-07 PROCEDURE — 3008F PR BODY MASS INDEX (BMI) DOCUMENTED: ICD-10-PCS | Mod: CPTII,S$GLB,, | Performed by: PHYSICIAN ASSISTANT

## 2022-06-07 PROCEDURE — 1159F PR MEDICATION LIST DOCUMENTED IN MEDICAL RECORD: ICD-10-PCS | Mod: CPTII,S$GLB,, | Performed by: PHYSICIAN ASSISTANT

## 2022-06-07 PROCEDURE — 3074F PR MOST RECENT SYSTOLIC BLOOD PRESSURE < 130 MM HG: ICD-10-PCS | Mod: CPTII,S$GLB,, | Performed by: PHYSICIAN ASSISTANT

## 2022-06-07 PROCEDURE — 1160F RVW MEDS BY RX/DR IN RCRD: CPT | Mod: CPTII,S$GLB,, | Performed by: PHYSICIAN ASSISTANT

## 2022-06-07 PROCEDURE — 1160F PR REVIEW ALL MEDS BY PRESCRIBER/CLIN PHARMACIST DOCUMENTED: ICD-10-PCS | Mod: CPTII,S$GLB,, | Performed by: PHYSICIAN ASSISTANT

## 2022-06-07 PROCEDURE — 99024 POSTOP FOLLOW-UP VISIT: CPT | Mod: S$GLB,,, | Performed by: PHYSICIAN ASSISTANT

## 2022-06-07 PROCEDURE — 99999 PR PBB SHADOW E&M-EST. PATIENT-LVL IV: ICD-10-PCS | Mod: PBBFAC,,, | Performed by: PHYSICIAN ASSISTANT

## 2022-06-07 PROCEDURE — 1159F MED LIST DOCD IN RCRD: CPT | Mod: CPTII,S$GLB,, | Performed by: PHYSICIAN ASSISTANT

## 2022-06-07 PROCEDURE — 99999 PR PBB SHADOW E&M-EST. PATIENT-LVL IV: CPT | Mod: PBBFAC,,, | Performed by: PHYSICIAN ASSISTANT

## 2022-06-07 PROCEDURE — 3008F BODY MASS INDEX DOCD: CPT | Mod: CPTII,S$GLB,, | Performed by: PHYSICIAN ASSISTANT

## 2022-06-07 RX ORDER — GABAPENTIN 300 MG/1
300 CAPSULE ORAL 3 TIMES DAILY
Qty: 90 CAPSULE | Refills: 1 | Status: SHIPPED | OUTPATIENT
Start: 2022-06-07 | End: 2022-10-17

## 2022-06-07 NOTE — PROGRESS NOTES
Wound Check   Neurosurgery     Jamilah French is a 32 y.o. female who presents to clinic today for 2 week wound check, s/p syringopleural shunt on 5/19/22 with Dr. Reis.  Denies fevers, chills, night sweats or N/V. Further denies wound drainage or swelling. Pt has not required narcotic pain medication since discharge from the hospital. She denies any change in her right leg numbness. She experienced left leg paresthesias post-operatively. Left leg sensation has improved slightly since surgery but remains worse than her right leg.     On 5/31/22 she presented to the ED with chest pain and was found to have acute on chronic PE. Anticoagulation has been increased to a loading dose.       Physical Exam:   General: well developed, well nourished, no distress  Neurologic: Alert and oriented. Thought content appropriate.   GCS: Motor: 6/Verbal: 5/Eyes: 4 GCS Total: 15   Mental Status: Awake, Alert, Oriented x3   Cranial nerves: face symmetric, tongue midline, pupils equal, round, reactive to light with accomodation, EOMI.   Motor Strength: moves all extremities with good strength and tone   Sensation: response to light touch throughout  No gait disturbances     Incision is clean, dry and intact with no signs of erythema, swelling or purulent drainage. Staples are intact on exam and removed in clinic. . All skin edges are completely approximated.       Vitals:    06/07/22 1046   BP: 121/73   Pulse: 96   Temp: 99.1 °F (37.3 °C)             Assessment/Plan:   Jamilah French is a 32 y.o. female who presents for 2 week wound check, s/p syringopleural shunt on 5/19/22 with Dr. Reis.     -Keep incisions open to air   -start gabapentin three times per day   -Can shower and get incision wet, just pat dry and no vigorous scrubbing. Do not submerge incision for another 4 weeks.   -No lifting more than 10 lbs or excessive bending/twisting.   -Can drive after 4 weeks when no longer taking narcotics   -Follow up with   Smiley in 4 weeks   -Encouraged patient to call if they have any questions or concerns prior to next follow up appt        Tracy Hurd PA-C  [facility]  Department of Neurosurgery  [phone]

## 2022-06-07 NOTE — PATIENT INSTRUCTIONS
-Keep incisions open to air   -start gabapentin three times per day   -Can shower and get incision wet, just pat dry and no vigorous scrubbing. Do not submerge incision for another 4 weeks.   -No lifting more than 10 lbs or excessive bending/twisting.   -Can drive after 4 weeks when no longer taking narcotics   -Follow up with Dr. Reis in 4 weeks   -Please call with any questions or concerns prior to your next appointment.

## 2022-06-07 NOTE — TELEPHONE ENCOUNTER
Attempted to contact patient reschedule appointment with a different provider that is a hospital follow up due to provider can not see hospital follow ups. No answer. LVM and portal message sent to reschedule.

## 2022-06-07 NOTE — TELEPHONE ENCOUNTER
Called number but just rings.    ----- Message from Rich Dugan sent at 6/7/2022 11:12 AM CDT -----  Contact: LINUS Gonzales  Type: Needs Medical Advice  Who Called:  LINUS Gonzales  Best Call Back Number: [phone]  Additional Information: Needs to speak with office. Wanting to assist coordinating care

## 2022-06-08 ENCOUNTER — TELEPHONE (OUTPATIENT)
Dept: MATERNAL FETAL MEDICINE | Facility: CLINIC | Age: 32
End: 2022-06-08
Payer: COMMERCIAL

## 2022-06-08 ENCOUNTER — TELEPHONE (OUTPATIENT)
Dept: CARDIOLOGY | Facility: CLINIC | Age: 32
End: 2022-06-08
Payer: COMMERCIAL

## 2022-06-08 NOTE — TELEPHONE ENCOUNTER
----- Message from Beverly Ugarte RN sent at 6/8/2022  4:19 PM CDT -----  Regarding: FW: metoprolol  Dr Contreras updated and stated that pt does not beta blockers, ok to stay off metoprolol. I called pt and left a voicemail.    ----- Message -----  From: Beverly Ugarte RN  Sent: 6/8/2022   4:12 PM CDT  To: Eber Contreras MD, Beverly Ugarte, RN  Subject: metoprolol                                       I found out accidentally that pt stopped taking her metroprolol tartrate since her last discharge as she was told. Looking at her chart it sounds that the med might have, or not have, been discontinued accidentally due to comment: no longer taking.    No bp at home, yesterday at clinic: /73, HR 96    Please advise

## 2022-06-08 NOTE — TELEPHONE ENCOUNTER
Pt called back and was updated on staying off the metoprolol as per dr Contreras. I encouraged checking bp at home once in a while and she verbalized understanding.

## 2022-06-08 NOTE — TELEPHONE ENCOUNTER
Spoke w. Ms Mcmillan from [facility], after she verified pt's info, updated her on Eliquis dose, f/u appt w. dr Contreras scheduled. She states that pt is on a special program allowing expensive meds to be covered at affordable / free price. She is faxing us a request for additional info.

## 2022-06-08 NOTE — TELEPHONE ENCOUNTER
Called Deyanira and told her we were not handling any of the patient's inpatient or out patient care.  That  saw her for a preconceptual consult.      ----- Message from Cindy Hernández MA sent at 6/8/2022  9:37 AM CDT -----  Patients  Deyanira from [facility] called regarding plans for care. Requesting a call back at [phone].

## 2022-06-08 NOTE — TELEPHONE ENCOUNTER
----- Message from Lolis Ta sent at 6/8/2022  7:59 AM CDT -----  Contact: Deyanira  Type: Needs Medical Advice    Who: Called:  Deyanira with [facility] Care Coordinator     Best Call Back Number: [phone]    Inquiry/Question: She is wanting a nurse to give her a call back in regards to helping plan pt care update on clinicals please advise......  Thank you~

## 2022-06-10 ENCOUNTER — OFFICE VISIT (OUTPATIENT)
Dept: FAMILY MEDICINE | Facility: CLINIC | Age: 32
End: 2022-06-10
Payer: COMMERCIAL

## 2022-06-10 VITALS
SYSTOLIC BLOOD PRESSURE: 112 MMHG | OXYGEN SATURATION: 97 % | WEIGHT: 280.63 LBS | HEIGHT: 65 IN | DIASTOLIC BLOOD PRESSURE: 60 MMHG | TEMPERATURE: 98 F | HEART RATE: 111 BPM | BODY MASS INDEX: 46.75 KG/M2

## 2022-06-10 DIAGNOSIS — Z09 HOSPITAL DISCHARGE FOLLOW-UP: Primary | ICD-10-CM

## 2022-06-10 DIAGNOSIS — E66.01 OBESITY, MORBID, BMI 40.0-49.9: ICD-10-CM

## 2022-06-10 DIAGNOSIS — G95.0 SYRINX OF SPINAL CORD: ICD-10-CM

## 2022-06-10 DIAGNOSIS — I82.4Y2 ACUTE DEEP VEIN THROMBOSIS (DVT) OF PROXIMAL VEIN OF LEFT LOWER EXTREMITY: ICD-10-CM

## 2022-06-10 DIAGNOSIS — E28.2 PCOS (POLYCYSTIC OVARIAN SYNDROME): ICD-10-CM

## 2022-06-10 DIAGNOSIS — G93.5 CHIARI MALFORMATION TYPE I: ICD-10-CM

## 2022-06-10 DIAGNOSIS — I26.99 OTHER ACUTE PULMONARY EMBOLISM WITHOUT ACUTE COR PULMONALE: ICD-10-CM

## 2022-06-10 PROCEDURE — 3008F PR BODY MASS INDEX (BMI) DOCUMENTED: ICD-10-PCS | Mod: CPTII,S$GLB,, | Performed by: NURSE PRACTITIONER

## 2022-06-10 PROCEDURE — 3078F PR MOST RECENT DIASTOLIC BLOOD PRESSURE < 80 MM HG: ICD-10-PCS | Mod: CPTII,S$GLB,, | Performed by: NURSE PRACTITIONER

## 2022-06-10 PROCEDURE — 1159F PR MEDICATION LIST DOCUMENTED IN MEDICAL RECORD: ICD-10-PCS | Mod: CPTII,S$GLB,, | Performed by: NURSE PRACTITIONER

## 2022-06-10 PROCEDURE — 1160F PR REVIEW ALL MEDS BY PRESCRIBER/CLIN PHARMACIST DOCUMENTED: ICD-10-PCS | Mod: CPTII,S$GLB,, | Performed by: NURSE PRACTITIONER

## 2022-06-10 PROCEDURE — 3078F DIAST BP <80 MM HG: CPT | Mod: CPTII,S$GLB,, | Performed by: NURSE PRACTITIONER

## 2022-06-10 PROCEDURE — 1111F PR DISCHARGE MEDS RECONCILED W/ CURRENT OUTPATIENT MED LIST: ICD-10-PCS | Mod: CPTII,S$GLB,, | Performed by: NURSE PRACTITIONER

## 2022-06-10 PROCEDURE — 1111F DSCHRG MED/CURRENT MED MERGE: CPT | Mod: CPTII,S$GLB,, | Performed by: NURSE PRACTITIONER

## 2022-06-10 PROCEDURE — 99999 PR PBB SHADOW E&M-EST. PATIENT-LVL III: CPT | Mod: PBBFAC,,, | Performed by: NURSE PRACTITIONER

## 2022-06-10 PROCEDURE — 3008F BODY MASS INDEX DOCD: CPT | Mod: CPTII,S$GLB,, | Performed by: NURSE PRACTITIONER

## 2022-06-10 PROCEDURE — 99999 PR PBB SHADOW E&M-EST. PATIENT-LVL III: ICD-10-PCS | Mod: PBBFAC,,, | Performed by: NURSE PRACTITIONER

## 2022-06-10 PROCEDURE — 3074F PR MOST RECENT SYSTOLIC BLOOD PRESSURE < 130 MM HG: ICD-10-PCS | Mod: CPTII,S$GLB,, | Performed by: NURSE PRACTITIONER

## 2022-06-10 PROCEDURE — 3074F SYST BP LT 130 MM HG: CPT | Mod: CPTII,S$GLB,, | Performed by: NURSE PRACTITIONER

## 2022-06-10 PROCEDURE — 99214 PR OFFICE/OUTPT VISIT, EST, LEVL IV, 30-39 MIN: ICD-10-PCS | Mod: S$GLB,,, | Performed by: NURSE PRACTITIONER

## 2022-06-10 PROCEDURE — 99214 OFFICE O/P EST MOD 30 MIN: CPT | Mod: S$GLB,,, | Performed by: NURSE PRACTITIONER

## 2022-06-10 PROCEDURE — 1160F RVW MEDS BY RX/DR IN RCRD: CPT | Mod: CPTII,S$GLB,, | Performed by: NURSE PRACTITIONER

## 2022-06-10 PROCEDURE — 1159F MED LIST DOCD IN RCRD: CPT | Mod: CPTII,S$GLB,, | Performed by: NURSE PRACTITIONER

## 2022-06-10 NOTE — PROGRESS NOTES
Subjective:       Patient ID: Jamilah French is a 32 y.o. female.    Chief Complaint: Follow-up, Chest Pain, and Epistaxis    HPI    Admission Date: 5/31/2022  Hospital Length of Stay: 1 days  Discharge Date and Time: 6/2/2022          Patient presents today for a hospital follow up. She is new to me. She was admitted for PE. Patient has an history of PE bilaterally on 1/6/22. She states that initial PE presented 2 weeks s/p surgery (decompression of Chiari malformation by removal of posterior arch of 1st cervical vertebra) performed on 12/23/21.  Patient is currently 22 days s/p syringopleural shunt insertion T4 laminectomy secondary to syrinx of spinal cord and syringomyelia. She is on Eliquis but notes a halt in compliancy due to recent surgery.    ER workup:  CBC with 22,000 white count.  CMP with glucose 147 BUN of 22 otherwise unremarkable.  Troponin elevated 0.125.  D-dimer 8.28.  CTA of chest demonstrates bilateral occlusive and nonocclusive pulmonary emboli with right heart strain with ground-glass opacities in the right lung suspicious for small pulmonary infarcts.     echocardiogram which showed no evidence of right heart strain    DVT ultrasound was also done which showed thrombus within the paired left posterior tibial veins which is new from 01/05/2022.             s/p chiari malformation decompression done on 12/23/2021 due to neuropathic face pain.  1/5/22 bilateral PE  s/p syringopleural shunt on 5/19/22 by Dr. Reis   5/19/22 patient  shunt procedure for syrinx of spinal cord, syringomyelia which anticoagulation was stopped for that.  After completion of procedure patient start on Lovenox transition to Eliquis without loading dosage, then 5/31/22 PE, DVT         Past Medical History:   Diagnosis Date    Bilateral pulmonary embolism 1/6/2022    Current moderate episode of major depressive disorder without prior episode 6/15/2018    Radiculopathy, cervical region 10/12/2021       Review of  "patient's allergies indicates:   Allergen Reactions    Sulfa (sulfonamide antibiotics) Hives               Current Outpatient Medications:     apixaban (ELIQUIS) 5 mg Tab, Take 2 tablets (10 mg total) by mouth 2 (two) times daily for 7 days, THEN 1 tablet (5 mg total) 2 (two) times daily., Disp: 208 tablet, Rfl: 0    gabapentin (NEURONTIN) 300 MG capsule, Take 1 capsule (300 mg total) by mouth 3 (three) times daily., Disp: 90 capsule, Rfl: 1    Review of Systems   Constitutional: Negative for unexpected weight change.   HENT: Negative for trouble swallowing.    Eyes: Negative for visual disturbance.   Respiratory: Negative for shortness of breath.    Cardiovascular: Negative for chest pain, palpitations and leg swelling.   Gastrointestinal: Negative for blood in stool.   Genitourinary: Negative for hematuria.   Skin: Negative for rash.   Allergic/Immunologic: Negative for immunocompromised state.   Neurological: Negative for headaches.   Hematological: Does not bruise/bleed easily.   Psychiatric/Behavioral: Negative for agitation. The patient is not nervous/anxious.        Objective:      /60 (BP Location: Right arm, Patient Position: Sitting, BP Method: Small (Manual))   Pulse (!) 111   Temp 98.1 °F (36.7 °C)   Ht 5' 5" (1.651 m)   Wt 127.3 kg (280 lb 10.3 oz)   LMP 06/10/2022 (Exact Date)   SpO2 97%   BMI 46.70 kg/m²   Physical Exam  Constitutional:       Appearance: She is well-developed. She is obese.   Eyes:      Pupils: Pupils are equal, round, and reactive to light.   Cardiovascular:      Rate and Rhythm: Normal rate and regular rhythm.      Heart sounds: Normal heart sounds.   Pulmonary:      Effort: Pulmonary effort is normal.      Breath sounds: Normal breath sounds.   Abdominal:      General: Bowel sounds are normal.      Palpations: Abdomen is soft.   Musculoskeletal:         General: Normal range of motion.      Cervical back: Normal range of motion.   Skin:     General: Skin is warm and " dry.             Comments:  The wound is well healed without signs of infection.     Neurological:      Mental Status: She is alert and oriented to person, place, and time.   Psychiatric:         Behavior: Behavior normal.         Thought Content: Thought content normal.         Judgment: Judgment normal.         Assessment:       1. Hospital discharge follow-up    2. Other acute pulmonary embolism without acute cor pulmonale    3. Acute deep vein thrombosis (DVT) of proximal vein of left lower extremity    4. Syrinx of spinal cord    5. Chiari malformation type I    6. PCOS (polycystic ovarian syndrome)    7. Obesity, morbid, BMI 40.0-49.9        Plan:       Hospital discharge follow-up    Other acute pulmonary embolism without acute cor pulmonale  Stable, patient c/o intermittent chest pain with deep breath-take OTC tylenol  Was seen by Hem/Onc Dr Nunez on 6/6/22: Note-continue ongoing Eliquis 10 mg p.o. b.i.d. x1 week then transition to 5 mg p.o. b.i.d..  I would like to perform a hypercoagulable workup prior to the next visit.  In addition I would like to perform lower extremity DVT studies as well as CTA for evaluation of treatment response.   Next visit 8/1/22  Acute deep vein thrombosis (DVT) of proximal vein of left lower extremity  Stable, OTC tylenol  Was seen by Hem/Onc Dr Nunez on 6/6/22: Note-continue ongoing Eliquis 10 mg p.o. b.i.d. x1 week then transition to 5 mg p.o. b.i.d..  I would like to perform a hypercoagulable workup prior to the next visit.  In addition I would like to perform lower extremity DVT studies as well as CTA for evaluation of treatment response.  Next visit 8/1/22  Syrinx of spinal cord  Stable, continue neurosx- JANIE Skaggs  follow up  Last visit 6/7/22: note: Assessment/Plan:   Jamilah French is a 32 y.o. female who presents for 2 week wound check, s/p syringopleural shunt on 5/19/22 with Dr. Reis.      -Keep incisions open to air   -start gabapentin three times  "per day   -Can shower and get incision wet, just pat dry and no vigorous scrubbing. Do not submerge incision for another 4 weeks.   -No lifting more than 10 lbs or excessive bending/twisting.   -Can drive after 4 weeks when no longer taking narcotics   -Follow up with Dr. Reis in 4 weeks   -Encouraged patient to call if they have any questions or concerns prior to next follow up appt  Next visit 7/6/22  Chiari malformation type I  Stable, continue neurology follow up  PCOS (polycystic ovarian syndrome)  Stable, was seen by Maternal and Fetal Medicine Dr. Zeferino Hess III for preconception counseling  Obesity, morbid, BMI 40.0-49.9  Counseled patient on his ideal body weight, health consequences of being obese and current recommendations including weekly exercise and a heart healthy diet.  Current BMI is:Estimated body mass index is 46.7 kg/m² as calculated from the following:    Height as of this encounter: 5' 5" (1.651 m).    Weight as of this encounter: 127.3 kg (280 lb 10.3 oz)..  Patient is aware that ideal BMI < 25 or Weight in (lb) to have BMI = 25: 149.9.            Patient readiness: acceptance and barriers:none    During the course of the visit the patient was educated and counseled about the following:     Obesity:   General weight loss/lifestyle modification strategies discussed (elicit support from others; identify saboteurs; non-food rewards, etc).  Regular aerobic exercise program discussed.    Goals: Obesity: Reduce calorie intake and BMI    Did patient meet goals/outcomes: No    The following self management tools provided: declined    Patient Instructions (the written plan) was given to the patient/family.     Time spent with patient: 15 minutes    Barriers to medications present (no )    Adverse reactions to current medications (no)    Over the counter medications reviewed (Yes)          "

## 2022-06-20 ENCOUNTER — TELEPHONE (OUTPATIENT)
Dept: NEUROSURGERY | Facility: CLINIC | Age: 32
End: 2022-06-20
Payer: COMMERCIAL

## 2022-06-20 ENCOUNTER — PATIENT MESSAGE (OUTPATIENT)
Dept: NEUROSURGERY | Facility: CLINIC | Age: 32
End: 2022-06-20
Payer: COMMERCIAL

## 2022-06-20 NOTE — TELEPHONE ENCOUNTER
Returned call.  states when pt got up this AM, there was a lump in the center of the incision on her back, sticking out about an inch. Asked him to send me a photo.        He states the swelling has gone down since the photo was taken.    Reviewed with Dr Reis. He said it is fluid and it will go away. Nothing need be done.    Called  back to let him know. Pt answered. Explained everything to her.  ----- Message from Yocasta Spaulding sent at 6/20/2022 12:21 PM CDT -----  Regarding: Advise  Contact: Neil @ [phone]  Caller Neil (spouse) is calling to speak to someone in Dr. Reis's office. Pt currently has a knot, that is hard in the spot that she had surgery at. Caller states that the pt and himself noticed the knot this morning and the pt is in a lot of pain. Please call.

## 2022-06-22 ENCOUNTER — OFFICE VISIT (OUTPATIENT)
Dept: UROLOGY | Facility: CLINIC | Age: 32
End: 2022-06-22
Payer: COMMERCIAL

## 2022-06-22 VITALS
SYSTOLIC BLOOD PRESSURE: 155 MMHG | HEIGHT: 65 IN | WEIGHT: 280 LBS | DIASTOLIC BLOOD PRESSURE: 98 MMHG | HEART RATE: 101 BPM | BODY MASS INDEX: 46.65 KG/M2

## 2022-06-22 DIAGNOSIS — Z13.89 SCREENING FOR BLOOD OR PROTEIN IN URINE: Primary | ICD-10-CM

## 2022-06-22 LAB
BACTERIA #/AREA URNS HPF: NORMAL /HPF
BILIRUB SERPL-MCNC: NORMAL MG/DL
BILIRUB UR QL STRIP: NEGATIVE
BLOOD URINE, POC: NORMAL
CLARITY UR: CLEAR
CLARITY, POC UA: CLEAR
COLOR UR: YELLOW
COLOR, POC UA: NORMAL
GLUCOSE UR QL STRIP: NEGATIVE
GLUCOSE UR QL STRIP: NORMAL
HGB UR QL STRIP: NEGATIVE
KETONES UR QL STRIP: NEGATIVE
KETONES UR QL STRIP: NORMAL
LEUKOCYTE ESTERASE UR QL STRIP: ABNORMAL
LEUKOCYTE ESTERASE URINE, POC: NORMAL
MICROSCOPIC COMMENT: NORMAL
NITRITE UR QL STRIP: NEGATIVE
NITRITE, POC UA: NORMAL
PH UR STRIP: 6 [PH] (ref 5–8)
PH, POC UA: 5.5
PROT UR QL STRIP: NEGATIVE
PROTEIN, POC: NORMAL
SP GR UR STRIP: <=1.005 (ref 1–1.03)
SPECIFIC GRAVITY, POC UA: 1.03
URN SPEC COLLECT METH UR: ABNORMAL
UROBILINOGEN UR STRIP-ACNC: NEGATIVE EU/DL
UROBILINOGEN, POC UA: 0.2
WBC #/AREA URNS HPF: 0 /HPF (ref 0–5)

## 2022-06-22 PROCEDURE — 3080F DIAST BP >= 90 MM HG: CPT | Mod: CPTII,S$GLB,, | Performed by: UROLOGY

## 2022-06-22 PROCEDURE — 99999 PR PBB SHADOW E&M-EST. PATIENT-LVL III: CPT | Mod: PBBFAC,,, | Performed by: UROLOGY

## 2022-06-22 PROCEDURE — 3080F PR MOST RECENT DIASTOLIC BLOOD PRESSURE >= 90 MM HG: ICD-10-PCS | Mod: CPTII,S$GLB,, | Performed by: UROLOGY

## 2022-06-22 PROCEDURE — 1160F RVW MEDS BY RX/DR IN RCRD: CPT | Mod: CPTII,S$GLB,, | Performed by: UROLOGY

## 2022-06-22 PROCEDURE — 87086 URINE CULTURE/COLONY COUNT: CPT | Performed by: UROLOGY

## 2022-06-22 PROCEDURE — 51701 PR INSERTION OF NON-INDWELLING BLADDER CATHETERIZATION FOR RESIDUAL UR: ICD-10-PCS | Mod: S$GLB,,, | Performed by: UROLOGY

## 2022-06-22 PROCEDURE — 3008F PR BODY MASS INDEX (BMI) DOCUMENTED: ICD-10-PCS | Mod: CPTII,S$GLB,, | Performed by: UROLOGY

## 2022-06-22 PROCEDURE — 81002 URINALYSIS NONAUTO W/O SCOPE: CPT | Mod: S$GLB,,, | Performed by: UROLOGY

## 2022-06-22 PROCEDURE — 99204 PR OFFICE/OUTPT VISIT, NEW, LEVL IV, 45-59 MIN: ICD-10-PCS | Mod: 25,S$GLB,, | Performed by: UROLOGY

## 2022-06-22 PROCEDURE — 3077F PR MOST RECENT SYSTOLIC BLOOD PRESSURE >= 140 MM HG: ICD-10-PCS | Mod: CPTII,S$GLB,, | Performed by: UROLOGY

## 2022-06-22 PROCEDURE — 99204 OFFICE O/P NEW MOD 45 MIN: CPT | Mod: 25,S$GLB,, | Performed by: UROLOGY

## 2022-06-22 PROCEDURE — 99999 PR PBB SHADOW E&M-EST. PATIENT-LVL III: ICD-10-PCS | Mod: PBBFAC,,, | Performed by: UROLOGY

## 2022-06-22 PROCEDURE — 3008F BODY MASS INDEX DOCD: CPT | Mod: CPTII,S$GLB,, | Performed by: UROLOGY

## 2022-06-22 PROCEDURE — 1160F PR REVIEW ALL MEDS BY PRESCRIBER/CLIN PHARMACIST DOCUMENTED: ICD-10-PCS | Mod: CPTII,S$GLB,, | Performed by: UROLOGY

## 2022-06-22 PROCEDURE — 81000 URINALYSIS NONAUTO W/SCOPE: CPT | Performed by: UROLOGY

## 2022-06-22 PROCEDURE — 81002 POCT URINE DIPSTICK WITHOUT MICROSCOPE: ICD-10-PCS | Mod: S$GLB,,, | Performed by: UROLOGY

## 2022-06-22 PROCEDURE — 1159F PR MEDICATION LIST DOCUMENTED IN MEDICAL RECORD: ICD-10-PCS | Mod: CPTII,S$GLB,, | Performed by: UROLOGY

## 2022-06-22 PROCEDURE — 1111F DSCHRG MED/CURRENT MED MERGE: CPT | Mod: CPTII,S$GLB,, | Performed by: UROLOGY

## 2022-06-22 PROCEDURE — 3077F SYST BP >= 140 MM HG: CPT | Mod: CPTII,S$GLB,, | Performed by: UROLOGY

## 2022-06-22 PROCEDURE — 1111F PR DISCHARGE MEDS RECONCILED W/ CURRENT OUTPATIENT MED LIST: ICD-10-PCS | Mod: CPTII,S$GLB,, | Performed by: UROLOGY

## 2022-06-22 PROCEDURE — 1159F MED LIST DOCD IN RCRD: CPT | Mod: CPTII,S$GLB,, | Performed by: UROLOGY

## 2022-06-22 PROCEDURE — 51701 INSERT BLADDER CATHETER: CPT | Mod: S$GLB,,, | Performed by: UROLOGY

## 2022-06-22 NOTE — PROGRESS NOTES
[facility] New Patient/H&P:    Jamilah French is a 32 y.o. female who presents for microscopic hematuria.    Patient with a history of bilateral pulmonary emboli on Eliquis and spinal decompression who was referred for microhematuria.     On review of records, she had a UA micro revealing 6 RBC, 30 WBC, few bacteria and 10 squamous epithelial cells on 5/6/22.  Also with 3 RBC, 4 WBC, 5 squamous epithelial cells and many bacteria.     Never smoked.     Maternal aunt with a history of cervical cancer.    Denies any fever, chills, gross hematuria, flank pain, bone pain, unintentional weight loss,  trauma or personal history of  malignancy.       Urine culture  Multi orgs 5/6/22  Citrobacter 6/10/21    Past Medical History:   Diagnosis Date    Bilateral pulmonary embolism 1/6/2022    Current moderate episode of major depressive disorder without prior episode 6/15/2018    Radiculopathy, cervical region 10/12/2021       Past Surgical History:   Procedure Laterality Date    DECOMPRESSION OF CHIARI MALFORMATION BY REMOVAL OF POSTERIOR ARCH OF FIRST CERVICAL VERTEBRA N/A 12/23/2021    Procedure: DECOMPRESSION, CHIARI MALFORMATION, BY 1ST CERVICAL VERTEBRA POSTERIOR ARCH REMOVAL;  Surgeon: Kirk Reis MD;  Location: [facility] OR [address] [place] [address];  Service: Neurosurgery;  Laterality: N/A;  ASA1  TOR1  T&Cross x 2 units  [place]    HYSTEROSCOPY WITH DILATION AND CURETTAGE OF UTERUS N/A 10/26/2020    Procedure: HYSTEROSCOPY, WITH DILATION AND CURETTAGE OF UTERUS;  Surgeon: Manjula Rivas MD;  Location: [facility];  Service: OB/GYN;  Laterality: N/A;    REVISION OF PROCEDURE INVOLVING SYRINGOPLEURAL SHUNT N/A 5/19/2022    Procedure: SYRINGOPLEURAL SHUNT Insertion T4 Laminectomy;  Surgeon: Kirk Reis MD;  Location: [address];  Service: Neurosurgery;  Laterality: N/A;       Family History   Problem Relation Age of Onset    No Known Problems Father     Hypertension Mother     Kidney disease  "Maternal Aunt     Mental illness Brother     Breast cancer Neg Hx     Cancer Neg Hx     Colon cancer Neg Hx     Diabetes Neg Hx     Eclampsia Neg Hx     Miscarriages / Stillbirths Neg Hx     Ovarian cancer Neg Hx      labor Neg Hx     Stroke Neg Hx     Prostate cancer Neg Hx     Bladder Cancer Neg Hx        Review of patient's allergies indicates:   Allergen Reactions    Sulfa (sulfonamide antibiotics) Hives             Medications Reviewed: see MAR    PHYSICAL EXAM:    Vitals:    22 1026   BP: (!) 155/98   Pulse: 101     Body mass index is 46.59 kg/m². Weight: 127 kg (280 lb) Height: 5' 5" (165.1 cm)       General: Alert, cooperative, no distress, appears stated age  Abdomen: Soft, non-tender, no CVA tenderness, non-distended      LABS:    Recent Results (from the past 336 hour(s))   POCT URINE DIPSTICK WITHOUT MICROSCOPE    Collection Time: 22 10:38 AM   Result Value Ref Range    Color, UA Dark Yellow     pH, UA 5.5     WBC, UA NEG     Nitrite, UA NEG     Protein, POC NEG     Glucose, UA NEG     Ketones, UA NEG     Urobilinogen, UA 0.2     Bilirubin, POC NEG     Blood, UA NEG     Clarity, UA Clear     Spec Grav UA 1.030              Assessment/Diagnosis:    1. Screening for blood or protein in urine  POCT URINE DIPSTICK WITHOUT MICROSCOPE    Urinalysis    Urinalysis Microscopic    Urine culture       Plans:    - I spent 45 minutes of the day of this encounter preparing for, treating and managing this patient. We discussed the etiology and management of the patient's microscopic hematuria. Explained that hematuria is multi-factorial and can be secondary to  cancer, obstructive uropathy, nephritis,  trauma,  infections, thrombosis, nephrolithiasis, bleeding disorders and medications.   - We discussed that each of her previous UA micros revealed numerous squamous epithelial cells suggesting contamination and also bacteria. Recommend clean catheterization today to obtain a " specimen - performed per nursing. Proceed with hematuria work-up based on results. If found to have microscopic hematuria - will order CT abd pelvis and schedule for cystoscopy.

## 2022-06-23 LAB — BACTERIA UR CULT: NORMAL

## 2022-07-06 ENCOUNTER — OFFICE VISIT (OUTPATIENT)
Dept: NEUROSURGERY | Facility: CLINIC | Age: 32
End: 2022-07-06
Payer: COMMERCIAL

## 2022-07-06 VITALS
DIASTOLIC BLOOD PRESSURE: 66 MMHG | WEIGHT: 283.31 LBS | BODY MASS INDEX: 47.2 KG/M2 | OXYGEN SATURATION: 99 % | HEIGHT: 65 IN | TEMPERATURE: 99 F | SYSTOLIC BLOOD PRESSURE: 141 MMHG | HEART RATE: 85 BPM

## 2022-07-06 DIAGNOSIS — G95.0 SYRINGOMYELIA: Primary | ICD-10-CM

## 2022-07-06 DIAGNOSIS — G93.5 CHIARI MALFORMATION TYPE I: ICD-10-CM

## 2022-07-06 PROCEDURE — 1159F MED LIST DOCD IN RCRD: CPT | Mod: CPTII,S$GLB,, | Performed by: NEUROLOGICAL SURGERY

## 2022-07-06 PROCEDURE — 3077F PR MOST RECENT SYSTOLIC BLOOD PRESSURE >= 140 MM HG: ICD-10-PCS | Mod: CPTII,S$GLB,, | Performed by: NEUROLOGICAL SURGERY

## 2022-07-06 PROCEDURE — 99999 PR PBB SHADOW E&M-EST. PATIENT-LVL IV: ICD-10-PCS | Mod: PBBFAC,,, | Performed by: NEUROLOGICAL SURGERY

## 2022-07-06 PROCEDURE — 3077F SYST BP >= 140 MM HG: CPT | Mod: CPTII,S$GLB,, | Performed by: NEUROLOGICAL SURGERY

## 2022-07-06 PROCEDURE — 1160F RVW MEDS BY RX/DR IN RCRD: CPT | Mod: CPTII,S$GLB,, | Performed by: NEUROLOGICAL SURGERY

## 2022-07-06 PROCEDURE — 1160F PR REVIEW ALL MEDS BY PRESCRIBER/CLIN PHARMACIST DOCUMENTED: ICD-10-PCS | Mod: CPTII,S$GLB,, | Performed by: NEUROLOGICAL SURGERY

## 2022-07-06 PROCEDURE — 99999 PR PBB SHADOW E&M-EST. PATIENT-LVL IV: CPT | Mod: PBBFAC,,, | Performed by: NEUROLOGICAL SURGERY

## 2022-07-06 PROCEDURE — 99024 PR POST-OP FOLLOW-UP VISIT: ICD-10-PCS | Mod: S$GLB,,, | Performed by: NEUROLOGICAL SURGERY

## 2022-07-06 PROCEDURE — 3078F DIAST BP <80 MM HG: CPT | Mod: CPTII,S$GLB,, | Performed by: NEUROLOGICAL SURGERY

## 2022-07-06 PROCEDURE — 3078F PR MOST RECENT DIASTOLIC BLOOD PRESSURE < 80 MM HG: ICD-10-PCS | Mod: CPTII,S$GLB,, | Performed by: NEUROLOGICAL SURGERY

## 2022-07-06 PROCEDURE — 3008F BODY MASS INDEX DOCD: CPT | Mod: CPTII,S$GLB,, | Performed by: NEUROLOGICAL SURGERY

## 2022-07-06 PROCEDURE — 3008F PR BODY MASS INDEX (BMI) DOCUMENTED: ICD-10-PCS | Mod: CPTII,S$GLB,, | Performed by: NEUROLOGICAL SURGERY

## 2022-07-06 PROCEDURE — 1159F PR MEDICATION LIST DOCUMENTED IN MEDICAL RECORD: ICD-10-PCS | Mod: CPTII,S$GLB,, | Performed by: NEUROLOGICAL SURGERY

## 2022-07-06 PROCEDURE — 99024 POSTOP FOLLOW-UP VISIT: CPT | Mod: S$GLB,,, | Performed by: NEUROLOGICAL SURGERY

## 2022-07-06 NOTE — PROGRESS NOTES
Subjective:   I, Klaudia Huber, attest that this documentation has been prepared under the direction and in the presence of Kirk Reis MD.     Patient ID: Jamilah French is a 32 y.o. female     Chief Complaint: Post-op Evaluation        HPI  Ms. Jamilah French is a 32 y.o. woman with cervical radiculopathy, and is s/p chiari malformation decompression done on 12/23/2021, who presents today for 6 week post op of syringopleural shunt insertion.    This is a pt who presented with atypical right-sided facial pain which she developed in early October 2021. Pain is sharp in nature but also faded into a dull ache when not as intense. The pain is constant but waxes and wanes in intensity throughout the day. Aggravating factors included coughing and sneezing. Stated of some difficulty with her gait at times. She denied urinary or bowel incontinence. MRI Thoracic Spine W WO Contrast (11/10/21) showed large cervical cord syrinx extending down the thoracic spine. MRI Cervical Spine W WO Contrast (10/19/21) showed very large cervical cord syrinx extending into the visualized upper thoracic spinal cord. 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  MRI Cervical Spine W WO Cont (3/16/2022): Postsurgical changes of posterior fossa craniectomy and C1 laminectomy for Chiari decompression. Abnormal enhancement and T2 signal within the surgical bed, likely granulation tissue and edema. No organized fluid collection to suggest abscess. Unchanged appearance of a large syrinx extending from the C2 level to most inferior visualized level, T4, although it is known to extend to T8.  I have recommended syringopleural shunt placement and, the pt wished to proceed.     Today the pt reports she has been doing well in since her procedure. She notes her legs are moving fine and has been walking with a cane. Pt reports she was  out walking up and down [facility] recently. Her partner, who is present in the room, states the pt has been walking on a treadmill for 30 minutes. Pt ceased vaping. Denies urinary incontinence or any other bladder problems.    Review of Systems   Constitutional: Negative for activity change, appetite change, fatigue, fever and unexpected weight change.   HENT: Negative for facial swelling.    Eyes: Negative.    Respiratory: Negative.    Cardiovascular: Negative.    Gastrointestinal: Negative for diarrhea, nausea and vomiting.   Endocrine: Negative.    Genitourinary: Negative.    Musculoskeletal: Negative for back pain, joint swelling, myalgias and neck pain.   Neurological: Negative for dizziness, seizures, weakness, numbness and headaches.   Psychiatric/Behavioral: Negative.       Past Medical History:   Diagnosis Date    Bilateral pulmonary embolism 1/6/2022    Current moderate episode of major depressive disorder without prior episode 6/15/2018    Radiculopathy, cervical region 10/12/2021       Objective:      Vitals:    07/06/22 1138   BP: (!) 141/66   Pulse: 85   Temp: 98.5 °F (36.9 °C)      Physical Exam  Constitutional:       General: She is not in acute distress.     Appearance: Normal appearance.   HENT:      Head: Normocephalic and atraumatic.   Pulmonary:      Effort: Pulmonary effort is normal.   Musculoskeletal:      Cervical back: Neck supple.   Neurological:      Mental Status: She is alert and oriented to person, place, and time.      GCS: GCS eye subscore is 4. GCS verbal subscore is 5. GCS motor subscore is 6.      Cranial Nerves: No cranial nerve deficit.      Motor: Motor function is intact.      Coordination: Rapid alternating movements normal.      Gait: Gait is intact.         I, Dr. Kirk Reis, personally performed the services described in this documentation. All medical record entries made by the scribe, Klaudia Huber, were at my direction and in my presence.  I have reviewed the  chart and agree that the record reflects my personal performance and is accurate and complete. Kirk Reis MD. 07/06/2022    Assessment:       Cervical and Thoracic syrinx.  S/p syringopleural shunt.   Plan:   I will schedule the patient for 3 month follow up with MRI cervical and thoracic.

## 2022-07-12 ENCOUNTER — TELEPHONE (OUTPATIENT)
Dept: NEUROSURGERY | Facility: CLINIC | Age: 32
End: 2022-07-12
Payer: COMMERCIAL

## 2022-07-12 NOTE — TELEPHONE ENCOUNTER
----- Message from Kisha Martin sent at 7/12/2022  2:32 PM CDT -----  Contact: Pt Mobile [phone]  Patient is calling in regards to her wanting for you to write her a doctors note for her being out of work and coming in to see you on 05/12/2022. Patient will be returning back to work on 08/11/2022.    Patient would like for you to E-mail her her doctors note to, E-mail address: [email or web address]

## 2022-07-25 ENCOUNTER — HOSPITAL ENCOUNTER (OUTPATIENT)
Dept: RADIOLOGY | Facility: HOSPITAL | Age: 32
Discharge: HOME | End: 2022-07-25
Attending: INTERNAL MEDICINE
Payer: COMMERCIAL

## 2022-07-25 DIAGNOSIS — I82.4Y9 ACUTE DEEP VEIN THROMBOSIS (DVT) OF PROXIMAL VEIN OF LOWER EXTREMITY, UNSPECIFIED LATERALITY: ICD-10-CM

## 2022-07-25 DIAGNOSIS — E66.01 MORBID OBESITY: ICD-10-CM

## 2022-07-25 DIAGNOSIS — I26.99 ACUTE PULMONARY EMBOLISM: ICD-10-CM

## 2022-07-25 PROCEDURE — 71275 CT ANGIOGRAPHY CHEST: CPT | Mod: TC

## 2022-07-25 PROCEDURE — 71275 CTA CHEST NON CORONARY: ICD-10-PCS | Mod: 26,,, | Performed by: RADIOLOGY

## 2022-07-25 PROCEDURE — 71275 CT ANGIOGRAPHY CHEST: CPT | Mod: 26,,, | Performed by: RADIOLOGY

## 2022-07-25 PROCEDURE — 25500020 PHARM REV CODE 255: Performed by: INTERNAL MEDICINE

## 2022-07-25 RX ADMIN — IOHEXOL 100 ML: 350 INJECTION, SOLUTION INTRAVENOUS at 10:07

## 2022-07-27 ENCOUNTER — HOSPITAL ENCOUNTER (OUTPATIENT)
Dept: RADIOLOGY | Facility: HOSPITAL | Age: 32
Discharge: HOME | End: 2022-07-27
Attending: INTERNAL MEDICINE
Payer: COMMERCIAL

## 2022-07-27 DIAGNOSIS — E66.01 MORBID OBESITY: ICD-10-CM

## 2022-07-27 DIAGNOSIS — I26.99 ACUTE PULMONARY EMBOLISM: ICD-10-CM

## 2022-07-27 DIAGNOSIS — I82.4Y9 ACUTE DEEP VEIN THROMBOSIS (DVT) OF PROXIMAL VEIN OF LOWER EXTREMITY, UNSPECIFIED LATERALITY: ICD-10-CM

## 2022-07-27 PROCEDURE — 93970 EXTREMITY STUDY: CPT | Mod: TC

## 2022-08-01 ENCOUNTER — HOSPITAL ENCOUNTER (OUTPATIENT)
Dept: RADIOLOGY | Facility: HOSPITAL | Age: 32
Discharge: HOME | End: 2022-08-01
Attending: NEUROLOGICAL SURGERY
Payer: COMMERCIAL

## 2022-08-01 ENCOUNTER — OFFICE VISIT (OUTPATIENT)
Dept: HEMATOLOGY/ONCOLOGY | Facility: CLINIC | Age: 32
End: 2022-08-01
Payer: COMMERCIAL

## 2022-08-01 VITALS
SYSTOLIC BLOOD PRESSURE: 129 MMHG | TEMPERATURE: 98 F | HEIGHT: 65 IN | RESPIRATION RATE: 14 BRPM | WEIGHT: 283.06 LBS | OXYGEN SATURATION: 99 % | BODY MASS INDEX: 47.16 KG/M2 | HEART RATE: 104 BPM | DIASTOLIC BLOOD PRESSURE: 72 MMHG

## 2022-08-01 DIAGNOSIS — I26.99 OTHER ACUTE PULMONARY EMBOLISM WITHOUT ACUTE COR PULMONALE: Primary | ICD-10-CM

## 2022-08-01 DIAGNOSIS — G95.0 SYRINGOMYELIA: ICD-10-CM

## 2022-08-01 DIAGNOSIS — E28.2 PCOS (POLYCYSTIC OVARIAN SYNDROME): ICD-10-CM

## 2022-08-01 DIAGNOSIS — G93.5 CHIARI MALFORMATION TYPE I: ICD-10-CM

## 2022-08-01 DIAGNOSIS — E66.01 MORBID OBESITY: ICD-10-CM

## 2022-08-01 PROCEDURE — 3074F PR MOST RECENT SYSTOLIC BLOOD PRESSURE < 130 MM HG: ICD-10-PCS | Mod: CPTII,S$GLB,, | Performed by: INTERNAL MEDICINE

## 2022-08-01 PROCEDURE — 99214 OFFICE O/P EST MOD 30 MIN: CPT | Mod: S$GLB,,, | Performed by: INTERNAL MEDICINE

## 2022-08-01 PROCEDURE — 99999 PR PBB SHADOW E&M-EST. PATIENT-LVL III: CPT | Mod: PBBFAC,,, | Performed by: INTERNAL MEDICINE

## 2022-08-01 PROCEDURE — 99999 PR PBB SHADOW E&M-EST. PATIENT-LVL III: ICD-10-PCS | Mod: PBBFAC,,, | Performed by: INTERNAL MEDICINE

## 2022-08-01 PROCEDURE — 99214 PR OFFICE/OUTPT VISIT, EST, LEVL IV, 30-39 MIN: ICD-10-PCS | Mod: S$GLB,,, | Performed by: INTERNAL MEDICINE

## 2022-08-01 PROCEDURE — 1159F PR MEDICATION LIST DOCUMENTED IN MEDICAL RECORD: ICD-10-PCS | Mod: CPTII,S$GLB,, | Performed by: INTERNAL MEDICINE

## 2022-08-01 PROCEDURE — 3078F PR MOST RECENT DIASTOLIC BLOOD PRESSURE < 80 MM HG: ICD-10-PCS | Mod: CPTII,S$GLB,, | Performed by: INTERNAL MEDICINE

## 2022-08-01 PROCEDURE — 3074F SYST BP LT 130 MM HG: CPT | Mod: CPTII,S$GLB,, | Performed by: INTERNAL MEDICINE

## 2022-08-01 PROCEDURE — 1159F MED LIST DOCD IN RCRD: CPT | Mod: CPTII,S$GLB,, | Performed by: INTERNAL MEDICINE

## 2022-08-01 PROCEDURE — 3008F BODY MASS INDEX DOCD: CPT | Mod: CPTII,S$GLB,, | Performed by: INTERNAL MEDICINE

## 2022-08-01 PROCEDURE — 3008F PR BODY MASS INDEX (BMI) DOCUMENTED: ICD-10-PCS | Mod: CPTII,S$GLB,, | Performed by: INTERNAL MEDICINE

## 2022-08-01 PROCEDURE — 3078F DIAST BP <80 MM HG: CPT | Mod: CPTII,S$GLB,, | Performed by: INTERNAL MEDICINE

## 2022-08-01 NOTE — PROGRESS NOTES
HPI    32 years old female obese referred to [facility] for evaluation of pulmonary embolism.    Initially patient provoked pulmonary embolism after spine surgery in 2022. Patient states that she had spine surgery in December in January she was found to have bilateral pulmonary embolism with right heart strains.  At the time DVT of lower extremities negative.  Patient was treated with Lovenox transition to Eliquis.  In May of 2022 patient return to clinic for a shunt procedure which anticoagulation was stopped for that.  After completion of procedure patient start on Lovenox transition to Eliquis without loading dosage.  Time of anticoagulation interruption proximally about 2 and half weeks.  On  patient experienced chest pain return to ED which found to have lower extremity DVT and ongoing pulmonary embolism.  Patient was again treated with Lovenox started on Eliquis loading.  At the time of current clinical visit patient is still on Eliquis loading dosage 10 mg b.i.d..    She denies any history of blood clots.  She denies any family history of blood clots.  Patient denies any smoking or estrogen products use.      Past Medical History:   Diagnosis Date    Bilateral pulmonary embolism 2022    Current moderate episode of major depressive disorder without prior episode 6/15/2018    Radiculopathy, cervical region 10/12/2021     Social History     Socioeconomic History    Marital status:      Spouse name: Neil    Number of children: 0   Tobacco Use    Smoking status: Former Smoker     Types: Vaping w/o nicotine     Quit date: 2018     Years since quittin.5    Smokeless tobacco: Never Used   Substance and Sexual Activity    Alcohol use: Yes     Comment: social     Drug use: No    Sexual activity: Yes     Partners: Male     Birth control/protection: None     Social Determinants of Health     Financial Resource Strain: Low Risk     Difficulty of Paying Living  Expenses: Not very hard   Food Insecurity: Food Insecurity Present    Worried About Running Out of Food in the Last Year: Sometimes true    Ran Out of Food in the Last Year: Sometimes true   Transportation Needs: No Transportation Needs    Lack of Transportation (Medical): No    Lack of Transportation (Non-Medical): No   Physical Activity: Insufficiently Active    Days of Exercise per Week: 2 days    Minutes of Exercise per Session: 30 min   Stress: No Stress Concern Present    Feeling of Stress : Only a little   Social Connections: Unknown    Frequency of Communication with Friends and Family: Twice a week    Frequency of Social Gatherings with Friends and Family: Twice a week    Active Member of Clubs or Organizations: Yes    Attends Club or Organization Meetings: More than 4 times per year    Marital Status:    Housing Stability: Low Risk     Unable to Pay for Housing in the Last Year: No    Number of Places Lived in the Last Year: 1    Unstable Housing in the Last Year: No         Subjective      Review of Systems   Constitutional: Negative for appetite change, fatigue and unexpected weight change.   HENT: Negative for mouth sores.   Eyes: Negative for visual disturbance.   Respiratory: Negative for cough and shortness of breath.   Cardiovascular: Negative for chest pain.   Gastrointestinal: Negative for diarrhea.   Genitourinary: Negative for frequency.   Musculoskeletal: Negative for back pain.   Skin: Negative for rash.   Neurological: Negative for headaches.   Hematological: Negative for adenopathy.   Psychiatric/Behavioral: The patient is not nervous/anxious.   All other systems reviewed and are negative.     Objective    Physical Exam     Vitals:    08/01/22 1309   BP: 129/72   Pulse: 104   Resp: 14   Temp: 98 °F (36.7 °C)     Awake alert no acute distress  Normocephalic atraumatic  Pupils equal round reactive  Clear to auscultate  Tachycardic  Soft nontender nondistended  Distal  pulses intact  No rash no lesions  CMP  Sodium   Date Value Ref Range Status   07/25/2022 138 136 - 145 mmol/L Final     Potassium   Date Value Ref Range Status   07/25/2022 3.8 3.5 - 5.1 mmol/L Final     Chloride   Date Value Ref Range Status   07/25/2022 105 95 - 110 mmol/L Final     CO2   Date Value Ref Range Status   07/25/2022 23 23 - 29 mmol/L Final     Glucose   Date Value Ref Range Status   07/25/2022 79 70 - 110 mg/dL Final     BUN   Date Value Ref Range Status   07/25/2022 11 6 - 20 mg/dL Final     Creatinine   Date Value Ref Range Status   07/25/2022 0.8 0.5 - 1.4 mg/dL Final     Calcium   Date Value Ref Range Status   07/25/2022 9.3 8.7 - 10.5 mg/dL Final     Total Protein   Date Value Ref Range Status   07/25/2022 7.4 6.0 - 8.4 g/dL Final     Albumin   Date Value Ref Range Status   07/25/2022 3.5 3.5 - 5.2 g/dL Final   08/21/2020 3.9 3.6 - 5.1 g/dL Final     Comment:     For additional information, please refer to   http://education.[employer]/faq/ZWV837 (This link is   being provided for informational/ educational purposes only.)  This test was developed and its analytical performance   characteristics have been determined by [facility]. It has not been cleared or approved by the   [place] Food and Drug Administration. This assay has been validated   pursuant to the CLIA regulations and is used for clinical   purposes.  @ Test Performed By:  [facility] [place]  Catrachito Archer M.D., Ph.D.,   [address] [place] [address]  [facility]  [id number]       Total Bilirubin   Date Value Ref Range Status   07/25/2022 0.9 0.1 - 1.0 mg/dL Final     Comment:     For infants and newborns, interpretation of results should be based  on gestational age, weight and in agreement with clinical  observations.    Premature Infant recommended reference ranges:  Up to 24 hours.............<8.0 mg/dL  Up to 48 hours............<12.0  mg/dL  3-5 days..................<15.0 mg/dL  6-29 days.................<15.0 mg/dL       Alkaline Phosphatase   Date Value Ref Range Status   07/25/2022 74 55 - 135 U/L Final     AST   Date Value Ref Range Status   07/25/2022 15 10 - 40 U/L Final     ALT   Date Value Ref Range Status   07/25/2022 12 10 - 44 U/L Final     Anion Gap   Date Value Ref Range Status   07/25/2022 10 8 - 16 mmol/L Final     eGFR if    Date Value Ref Range Status   07/25/2022 >60 >60 mL/min/1.73 m^2 Final     eGFR if non    Date Value Ref Range Status   07/25/2022 >60 >60 mL/min/1.73 m^2 Final     Comment:     Calculation used to obtain the estimated glomerular filtration  rate (eGFR) is the CKD-EPI equation.        Ultrasound lower extremity  06/02/2022  Thrombus within para left posterior tibial vein.  Compared to jan 22 new finding.    05/31/2022 CTA  Bilateral occlusive and nonocclusive pulmonary embolism with right heart strain    01/05/2022 ultrasound lower extremity bilateral  Negative for DVT on study    Assessment    Recurrence relapse extensive pulmonary embolism now involving lower extremity.    Patient has currently been rechallenged with Eliquis 10 mg p.o. b.i.d. loading dose and is planning on transition to 5 mg p.o. b.i.d. after 1 week.    Initial events thrombus pulmonary embolism without lower extremity DVT diagnosed January 2, 2022 treated with Lovenox transition to Eliquis at the time.  Due to need of spine surgery and shunt procedure anticoagulation was interrupted for 2 and half weeks and will resumed with Lovenox transition to Eliquis 5 mg p.o. b.i.d..  On January 2nd patient experienced chest pain shortness breath which prompted her to go to ED found to have pulmonary embolism in addition lower extremity DVT.  She was treated with Lovenox and was transition to Eliquis 10 mg p.o. b.i.d..    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    Expect lifelong anticoagulation    RTC 6 months with lab    There are no diagnoses linked to this encounter.

## 2022-08-23 ENCOUNTER — HOSPITAL ENCOUNTER (OUTPATIENT)
Dept: RADIOLOGY | Facility: HOSPITAL | Age: 32
Discharge: HOME | End: 2022-08-23
Attending: NEUROLOGICAL SURGERY
Payer: COMMERCIAL

## 2022-08-23 PROCEDURE — 72141 MRI NECK SPINE W/O DYE: CPT | Mod: TC,PO

## 2022-08-23 PROCEDURE — 72146 MRI CHEST SPINE W/O DYE: CPT | Mod: TC,PO

## 2022-10-10 ENCOUNTER — OFFICE VISIT (OUTPATIENT)
Dept: FAMILY MEDICINE | Facility: CLINIC | Age: 32
End: 2022-10-10
Payer: COMMERCIAL

## 2022-10-10 VITALS
HEIGHT: 65 IN | DIASTOLIC BLOOD PRESSURE: 80 MMHG | TEMPERATURE: 98 F | SYSTOLIC BLOOD PRESSURE: 122 MMHG | WEIGHT: 288.38 LBS | OXYGEN SATURATION: 99 % | HEART RATE: 91 BPM | BODY MASS INDEX: 48.05 KG/M2

## 2022-10-10 DIAGNOSIS — N39.0 URINARY TRACT INFECTION WITHOUT HEMATURIA, SITE UNSPECIFIED: Primary | ICD-10-CM

## 2022-10-10 DIAGNOSIS — B37.9 YEAST INFECTION: ICD-10-CM

## 2022-10-10 LAB
BILIRUB SERPL-MCNC: NEGATIVE MG/DL
BLOOD URINE, POC: 250
CLARITY, POC UA: CLEAR
COLOR, POC UA: ABNORMAL
GLUCOSE UR QL STRIP: NORMAL
KETONES UR QL STRIP: NEGATIVE
LEUKOCYTE ESTERASE URINE, POC: ABNORMAL
NITRITE, POC UA: NEGATIVE
PH, POC UA: 5
PROTEIN, POC: 500
SPECIFIC GRAVITY, POC UA: 1
UROBILINOGEN, POC UA: NORMAL

## 2022-10-10 PROCEDURE — 99999 PR PBB SHADOW E&M-EST. PATIENT-LVL III: CPT | Mod: PBBFAC,,, | Performed by: PHYSICIAN ASSISTANT

## 2022-10-10 PROCEDURE — 3074F SYST BP LT 130 MM HG: CPT | Mod: CPTII,S$GLB,, | Performed by: PHYSICIAN ASSISTANT

## 2022-10-10 PROCEDURE — 81002 POCT URINE DIPSTICK WITHOUT MICROSCOPE: ICD-10-PCS | Mod: S$GLB,,, | Performed by: PHYSICIAN ASSISTANT

## 2022-10-10 PROCEDURE — 3074F PR MOST RECENT SYSTOLIC BLOOD PRESSURE < 130 MM HG: ICD-10-PCS | Mod: CPTII,S$GLB,, | Performed by: PHYSICIAN ASSISTANT

## 2022-10-10 PROCEDURE — 81002 URINALYSIS NONAUTO W/O SCOPE: CPT | Mod: S$GLB,,, | Performed by: PHYSICIAN ASSISTANT

## 2022-10-10 PROCEDURE — 99999 PR PBB SHADOW E&M-EST. PATIENT-LVL III: ICD-10-PCS | Mod: PBBFAC,,, | Performed by: PHYSICIAN ASSISTANT

## 2022-10-10 PROCEDURE — 3079F PR MOST RECENT DIASTOLIC BLOOD PRESSURE 80-89 MM HG: ICD-10-PCS | Mod: CPTII,S$GLB,, | Performed by: PHYSICIAN ASSISTANT

## 2022-10-10 PROCEDURE — 3079F DIAST BP 80-89 MM HG: CPT | Mod: CPTII,S$GLB,, | Performed by: PHYSICIAN ASSISTANT

## 2022-10-10 PROCEDURE — 99213 OFFICE O/P EST LOW 20 MIN: CPT | Mod: S$GLB,,, | Performed by: PHYSICIAN ASSISTANT

## 2022-10-10 PROCEDURE — 99213 PR OFFICE/OUTPT VISIT, EST, LEVL III, 20-29 MIN: ICD-10-PCS | Mod: S$GLB,,, | Performed by: PHYSICIAN ASSISTANT

## 2022-10-10 RX ORDER — FLUCONAZOLE 150 MG/1
150 TABLET ORAL DAILY
Qty: 1 TABLET | Refills: 0 | Status: SHIPPED | OUTPATIENT
Start: 2022-10-10 | End: 2022-10-11

## 2022-10-10 RX ORDER — DOXYCYCLINE 100 MG/1
100 CAPSULE ORAL 2 TIMES DAILY
Qty: 20 CAPSULE | Refills: 0 | Status: SHIPPED | OUTPATIENT
Start: 2022-10-10 | End: 2022-10-20

## 2022-10-12 ENCOUNTER — OFFICE VISIT (OUTPATIENT)
Dept: NEUROSURGERY | Facility: CLINIC | Age: 32
End: 2022-10-12
Payer: COMMERCIAL

## 2022-10-12 VITALS
WEIGHT: 292.56 LBS | DIASTOLIC BLOOD PRESSURE: 60 MMHG | SYSTOLIC BLOOD PRESSURE: 123 MMHG | HEART RATE: 88 BPM | BODY MASS INDEX: 48.74 KG/M2 | HEIGHT: 65 IN | OXYGEN SATURATION: 99 %

## 2022-10-12 DIAGNOSIS — R20.0 LOWER EXTREMITY NUMBNESS: ICD-10-CM

## 2022-10-12 DIAGNOSIS — G95.0 SYRINGOMYELIA: ICD-10-CM

## 2022-10-12 DIAGNOSIS — G93.5 CHIARI MALFORMATION TYPE I: Primary | ICD-10-CM

## 2022-10-12 PROCEDURE — 3078F PR MOST RECENT DIASTOLIC BLOOD PRESSURE < 80 MM HG: ICD-10-PCS | Mod: CPTII,S$GLB,, | Performed by: NEUROLOGICAL SURGERY

## 2022-10-12 PROCEDURE — 3074F PR MOST RECENT SYSTOLIC BLOOD PRESSURE < 130 MM HG: ICD-10-PCS | Mod: CPTII,S$GLB,, | Performed by: NEUROLOGICAL SURGERY

## 2022-10-12 PROCEDURE — 3074F SYST BP LT 130 MM HG: CPT | Mod: CPTII,S$GLB,, | Performed by: NEUROLOGICAL SURGERY

## 2022-10-12 PROCEDURE — 1160F RVW MEDS BY RX/DR IN RCRD: CPT | Mod: CPTII,S$GLB,, | Performed by: NEUROLOGICAL SURGERY

## 2022-10-12 PROCEDURE — 99999 PR PBB SHADOW E&M-EST. PATIENT-LVL III: CPT | Mod: PBBFAC,,, | Performed by: NEUROLOGICAL SURGERY

## 2022-10-12 PROCEDURE — 3078F DIAST BP <80 MM HG: CPT | Mod: CPTII,S$GLB,, | Performed by: NEUROLOGICAL SURGERY

## 2022-10-12 PROCEDURE — 1160F PR REVIEW ALL MEDS BY PRESCRIBER/CLIN PHARMACIST DOCUMENTED: ICD-10-PCS | Mod: CPTII,S$GLB,, | Performed by: NEUROLOGICAL SURGERY

## 2022-10-12 PROCEDURE — 1159F PR MEDICATION LIST DOCUMENTED IN MEDICAL RECORD: ICD-10-PCS | Mod: CPTII,S$GLB,, | Performed by: NEUROLOGICAL SURGERY

## 2022-10-12 PROCEDURE — 99214 PR OFFICE/OUTPT VISIT, EST, LEVL IV, 30-39 MIN: ICD-10-PCS | Mod: S$GLB,,, | Performed by: NEUROLOGICAL SURGERY

## 2022-10-12 PROCEDURE — 99999 PR PBB SHADOW E&M-EST. PATIENT-LVL III: ICD-10-PCS | Mod: PBBFAC,,, | Performed by: NEUROLOGICAL SURGERY

## 2022-10-12 PROCEDURE — 99214 OFFICE O/P EST MOD 30 MIN: CPT | Mod: S$GLB,,, | Performed by: NEUROLOGICAL SURGERY

## 2022-10-12 PROCEDURE — 1159F MED LIST DOCD IN RCRD: CPT | Mod: CPTII,S$GLB,, | Performed by: NEUROLOGICAL SURGERY

## 2022-10-12 NOTE — PATIENT INSTRUCTIONS
I have personally reviewed the MRI cervical with the pt which shows marked favorable interval decrease in size of previously demonstrated syrinx within the cervical spinal cord. Minor degenerative disc changes. Degradation by motion. Surgical changes are observed in the midline posterior soft tissues.    I recommend the pt to continue walking and actively exercising. I will refer the pt to physical therapy.    Patient may follow up as needed. Pt advised to contact us with any questions, concerns, or if she experiences any new or worsening symptoms.

## 2022-10-12 NOTE — PROGRESS NOTES
Subjective:   I, Klaudia Huber, attest that this documentation has been prepared under the direction and in the presence of Kirk Reis MD.     Patient ID: Jamilah French is a 32 y.o. female     Chief Complaint: No chief complaint on file.      HPI  MsDelmar French is a 32 y.o. woman with cervical radiculopathy, and is s/p chiari malformation decompression done on 12/23/2021 and s/p syringopleural shunt insertion on 5/19/2022, who presents today for 3 month follow up with MRI and CT.     This is a pt who presented with atypical right-sided facial pain which she developed in early October 2021. Pain is sharp in nature but also faded into a dull ache when not as intense. The pain is constant but waxes and wanes in intensity throughout the day. Aggravating factors included coughing and sneezing. Stated of some difficulty with her gait at times. She denied urinary or bowel incontinence. At the time of our last office visit on 7/6/2022, the pt reports she has been doing well since her procedure. She notes her legs are moving fine and has been walking with a cane. Pt reports she was out walking up and down [employer]  recently. Her partner, who is present in the room, states the pt has been walking on a treadmill for 30 minutes. Pt ceased vaping. Denies urinary incontinence or any other bladder problems.    Today the pt reports she was initially doing well post operatively and had regained sensation to her legs. However, approximately 1.5 weeks ago, pt reports a static sensation to her BLE. States she is unable to wear closed toe shoes for long periods of time. Notes this is similar to her previous symptomatology prior to treatment. Patient reports most of her walking is attributed to grocery shopping. Otherwise, she is not active.    Review of Systems   Constitutional:  Negative for activity change, appetite change, fatigue, fever and unexpected weight change.   HENT:  Negative for facial swelling.     Eyes: Negative.    Respiratory: Negative.     Cardiovascular: Negative.    Gastrointestinal:  Negative for diarrhea, nausea and vomiting.   Endocrine: Negative.    Genitourinary: Negative.    Musculoskeletal:  Negative for back pain, joint swelling, myalgias and neck pain.   Neurological:  Negative for dizziness, seizures, weakness and headaches.   Psychiatric/Behavioral: Negative.        Past Medical History:   Diagnosis Date    Bilateral pulmonary embolism 1/6/2022    Current moderate episode of major depressive disorder without prior episode 6/15/2018    Radiculopathy, cervical region 10/12/2021       Objective:      Vitals:    10/12/22 1123   BP: 123/60   Pulse: 88      Physical Exam  Constitutional:       General: She is not in acute distress.     Appearance: Normal appearance.   HENT:      Head: Normocephalic and atraumatic.   Pulmonary:      Effort: Pulmonary effort is normal.   Musculoskeletal:      Cervical back: Neck supple.   Neurological:      Mental Status: She is alert and oriented to person, place, and time.      GCS: GCS eye subscore is 4. GCS verbal subscore is 5. GCS motor subscore is 6.      Cranial Nerves: No cranial nerve deficit.      Sensory: No sensory deficit.     IMAGING:  MRI Cervical Spine W WO Contrast (8/23/2022):  Marked favorable interval decrease in size of previously demonstrated syrinx within the cervical spinal cord. Minor degenerative disc changes. Degradation by motion. Surgical changes are observed in the midline posterior soft tissues.    I have personally reviewed the images with the pt.      I, Dr. Kirk Reis, personally performed the services described in this documentation. All medical record entries made by the scribe, Klaudia Huber, were at my direction and in my presence.  I have reviewed the chart and agree that the record reflects my personal performance and is accurate and complete. Kirk Reis MD. 10/12/2022    Assessment:       Cervical and Thoracic syrinx      Plan:   I have personally reviewed the MRI cervical with the pt which shows marked favorable interval decrease in size of previously demonstrated syrinx within the cervical spinal cord. Minor degenerative disc changes. Degradation by motion. Surgical changes are observed in the midline posterior soft tissues.    I recommend the pt to continue walking and actively exercising. I will refer the pt to physical therapy.    Patient may follow up as needed. Pt advised to contact us with any questions, concerns, or if she experiences any new or worsening symptoms.

## 2022-10-17 NOTE — PROGRESS NOTES
Subjective:       Patient ID: Jamilah French is a 32 y.o. female.    Chief Complaint: Urinary Tract Infection (Burning, frequency, smell)    HPI  Review of Systems   Constitutional: Negative.  Negative for activity change, appetite change, chills, diaphoresis, fatigue, fever and unexpected weight change.   HENT: Negative.     Eyes: Negative.    Respiratory: Negative.  Negative for cough and shortness of breath.    Cardiovascular: Negative.  Negative for chest pain and leg swelling.   Gastrointestinal: Negative.    Endocrine: Negative.    Genitourinary:  Positive for dysuria, frequency and urgency. Negative for flank pain.   Musculoskeletal: Negative.    Integumentary:  Negative for rash. Negative.   Neurological: Negative.        Objective:      Physical Exam  Vitals reviewed.   Constitutional:       General: She is not in acute distress.     Appearance: Normal appearance. She is obese. She is not ill-appearing, toxic-appearing or diaphoretic.   HENT:      Head: Normocephalic and atraumatic.   Eyes:      General: No scleral icterus.     Conjunctiva/sclera: Conjunctivae normal.   Abdominal:      General: Abdomen is flat. There is no distension.      Palpations: Abdomen is soft. There is no mass.      Tenderness: There is no abdominal tenderness. There is no right CVA tenderness, left CVA tenderness, guarding or rebound.      Hernia: No hernia is present.   Musculoskeletal:         General: No swelling.      Right lower leg: No edema.      Left lower leg: No edema.   Skin:     General: Skin is warm and dry.      Findings: No rash.   Neurological:      Mental Status: She is alert.       Assessment:       Problem List Items Addressed This Visit    None  Visit Diagnoses       Urinary tract infection without hematuria, site unspecified    -  Primary    Relevant Medications    doxycycline (MONODOX) 100 MG capsule    Other Relevant Orders    Urine culture    POCT URINE DIPSTICK WITHOUT MICROSCOPE (Completed)    Yeast  infection                  Plan:       Jamilah was seen today for urinary tract infection.    Diagnoses and all orders for this visit:    Urinary tract infection without hematuria, site unspecified  -     Urine culture  -     POCT URINE DIPSTICK WITHOUT MICROSCOPE  -     doxycycline (MONODOX) 100 MG capsule; Take 1 capsule (100 mg total) by mouth 2 (two) times daily. for 10 days    Yeast infection  -     fluconazole (DIFLUCAN) 150 MG Tab; Take 1 tablet (150 mg total) by mouth once daily. for 1 day       Discussed otc's  hydrate    POCT urine positive for infection  Diflucan given for yeast from anibiotic use

## 2022-10-20 ENCOUNTER — CLINICAL SUPPORT (OUTPATIENT)
Dept: REHABILITATION | Facility: HOSPITAL | Age: 32
End: 2022-10-20
Attending: NEUROLOGICAL SURGERY
Payer: COMMERCIAL

## 2022-10-20 DIAGNOSIS — R20.2 PARESTHESIA OF LEFT LOWER EXTREMITY: ICD-10-CM

## 2022-10-20 DIAGNOSIS — R26.89 IMPAIRMENT OF BALANCE: ICD-10-CM

## 2022-10-20 DIAGNOSIS — G95.0 SYRINGOMYELIA: ICD-10-CM

## 2022-10-20 DIAGNOSIS — R29.898 WEAKNESS OF BOTH LOWER EXTREMITIES: ICD-10-CM

## 2022-10-20 DIAGNOSIS — R20.0 LOWER EXTREMITY NUMBNESS: ICD-10-CM

## 2022-10-20 DIAGNOSIS — G93.5 CHIARI MALFORMATION TYPE I: ICD-10-CM

## 2022-10-20 PROCEDURE — 97110 THERAPEUTIC EXERCISES: CPT | Mod: PN

## 2022-10-20 PROCEDURE — 97161 PT EVAL LOW COMPLEX 20 MIN: CPT | Mod: PN

## 2022-10-20 NOTE — PROGRESS NOTES
[facility]  Physical Therapy Initial Evaluation    Name: Jamilah French  Clinic Number: [id number]    Therapy Diagnosis:   Encounter Diagnoses   Name Primary?    Chiari malformation type I     Lower extremity numbness     Syringomyelia     Weakness of both lower extremities     Paresthesia of left lower extremity     Impairment of balance      Physician: Kirk Reis MD    Physician Orders: PT Eval and Treat   Medical Diagnosis from Referral:   G93.5 (ICD-10-CM) - Chiari malformation type I   R20.0 (ICD-10-CM) - Lower extremity numbness   G95.0 (ICD-10-CM) - Syringomyelia   Evaluation Date: 10/20/2022  Authorization Period Expiration: 10/12/2023  Plan of Care Expiration: 12/31/2022 or 12v post eval  Visit # (total) / Visits authorized: 1/ eval (POC 0 / 12)  2nd FOTO visit 5  3rd FOTO visit 10    Time In: 1235  Time Out: 120  Total Billable Time: 45 minutes    Precautions: Bilateral PE history; Depression; Cervical radic; Chiari malformation; Syringomelia  Insurance: Payor: [employer] / Plan[place] [facility] / Product Type: Commercial /     Subjective   Date of onset: 5/19/2022   History of current condition - Jamilah reports: she has been having constant numbness in the left leg since shunt surgery. At first, felt like n/t down the leg, then it got better, and then it came back with constant numbness. Has had history of numbness on the entire right side of her body prior to decompression, now it's the left leg. History of cervical radiculopathy, s/p chiari malformation decompression done on 12/23/2021, Pulmonary embolism 1/6/2022, and s/p syringopleural shunt insertion on 5/19/2022.     Medical History:   Past Medical History:   Diagnosis Date    Bilateral pulmonary embolism 1/6/2022    Current moderate episode of major depressive disorder without prior episode 6/15/2018    Radiculopathy, cervical region 10/12/2021       Surgical History:   Jamilah French   has a past surgical history that includes Hysteroscopy with dilation and curettage of uterus (N/A, 10/26/2020); Decompression of Chiari malformation by removal of posterior arch of first cervical vertebra (N/A, 12/23/2021); and Revision of procedure involving syringopleural shunt (N/A, 5/19/2022).    Medications:   Jamilah has a current medication list which includes the following prescription(s): apixaban, doxycycline, and [DISCONTINUED] metoprolol tartrate.    Allergies:   Review of patient's allergies indicates:   Allergen Reactions    Sulfa (sulfonamide antibiotics) Hives              Imaging, see in Epic; no lumbar spine imaging    Prior Therapy: no  Social History: lives with  and in-laws  Current Smoker: no  Bowel or Bladder Issues: none  Falls in last 6 months: unsteady; veer left  Cancer Hx: none  Occupation: PT  at [employer]; on feet  Prior Level of Function: will use AD if knowing she is going to walk more than an hour  Current Level of Function: difficult/pain with chores; work; walking long distances; bend over, squatting, kneeling    Pain:  Current 0/10, worst 4/10, best 0/10   Location: anterior thigh down the entire leg into foot (glove paresthesia); pain in back if standing from squatting  Description: Aching and Numb  Aggravating Factors: difficult/pain with chores; work; walking long distances; bend over, squatting, kneeling  Easing Factors: medication; use assistive device; walking 15-20 minutes    Pts goals: to reduce numbness in left leg    Objective     Posture / IC / ASIS / PSIS:  Bilateral WNL arches; Bilateral genu valgum; high BMI; protrude ab; Anterior Pelvic Tilt; decrease l/s lordosis; moderate t/s kyphosis and FHP  Palpation: NT    Gait Without AD   Analysis External Rotation; slow pace; Bilateral trendlenburt       Lumbar AROM: ROM-   Response to repeated movements   Flexion 33 cm from floor - stretch, dizzy - no change in numbness   Extension (15 norm) 20 degrees - no  change in numbness   Right side bending  (20 norm) 20 degrees - no change   Left side bending  (20 norm) 15 degrees- no change   Rotation Observation NT     Sensation: diminished Left Lower Extremity from knee to dorsum of foot (anterior path)    DTR: unable to elicit Quadricep tendon response Bilateral     L/E MMT: 5/5 Left Right   Hip Flexion 4/5. 4/5.   Hip Extension 4/5. 4/5.   Hip Abduction 3+/5. 3+/5.   Hip Adduction 4/5. 4/5.   Hip IR 4+/5. 4+/5.   Hip ER 4+/5. 4+/5.   Knee Flexion 4+/5. 4+/5.   Knee Extension 4/5. 4/5.   Ankle DF 5/5. 5/5.   Ankle INV 5/5. 5/5.   Ankle EV 5/5. 5/5.     Hip and LE Range of Motion:   In degrees Left  WNL  Limited internal rotation Right  WNL  Limited internal rotation     Core strength MMT: 2/5 = unable to assume test position  LLD (Leg length discrepancy) / Supine to Sit: Left short to long = posterior innom rotation    Flexibility Left Right   Hamstrings 90/90 (-20 norm) -40 degrees -30 degrees     Flexibility: mild / moderate / severe restriction grading  Quadriceps: mild  Illiopsoas: mild  Piriformis: mild    Static Balance: 30 sec ea   Eyes Open / stable: 30/ no sway   Eyes Closed / stable: 30 / moderate sway        Evaluation   Single Limb Stance R LE 3 seconds  (<10 sec = HIGH FALL RISK)   Single Limb Stance L LE 5 seconds  (<10 sec = HIGH FALL RISK)         CMS Impairment/Limitation/Restriction for FOTO Survey    Therapist reviewed FOTO scores for Jamilah French on 10/20/2022.   FOTO documents entered into [employer] - see Media section.    Limitation Score: 43%  Category: Mobility    Current : CK = at least 40% but < 60% impaired, limited or restricted  Goal: CJ = at least 20% but < 40% impaired, limited or restricted  Discharge:          TREATMENT   Treatment Time In: 100  Treatment Time Out: 120  Total Treatment time separate from Evaluation: 20 minutes    Jamilah received therapeutic exercises to develop flexibility, symmetrical innominates and modulate nervous  system for 20 minutes including:    High Co-morbidities: Repeat Precautions: Bilateral Pulmonary Embolism history; Depression; Cervical radic; Chiari malformation; Syringomelia - shunt 5/18/2022  FOCUS: nervous system modulation, balance training, weight loss (pt does not have any pain)    STAND:  LEFT Tensor Fascia Latae/Quadratus Lumborum stretch, 4x, 10sec    TABLE:  Supine Sciatic nerve glide, 10x, 2 sets, Bilateral (utilize as Hamstring stretch and nerve glide)  Abdominal brace (TrA set), 20x, 5sec  Prone LEFT femoral nerve glide, 2x10    SEATED  MET for left posterior innominate rotation: Left Hip Flexion / Right Hip Extension, 5x, 8sec (see HEP)      Add NEXT:  Hamstring stretch  Balance Train on Airex: WBOS eyes o/c / NBOS eyes o/c / Single Leg Balance with hips even, Bilateral, 2-3x, 30sec  Marching on Airex, activate abs and no leaning, 1 minute  Cardio to promote weight loss  Core stabilization   Gait training  Manual tx: Left Lower Extremity traction    Home Exercises and Patient Education Provided    Education provided:   - daily HEP    Written Home Exercises Provided: yes.  Exercises were reviewed and Jamilah was able to demonstrate them prior to the end of the session.  Jamilah demonstrated good  understanding of the education provided.     See EMR under Patient Instructions for exercises provided 10/20/2022.    Assessment   Jamilah is a 32 y.o. female referred to outpatient Physical Therapy with a medical diagnosis of lower extremity numbness. Pt presents with no pain, glove paresthesia in Left Lower Extremity, decreased lumbar flexion Range of Motion, decreased Hamstring flexibility, hip, core, and Bilateral Lower Extremity weakness and balance, posture and gait deficits. These deficits contribute to patient pain and dysfunction.   Pt prognosis is Fair.   Pt will benefit from skilled outpatient Physical Therapy to address the deficits stated above and in the chart below, provide pt/family education,  and to maximize pt's level of independence.     Plan of care discussed with patient: Yes  Pt's spiritual, cultural and educational needs considered and patient is agreeable to the plan of care and goals as stated below:     Anticipated Barriers for therapy: co-morbidities    Medical Necessity is demonstrated by the following  History  Co-morbidities and personal factors that may impact the plan of care Co-morbidities:   Bilateral PE history; Depression; Cervical radic; Chiari malformation; Syringomelia; high BMI    Personal Factors:   coping style  lifestyle     moderate   Examination  Body Structures and Functions, activity limitations and participation restrictions that may impact the plan of care Body Regions:   lower extremities    Body Systems:    gross symmetry  ROM  strength  balance  gait  motor control  motor learning    Participation Restrictions:   ADLs, chores, community    Activity limitations:   Learning and applying knowledge  no deficits    General Tasks and Commands  no deficits    Communication  no deficits    Mobility  lifting and carrying objects  walking    Self care  dressing    Domestic Life  doing house work (cleaning house, washing dishes, laundry)  assisting others    Interactions/Relationships  no deficits    Life Areas  employment    Community and Social Life  community life  recreation and leisure         low   Clinical Presentation evolving clinical presentation with changing clinical characteristics moderate   Decision Making/ Complexity Score: low     Goals:  Short Term GOALS: 3 weeks  1. Patient is independent with HEP.   2. Patient demonstrates independence with core activation and postural awareness while sitting and standing.   3. Patient will reduce pn to 2/10 while performing daily activities.  4. Patient will reduce LLE radiculopathy frequency and intensity.     Long Term GOALS: 6 weeks.   1. Patient demonstrates increased l/s FB ROM to 15 cm from floor to improve tolerance to  reaching activities.   2. Patient demonstrates increased core, hip and BLE strength by 1/2 grade or greater to improve tolerance to home chores.  3. Patient demonstrates independence with body mechanics while working.  4. Patient will improve FOTO limitation score to </= 35% to show improvement in condition and functional mobility.     Plan   Plan of care Certification: 10/20/2022 to 12/31/2022.    Outpatient Physical Therapy 2 times weekly for 6 weeks to include the following interventions: Electrical Stimulation ,, Gait Training, Manual Therapy, Moist Heat/ Ice, Neuromuscular Re-ed, Patient Education, Self Care, Therapeutic Activities, and Therapeutic Exercise. Mechanical Lumbar traction must be cleared by MD if utilized.    Yumiko Fair, PT

## 2022-10-21 NOTE — PLAN OF CARE
[facility]  Physical Therapy Initial Evaluation    Name: Jaimlah French  Clinic Number: [id number]    Therapy Diagnosis:   Encounter Diagnoses   Name Primary?    Chiari malformation type I     Lower extremity numbness     Syringomyelia     Weakness of both lower extremities     Paresthesia of left lower extremity     Impairment of balance      Physician: Kirk Reis MD    Physician Orders: PT Eval and Treat   Medical Diagnosis from Referral:   G93.5 (ICD-10-CM) - Chiari malformation type I   R20.0 (ICD-10-CM) - Lower extremity numbness   G95.0 (ICD-10-CM) - Syringomyelia   Evaluation Date: 10/20/2022  Authorization Period Expiration: 10/12/2023  Plan of Care Expiration: 12/31/2022 or 12v post eval  Visit # (total) / Visits authorized: 1/ eval (POC 0 / 12)  2nd FOTO visit 5  3rd FOTO visit 10    Time In: 1235  Time Out: 120  Total Billable Time: 45 minutes    Precautions: Bilateral PE history; Depression; Cervical radic; Chiari malformation; Syringomelia  Insurance: Payor: [employer] / Plan: [place]BS OF [place] [facility] / Product Type: Commercial /     Subjective   Date of onset: 5/19/2022   History of current condition - Jamilah reports: she has been having constant numbness in the left leg since shunt surgery. At first, felt like n/t down the leg, then it got better, and then it came back with constant numbness. Has had history of numbness on the entire right side of her body prior to decompression, now it's the left leg. History of cervical radiculopathy, s/p chiari malformation decompression done on 12/23/2021, Pulmonary embolism 1/6/2022, and s/p syringopleural shunt insertion on 5/19/2022.     Medical History:   Past Medical History:   Diagnosis Date    Bilateral pulmonary embolism 1/6/2022    Current moderate episode of major depressive disorder without prior episode 6/15/2018    Radiculopathy, cervical region 10/12/2021       Surgical History:   Jamilah French   has a past surgical history that includes Hysteroscopy with dilation and curettage of uterus (N/A, 10/26/2020); Decompression of Chiari malformation by removal of posterior arch of first cervical vertebra (N/A, 12/23/2021); and Revision of procedure involving syringopleural shunt (N/A, 5/19/2022).    Medications:   Jamilah has a current medication list which includes the following prescription(s): apixaban, doxycycline, and [DISCONTINUED] metoprolol tartrate.    Allergies:   Review of patient's allergies indicates:   Allergen Reactions    Sulfa (sulfonamide antibiotics) Hives              Imaging, see in Epic; no lumbar spine imaging    Prior Therapy: no  Social History: lives with  and in-laws  Current Smoker: no  Bowel or Bladder Issues: none  Falls in last 6 months: unsteady; veer left  Cancer Hx: none  Occupation: PT  at [employer]; on feet  Prior Level of Function: will use AD if knowing she is going to walk more than an hour  Current Level of Function: difficult/pain with chores; work; walking long distances; bend over, squatting, kneeling    Pain:  Current 0/10, worst 4/10, best 0/10   Location: anterior thigh down the entire leg into foot (glove paresthesia); pain in back if standing from squatting  Description: Aching and Numb  Aggravating Factors: difficult/pain with chores; work; walking long distances; bend over, squatting, kneeling  Easing Factors: medication; use assistive device; walking 15-20 minutes    Pts goals: to reduce numbness in left leg    Objective     Posture / IC / ASIS / PSIS:  Bilateral WNL arches; Bilateral genu valgum; high BMI; protrude ab; Anterior Pelvic Tilt; decrease l/s lordosis; moderate t/s kyphosis and FHP  Palpation: NT    Gait Without AD   Analysis External Rotation; slow pace; Bilateral trendlenburt       Lumbar AROM: ROM-   Response to repeated movements   Flexion 33 cm from floor - stretch, dizzy - no change in numbness   Extension (15 norm) 20 degrees - no  change in numbness   Right side bending  (20 norm) 20 degrees - no change   Left side bending  (20 norm) 15 degrees- no change   Rotation Observation NT     Sensation: diminished Left Lower Extremity from knee to dorsum of foot (anterior path)    DTR: unable to elicit Quadricep tendon response Bilateral     L/E MMT: 5/5 Left Right   Hip Flexion 4/5. 4/5.   Hip Extension 4/5. 4/5.   Hip Abduction 3+/5. 3+/5.   Hip Adduction 4/5. 4/5.   Hip IR 4+/5. 4+/5.   Hip ER 4+/5. 4+/5.   Knee Flexion 4+/5. 4+/5.   Knee Extension 4/5. 4/5.   Ankle DF 5/5. 5/5.   Ankle INV 5/5. 5/5.   Ankle EV 5/5. 5/5.     Hip and LE Range of Motion:   In degrees Left  WNL  Limited internal rotation Right  WNL  Limited internal rotation     Core strength MMT: 2/5 = unable to assume test position  LLD (Leg length discrepancy) / Supine to Sit: Left short to long = posterior innom rotation    Flexibility Left Right   Hamstrings 90/90 (-20 norm) -40 degrees -30 degrees     Flexibility: mild / moderate / severe restriction grading  Quadriceps: mild  Illiopsoas: mild  Piriformis: mild    Static Balance: 30 sec ea   Eyes Open / stable: 30/ no sway   Eyes Closed / stable: 30 / moderate sway        Evaluation   Single Limb Stance R LE 3 seconds  (<10 sec = HIGH FALL RISK)   Single Limb Stance L LE 5 seconds  (<10 sec = HIGH FALL RISK)         CMS Impairment/Limitation/Restriction for FOTO Survey    Therapist reviewed FOTO scores for Jamilah French on 10/20/2022.   FOTO documents entered into [employer]" - see Media section.    Limitation Score: 43%  Category: Mobility    Current : CK = at least 40% but < 60% impaired, limited or restricted  Goal: CJ = at least 20% but < 40% impaired, limited or restricted  Discharge:          TREATMENT   Treatment Time In: 100  Treatment Time Out: 120  Total Treatment time separate from Evaluation: 20 minutes    Jamilah received therapeutic exercises to develop flexibility, symmetrical innominates and modulate nervous  system for 20 minutes including:    High Co-morbidities: Repeat Precautions: Bilateral Pulmonary Embolism history; Depression; Cervical radic; Chiari malformation; Syringomelia - shunt 5/18/2022  FOCUS: nervous system modulation, balance training, weight loss (pt does not have any pain)    STAND:  LEFT Tensor Fascia Latae/Quadratus Lumborum stretch, 4x, 10sec    TABLE:  Supine Sciatic nerve glide, 10x, 2 sets, Bilateral (utilize as Hamstring stretch and nerve glide)  Abdominal brace (TrA set), 20x, 5sec  Prone LEFT femoral nerve glide, 2x10    SEATED  MET for left posterior innominate rotation: Left Hip Flexion / Right Hip Extension, 5x, 8sec (see HEP)      Add NEXT:  Hamstring stretch  Balance Train on Airex: WBOS eyes o/c / NBOS eyes o/c / Single Leg Balance with hips even, Bilateral, 2-3x, 30sec  Marching on Airex, activate abs and no leaning, 1 minute  Cardio to promote weight loss  Core stabilization   Gait training  Manual tx: Left Lower Extremity traction    Home Exercises and Patient Education Provided    Education provided:   - daily HEP    Written Home Exercises Provided: yes.  Exercises were reviewed and Jamilah was able to demonstrate them prior to the end of the session.  Jamilah demonstrated good  understanding of the education provided.     See EMR under Patient Instructions for exercises provided 10/20/2022.    Assessment   Jamilah is a 32 y.o. female referred to outpatient Physical Therapy with a medical diagnosis of lower extremity numbness. Pt presents with no pain, glove paresthesia in Left Lower Extremity, decreased lumbar flexion Range of Motion, decreased Hamstring flexibility, hip, core, and Bilateral Lower Extremity weakness and balance, posture and gait deficits. These deficits contribute to patient pain and dysfunction.   Pt prognosis is Fair.   Pt will benefit from skilled outpatient Physical Therapy to address the deficits stated above and in the chart below, provide pt/family education,  and to maximize pt's level of independence.     Plan of care discussed with patient: Yes  Pt's spiritual, cultural and educational needs considered and patient is agreeable to the plan of care and goals as stated below:     Anticipated Barriers for therapy: co-morbidities    Medical Necessity is demonstrated by the following  History  Co-morbidities and personal factors that may impact the plan of care Co-morbidities:   Bilateral PE history; Depression; Cervical radic; Chiari malformation; Syringomelia; high BMI    Personal Factors:   coping style  lifestyle     moderate   Examination  Body Structures and Functions, activity limitations and participation restrictions that may impact the plan of care Body Regions:   lower extremities    Body Systems:    gross symmetry  ROM  strength  balance  gait  motor control  motor learning    Participation Restrictions:   ADLs, chores, community    Activity limitations:   Learning and applying knowledge  no deficits    General Tasks and Commands  no deficits    Communication  no deficits    Mobility  lifting and carrying objects  walking    Self care  dressing    Domestic Life  doing house work (cleaning house, washing dishes, laundry)  assisting others    Interactions/Relationships  no deficits    Life Areas  employment    Community and Social Life  community life  recreation and leisure         low   Clinical Presentation evolving clinical presentation with changing clinical characteristics moderate   Decision Making/ Complexity Score: low     Goals:  Short Term GOALS: 3 weeks  1. Patient is independent with HEP.   2. Patient demonstrates independence with core activation and postural awareness while sitting and standing.   3. Patient will reduce pn to 2/10 while performing daily activities.  4. Patient will reduce LLE radiculopathy frequency and intensity.     Long Term GOALS: 6 weeks.   1. Patient demonstrates increased l/s FB ROM to 15 cm from floor to improve tolerance to  reaching activities.   2. Patient demonstrates increased core, hip and BLE strength by 1/2 grade or greater to improve tolerance to home chores.  3. Patient demonstrates independence with body mechanics while working.  4. Patient will improve FOTO limitation score to </= 35% to show improvement in condition and functional mobility.     Plan   Plan of care Certification: 10/20/2022 to 12/31/2022.    Outpatient Physical Therapy 2 times weekly for 6 weeks to include the following interventions: Electrical Stimulation ,, Gait Training, Manual Therapy, Moist Heat/ Ice, Neuromuscular Re-ed, Patient Education, Self Care, Therapeutic Activities, and Therapeutic Exercise. Mechanical Lumbar traction must be cleared by MD if utilized.    Yumiko Fair, PT

## 2022-10-24 ENCOUNTER — CLINICAL SUPPORT (OUTPATIENT)
Dept: REHABILITATION | Facility: HOSPITAL | Age: 32
End: 2022-10-24
Payer: COMMERCIAL

## 2022-10-24 DIAGNOSIS — R20.2 PARESTHESIA OF LEFT LOWER EXTREMITY: ICD-10-CM

## 2022-10-24 DIAGNOSIS — R29.898 WEAKNESS OF BOTH LOWER EXTREMITIES: Primary | ICD-10-CM

## 2022-10-24 DIAGNOSIS — R26.89 IMPAIRMENT OF BALANCE: ICD-10-CM

## 2022-10-24 PROCEDURE — 97110 THERAPEUTIC EXERCISES: CPT | Mod: PN,CQ

## 2022-10-24 NOTE — PROGRESS NOTES
[facility]   Physical Therapy Treatment Note     Name: Jamilah French  Clinic Number: [id number]    Therapy Diagnosis:   Encounter Diagnoses   Name Primary?    Weakness of both lower extremities Yes    Paresthesia of left lower extremity     Impairment of balance      Physician: Kirk Reis MD    Visit Date: 10/24/2022    Physician Orders: PT Eval and Treat   Medical Diagnosis from Referral:   G93.5 (ICD-10-CM) - Chiari malformation type I   R20.0 (ICD-10-CM) - Lower extremity numbness   G95.0 (ICD-10-CM) - Syringomyelia   Evaluation Date: 10/20/2022  Authorization Period Expiration: 10/12/2023  Plan of Care Expiration: 12/31/2022 or 12v post eval  Visit # (total) / Visits authorized: 2/  (POC 1 / 12)  PTA Visit #: 1/5     FOTO   Eval - 57/100   visit 5 -   3rd FOTO visit 10      Time In: 1100  Time Out: 1159  Total Billable Time: 59 minutes    Precautions: Bilateral PE history; Depression; Cervical radic; Chiari malformation; Syringomelia  Insurance: Payor: [employer] / Plan: BCBS OF [employer] / Product Type: Commercial /       SUBJECTIVE     Pt reports: she is not having any pain today.  She was compliant with home exercise program.  Response to previous treatment: no complaints; did ok  Functional change: none reported    Pain: 0/10  Location: N/A    OBJECTIVE     Objective Measures updated at progress report unless specified.     Treatment     Jamilah received the treatments listed below:      therapeutic exercises to develop strength, endurance, ROM, flexibility, posture, and core stabilization for 59 minutes including:    High Co-morbidities: Repeat Precautions: Bilateral Pulmonary Embolism history; Depression; Cervical radic; Chiari malformation; Syringomelia - shunt 5/18/2022  FOCUS: nervous system modulation, balance training, weight loss (pt does not have any pain)     Bike x ,10 level 2     STAND:  LEFT Tensor Fascia Latae/Quadratus Lumborum stretch, 4  "x 10 seconds hold  Gastroc stretch 3 x 30 seconds bilateral (1/2 roll)  Balance Train on Airex: WBOS eyes open/eyes closed x 30 seconds each  NBOS eyes open/closed x 30 seconds each (min/mod Assist)  Single Leg Balance with hips even, Right/Left  x 30 seconds (min Assist)  Marching on Airex, activate abs and no leaning x 20 reps   Hip Abduction 2 x 10 reps bilateral   Toe taps 6" step x 20 reps bilateral alternating    TABLE:  Supine Sciatic nerve glide 2 x 10 reps Bilateral (utilize as Hamstring stretch and nerve glide)  Transverse abdominal sets x 20 reps with 5 seconds hold  Prone LEFT femoral nerve glide, 2 x 10 reps      SEATED  MET for left posterior innominate rotation: Left Hip Flexion / Right Hip Extension, 5x, 8sec (see HEP)  +Hamstring stretch 3 x 30 seconds bilateral   + hip Abduction Green Theraband x 30 reps  + Ball squeezes x 30 reps     Add NEXT:  Core stabilization   Gait training  Manual tx: Left Lower Extremity traction    Patient Education and Home Exercises     Home Exercises Provided and Patient Education Provided     Education provided:   - apply ice if delayed muscle soreness occurs.    Written Home Exercises Provided: yes. Home exercise program given at [facility].  Exercises were reviewed and Jamilah was able to demonstrate them prior to the end of the session.  Jamilah demonstrated good  understanding of the education provided. See EMR under Patient Instructions for exercises provided during therapy sessions    ASSESSMENT     Jamilah provided good effort and participation toward therapeutic interventions today with focus on  lower extremity strengthening, balance.  Pt had difficulty with Single leg stance and eyes closed balance activities.    Jamilah Is progressing well towards her goals.   Pt prognosis is Fair.     Pt will continue to benefit from skilled outpatient physical therapy to address the deficits listed in the problem list box on initial evaluation, provide pt/family education and to " maximize pt's level of independence in the home and community environment.     Pt's spiritual, cultural and educational needs considered and pt agreeable to plan of care and goals.     Anticipated barriers to physical therapy: co-morbidities    Goals:   Short Term GOALS: 3 weeks  1. Patient is independent with HEP.   2. Patient demonstrates independence with core activation and postural awareness while sitting and standing.   3. Patient will reduce pn to 2/10 while performing daily activities.  4. Patient will reduce LLE radiculopathy frequency and intensity.     Long Term GOALS: 6 weeks.   1. Patient demonstrates increased l/s FB ROM to 15 cm from floor to improve tolerance to reaching activities.   2. Patient demonstrates increased core, hip and BLE strength by 1/2 grade or greater to improve tolerance to home chores.  3. Patient demonstrates independence with body mechanics while working.  4. Patient will improve FOTO limitation score to </= 35% to show improvement in condition and functional mobility.     PLAN     Plan of care Certification: 10/20/2022 to 12/31/2022.     Outpatient Physical Therapy 2 times weekly for 6 weeks to include the following interventions: Electrical Stimulation ,, Gait Training, Manual Therapy, Moist Heat/ Ice, Neuromuscular Re-ed, Patient Education, Self Care, Therapeutic Activities, and Therapeutic Exercise. Mechanical Lumbar traction must be cleared by MD if utilized.    Kathy Russell, PTA

## 2022-10-26 ENCOUNTER — CLINICAL SUPPORT (OUTPATIENT)
Dept: REHABILITATION | Facility: HOSPITAL | Age: 32
End: 2022-10-26
Payer: COMMERCIAL

## 2022-10-26 DIAGNOSIS — R29.898 WEAKNESS OF BOTH LOWER EXTREMITIES: Primary | ICD-10-CM

## 2022-10-26 DIAGNOSIS — R20.2 PARESTHESIA OF LEFT LOWER EXTREMITY: ICD-10-CM

## 2022-10-26 DIAGNOSIS — R26.89 IMPAIRMENT OF BALANCE: ICD-10-CM

## 2022-10-26 PROCEDURE — 97110 THERAPEUTIC EXERCISES: CPT | Mod: PN,CQ

## 2022-10-26 NOTE — PROGRESS NOTES
[facility]   Physical Therapy Treatment Note     Name: Jamilah French  Clinic Number: [id number]    Therapy Diagnosis:   Encounter Diagnoses   Name Primary?    Weakness of both lower extremities Yes    Paresthesia of left lower extremity     Impairment of balance      Physician: Kirk Reis MD    Visit Date: 10/26/2022    Physician Orders: PT Eval and Treat   Medical Diagnosis from Referral:   G93.5 (ICD-10-CM) - Chiari malformation type I   R20.0 (ICD-10-CM) - Lower extremity numbness   G95.0 (ICD-10-CM) - Syringomyelia   Evaluation Date: 10/20/2022  Authorization Period Expiration: 12/31/2022  Plan of Care Expiration: 12/31/2022 or 12v post eval  Visit # (total) / Visits authorized: 3/12  (POC 2 / 12)  PTA Visit #: 2/5     FOTO   Eval - 57/100   visit 5 -   3rd FOTO visit 10      Time In: 1100  Time Out: 1158  Total Billable Time: 58 minutes    Precautions: Bilateral PE history; Depression; Cervical radic; Chiari malformation; Syringomelia  Insurance: Payor: [employer] / Plan: BCBS OF [employer] / Product Type: Commercial /       SUBJECTIVE     Pt reports: she is doing ok today but last night she had a bad pain in her Left upper thigh that lasted for a while.  Pt rated her pain at that time as a 3/10  She was compliant with home exercise program.  Response to previous treatment: no complaints; did ok  Functional change: none reported    Pain: 0/10  Location: N/A    OBJECTIVE     Objective Measures updated at progress report unless specified.     Treatment     Jamilah received the treatments listed below:      therapeutic exercises to develop strength, endurance, ROM, flexibility, posture, and core stabilization for 59 minutes including:    High Co-morbidities: Repeat Precautions: Bilateral Pulmonary Embolism history; Depression; Cervical radic; Chiari malformation; Syringomelia - shunt 5/18/2022  FOCUS: nervous system modulation, balance training, weight loss  "(pt does not have any pain)     During 58 minutes of therapeutic exercises, Jamilah, was supervised by a rehabilitation technician under the direction of the treating therapist.     Bike x ,10 minutes, level 2     STAND:  LEFT Tensor Fascia Latae/Quadratus Lumborum stretch, 4 x 10 seconds hold  Gastroc stretch 3 x 30 seconds bilateral (1/2 roll)  Balance Train on Airex: WBOS eyes open/eyes closed x 30 seconds each (Not performed)  NBOS eyes open/closed x 30 seconds each (min/mod Assist)  Single Leg Balance with hips even, Right/Left  x 30 seconds (min Assist)  Marching on Airex, activate abs and no leaning x 20 reps   Hip Abduction 2 x 10 reps bilateral   Toe taps 6" step x 20 reps bilateral alternating    TABLE:  Supine Sciatic nerve glide 2 x 10 reps Bilateral (utilize as Hamstring stretch and nerve glide)  Transverse abdominal sets x 20 reps with 5 seconds hold  Prone LEFT femoral nerve glide, 2 x 10 reps      SEATED  MET for left posterior innominate rotation: Left Hip Flexion / Right Hip Extension, 5x, 8sec (see HEP)  Hamstring stretch 3 x 30 seconds bilateral   hip Abduction Green Theraband x 30 reps  Ball squeezes x 30 reps   +Toe extension with rolls (5,4,3,2,1) x 20 reps     Add NEXT:  Core stabilization   Gait training  Manual tx: Left Lower Extremity traction    Patient Education and Home Exercises     Home Exercises Provided and Patient Education Provided     Education provided:   - apply ice if delayed muscle soreness occurs.    Written Home Exercises Provided: yes. Home exercise program given at [facility].  Exercises were reviewed and Jamilah was able to demonstrate them prior to the end of the session.  Jamilah demonstrated good  understanding of the education provided. See EMR under Patient Instructions for exercises provided during therapy sessions    ASSESSMENT     Jamilah provided good effort and participation toward therapeutic interventions today with focus on  lower extremity strengthening, balance.  No " problems with exercises today but still had pain/discomforft in upper thigh.    Jamilah Is progressing well towards her goals.   Pt prognosis is Fair.     Pt will continue to benefit from skilled outpatient physical therapy to address the deficits listed in the problem list box on initial evaluation, provide pt/family education and to maximize pt's level of independence in the home and community environment.     Pt's spiritual, cultural and educational needs considered and pt agreeable to plan of care and goals.     Anticipated barriers to physical therapy: co-morbidities    Goals:   Short Term GOALS: 3 weeks  1. Patient is independent with HEP.   2. Patient demonstrates independence with core activation and postural awareness while sitting and standing.   3. Patient will reduce pn to 2/10 while performing daily activities.  4. Patient will reduce LLE radiculopathy frequency and intensity.     Long Term GOALS: 6 weeks.   1. Patient demonstrates increased l/s FB ROM to 15 cm from floor to improve tolerance to reaching activities.   2. Patient demonstrates increased core, hip and BLE strength by 1/2 grade or greater to improve tolerance to home chores.  3. Patient demonstrates independence with body mechanics while working.  4. Patient will improve FOTO limitation score to </= 35% to show improvement in condition and functional mobility.     PLAN     Plan of care Certification: 10/20/2022 to 12/31/2022.     Outpatient Physical Therapy 2 times weekly for 6 weeks to include the following interventions: Electrical Stimulation ,, Gait Training, Manual Therapy, Moist Heat/ Ice, Neuromuscular Re-ed, Patient Education, Self Care, Therapeutic Activities, and Therapeutic Exercise. Mechanical Lumbar traction must be cleared by MD if utilized.    Kathy Russell, PTA

## 2022-10-31 ENCOUNTER — CLINICAL SUPPORT (OUTPATIENT)
Dept: REHABILITATION | Facility: HOSPITAL | Age: 32
End: 2022-10-31
Payer: COMMERCIAL

## 2022-10-31 DIAGNOSIS — R26.89 IMPAIRMENT OF BALANCE: ICD-10-CM

## 2022-10-31 DIAGNOSIS — R20.2 PARESTHESIA OF LEFT LOWER EXTREMITY: ICD-10-CM

## 2022-10-31 DIAGNOSIS — R29.898 WEAKNESS OF BOTH LOWER EXTREMITIES: Primary | ICD-10-CM

## 2022-10-31 PROCEDURE — 97110 THERAPEUTIC EXERCISES: CPT | Mod: PN,CQ

## 2022-10-31 NOTE — PROGRESS NOTES
[facility]   Physical Therapy Treatment Note     Name: Jamilah French  Clinic Number: [id number]    Therapy Diagnosis:   Encounter Diagnoses   Name Primary?    Weakness of both lower extremities Yes    Paresthesia of left lower extremity     Impairment of balance      Physician: Kirk Reis MD    Visit Date: 10/31/2022    Physician Orders: PT Eval and Treat   Medical Diagnosis from Referral:   G93.5 (ICD-10-CM) - Chiari malformation type I   R20.0 (ICD-10-CM) - Lower extremity numbness   G95.0 (ICD-10-CM) - Syringomyelia   Evaluation Date: 10/20/2022  Authorization Period Expiration: 2022  Plan of Care Expiration: 2022 or 12v post eval  Visit # (total) / Visits authorized:   (POC 3 / 12)  PTA Visit #: 3     FOTO   Eval -    visit 5 -    FOTO visit 10      Time In: 08  Time Out: 09  Total Billable Time: 58 minutes    Precautions: Bilateral PE history; Depression; Cervical radic; Chiari malformation; Syringomelia  Insurance: Payor: [employer] / Plan: BCBS OF [employer] / Product Type: Commercial /       SUBJECTIVE     Pt reports: she is feeling ok but she is really congested.  Pt stated she had to go to [place] for a  and the weather was much colder up there.   She was compliant with home exercise program.  Response to previous treatment: no complaints; did ok  Functional change: none reported    Pain: 0/10  Location: N/A    OBJECTIVE     Objective Measures updated at progress report unless specified.     Treatment     Jamilah received the treatments listed below:      therapeutic exercises to develop strength, endurance, ROM, flexibility, posture, and core stabilization for  minutes including:    High Co-morbidities: Repeat Precautions: Bilateral Pulmonary Embolism history; Depression; Cervical radic; Chiari malformation; Syringomelia - shunt 2022  FOCUS: nervous system modulation, balance training, weight loss (pt does  "not have any pain)     During 58 minutes of therapeutic exercises, Jamilah, was supervised by a rehabilitation technician under the direction of the treating therapist.     Bike x ,10 minutes, level 2     STAND:  LEFT Tensor Fascia Latae/Quadratus Lumborum stretch, 4 x 10 seconds hold  Gastroc stretch 3 x 30 seconds bilateral (1/2 roll)  Balance Train on Airex: WBOS eyes open/eyes closed x 30 seconds each (Not performed)  NBOS eyes open/closed  2 x 30 seconds each (min/mod Assist)  Single Leg Balance with hips even, Right/Left  x 30 seconds (min Assist)  Marching on Airex, activate abs and no leaning x 20 reps   Hip Abduction 3 x 10 reps bilateral   Toe taps 6" step x 20 reps bilateral alternating    TABLE:  Supine Sciatic nerve glide 2 x 20 reps Bilateral (utilize as Hamstring stretch and nerve glide)  Transverse abdominal sets x 20 reps with 5 seconds hold  Prone LEFT femoral nerve glide, 2 x 10 reps      SEATED  MET for left posterior innominate rotation: Left Hip Flexion / Right Hip Extension, 5x, 8sec (see HEP)  Hamstring stretch 3 x 30 seconds bilateral   hip Abduction Green Theraband x 30 reps  Ball squeezes x 30 reps   Toe extension with rolls (5,4,3,2,1) x 20 reps     Add NEXT:  Core stabilization   Gait training  Manual tx: Left Lower Extremity traction    Patient Education and Home Exercises     Home Exercises Provided and Patient Education Provided     Education provided:   - apply ice if delayed muscle soreness occurs.    Written Home Exercises Provided: yes. Home exercise program given at [facility].  Exercises were reviewed and Jamilah was able to demonstrate them prior to the end of the session.  Jamilah demonstrated good  understanding of the education provided. See EMR under Patient Instructions for exercises provided during therapy sessions    ASSESSMENT     Jamilah provided good effort and participation toward therapeutic interventions today with focus on  lower extremity strengthening, balance.  Pt did " well with exercises today and only complained of muscle soreness.      Jamilah Is progressing well towards her goals.   Pt prognosis is Fair.     Pt will continue to benefit from skilled outpatient physical therapy to address the deficits listed in the problem list box on initial evaluation, provide pt/family education and to maximize pt's level of independence in the home and community environment.     Pt's spiritual, cultural and educational needs considered and pt agreeable to plan of care and goals.     Anticipated barriers to physical therapy: co-morbidities    Goals:   Short Term GOALS: 3 weeks  1. Patient is independent with HEP.   2. Patient demonstrates independence with core activation and postural awareness while sitting and standing.   3. Patient will reduce pn to 2/10 while performing daily activities.  4. Patient will reduce LLE radiculopathy frequency and intensity.     Long Term GOALS: 6 weeks.   1. Patient demonstrates increased l/s FB ROM to 15 cm from floor to improve tolerance to reaching activities.   2. Patient demonstrates increased core, hip and BLE strength by 1/2 grade or greater to improve tolerance to home chores.  3. Patient demonstrates independence with body mechanics while working.  4. Patient will improve FOTO limitation score to </= 35% to show improvement in condition and functional mobility.     PLAN     Plan of care Certification: 10/20/2022 to 12/31/2022.     Outpatient Physical Therapy 2 times weekly for 6 weeks to include the following interventions: Electrical Stimulation ,, Gait Training, Manual Therapy, Moist Heat/ Ice, Neuromuscular Re-ed, Patient Education, Self Care, Therapeutic Activities, and Therapeutic Exercise. Mechanical Lumbar traction must be cleared by MD if utilized.    Continue with therapeutic exercises, stretches per Plan of Care.    Kathy Russell, PTA

## 2022-10-31 NOTE — PROGRESS NOTES
PT/PTA met face to face to discuss pt's treatment plan and progress towards established goals. Pt will be seen by a physical therapist minimally every 6th visit or every 30 days.      Kathy Russell PTA

## 2022-11-02 ENCOUNTER — CLINICAL SUPPORT (OUTPATIENT)
Dept: REHABILITATION | Facility: HOSPITAL | Age: 32
End: 2022-11-02
Payer: COMMERCIAL

## 2022-11-02 DIAGNOSIS — R20.2 PARESTHESIA OF LEFT LOWER EXTREMITY: ICD-10-CM

## 2022-11-02 DIAGNOSIS — R29.898 WEAKNESS OF BOTH LOWER EXTREMITIES: Primary | ICD-10-CM

## 2022-11-02 DIAGNOSIS — R26.89 IMPAIRMENT OF BALANCE: ICD-10-CM

## 2022-11-02 PROCEDURE — 97110 THERAPEUTIC EXERCISES: CPT | Mod: PN,CQ

## 2022-11-02 NOTE — PROGRESS NOTES
[facility]   Physical Therapy Treatment Note     Name: Jamilah French  Clinic Number: [id number]    Therapy Diagnosis:   Encounter Diagnoses   Name Primary?    Weakness of both lower extremities Yes    Paresthesia of left lower extremity     Impairment of balance      Physician: Kirk Reis MD    Visit Date: 11/2/2022    Physician Orders: PT Eval and Treat   Medical Diagnosis from Referral:   G93.5 (ICD-10-CM) - Chiari malformation type I   R20.0 (ICD-10-CM) - Lower extremity numbness   G95.0 (ICD-10-CM) - Syringomyelia   Evaluation Date: 10/20/2022  Authorization Period Expiration: 12/31/2022  Plan of Care Expiration: 12/31/2022 or 12v post eval  Visit # (total) / Visits authorized: 5/12  (POC 4/ 12)  PTA Visit #: 4/5     FOTO   Eval - 57/100   visit 5 -   3rd FOTO visit 10      Time In: 1100  Time Out: 1153  Total Billable Time: 53 minutes    Precautions: Bilateral PE history; Depression; Cervical radic; Chiari malformation; Syringomelia  Insurance: Payor: [employer] / Plan: BCBS OF [place] / Product Type: Commercial /       SUBJECTIVE     Pt reports: she is feeling better today.   She was compliant with home exercise program.  Response to previous treatment: no complaints; did ok  Functional change: none reported    Pain: 0/10  Location: N/A    OBJECTIVE     Objective Measures updated at progress report unless specified.     Treatment     Jamilah received the treatments listed below:      therapeutic exercises to develop strength, endurance, ROM, flexibility, posture, and core stabilization for 53 minutes including:    High Co-morbidities: Repeat Precautions: Bilateral Pulmonary Embolism history; Depression; Cervical radic; Chiari malformation; Syringomelia - shunt 5/18/2022  FOCUS: nervous system modulation, balance training, weight loss (pt does not have any pain)     Bike x ,10 minutes, level 2     STAND:  LEFT Tensor Fascia Latae/Quadratus Lumborum  "stretch, 4 x 10 seconds hold  Gastroc stretch 3 x 30 seconds bilateral (1/2 roll)  Balance Train on Airex: WBOS eyes open/eyes closed x 30 seconds each (Not performed)  NBOS eyes open/closed  2 x 30 seconds each (min/mod Assist)  Single Leg Balance with hips even, Right/Left  x 30 seconds (min Assist)  Marching on Airex, activate abs and no leaning x 20 reps   Hip Abduction 3 x 10 reps bilateral   Toe taps 6" step x 20 reps bilateral alternating    TABLE:  Supine Sciatic nerve glide 2 x 20 reps Bilateral (utilize as Hamstring stretch and nerve glide)  Transverse abdominal sets x 20 reps with 5 seconds hold  Prone LEFT femoral nerve glide, 2 x 10 reps      SEATED  MET for left posterior innominate rotation: Left Hip Flexion / Right Hip Extension, 5x, 8sec (see HEP) (not performed)  Hamstring stretch 3 x 30 seconds bilateral   hip Abduction Green Theraband x 30 reps  Ball squeezes x 30 reps   Toe extension with rolls (5,4,3,2,1) x 20 reps     Add NEXT:  Core stabilization   Gait training  Manual tx: Left Lower Extremity traction    Patient Education and Home Exercises     Home Exercises Provided and Patient Education Provided     Education provided:   - apply ice if delayed muscle soreness occurs.    Written Home Exercises Provided: yes. Home exercise program given at [facility].  Exercises were reviewed and Jamilah was able to demonstrate them prior to the end of the session.  Jamilah demonstrated good  understanding of the education provided. See EMR under Patient Instructions for exercises provided during therapy sessions    ASSESSMENT     Jamilah provided good effort and participation toward therapeutic interventions today with focus on  lower extremity strengthening, balance.  Pt is still progressing with her exercises.  No complaints of pain reported while performing exercises today.      Jamilah Is progressing well towards her goals.   Pt prognosis is Fair.     Pt will continue to benefit from skilled outpatient physical " therapy to address the deficits listed in the problem list box on initial evaluation, provide pt/family education and to maximize pt's level of independence in the home and community environment.     Pt's spiritual, cultural and educational needs considered and pt agreeable to plan of care and goals.     Anticipated barriers to physical therapy: co-morbidities    Goals:   Short Term GOALS: 3 weeks  1. Patient is independent with HEP.   2. Patient demonstrates independence with core activation and postural awareness while sitting and standing.   3. Patient will reduce pn to 2/10 while performing daily activities.  4. Patient will reduce LLE radiculopathy frequency and intensity.     Long Term GOALS: 6 weeks.   1. Patient demonstrates increased l/s FB ROM to 15 cm from floor to improve tolerance to reaching activities.   2. Patient demonstrates increased core, hip and BLE strength by 1/2 grade or greater to improve tolerance to home chores.  3. Patient demonstrates independence with body mechanics while working.  4. Patient will improve FOTO limitation score to </= 35% to show improvement in condition and functional mobility.     PLAN     Plan of care Certification: 10/20/2022 to 12/31/2022.     Outpatient Physical Therapy 2 times weekly for 6 weeks to include the following interventions: Electrical Stimulation ,, Gait Training, Manual Therapy, Moist Heat/ Ice, Neuromuscular Re-ed, Patient Education, Self Care, Therapeutic Activities, and Therapeutic Exercise. Mechanical Lumbar traction must be cleared by MD if utilized.    Continue with therapeutic exercises, stretches per Plan of Care.    Kathy Russell, PTA

## 2022-11-08 ENCOUNTER — CLINICAL SUPPORT (OUTPATIENT)
Dept: REHABILITATION | Facility: HOSPITAL | Age: 32
End: 2022-11-08
Payer: COMMERCIAL

## 2022-11-08 DIAGNOSIS — R29.898 WEAKNESS OF BOTH LOWER EXTREMITIES: Primary | ICD-10-CM

## 2022-11-08 DIAGNOSIS — R26.89 IMPAIRMENT OF BALANCE: ICD-10-CM

## 2022-11-08 DIAGNOSIS — R20.2 PARESTHESIA OF LEFT LOWER EXTREMITY: ICD-10-CM

## 2022-11-08 PROCEDURE — 97110 THERAPEUTIC EXERCISES: CPT | Mod: PN

## 2022-11-08 PROCEDURE — 97112 NEUROMUSCULAR REEDUCATION: CPT | Mod: PN

## 2022-11-08 NOTE — PROGRESS NOTES
[facility]   Physical Therapy Treatment Note     Name: Jamilah French  Clinic Number: [id number]    Therapy Diagnosis:   Encounter Diagnoses   Name Primary?    Weakness of both lower extremities Yes    Paresthesia of left lower extremity     Impairment of balance        Physician: Kirk Reis MD    Visit Date: 11/8/2022    Physician Orders: PT Eval and Treat   Medical Diagnosis from Referral:   G93.5 (ICD-10-CM) - Chiari malformation type I   R20.0 (ICD-10-CM) - Lower extremity numbness   G95.0 (ICD-10-CM) - Syringomyelia   Evaluation Date: 10/20/2022  Authorization Period Expiration: 12/31/2022  Plan of Care Expiration: 12/31/2022 or 12v post eval  Visit # (total) / Visits authorized: 6/12  (POC 5 / 12)  PTA Visit #: 4/5     FOTO   Eval - 57/100   visit 5 - 11/8/22  3rd FOTO visit 12      Time In: 930  Time Out: 72895  Total Billable Time: 55 minutes    Precautions: Bilateral PE history; Depression; Cervical radic; Chiari malformation; Syringomelia  Insurance: Payor: [employer] / Plan: [employer] [place] / Product Type: Commercial /       SUBJECTIVE     Pt reports: she is doing better since initial evaluation. Less numbness in left leg; some strange nerve signals.     She was compliant with home exercise program.  Response to previous treatment: no complaints; did ok  Functional change: none reported    Pain: 0/10  Location: N/A    OBJECTIVE     Objective Measures updated at progress report unless specified.     Treatment     Jamilah received the treatments listed below:      therapeutic exercises to develop strength, endurance, ROM, flexibility, posture, and core stabilization for 27 minutes including:  NMRE utilized to activate appropriate mm to improve balance, proprioception, stability and gait skills: 28 minutes     High Co-morbidities:   Repeat Precautions: Bilateral Pulmonary Embolism history; Depression; Cervical radic; Chiari malformation; Syringomelia -  "shunt 5/18/2022  FOCUS: nervous system modulation, balance training, weight loss (pt does not have any pain)     Bike x ,10 minutes, level 2     STAND: Neuromuscular Educaiton  LEFT Tensor Fascia Latae/Quadratus Lumborum stretch, 4 x 10 seconds hold  Airex NBOS eyes open/closed  3 x 30 seconds each (min/mod Assist)  Airex Single Leg Balance with hips even, Right/Left  x 30 seconds (min Assist)  Marching on Airex, activate abs and no leaning x 20 reps   Yellow Theraband Hip Abduction 3 x 10 reps bilateral   +Forward Step ups, 20x Bilateral   +Cable Column: Walk back / Paloff Press  Gastroc stretch 3 x 30 seconds bilateral (1/2 roll) - NOT PERFORMED     TABLE:  Supine Sciatic nerve glide 2 x 20 reps Bilateral (utilize as Hamstring stretch and nerve glide)  Transverse abdominal sets x 20 reps with 5 seconds hold  Prone LEFT femoral nerve glide, 2 x 10 reps   Test innom rotation; perform MET seated if need - neg today     SEATED:  MET for left posterior innominate rotation: Left Hip Flexion / Right Hip Extension, 5x, 8sec (see HEP) - NOT PERFORMED not needed  Hamstring stretch 2 x 30 seconds bilateral   Hip Abduction Green Theraband x 30 reps  Ball squeezes x 30 reps   Toe extension with rolls (5,4,3,2,1) x 20 reps       Add NEXT:  Core stabilization   Gait training  Manual tx: Left Lower Extremity traction    Patient Education and Home Exercises     Home Exercises Provided and Patient Education Provided     Education provided:   - apply ice if delayed muscle soreness occurs.    Written Home Exercises Provided: yes. Home exercise program given at [facility].  Exercises were reviewed and Jamilah was able to demonstrate them prior to the end of the session.  Jamilah demonstrated good  understanding of the education provided. See EMR under Patient Instructions for exercises provided during therapy sessions    ASSESSMENT     Jamilah demonstrates less numbness in Left Lower Extremity and more "feeling". Pt able to perform all " progressive therex without pn provocation. Focus on core stabilization, nervous system modulation, hip and Bilateral Lower Extremity strength, balance and gait training, and cardio to promote weight loss to improve quality of life.    Jamilah Is progressing well towards her goals.   Pt prognosis is Fair.     Pt will continue to benefit from skilled outpatient physical therapy to address the deficits listed in the problem list box on initial evaluation, provide pt/family education and to maximize pt's level of independence in the home and community environment.     Pt's spiritual, cultural and educational needs considered and pt agreeable to plan of care and goals.     Anticipated barriers to physical therapy: co-morbidities    Goals:   Short Term GOALS: 3 weeks  1. Patient is independent with HEP. MET 11/8/2022  2. Patient demonstrates independence with core activation and postural awareness while sitting and standing. PROGRESS 11/8/2022  3. Patient will reduce pn to 2/10 while performing daily activities. PROGRESS 11/8/2022  4. Patient will reduce LLE radiculopathy frequency and intensity.PROGRESS 11/8/2022     Long Term GOALS: 6 weeks.   1. Patient demonstrates increased l/s FB ROM to 15 cm from floor to improve tolerance to reaching activities.   2. Patient demonstrates increased core, hip and BLE strength by 1/2 grade or greater to improve tolerance to home chores.  3. Patient demonstrates independence with body mechanics while working.  4. Patient will improve FOTO limitation score to </= 35% to show improvement in condition and functional mobility.     PLAN     Continue PT as deemed per POC: core stabilization, nervous system modulation, hip and Bilateral Lower Extremity strength, balance and gait training, and cardio    Plan of care Certification: 10/20/2022 to 12/31/2022.     Outpatient Physical Therapy 2 times weekly for 6 weeks to include the following interventions: Electrical Stimulation ,, Gait Training,  Manual Therapy, Moist Heat/ Ice, Neuromuscular Re-ed, Patient Education, Self Care, Therapeutic Activities, and Therapeutic Exercise. Mechanical Lumbar traction must be cleared by MD if utilized.    Yumiko Fair, PT

## 2022-11-09 ENCOUNTER — OFFICE VISIT (OUTPATIENT)
Dept: PODIATRY | Facility: CLINIC | Age: 32
End: 2022-11-09
Payer: COMMERCIAL

## 2022-11-09 ENCOUNTER — HOSPITAL ENCOUNTER (OUTPATIENT)
Dept: RADIOLOGY | Facility: CLINIC | Age: 32
Discharge: HOME | End: 2022-11-09
Attending: PODIATRIST
Payer: COMMERCIAL

## 2022-11-09 VITALS
OXYGEN SATURATION: 96 % | HEIGHT: 65 IN | HEART RATE: 82 BPM | RESPIRATION RATE: 18 BRPM | BODY MASS INDEX: 47.9 KG/M2 | WEIGHT: 287.5 LBS

## 2022-11-09 DIAGNOSIS — M72.2 PLANTAR FASCIITIS: Primary | ICD-10-CM

## 2022-11-09 DIAGNOSIS — M79.672 LEFT FOOT PAIN: ICD-10-CM

## 2022-11-09 PROCEDURE — 1160F PR REVIEW ALL MEDS BY PRESCRIBER/CLIN PHARMACIST DOCUMENTED: ICD-10-PCS | Mod: CPTII,S$GLB,, | Performed by: PODIATRIST

## 2022-11-09 PROCEDURE — 99203 PR OFFICE/OUTPT VISIT, NEW, LEVL III, 30-44 MIN: ICD-10-PCS | Mod: S$GLB,,, | Performed by: PODIATRIST

## 2022-11-09 PROCEDURE — 1159F MED LIST DOCD IN RCRD: CPT | Mod: CPTII,S$GLB,, | Performed by: PODIATRIST

## 2022-11-09 PROCEDURE — 1160F RVW MEDS BY RX/DR IN RCRD: CPT | Mod: CPTII,S$GLB,, | Performed by: PODIATRIST

## 2022-11-09 PROCEDURE — 73630 XR FOOT COMPLETE 3 VIEW LEFT: ICD-10-PCS | Mod: LT,S$GLB,, | Performed by: RADIOLOGY

## 2022-11-09 PROCEDURE — 73630 X-RAY EXAM OF FOOT: CPT | Mod: LT,S$GLB,, | Performed by: RADIOLOGY

## 2022-11-09 PROCEDURE — 3008F PR BODY MASS INDEX (BMI) DOCUMENTED: ICD-10-PCS | Mod: CPTII,S$GLB,, | Performed by: PODIATRIST

## 2022-11-09 PROCEDURE — 3008F BODY MASS INDEX DOCD: CPT | Mod: CPTII,S$GLB,, | Performed by: PODIATRIST

## 2022-11-09 PROCEDURE — 99203 OFFICE O/P NEW LOW 30 MIN: CPT | Mod: S$GLB,,, | Performed by: PODIATRIST

## 2022-11-09 PROCEDURE — 1159F PR MEDICATION LIST DOCUMENTED IN MEDICAL RECORD: ICD-10-PCS | Mod: CPTII,S$GLB,, | Performed by: PODIATRIST

## 2022-11-09 NOTE — PATIENT INSTRUCTIONS

## 2022-11-09 NOTE — PROGRESS NOTES
"  [address] [facility] [address]  [place] [address]  Phone: [phone]   [phone]    Patient's PCP:Hanna Edouard NP  Referring Provider: Aaareferral Self    Subjective:      Chief Complaint:: Foot Pain (Left arch)    HPI  Jamilah French is a 32 y.o. female who presents today with a complaint of left foot arch pain lasting for one week. Onset of symptoms tightness and pulling sensation.  Current symptoms include pain, tightness, and pulling sensation.  Aggravating factors are  closed toe shoe, walking and prolonged use. Symptoms have remained. Treatment to date have included different shoes, massaging arch, and rest.      Vitals:    11/09/22 1450   Pulse: 82   Resp: 18   SpO2: 96%   Weight: 130.4 kg (287 lb 8 oz)   Height: 5' 5" (1.651 m)   PainSc:   4      Shoe Size: 9    Past Surgical History:   Procedure Laterality Date    DECOMPRESSION OF CHIARI MALFORMATION BY REMOVAL OF POSTERIOR ARCH OF FIRST CERVICAL VERTEBRA N/A 12/23/2021    Procedure: DECOMPRESSION, CHIARI MALFORMATION, BY 1ST CERVICAL VERTEBRA POSTERIOR ARCH REMOVAL;  Surgeon: Kirk Reis MD;  Location: [facility] OR [address] [place] [address];  Service: Neurosurgery;  Laterality: N/A;  ASA1  TOR1  T&Cross x 2 units  [place]    HYSTEROSCOPY WITH DILATION AND CURETTAGE OF UTERUS N/A 10/26/2020    Procedure: HYSTEROSCOPY, WITH DILATION AND CURETTAGE OF UTERUS;  Surgeon: Manjula Rivas MD;  Location: [facility];  Service: OB/GYN;  Laterality: N/A;    REVISION OF PROCEDURE INVOLVING SYRINGOPLEURAL SHUNT N/A 5/19/2022    Procedure: SYRINGOPLEURAL SHUNT Insertion T4 Laminectomy;  Surgeon: Kirk Reis MD;  Location: [facility] OR [address] [place] [address];  Service: Neurosurgery;  Laterality: N/A;     Past Medical History:   Diagnosis Date    Bilateral pulmonary embolism 1/6/2022    Current moderate episode of major depressive disorder without prior episode 6/15/2018    Radiculopathy, cervical region 10/12/2021     Family History   Problem Relation Age of Onset    No Known " Problems Father     Hypertension Mother     Kidney disease Maternal Aunt     Mental illness Brother     Breast cancer Neg Hx     Cancer Neg Hx     Colon cancer Neg Hx     Diabetes Neg Hx     Eclampsia Neg Hx     Miscarriages / Stillbirths Neg Hx     Ovarian cancer Neg Hx      labor Neg Hx     Stroke Neg Hx     Prostate cancer Neg Hx     Bladder Cancer Neg Hx         Social History:   Marital Status:   Alcohol History:  reports current alcohol use.  Tobacco History:  reports that she quit smoking about 4 years ago. Her smoking use included vaping w/o nicotine. She has never used smokeless tobacco.  Drug History:  reports no history of drug use.    Review of patient's allergies indicates:   Allergen Reactions    Sulfa (sulfonamide antibiotics) Hives             Current Outpatient Medications   Medication Sig Dispense Refill    apixaban (ELIQUIS) 5 mg Tab Take 5 mg by mouth 2 (two) times daily.       No current facility-administered medications for this visit.       Review of Systems   Constitutional:  Negative for chills, fatigue, fever and unexpected weight change.   HENT:  Negative for hearing loss and trouble swallowing.    Eyes:  Negative for photophobia and visual disturbance.   Respiratory:  Negative for cough, shortness of breath and wheezing.    Cardiovascular:  Negative for chest pain, palpitations and leg swelling.   Gastrointestinal:  Negative for abdominal pain and nausea.   Genitourinary:  Negative for dysuria and frequency.   Musculoskeletal:  Negative for arthralgias, back pain, gait problem, joint swelling and myalgias.   Skin:  Negative for rash and wound.   Neurological:  Negative for tremors, seizures, weakness, numbness and headaches.   Hematological:  Does not bruise/bleed easily.       Objective:        Physical Exam:   Foot Exam    General  General Appearance: appears stated age and healthy   Orientation: alert and oriented to person, place, and time   Affect: appropriate   Gait:  unimpaired       Left Foot/Ankle      Inspection and Palpation  Ecchymosis: none  Tenderness: plantar fascia   Swelling: none   Arch: normal  Hammertoes: absent  Claw toes: absent  Hallux valgus: no  Hallux limitus: no  Skin Exam: skin intact; no drainage, no ulcer and no erythema   Neurovascular  Dorsalis pedis: 2+  Posterior tibial: 2+  Capillary refill: 2+  Varicose veins: not present  Saphenous nerve sensation: normal  Tibial nerve sensation: normal  Superficial peroneal nerve sensation: normal  Deep peroneal nerve sensation: normal  Sural nerve sensation: normal    Muscle Strength  Ankle dorsiflexion: 5  Ankle plantar flexion: 5  Ankle inversion: 5  Ankle eversion: 5  Great toe extension: 5  Great toe flexion: 5    Range of Motion    Normal left ankle ROM    Tests  Anterior drawer: negative   Talar tilt: negative   PT Tinel's sign: negative  Paresthesia: negative  Comments  Pain on palpation of the infeior medial heel and mid arch . No pain present  with side to side compression of the calcaneus. Negative tinnel's sign  at the tarsal tunnel. Negative Bill's nerve pain. Negative Calcaneal nerve pain. No soft tissue masses. Pain absent  with dorsiflexion of the ankle. No edema, erythema, or ecchymosis noted.     Physical Exam  Cardiovascular:      Pulses:           Dorsalis pedis pulses are 2+ on the left side.        Posterior tibial pulses are 2+ on the left side.   Musculoskeletal:      Left foot: No bunion.   Feet:      Left foot:      Skin integrity: No ulcer or erythema.             Left Ankle/Foot Exam     Range of Motion   The patient has normal left ankle ROM.     Comments:  Pain on palpation of the infeior medial heel and mid arch . No pain present  with side to side compression of the calcaneus. Negative tinnel's sign  at the tarsal tunnel. Negative Bill's nerve pain. Negative Calcaneal nerve pain. No soft tissue masses. Pain absent  with dorsiflexion of the ankle. No edema, erythema, or ecchymosis  noted.         Muscle Strength   Left Lower Extremity   Ankle Dorsiflexion:  5   Plantar flexion:  5/5     Vascular Exam       Left Pulses  Dorsalis Pedis:      2+  Posterior Tibial:      2+         Imaging: X-Ray Foot Complete Left  Narrative: EXAMINATION:  XR FOOT COMPLETE 3 VIEW LEFT    CLINICAL HISTORY:  .  Pain in left foot    TECHNIQUE:  AP, lateral and oblique views of the left foot were performed.    COMPARISON:  None    FINDINGS:  A fracture of the bones of the left foot is not seen.  Soft tissue swelling or foreign bodies are not noted.  A small heel spur is noted.  Impression: Small heel spur otherwise a negative radiographs of the left foot.    Electronically signed by: Jacinto Myrick MD  Date:    11/09/2022  Time:    15:39             Assessment:       1. Plantar fasciitis    2. Left foot pain      Plan:   Plantar fasciitis    Left foot pain  -     X-Ray Foot Complete Left    Follow up if symptoms worsen or fail to improve.    Procedures        Plantar Fasciitis Level I Treatment:   Anti-inflammatory  No bare feet  Over the counter insert  Restrict activity level   Use running shoes at full time  No impact exercise and stretch gastrocnemius muscle      Counseling:     I provided patient education verbally regarding:   Patient diagnosis, treatment options, as well as alternatives, risks, and benefits.     Discussed different treatment options for heel pain. I gave written and verbal instructions on heel cord stretching and this was demonstrated for the patient. Patient expressed understanding. Discussed wearing appropriate shoe gear and avoiding flats, slippers, sandals, barefoot, and sockfeet. Recommended arch supports. My recommendation for OTC supports is Spenco polysorb replacement insoles or patient may elect more aggressive treatment with prescription arch supports. We also discussed cortisone injections and NSAID therapy.       This note was created using Dragon voice recognition software that  occasionally misinterpreted phrases or words.

## 2022-11-11 ENCOUNTER — CLINICAL SUPPORT (OUTPATIENT)
Dept: REHABILITATION | Facility: HOSPITAL | Age: 32
End: 2022-11-11
Payer: COMMERCIAL

## 2022-11-11 DIAGNOSIS — R29.898 WEAKNESS OF BOTH LOWER EXTREMITIES: Primary | ICD-10-CM

## 2022-11-11 DIAGNOSIS — R26.89 IMPAIRMENT OF BALANCE: ICD-10-CM

## 2022-11-11 DIAGNOSIS — R20.2 PARESTHESIA OF LEFT LOWER EXTREMITY: ICD-10-CM

## 2022-11-11 PROCEDURE — 97110 THERAPEUTIC EXERCISES: CPT | Mod: PN

## 2022-11-11 PROCEDURE — 97112 NEUROMUSCULAR REEDUCATION: CPT | Mod: PN

## 2022-11-11 NOTE — PROGRESS NOTES
[facility]   Physical Therapy Treatment Note     Name: Jamilah French  Clinic Number: [id number]    Therapy Diagnosis:   Encounter Diagnoses   Name Primary?    Weakness of both lower extremities Yes    Paresthesia of left lower extremity     Impairment of balance        Physician: Kirk Reis MD    Visit Date: 11/11/2022    Physician Orders: PT Eval and Treat   Medical Diagnosis from Referral:   G93.5 (ICD-10-CM) - Chiari malformation type I   R20.0 (ICD-10-CM) - Lower extremity numbness   G95.0 (ICD-10-CM) - Syringomyelia   Evaluation Date: 10/20/2022  Authorization Period Expiration: 12/31/2022  Plan of Care Expiration: 12/31/2022 or 12v post eval  Visit # (total) / Visits authorized: 7 /12  (POC 6 / 12)  PTA Visit #: 4/5     FOTO   Eval - 57/100   visit 5 - 11/8/22  3rd FOTO visit 12      Time In: 1030  Time Out: 1125  Total Billable Time: 55 minutes    Precautions: Bilateral PE history; Depression; Cervical radic; Chiari malformation; Syringomelia  Insurance: Payor: [employer] / Plan: [employer] [place] / Product Type: Commercial /       SUBJECTIVE     Pt reports: doing pretty good. Arch in left foot was hurting; went to podiatrist and dx with plantar fascitis.     She was compliant with home exercise program.  Response to previous treatment: no complaints; did ok  Functional change: none reported    Pain: 0/10  Location: N/A    OBJECTIVE     Objective Measures updated at progress report unless specified.     Treatment     High Co-morbidities:   Repeat Precautions: Bilateral Pulmonary Embolism history; Depression; Cervical radic; Chiari malformation; Syringomelia - shunt 5/18/2022  FOCUS: nervous system modulation, balance training, weight loss (pt does not have any pain)       therapeutic exercises to develop strength, endurance, ROM, flexibility, posture, and core stabilization for 27 minutes including:  NMRE utilized to activate appropriate mm to improve  "balance, proprioception, stability and gait skills: 28 minutes      Bike x ,10 minutes, level 2, Seat 8     STAND: Neuromuscular Education  LEFT Tensor Fascia Latae/Quadratus Lumborum stretch, 4 x 10 seconds hold  Airex NBOS eyes open/closed  3 x 30 seconds each (min/mod Assist)  Airex Single Leg Balance with hips even, Right/Left  x 30 seconds (min Assist)  Marching on Airex, activate abs and no leaning x 20 reps   Yellow Theraband Hip Abduction 3 x 10 reps bilateral   Forward Step ups, 20x Bilateral   Cable Column: Walk back, 10#, 10x / Paloff Press, 7#, 20x Bilateral   Single leg Gastroc stretch, stairs 3 x 30 seconds bilateral      TABLE:  Supine Sciatic nerve glide 2 x 20 reps Bilateral (utilize as Hamstring stretch and nerve glide)   Transverse abdominal sets x 20 reps with 5 seconds hold  Prone LEFT femoral nerve glide, 2 x 10 reps   Test innom rotation; perform MET seated if need - neg today     SEATED:  MET for left posterior innominate rotation: Left Hip Flexion / Right Hip Extension, 5x, 8sec (see HEP) - NOT PERFORMED not needed  Hamstring stretch 2 x 30 seconds bilateral   Hip Abduction Green Theraband x 30 reps  Ball squeezes x 30 reps   Toe extension with rolls (5,4,3,2,1) x 20 reps       Add NEXT:  Core stabilization   Gait training  Manual tx: Left Lower Extremity traction    Patient Education and Home Exercises     Home Exercises Provided and Patient Education Provided     Education provided:   - apply ice if delayed muscle soreness occurs.    Written Home Exercises Provided: yes. Home exercise program given at [facility].  Exercises were reviewed and Jamilah was able to demonstrate them prior to the end of the session.  Jamilah demonstrated good  understanding of the education provided. See EMR under Patient Instructions for exercises provided during therapy sessions    ASSESSMENT     Jamilah demonstrates less numbness in Left Lower Extremity and more "feeling". Pt able to perform all progressive therex " without pn provocation. Added tennis ball massage and revisited gastroc stretch for arch pain. Focus on core stabilization, nervous system modulation, balance and gait training, and cardio to promote weight loss to improve quality of life.    Jamilah Is progressing well towards her goals.   Pt prognosis is Fair.     Pt will continue to benefit from skilled outpatient physical therapy to address the deficits listed in the problem list box on initial evaluation, provide pt/family education and to maximize pt's level of independence in the home and community environment.     Pt's spiritual, cultural and educational needs considered and pt agreeable to plan of care and goals.     Anticipated barriers to physical therapy: co-morbidities    Goals:   Short Term GOALS: 3 weeks  1. Patient is independent with HEP. MET 11/8/2022  2. Patient demonstrates independence with core activation and postural awareness while sitting and standing. PROGRESS 11/11/2022  3. Patient will reduce pn to 2/10 while performing daily activities. PROGRESS 11/11/2022  4. Patient will reduce LLE radiculopathy frequency and intensity.PROGRESS 11/11/2022     Long Term GOALS: 6 weeks.   1. Patient demonstrates increased l/s FB ROM to 15 cm from floor to improve tolerance to reaching activities.   2. Patient demonstrates increased core, hip and BLE strength by 1/2 grade or greater to improve tolerance to home chores.  3. Patient demonstrates independence with body mechanics while working.  4. Patient will improve FOTO limitation score to </= 35% to show improvement in condition and functional mobility.     PLAN     Continue PT as deemed per POC: core stabilization, nervous system modulation, hip and Bilateral Lower Extremity strength, balance and gait training, and cardio    Plan of care Certification: 10/20/2022 to 12/31/2022.     Outpatient Physical Therapy 2 times weekly for 6 weeks to include the following interventions: Electrical Stimulation ,,  Gait Training, Manual Therapy, Moist Heat/ Ice, Neuromuscular Re-ed, Patient Education, Self Care, Therapeutic Activities, and Therapeutic Exercise. Mechanical Lumbar traction must be cleared by MD if utilized.    Yumiko Fair, PT

## 2022-11-15 ENCOUNTER — CLINICAL SUPPORT (OUTPATIENT)
Dept: REHABILITATION | Facility: HOSPITAL | Age: 32
End: 2022-11-15
Payer: COMMERCIAL

## 2022-11-15 DIAGNOSIS — R29.898 WEAKNESS OF BOTH LOWER EXTREMITIES: Primary | ICD-10-CM

## 2022-11-15 DIAGNOSIS — R20.2 PARESTHESIA OF LEFT LOWER EXTREMITY: ICD-10-CM

## 2022-11-15 DIAGNOSIS — R26.89 IMPAIRMENT OF BALANCE: ICD-10-CM

## 2022-11-15 PROCEDURE — 97112 NEUROMUSCULAR REEDUCATION: CPT | Mod: PN

## 2022-11-15 PROCEDURE — 97110 THERAPEUTIC EXERCISES: CPT | Mod: PN

## 2022-11-15 NOTE — PROGRESS NOTES
[facility]   Physical Therapy Treatment Note     Name: Jamilah French  Clinic Number: [id number]    Therapy Diagnosis:   Encounter Diagnoses   Name Primary?    Weakness of both lower extremities Yes    Paresthesia of left lower extremity     Impairment of balance        Physician: Kirk Reis MD    Visit Date: 11/15/2022    Physician Orders: PT Eval and Treat   Medical Diagnosis from Referral:   G93.5 (ICD-10-CM) - Chiari malformation type I   R20.0 (ICD-10-CM) - Lower extremity numbness   G95.0 (ICD-10-CM) - Syringomyelia   Evaluation Date: 10/20/2022  Authorization Period Expiration: 12/31/2022  Plan of Care Expiration: 12/31/2022 or 12v post eval  Visit # (total) / Visits authorized: 8 /12  (POC 7 / 12)  PTA Visit #: 4/5     FOTO   Eval - 57/100   visit 5 - 11/8/22  3rd FOTO visit 12      Time In: 130  Time Out: 225  Total Billable Time: 55 minutes    Precautions: Bilateral PE history; Depression; Cervical radic; Chiari malformation; Syringomelia  Insurance: Payor: [employer] / Plan: BCBS OF [employer] / Product Type: Commercial /       SUBJECTIVE     Pt reports: doing pretty good.      She was compliant with home exercise program.  Response to previous treatment: no complaints; did ok  Functional change: none reported    Pain: 0/10  Location: N/A    OBJECTIVE     Objective Measures updated at progress report unless specified.     Treatment     High Co-morbidities:   Repeat Precautions: Bilateral Pulmonary Embolism history; Depression; Cervical radic; Chiari malformation; Syringomelia - shunt 5/18/2022  FOCUS: nervous system modulation, balance training, weight loss (pt does not have any pain)       therapeutic exercises to develop strength, endurance, ROM, flexibility, posture, and core stabilization for 27 minutes including:  NMRE utilized to activate appropriate mm to improve balance, proprioception, stability and gait skills: 28 minutes      Bike x ,10  "minutes, level 2, Seat 8     STAND: Neuromuscular Education  LEFT Tensor Fascia Latae/Quadratus Lumborum stretch, 4 x 10 seconds hold  Airex NBOS eyes open/closed  3 x 30 seconds each (min/mod Assist)  Airex Single Leg Balance with hips even, LEFT  3 x 30 seconds (min Assist)  Marching on Airex, activate abs and no leaning, 1 min  Yellow Theraband Hip Abduction 3 x 10 reps bilateral   Forward Step ups, 30x Bilateral   Cable Column: Walk back, 10#, 10x / Paloff Press, 7#, 20x Bilateral   Single leg Gastroc stretch, stairs 2 x 30 seconds bilateral - NOT PERFORMED      TABLE:  Transverse abdominal sets x 20 reps with 5 seconds hold  Supine Sciatic nerve glide 2 x 20 reps Bilateral (utilize as Hamstring stretch and nerve glide)   Prone LEFT femoral nerve glide, 2 x 10 reps   Test innom rotation; perform MET seated if need - pos today     SEATED:  MET for left posterior innominate rotation: Left Hip Flexion / Right Hip Extension, 5x, 8sec (see HEP)   Hip Abduction Green Theraband x 30 reps  Ball squeezes x 30 reps   Toe extension with rolls (5,4,3,2,1) x 20 reps       Add NEXT:  Core stabilization   Gait training  Manual tx: Left Lower Extremity traction    Patient Education and Home Exercises     Home Exercises Provided and Patient Education Provided     Education provided:   - apply ice if delayed muscle soreness occurs.    Written Home Exercises Provided: yes. Home exercise program given at [facility].  Exercises were reviewed and Jamilah was able to demonstrate them prior to the end of the session.  Jamilah demonstrated good  understanding of the education provided. See EMR under Patient Instructions for exercises provided during therapy sessions    ASSESSMENT     Jamilah demonstrates less numbness in Left Lower Extremity and more "feeling". Pt able to perform all progressive therex without pn provocation. No difficulty advancing reps and resistance. Focus on core stabilization, nervous system modulation, balance and gait " training, and cardio to promote weight loss in order to improve quality of life.    Jamilah Is progressing well towards her goals.   Pt prognosis is Fair.     Pt will continue to benefit from skilled outpatient physical therapy to address the deficits listed in the problem list box on initial evaluation, provide pt/family education and to maximize pt's level of independence in the home and community environment.     Pt's spiritual, cultural and educational needs considered and pt agreeable to plan of care and goals.     Anticipated barriers to physical therapy: co-morbidities    Goals:   Short Term GOALS: 3 weeks  1. Patient is independent with HEP. MET 11/8/2022  2. Patient demonstrates independence with core activation and postural awareness while sitting and standing. PROGRESS 11/15/2022  3. Patient will reduce pn to 2/10 while performing daily activities. PROGRESS 11/15/2022  4. Patient will reduce LLE radiculopathy frequency and intensity.PROGRESS 11/15/2022     Long Term GOALS: 6 weeks.   1. Patient demonstrates increased l/s FB ROM to 15 cm from floor to improve tolerance to reaching activities.   2. Patient demonstrates increased core, hip and BLE strength by 1/2 grade or greater to improve tolerance to home chores.  3. Patient demonstrates independence with body mechanics while working.  4. Patient will improve FOTO limitation score to </= 35% to show improvement in condition and functional mobility.     PLAN     Continue PT as deemed per POC: core stabilization, nervous system modulation, hip and Bilateral Lower Extremity strength, balance and gait training, and cardio    Plan of care Certification: 10/20/2022 to 12/31/2022.     Outpatient Physical Therapy 2 times weekly for 6 weeks to include the following interventions: Electrical Stimulation ,, Gait Training, Manual Therapy, Moist Heat/ Ice, Neuromuscular Re-ed, Patient Education, Self Care, Therapeutic Activities, and Therapeutic Exercise. Mechanical  Lumbar traction must be cleared by MD if utilized.    Yumiko Fair, PT

## 2022-11-17 ENCOUNTER — CLINICAL SUPPORT (OUTPATIENT)
Dept: REHABILITATION | Facility: HOSPITAL | Age: 32
End: 2022-11-17
Payer: COMMERCIAL

## 2022-11-17 DIAGNOSIS — R26.89 IMPAIRMENT OF BALANCE: ICD-10-CM

## 2022-11-17 DIAGNOSIS — R29.898 WEAKNESS OF BOTH LOWER EXTREMITIES: Primary | ICD-10-CM

## 2022-11-17 DIAGNOSIS — R20.2 PARESTHESIA OF LEFT LOWER EXTREMITY: ICD-10-CM

## 2022-11-17 PROCEDURE — 97110 THERAPEUTIC EXERCISES: CPT | Mod: PN

## 2022-11-17 PROCEDURE — 97112 NEUROMUSCULAR REEDUCATION: CPT | Mod: PN

## 2022-11-17 NOTE — PROGRESS NOTES
[facility]   Physical Therapy Treatment Note     Name: Jamilah French  Clinic Number: [id number]    Therapy Diagnosis:   Encounter Diagnoses   Name Primary?    Weakness of both lower extremities Yes    Paresthesia of left lower extremity     Impairment of balance        Physician: Kirk Reis MD    Visit Date: 11/17/2022    Physician Orders: PT Eval and Treat   Medical Diagnosis from Referral:   G93.5 (ICD-10-CM) - Chiari malformation type I   R20.0 (ICD-10-CM) - Lower extremity numbness   G95.0 (ICD-10-CM) - Syringomyelia   Evaluation Date: 10/20/2022  Authorization Period Expiration: 12/31/2022  Plan of Care Expiration: 12/31/2022 or 12v post eval  Visit # (total) / Visits authorized: 9 /eval + 12  (POC 8 / 12)  PTA Visit #: 4/5     FOTO   Eval - 57/100   visit 5 - 11/8/22  3rd FOTO visit 12      Time In: 130  Time Out: 225  Total Billable Time: 55 minutes    Precautions: Bilateral PE history; Depression; Cervical radic; Chiari malformation; Syringomelia  Insurance: Payor: [employer] / Plan: BCBS OF [employer] / Product Type: Commercial /       SUBJECTIVE     Pt reports: doing pretty good. Having more feeling in her left foot.     She was compliant with home exercise program.  Response to previous treatment: no complaints; did ok  Functional change: none reported    Pain: 0/10  Location: N/A    OBJECTIVE     Objective Measures updated at progress report unless specified.     Treatment     High Co-morbidities:   Repeat Precautions: Bilateral Pulmonary Embolism history; Depression; Cervical radic; Chiari malformation; Syringomelia - shunt 5/18/2022  FOCUS: nervous system modulation, balance training, weight loss (pt does not have any pain)       therapeutic exercises to develop strength, endurance, ROM, flexibility, posture, and core stabilization for 27 minutes including:  NMRE utilized to activate appropriate mm to improve balance, proprioception, stability and  gait skills: 28 minutes      Bike x ,10 minutes, level 2, Seat 8     STAND: Neuromuscular Education  Airex NBOS eyes open CLOSED only  3 x 30 seconds each (min/mod Assist)  Airex Single Leg Balance with hips even, LEFT  3 x 30 seconds (min Assist)  Marching on Airex, activate abs and no leaning, 1 min  Yellow Theraband Hip Abduction 3 x 10 reps bilateral (red next)  Forward Step ups, 30x Bilateral   Cable Column: Walk back, 17#, 10x / Paloff Press, 7#, 30x Bilateral   Single leg Gastroc stretch, stairs 2 x 30 seconds bilateral - NOT PERFORMED   LEFT Tensor Fascia Latae/Quadratus Lumborum stretch, 4 x 10 seconds hold    Physioball: Marching, 20x  Physioball: Core stab with strap, 3x, 30sec    TABLE:  Transverse abdominal sets x 20 reps with 5 seconds hold  Supine Sciatic nerve glide 2 x 20 reps Bilateral (utilize as Hamstring stretch and nerve glide)   Prone LEFT femoral nerve glide, 2 x 10 reps   Test innom rotation; perform MET seated if need - pos today     SEATED:  MET for left posterior innominate rotation: Left Hip Flexion / Right Hip Extension, 5x, 8sec (see HEP)   Hip Abduction Green Theraband x 30 reps  Ball squeezes x 30 reps, avoid medial knee collapse  Toe extension with rolls (5,4,3,2,1) x 20 reps  Ankle INV/EV, leg supported on footstool, 20x LEFT foot       Add NEXT:  Core stabilization on Physioball   Manual tx: Left Lower Extremity traction    Patient Education and Home Exercises     Home Exercises Provided and Patient Education Provided     Education provided:   - apply ice if delayed muscle soreness occurs.    Written Home Exercises Provided: yes. Home exercise program given at [facility].  Exercises were reviewed and Jamilah was able to demonstrate them prior to the end of the session.  Jamilah demonstrated good  understanding of the education provided. See EMR under Patient Instructions for exercises provided during therapy sessions    ASSESSMENT     Jamilah demonstrates less numbness in Left Lower  "Extremity and more "feeling". Pt able to perform all progressive therex without pn provocation. Added physioball core stability training; required cues for upright posture during exercises.  Focus on core stabilization, nervous system modulation, balance and gait training, and cardio to promote weight loss in order to improve quality of life.    Jamilah Is progressing well towards her goals.   Pt prognosis is Fair.     Pt will continue to benefit from skilled outpatient physical therapy to address the deficits listed in the problem list box on initial evaluation, provide pt/family education and to maximize pt's level of independence in the home and community environment.     Pt's spiritual, cultural and educational needs considered and pt agreeable to plan of care and goals.     Anticipated barriers to physical therapy: co-morbidities    Goals:   Short Term GOALS: 3 weeks  1. Patient is independent with HEP. MET 11/8/2022  2. Patient demonstrates independence with core activation and postural awareness while sitting and standing. PROGRESS 11/17/2022  3. Patient will reduce pn to 2/10 while performing daily activities. PROGRESS 11/17/2022  4. Patient will reduce LLE radiculopathy frequency and intensity.PROGRESS 11/17/2022     Long Term GOALS: 6 weeks.   1. Patient demonstrates increased l/s FB ROM to 15 cm from floor to improve tolerance to reaching activities.   2. Patient demonstrates increased core, hip and BLE strength by 1/2 grade or greater to improve tolerance to home chores.  3. Patient demonstrates independence with body mechanics while working.  4. Patient will improve FOTO limitation score to </= 35% to show improvement in condition and functional mobility.     PLAN     Continue PT as deemed per POC: core stabilization, nervous system modulation, hip and Bilateral Lower Extremity strength, balance and gait training, and cardio    Plan of care Certification: 10/20/2022 to 12/31/2022.     Outpatient Physical " Therapy 2 times weekly for 6 weeks to include the following interventions: Electrical Stimulation ,, Gait Training, Manual Therapy, Moist Heat/ Ice, Neuromuscular Re-ed, Patient Education, Self Care, Therapeutic Activities, and Therapeutic Exercise. Mechanical Lumbar traction must be cleared by MD if utilized.    Yumiko Fair, PT

## 2022-11-21 ENCOUNTER — CLINICAL SUPPORT (OUTPATIENT)
Dept: REHABILITATION | Facility: HOSPITAL | Age: 32
End: 2022-11-21
Payer: COMMERCIAL

## 2022-11-21 DIAGNOSIS — R26.89 IMPAIRMENT OF BALANCE: ICD-10-CM

## 2022-11-21 DIAGNOSIS — R20.2 PARESTHESIA OF LEFT LOWER EXTREMITY: ICD-10-CM

## 2022-11-21 DIAGNOSIS — R29.898 WEAKNESS OF BOTH LOWER EXTREMITIES: Primary | ICD-10-CM

## 2022-11-21 PROCEDURE — 97110 THERAPEUTIC EXERCISES: CPT | Mod: PN

## 2022-11-21 PROCEDURE — 97112 NEUROMUSCULAR REEDUCATION: CPT | Mod: PN

## 2022-11-21 NOTE — PROGRESS NOTES
"[facility]   Physical Therapy Treatment Note     Name: Jamilah French  Clinic Number: [id number]    Therapy Diagnosis:   Encounter Diagnoses   Name Primary?    Weakness of both lower extremities Yes    Paresthesia of left lower extremity     Impairment of balance        Physician: Kirk Reis MD    Visit Date: 11/21/2022    Physician Orders: PT Eval and Treat   Medical Diagnosis from Referral:   G93.5 (ICD-10-CM) - Chiari malformation type I   R20.0 (ICD-10-CM) - Lower extremity numbness   G95.0 (ICD-10-CM) - Syringomyelia   Evaluation Date: 10/20/2022  Authorization Period Expiration: 12/31/2022  Plan of Care Expiration: 12/31/2022 or 12v post eval  Visit # (total) / Visits authorized: 9 /eval + 12  (POC 9 / 12)  PTA Visit #: 4/5     FOTO   Eval - 57/100   visit 5 - 11/8/22  3rd FOTO visit 12      Time In: 130  Time Out: 225  Total Billable Time: 55 minutes    Precautions: Bilateral PE history; Depression; Cervical radic; Chiari malformation; Syringomelia  Insurance: Payor: [employer] / Plan: BCBS OF [email or web address] / Product Type: Commercial /       SUBJECTIVE     Pt reports: doing pretty good. No pain just a "static" sensation in her Lower Extremity     She was compliant with home exercise program.  Response to previous treatment: no complaints; did ok  Functional change: none reported    Pain: 0/10  Location: N/A    OBJECTIVE     Objective Measures updated at progress report unless specified.     Treatment     High Co-morbidities:   Repeat Precautions: Bilateral Pulmonary Embolism history; Depression; Cervical radic; Chiari malformation; Syringomelia - shunt 5/18/2022  FOCUS: nervous system modulation, balance training, weight loss (pt does not have any pain)       therapeutic exercises to develop strength, endurance, ROM, flexibility, posture, and core stabilization for 27 minutes including:  NMRE utilized to activate appropriate mm to improve balance, " proprioception, stability and gait skills: 28 minutes      Bike x ,10 minutes, level 2, Seat 8     STAND: Neuromuscular Education  Airex NBOS eyes open CLOSED only  3 x 30 seconds each (min/mod Assist)  Airex Single Leg Balance with hips even, LEFT  3 x 30 seconds (min Assist)  Marching on Airex, activate abs and no leaning, 1 min  RED Theraband Hip Abduction 3 x 10 reps bilateral   Forward Step ups, 30x Bilateral   Cable Column: Walk back, 17#, 10x / Paloff Press, 7#, 30x Bilateral   Single leg Gastroc stretch, stairs 2 x 30 seconds bilateral - NOT PERFORMED   LEFT Tensor Fascia Latae/Quadratus Lumborum stretch, 4 x 10 seconds hold    Physioball: Marching, 20x  Physioball: Core stab with strap, 3x, 30sec    TABLE:  Transverse abdominal sets x 20 reps with 5 seconds hold  Supine Sciatic nerve glide 2 x 20 reps Bilateral (utilize as Hamstring stretch and nerve glide)   Prone LEFT femoral nerve glide, 2 x 10 reps   Test innom rotation; perform MET seated if need - pos today     SEATED:  MET for left posterior innominate rotation: Left Hip Flexion / Right Hip Extension, 5x, 8sec (see HEP)   Hip Abduction Green Theraband x 30 reps  Ball squeezes x 30 reps, avoid medial knee collapse  Toe extension with rolls (5,4,3,2,1) x 20 reps  Ankle INV/EV, leg supported on footstool, 20x LEFT foot       Add NEXT:  Core stabilization on Physioball   Manual tx: Left Lower Extremity traction    Patient Education and Home Exercises     Home Exercises Provided and Patient Education Provided     Education provided:   - apply ice if delayed muscle soreness occurs.    Written Home Exercises Provided: yes. Home exercise program given at [facility].  Exercises were reviewed and Jamilah was able to demonstrate them prior to the end of the session.  Jamilah demonstrated good  understanding of the education provided. See EMR under Patient Instructions for exercises provided during therapy sessions    ASSESSMENT     Jamilah demonstrates less numbness in  "Left Lower Extremity and more "feeling". Pt able to perform all progressive therex without pn provocation. Slight cueing for pelvis alignment when balancig and to keep core engaged when on PhysioBall   Focus on core stabilization, nervous system modulation, balance and gait training, and cardio to promote weight loss in order to improve quality of life.    Jamilah Is progressing well towards her goals.   Pt prognosis is Fair.     Pt will continue to benefit from skilled outpatient physical therapy to address the deficits listed in the problem list box on initial evaluation, provide pt/family education and to maximize pt's level of independence in the home and community environment.     Pt's spiritual, cultural and educational needs considered and pt agreeable to plan of care and goals.     Anticipated barriers to physical therapy: co-morbidities    Goals:   Short Term GOALS: 3 weeks  1. Patient is independent with HEP. MET 11/8/2022  2. Patient demonstrates independence with core activation and postural awareness while sitting and standing. PROGRESS 11/21/2022  3. Patient will reduce pn to 2/10 while performing daily activities. PROGRESS 11/21/2022  4. Patient will reduce LLE radiculopathy frequency and intensity.PROGRESS 11/21/2022     Long Term GOALS: 6 weeks.   1. Patient demonstrates increased l/s FB ROM to 15 cm from floor to improve tolerance to reaching activities.   2. Patient demonstrates increased core, hip and BLE strength by 1/2 grade or greater to improve tolerance to home chores.  3. Patient demonstrates independence with body mechanics while working.  4. Patient will improve FOTO limitation score to </= 35% to show improvement in condition and functional mobility.     PLAN     Continue PT as deemed per POC: core stabilization, nervous system modulation, hip and Bilateral Lower Extremity strength, balance and gait training, and cardio    Plan of care Certification: 10/20/2022 to 12/31/2022.   "   Outpatient Physical Therapy 2 times weekly for 6 weeks to include the following interventions: Electrical Stimulation ,, Gait Training, Manual Therapy, Moist Heat/ Ice, Neuromuscular Re-ed, Patient Education, Self Care, Therapeutic Activities, and Therapeutic Exercise. Mechanical Lumbar traction must be cleared by MD if utilized.    Jacinto Linares, PT

## 2022-11-23 ENCOUNTER — CLINICAL SUPPORT (OUTPATIENT)
Dept: REHABILITATION | Facility: HOSPITAL | Age: 32
End: 2022-11-23
Payer: COMMERCIAL

## 2022-11-23 DIAGNOSIS — R26.89 IMPAIRMENT OF BALANCE: ICD-10-CM

## 2022-11-23 DIAGNOSIS — R20.2 PARESTHESIA OF LEFT LOWER EXTREMITY: ICD-10-CM

## 2022-11-23 DIAGNOSIS — R29.898 WEAKNESS OF BOTH LOWER EXTREMITIES: Primary | ICD-10-CM

## 2022-11-23 NOTE — PROGRESS NOTES
[facility]   Physical Therapy Treatment Note     Name: Jamilah French  Clinic Number: [id number]    Therapy Diagnosis:   Encounter Diagnoses   Name Primary?    Weakness of both lower extremities Yes    Paresthesia of left lower extremity     Impairment of balance        Physician: Kirk Reis MD    Visit Date: 11/23/2022    Physician Orders: PT Eval and Treat   Medical Diagnosis from Referral:   G93.5 (ICD-10-CM) - Chiari malformation type I   R20.0 (ICD-10-CM) - Lower extremity numbness   G95.0 (ICD-10-CM) - Syringomyelia   Evaluation Date: 10/20/2022  Authorization Period Expiration: 12/31/2022  Plan of Care Expiration: 12/31/2022 or 12v post eval  Visit # (total) / Visits authorized: 11 /eval + 12  (POC 10 / 12)  PTA Visit #: 4/5     FOTO   Eval - 57/100   visit 5 - 11/8/22  3rd FOTO visit 12      Time In: 300  Time Out: 353  Total Billable Time: 53 minutes    Precautions: Bilateral PE history; Depression; Cervical radic; Chiari malformation; Syringomelia  Insurance: Payor: [employer] / Plan: BC[employer] [place] / Product Type: Commercial /       SUBJECTIVE     Pt reports: doing pretty good. Her left foot is hurting from 4 hour shift this morning.    She was compliant with home exercise program.  Response to previous treatment: no complaints; did ok  Functional change: none reported    Pain: 0/10  Location: N/A    OBJECTIVE     Objective Measures updated at progress report unless specified.     Treatment     High Co-morbidities:   Repeat Precautions: Bilateral Pulmonary Embolism history; Depression; Cervical radic; Chiari malformation; Syringomelia - shunt 5/18/2022  FOCUS: nervous system modulation, balance training, weight loss (pt does not have any pain)       therapeutic exercises to develop strength, endurance, ROM, flexibility, posture, and core stabilization for 27 minutes including:  NMRE utilized to activate appropriate mm to improve balance,  proprioception, stability and gait skills: 26 minutes   During 30 minutes of therapeutic exercise, this pt was supervised by a rehabilitation technician under the direction of the treating therapist.       Bike x ,10 minutes, level 2, Seat 8     STAND: Neuromuscular Education  Airex NBOS eyes open CLOSED only  3 x 30 seconds each (min/mod Assist)  Airex Single Leg Balance with hips even, LEFT  3 x 30 seconds (min Assist)  Marching on Airex, activate abs and no leaning, 1 min  RED Theraband Hip Abduction 3 x 10 reps bilateral   Forward Step ups, 30x Bilateral   Cable Column: Walk back, 17#, 10x / Paloff Press, 7#, 30x Bilateral   Single leg Gastroc stretch, stairs 3 x 30 seconds bilateral    LEFT Tensor Fascia Latae/Quadratus Lumborum stretch, 4 x 10 seconds hold - NOT PERFORMED     Physioball: Marching, 20x  Physioball: Core stab with strap, 3x, 30sec    TABLE:  Transverse abdominal sets x 20 reps with 5 seconds hold  Supine Sciatic nerve glide 2 x 20 reps Bilateral (utilize as Hamstring stretch and nerve glide)   Prone LEFT femoral nerve glide, 2 x 10 reps   Test innom rotation; perform MET seated if need - pos today     SEATED:  MET for left posterior innominate rotation: Left Hip Flexion / Right Hip Extension, 5x, 8sec (see HEP)   Hip Abduction Green Theraband x 30 reps  Ball squeezes x 30 reps, avoid medial knee collapse  Toe extension with rolls (5,4,3,2,1) x 20 reps  Ankle INV/EV, leg supported on footstool, 20x LEFT foot       Add NEXT:  Manual tx: Left Lower Extremity traction    Patient Education and Home Exercises     Home Exercises Provided and Patient Education Provided     Education provided:   - apply ice if delayed muscle soreness occurs.    Written Home Exercises Provided: yes. Home exercise program given at [facility].  Exercises were reviewed and Jamilah was able to demonstrate them prior to the end of the session.  Jamilah demonstrated good  understanding of the education provided. See EMR under Patient  "Instructions for exercises provided during therapy sessions    ASSESSMENT     Jamilah demonstrates less numbness in Left Lower Extremity and more "feeling". Pt able to perform all progressive therex without pn provocation. Min cueing for pelvis alignment when balancing. Focus on core stabilization, nervous system modulation, balance and gait training, and cardio to promote weight loss in order to improve quality of life.    Jamilah Is progressing well towards her goals.   Pt prognosis is Fair.     Pt will continue to benefit from skilled outpatient physical therapy to address the deficits listed in the problem list box on initial evaluation, provide pt/family education and to maximize pt's level of independence in the home and community environment.     Pt's spiritual, cultural and educational needs considered and pt agreeable to plan of care and goals.     Anticipated barriers to physical therapy: co-morbidities    Goals:   Short Term GOALS: 3 weeks  1. Patient is independent with HEP. MET 11/8/2022  2. Patient demonstrates independence with core activation and postural awareness while sitting and standing. PROGRESS 11/23/2022  3. Patient will reduce pn to 2/10 while performing daily activities. PROGRESS 11/23/2022  4. Patient will reduce LLE radiculopathy frequency and intensity.PROGRESS 11/23/2022     Long Term GOALS: 6 weeks.   1. Patient demonstrates increased l/s FB ROM to 15 cm from floor to improve tolerance to reaching activities.   2. Patient demonstrates increased core, hip and BLE strength by 1/2 grade or greater to improve tolerance to home chores.  3. Patient demonstrates independence with body mechanics while working.  4. Patient will improve FOTO limitation score to </= 35% to show improvement in condition and functional mobility.     PLAN     Continue PT as deemed per POC: core stabilization, nervous system modulation, hip and Bilateral Lower Extremity strength, balance and gait training, and " cardio    Plan of care Certification: 10/20/2022 to 12/31/2022.     Outpatient Physical Therapy 2 times weekly for 6 weeks to include the following interventions: Electrical Stimulation ,, Gait Training, Manual Therapy, Moist Heat/ Ice, Neuromuscular Re-ed, Patient Education, Self Care, Therapeutic Activities, and Therapeutic Exercise. Mechanical Lumbar traction must be cleared by MD if utilized.    Yumiko Fair, PT

## 2022-11-29 ENCOUNTER — CLINICAL SUPPORT (OUTPATIENT)
Dept: REHABILITATION | Facility: HOSPITAL | Age: 32
End: 2022-11-29
Payer: COMMERCIAL

## 2022-11-29 DIAGNOSIS — R26.89 IMPAIRMENT OF BALANCE: ICD-10-CM

## 2022-11-29 DIAGNOSIS — R20.2 PARESTHESIA OF LEFT LOWER EXTREMITY: ICD-10-CM

## 2022-11-29 DIAGNOSIS — R29.898 WEAKNESS OF BOTH LOWER EXTREMITIES: Primary | ICD-10-CM

## 2022-11-29 PROCEDURE — 97112 NEUROMUSCULAR REEDUCATION: CPT | Mod: PN

## 2022-11-29 PROCEDURE — 97110 THERAPEUTIC EXERCISES: CPT | Mod: PN

## 2022-11-29 NOTE — PROGRESS NOTES
[facility]   Physical Therapy Treatment Note     Name: Jamilah French  Clinic Number: [id number]    Therapy Diagnosis:   Encounter Diagnoses   Name Primary?    Weakness of both lower extremities Yes    Paresthesia of left lower extremity     Impairment of balance        Physician: Kirk Reis MD    Visit Date: 11/29/2022    Physician Orders: PT Eval and Treat   Medical Diagnosis from Referral:   G93.5 (ICD-10-CM) - Chiari malformation type I   R20.0 (ICD-10-CM) - Lower extremity numbness   G95.0 (ICD-10-CM) - Syringomyelia   Evaluation Date: 10/20/2022  Authorization Period Expiration: 12/31/2022  Plan of Care Expiration: 12/31/2022 or 12v post eval  Visit # (total) / Visits authorized: 12 /eval + 12  (POC 11 / 12)  PTA Visit #: 4/5     FOTO   Eval - 57/100   visit 5 - 11/8/22  3rd FOTO visit 12      Time In: 130  Time Out: 226  Total Billable Time: 56 minutes    Precautions: Bilateral PE history; Depression; Cervical radic; Chiari malformation; Syringomelia  Insurance: Payor: [employer] / Plan: BCBS OF [place] / Product Type: Commercial /       SUBJECTIVE     Pt reports: doing pretty good. Left foot feeling better from PF compression sock.    She was compliant with home exercise program.  Response to previous treatment: no complaints; did ok  Functional change: none reported    Pain: 0/10  Location: N/A    OBJECTIVE     Objective Measures updated at progress report unless specified.     Treatment     High Co-morbidities:   Repeat Precautions: Bilateral Pulmonary Embolism history; Depression; Cervical radic; Chiari malformation; Syringomelia - shunt 5/18/2022  FOCUS: nervous system modulation, balance training, weight loss (pt does not have any pain)       therapeutic exercises to develop strength, endurance, ROM, flexibility, posture, and core stabilization for 29 minutes including:  NMRE utilized to activate appropriate mm to improve balance, proprioception,  stability and gait skills: 27 minutes   During 45 minutes of therapeutic exercise, this pt was supervised by a rehabilitation technician under the direction of the treating therapist.       Bike x ,10 minutes, level 2, Seat 8     STAND: Neuromuscular Education  Airex NBOS eyes open CLOSED only  3 x 30 seconds each (min/mod Assist)  Airex Single Leg Balance with hips even, LEFT  3 x 30 seconds (min Assist)  Marching on Airex, activate abs and no leaning, 1 min  RED Theraband Hip Abduction 3 x 10 reps bilateral   Forward Step ups, 30x Bilateral   Cable Column: Walk back, 17#, 10x / Paloff Press, 7#, 30x Bilateral   Single leg Gastroc stretch, stairs 3 x 30 seconds bilateral    LEFT Tensor Fascia Latae/Quadratus Lumborum stretch, 4 x 10 seconds hold   +Red Theraband lateral steps, hallway, 2 sets    Physioball: Marching, 20x  Physioball: Core stab with strap, 3x, 30sec    TABLE:  Transverse abdominal sets x 20 reps with 5 seconds hold  Supine Sciatic nerve glide 2 x 20 reps Bilateral (utilize as Hamstring stretch and nerve glide)   Prone LEFT femoral nerve glide, 2 x 10 reps   Test innom rotation; perform MET seated if need - neg today     SEATED:  MET for left posterior innominate rotation: Left Hip Flexion / Right Hip Extension, 5x, 8sec (see HEP)   Hip Abduction Green Theraband x 30 reps  Ball squeezes x 30 reps, avoid medial knee collapse  Toe extension with rolls (5,4,3,2,1) x 20 reps  Ankle INV/EV, leg supported on footstool, 20x LEFT foot       Add NEXT:  Manual tx: Left Lower Extremity traction    Patient Education and Home Exercises     Home Exercises Provided and Patient Education Provided     Education provided:   - apply ice if delayed muscle soreness occurs.    Written Home Exercises Provided: yes. Home exercise program given at [facility].  Exercises were reviewed and Jamilah was able to demonstrate them prior to the end of the session.  Jamilah demonstrated good  understanding of the education provided. See EMR  "under Patient Instructions for exercises provided during therapy sessions    ASSESSMENT     Jamilah demonstrates less numbness in Left Lower Extremity and more "feeling". Pt able to perform all progressive therex without pn provocation. Added additional hip abduction strengthening. Min cueing for pelvis alignment when balancing. Focus on core stabilization, nervous system modulation, balance and gait training, and cardio to promote weight loss in order to improve quality of life.    Jamilah Is progressing well towards her goals.   Pt prognosis is Fair.     Pt will continue to benefit from skilled outpatient physical therapy to address the deficits listed in the problem list box on initial evaluation, provide pt/family education and to maximize pt's level of independence in the home and community environment.     Pt's spiritual, cultural and educational needs considered and pt agreeable to plan of care and goals.     Anticipated barriers to physical therapy: co-morbidities    Goals:   Short Term GOALS: 3 weeks  1. Patient is independent with HEP. MET 11/8/2022  2. Patient demonstrates independence with core activation and postural awareness while sitting and standing. PROGRESS 11/29/2022  3. Patient will reduce pn to 2/10 while performing daily activities. PROGRESS 11/29/2022  4. Patient will reduce LLE radiculopathy frequency and intensity.PROGRESS 11/29/2022     Long Term GOALS: 6 weeks.   1. Patient demonstrates increased l/s FB ROM to 15 cm from floor to improve tolerance to reaching activities.   2. Patient demonstrates increased core, hip and BLE strength by 1/2 grade or greater to improve tolerance to home chores.  3. Patient demonstrates independence with body mechanics while working.  4. Patient will improve FOTO limitation score to </= 35% to show improvement in condition and functional mobility.     PLAN     Continue PT as deemed per POC: core stabilization, nervous system modulation, hip and Bilateral Lower " Extremity strength, balance and gait training, and cardio    Plan of care Certification: 10/20/2022 to 12/31/2022.     Outpatient Physical Therapy 2 times weekly for 6 weeks to include the following interventions: Electrical Stimulation ,, Gait Training, Manual Therapy, Moist Heat/ Ice, Neuromuscular Re-ed, Patient Education, Self Care, Therapeutic Activities, and Therapeutic Exercise. Mechanical Lumbar traction must be cleared by MD if utilized.    Yumiko Fair, PT

## 2022-12-01 ENCOUNTER — CLINICAL SUPPORT (OUTPATIENT)
Dept: REHABILITATION | Facility: HOSPITAL | Age: 32
End: 2022-12-01
Payer: COMMERCIAL

## 2022-12-01 DIAGNOSIS — R26.89 IMPAIRMENT OF BALANCE: ICD-10-CM

## 2022-12-01 DIAGNOSIS — R29.898 WEAKNESS OF BOTH LOWER EXTREMITIES: Primary | ICD-10-CM

## 2022-12-01 DIAGNOSIS — R20.2 PARESTHESIA OF LEFT LOWER EXTREMITY: ICD-10-CM

## 2022-12-01 PROCEDURE — 97112 NEUROMUSCULAR REEDUCATION: CPT | Mod: PN

## 2022-12-01 PROCEDURE — 97530 THERAPEUTIC ACTIVITIES: CPT | Mod: PN

## 2022-12-01 PROCEDURE — 97110 THERAPEUTIC EXERCISES: CPT | Mod: PN

## 2022-12-01 NOTE — PROGRESS NOTES
[facility]   Physical Therapy Treatment Note & Outpatient Discharge Summary     Name: Jamilah French  Clinic Number: [id number]    Therapy Diagnosis:   Encounter Diagnoses   Name Primary?    Weakness of both lower extremities Yes    Paresthesia of left lower extremity     Impairment of balance        Physician: Kirk Reis MD    Visit Date: 12/1/2022    Physician Orders: PT Eval and Treat   Medical Diagnosis from Referral:   G93.5 (ICD-10-CM) - Chiari malformation type I   R20.0 (ICD-10-CM) - Lower extremity numbness   G95.0 (ICD-10-CM) - Syringomyelia   Evaluation Date: 10/20/2022  Authorization Period Expiration: 12/31/2022  Plan of Care Expiration: 12/31/2022 or 12v post eval  Visit # (total) / Visits authorized: 13 /eval + 12  (POC 12 / 12)  PTA Visit #: 4/5     FOTO   Eval - 57/100   visit 5 - 11/8/22  3rd FOTO visit 12 - today      Time In: 130  Time Out: 223  Total Billable Time: 53 minutes    Precautions: Bilateral PE history; Depression; Cervical radic; Chiari malformation; Syringomelia  Insurance: Payor: [employer] / Plan: [employer] / Product Type: Commercial /       SUBJECTIVE     Pt reports: feels like she is functioning at 90% normal function. Feels PT has helped her; getting more sensation. Feels she has plateaued and is ready for discharge to work independently on HEP. Does not stop her from doing anything except for kneeling.     She was compliant with home exercise program.  Response to previous treatment: no complaints; did ok  Functional change: none reported    Pain: 0/10  Location: N/A    OBJECTIVE     Therapeutic Activity: Updated Measurements, 8 minutes     Lumbar AROM: ROM-   Response to repeated movements   Flexion 33 cm from floor - stretch, dizzy - no change in numbness->26cm   Extension (15 norm) 20 degrees - no change in numbness   Right side bending  (20 norm) 20 degrees - no change   Left side bending  (20 norm) 15 degrees-  no change->21 deg   Rotation Observation NT       L/E MMT: 5/5 Left Right   Hip Flexion 4/5->4+ 4/5->4+   Hip Extension 4/5->same 4/5->4+   Hip Abduction 3+/5->4 3+/5->4   Hip Adduction 4/5->same 4/5->4+   Hip IR 4+/5. 4+/5.   Hip ER 4+/5. 4+/5.   Knee Flexion 4+/5. 4+/5.   Knee Extension 4/5->4+ 4/5->4+   Ankle DF 5/5. 5/5.   Ankle INV 5/5. 5/5.   Ankle EV 5/5. 5/5.     LLD (Leg length discrepancy) / Supine to Sit: Left short to long = posterior innom rotation-> NEG     Flexibility Left Right   Hamstrings 90/90 (-20 norm) -40 degrees->-25 -30 degrees->25                       Evaluation   Single Limb Stance R LE 3 seconds-> 13 sec  (<10 sec = HIGH FALL RISK)   Single Limb Stance L LE 5 seconds-> 15 sec  (<10 sec = HIGH FALL RISK)              Treatment     High Co-morbidities:   Repeat Precautions: Bilateral Pulmonary Embolism history; Depression; Cervical radic; Chiari malformation; Syringomelia - shunt 5/18/2022  FOCUS: nervous system modulation, balance training, weight loss (pt does not have any pain)       therapeutic exercises to develop strength, endurance, ROM, flexibility, posture, and core stabilization for 15 minutes including:  NMRE utilized to activate appropriate mm to improve balance, proprioception, stability and gait skills: 28 minutes   During 0 minutes of therapeutic exercise, this pt was supervised by a rehabilitation technician under the direction of the treating therapist.     Bike x ,10 minutes, level 2, Seat 8     STAND: Neuromuscular Education  Airex NBOS eyes open CLOSED only  3 x 30 seconds each (min/mod Assist)  Airex Single Leg Balance with hips even, LEFT  3 x 30 seconds (min Assist)  Marching on Airex, activate abs and no leaning, 1 min  RED Theraband Hip Abduction 3 x 10 reps bilateral   Forward Step ups, 30x Bilateral   Cable Column: Walk back, 17#, 10x / Paloff Press, 7#, 30x Bilateral   Single leg Gastroc stretch, stairs 3 x 30 seconds bilateral    LEFT Tensor Fascia  "Latae/Quadratus Lumborum stretch, 4 x 10 seconds hold   +Red Theraband lateral steps, hallway, 2 sets    Physioball: Marching, 20x  Physioball: Core stab with strap, 3x, 30sec    TABLE: NOT PERFORMED   Transverse abdominal sets x 20 reps with 5 seconds hold  Supine Sciatic nerve glide 2 x 20 reps Bilateral (utilize as Hamstring stretch and nerve glide)   Prone LEFT femoral nerve glide, 2 x 10 reps   Test innom rotation; perform MET seated if need - neg today     SEATED: NOT PERFORMED   MET for left posterior innominate rotation: Left Hip Flexion / Right Hip Extension, 5x, 8sec (see HEP) - NOT PERFORMED   Hip Abduction Green Theraband x 30 reps  Ball squeezes x 30 reps, avoid medial knee collapse  Toe extension with rolls (5,4,3,2,1) x 20 reps  Ankle INV/EV, leg supported on footstool, 20x LEFT foot       Add NEXT:  Manual tx: Left Lower Extremity traction    Patient Education and Home Exercises     Home Exercises Provided and Patient Education Provided     Education provided:   - apply ice if delayed muscle soreness occurs.    Written Home Exercises Provided: no   Home exercise program given at [facility].  Exercises were reviewed and Jamilah was able to demonstrate them prior to the end of the session.  Jamilah demonstrated good  understanding of the education provided. See EMR under Patient Instructions for exercises provided during therapy sessions    ASSESSMENT     Jamilah demonstrates less numbness in Left Lower Extremity and more "feeling". Pt has made significant clinical gains in lumbar AROM, Hamstring flexibility, hip and Bilateral Lower Extremity strength, and overall function. Pt has reached max rehab potential at this time and is ready for discharge and to work independently on HEP.     Jamilah Is progressing well towards her goals.   Pt prognosis is Fair.     Pt will continue to benefit from skilled outpatient physical therapy to address the deficits listed in the problem list box on initial evaluation, provide " pt/family education and to maximize pt's level of independence in the home and community environment.     Pt's spiritual, cultural and educational needs considered and pt agreeable to plan of care and goals.     Anticipated barriers to physical therapy: co-morbidities    Goals:   Short Term GOALS: 3 weeks  1. Patient is independent with HEP. MET 11/8/2022  2. Patient demonstrates independence with core activation and postural awareness while sitting and standing. PROGRESS 12/1/2022  3. Patient will reduce pn to 2/10 while performing daily activities. MET 12/1/2022  4. Patient will reduce LLE radiculopathy frequency and intensity.PROGRESS 12/1/2022     Long Term GOALS: 6 weeks.   1. Patient demonstrates increased l/s FB ROM to 15 cm from floor to improve tolerance to reaching activities. PROGRESS 12/1/2022  2. Patient demonstrates increased core, hip and BLE strength by 1/2 grade or greater to improve tolerance to home chores. MET 12/1/2022  3. Patient demonstrates independence with body mechanics while working. PROGRESS 12/1/2022  4. Patient will improve FOTO limitation score to </= 35% to show improvement in condition and functional mobility. MET 12/1/2022    PLAN     Continue PT as deemed per POC: core stabilization, nervous system modulation, hip and Bilateral Lower Extremity strength, balance and gait training, and cardio    Plan of care Certification: 10/20/2022 to 12/31/2022.     Outpatient Physical Therapy 2 times weekly for 6 weeks to include the following interventions: Electrical Stimulation, Gait Training, Manual Therapy, Moist Heat/ Ice, Neuromuscular Re-ed, Patient Education, Self Care, Therapeutic Activities, and Therapeutic Exercise. Mechanical Lumbar traction must be cleared by MD if utilized.    Yumiko Fair, PT

## 2022-12-01 NOTE — PLAN OF CARE
[facility]   Physical Therapy Treatment Note & Outpatient Discharge Summary     Name: Jamilah French  Clinic Number: [id number]    Therapy Diagnosis:   Encounter Diagnoses   Name Primary?    Weakness of both lower extremities Yes    Paresthesia of left lower extremity     Impairment of balance        Physician: Kirk Reis MD    Visit Date: 12/1/2022    Physician Orders: PT Eval and Treat   Medical Diagnosis from Referral:   G93.5 (ICD-10-CM) - Chiari malformation type I   R20.0 (ICD-10-CM) - Lower extremity numbness   G95.0 (ICD-10-CM) - Syringomyelia   Evaluation Date: 10/20/2022  Authorization Period Expiration: 12/31/2022  Plan of Care Expiration: 12/31/2022 or 12v post eval  Visit # (total) / Visits authorized: 13 /eval + 12  (POC 12 / 12)  PTA Visit #: 4/5     FOTO   Eval - 57/100   visit 5 - 11/8/22  3rd FOTO visit 12 - today      Time In: 130  Time Out: 223  Total Billable Time: 53 minutes    Precautions: Bilateral PE history; Depression; Cervical radic; Chiari malformation; Syringomelia  Insurance: Payor: [employer] / Plan: [employer] / Product Type: Commercial /       SUBJECTIVE     Pt reports: feels like she is functioning at 90% normal function. Feels PT has helped her; getting more sensation. Feels she has plateaued and is ready for discharge to work independently on HEP. Does not stop her from doing anything except for kneeling.     She was compliant with home exercise program.  Response to previous treatment: no complaints; did ok  Functional change: none reported    Pain: 0/10  Location: N/A    OBJECTIVE     Therapeutic Activity: Updated Measurements, 8 minutes     Lumbar AROM: ROM-   Response to repeated movements   Flexion 33 cm from floor - stretch, dizzy - no change in numbness->26cm   Extension (15 norm) 20 degrees - no change in numbness   Right side bending  (20 norm) 20 degrees - no change   Left side bending  (20 norm) 15 degrees-  no change->21 deg   Rotation Observation NT       L/E MMT: 5/5 Left Right   Hip Flexion 4/5->4+ 4/5->4+   Hip Extension 4/5->same 4/5->4+   Hip Abduction 3+/5->4 3+/5->4   Hip Adduction 4/5->same 4/5->4+   Hip IR 4+/5. 4+/5.   Hip ER 4+/5. 4+/5.   Knee Flexion 4+/5. 4+/5.   Knee Extension 4/5->4+ 4/5->4+   Ankle DF 5/5. 5/5.   Ankle INV 5/5. 5/5.   Ankle EV 5/5. 5/5.     LLD (Leg length discrepancy) / Supine to Sit: Left short to long = posterior innom rotation-> NEG     Flexibility Left Right   Hamstrings 90/90 (-20 norm) -40 degrees->-25 -30 degrees->25                       Evaluation   Single Limb Stance R LE 3 seconds-> 13 sec  (<10 sec = HIGH FALL RISK)   Single Limb Stance L LE 5 seconds-> 15 sec  (<10 sec = HIGH FALL RISK)              Treatment     High Co-morbidities:   Repeat Precautions: Bilateral Pulmonary Embolism history; Depression; Cervical radic; Chiari malformation; Syringomelia - shunt 5/18/2022  FOCUS: nervous system modulation, balance training, weight loss (pt does not have any pain)       therapeutic exercises to develop strength, endurance, ROM, flexibility, posture, and core stabilization for 15 minutes including:  NMRE utilized to activate appropriate mm to improve balance, proprioception, stability and gait skills: 28 minutes   During 0 minutes of therapeutic exercise, this pt was supervised by a rehabilitation technician under the direction of the treating therapist.     Bike x ,10 minutes, level 2, Seat 8     STAND: Neuromuscular Education  Airex NBOS eyes open CLOSED only  3 x 30 seconds each (min/mod Assist)  Airex Single Leg Balance with hips even, LEFT  3 x 30 seconds (min Assist)  Marching on Airex, activate abs and no leaning, 1 min  RED Theraband Hip Abduction 3 x 10 reps bilateral   Forward Step ups, 30x Bilateral   Cable Column: Walk back, 17#, 10x / Paloff Press, 7#, 30x Bilateral   Single leg Gastroc stretch, stairs 3 x 30 seconds bilateral    LEFT Tensor Fascia  "Latae/Quadratus Lumborum stretch, 4 x 10 seconds hold   +Red Theraband lateral steps, hallway, 2 sets    Physioball: Marching, 20x  Physioball: Core stab with strap, 3x, 30sec    TABLE: NOT PERFORMED   Transverse abdominal sets x 20 reps with 5 seconds hold  Supine Sciatic nerve glide 2 x 20 reps Bilateral (utilize as Hamstring stretch and nerve glide)   Prone LEFT femoral nerve glide, 2 x 10 reps   Test innom rotation; perform MET seated if need - neg today     SEATED: NOT PERFORMED   MET for left posterior innominate rotation: Left Hip Flexion / Right Hip Extension, 5x, 8sec (see HEP) - NOT PERFORMED   Hip Abduction Green Theraband x 30 reps  Ball squeezes x 30 reps, avoid medial knee collapse  Toe extension with rolls (5,4,3,2,1) x 20 reps  Ankle INV/EV, leg supported on footstool, 20x LEFT foot       Add NEXT:  Manual tx: Left Lower Extremity traction    Patient Education and Home Exercises     Home Exercises Provided and Patient Education Provided     Education provided:   - apply ice if delayed muscle soreness occurs.    Written Home Exercises Provided: no   Home exercise program given at [facility].  Exercises were reviewed and Jamilah was able to demonstrate them prior to the end of the session.  Jamilah demonstrated good  understanding of the education provided. See EMR under Patient Instructions for exercises provided during therapy sessions    ASSESSMENT     Jamilah demonstrates less numbness in Left Lower Extremity and more "feeling". Pt has made significant clinical gains in lumbar AROM, Hamstring flexibility, hip and Bilateral Lower Extremity strength, and overall function. Pt has reached max rehab potential at this time and is ready for discharge and to work independently on HEP.     Jamilah Is progressing well towards her goals.   Pt prognosis is Fair.     Pt will continue to benefit from skilled outpatient physical therapy to address the deficits listed in the problem list box on initial evaluation, provide " pt/family education and to maximize pt's level of independence in the home and community environment.     Pt's spiritual, cultural and educational needs considered and pt agreeable to plan of care and goals.     Anticipated barriers to physical therapy: co-morbidities    Goals:   Short Term GOALS: 3 weeks  1. Patient is independent with HEP. MET 11/8/2022  2. Patient demonstrates independence with core activation and postural awareness while sitting and standing. PROGRESS 12/1/2022  3. Patient will reduce pn to 2/10 while performing daily activities. MET 12/1/2022  4. Patient will reduce LLE radiculopathy frequency and intensity.PROGRESS 12/1/2022     Long Term GOALS: 6 weeks.   1. Patient demonstrates increased l/s FB ROM to 15 cm from floor to improve tolerance to reaching activities. PROGRESS 12/1/2022  2. Patient demonstrates increased core, hip and BLE strength by 1/2 grade or greater to improve tolerance to home chores. MET 12/1/2022  3. Patient demonstrates independence with body mechanics while working. PROGRESS 12/1/2022  4. Patient will improve FOTO limitation score to </= 35% to show improvement in condition and functional mobility. MET 12/1/2022    PLAN     Continue PT as deemed per POC: core stabilization, nervous system modulation, hip and Bilateral Lower Extremity strength, balance and gait training, and cardio    Plan of care Certification: 10/20/2022 to 12/31/2022.     Outpatient Physical Therapy 2 times weekly for 6 weeks to include the following interventions: Electrical Stimulation, Gait Training, Manual Therapy, Moist Heat/ Ice, Neuromuscular Re-ed, Patient Education, Self Care, Therapeutic Activities, and Therapeutic Exercise. Mechanical Lumbar traction must be cleared by MD if utilized.    Yumiko Fair, PT

## 2022-12-05 ENCOUNTER — PATIENT MESSAGE (OUTPATIENT)
Dept: CARDIOLOGY | Facility: CLINIC | Age: 32
End: 2022-12-05
Payer: COMMERCIAL

## 2022-12-08 ENCOUNTER — OFFICE VISIT (OUTPATIENT)
Dept: URGENT CARE | Facility: CLINIC | Age: 32
End: 2022-12-08
Payer: COMMERCIAL

## 2022-12-08 VITALS
BODY MASS INDEX: 48.12 KG/M2 | TEMPERATURE: 99 F | WEIGHT: 288.81 LBS | RESPIRATION RATE: 20 BRPM | DIASTOLIC BLOOD PRESSURE: 83 MMHG | SYSTOLIC BLOOD PRESSURE: 136 MMHG | HEART RATE: 81 BPM | OXYGEN SATURATION: 100 % | HEIGHT: 65 IN

## 2022-12-08 DIAGNOSIS — Z20.822 COVID-19 VIRUS NOT DETECTED: ICD-10-CM

## 2022-12-08 DIAGNOSIS — Z20.822 ENCOUNTER FOR LABORATORY TESTING FOR COVID-19 VIRUS: Primary | ICD-10-CM

## 2022-12-08 LAB
CTP QC/QA: YES
SARS-COV-2 AG RESP QL IA.RAPID: NEGATIVE

## 2022-12-08 PROCEDURE — 3008F PR BODY MASS INDEX (BMI) DOCUMENTED: ICD-10-PCS | Mod: CPTII,S$GLB,, | Performed by: NURSE PRACTITIONER

## 2022-12-08 PROCEDURE — 87811 SARS-COV-2 COVID19 W/OPTIC: CPT | Mod: QW,S$GLB,, | Performed by: NURSE PRACTITIONER

## 2022-12-08 PROCEDURE — 1160F RVW MEDS BY RX/DR IN RCRD: CPT | Mod: CPTII,S$GLB,, | Performed by: NURSE PRACTITIONER

## 2022-12-08 PROCEDURE — 3079F DIAST BP 80-89 MM HG: CPT | Mod: CPTII,S$GLB,, | Performed by: NURSE PRACTITIONER

## 2022-12-08 PROCEDURE — 1160F PR REVIEW ALL MEDS BY PRESCRIBER/CLIN PHARMACIST DOCUMENTED: ICD-10-PCS | Mod: CPTII,S$GLB,, | Performed by: NURSE PRACTITIONER

## 2022-12-08 PROCEDURE — 3008F BODY MASS INDEX DOCD: CPT | Mod: CPTII,S$GLB,, | Performed by: NURSE PRACTITIONER

## 2022-12-08 PROCEDURE — 3075F SYST BP GE 130 - 139MM HG: CPT | Mod: CPTII,S$GLB,, | Performed by: NURSE PRACTITIONER

## 2022-12-08 PROCEDURE — 1159F MED LIST DOCD IN RCRD: CPT | Mod: CPTII,S$GLB,, | Performed by: NURSE PRACTITIONER

## 2022-12-08 PROCEDURE — 3079F PR MOST RECENT DIASTOLIC BLOOD PRESSURE 80-89 MM HG: ICD-10-PCS | Mod: CPTII,S$GLB,, | Performed by: NURSE PRACTITIONER

## 2022-12-08 PROCEDURE — 1159F PR MEDICATION LIST DOCUMENTED IN MEDICAL RECORD: ICD-10-PCS | Mod: CPTII,S$GLB,, | Performed by: NURSE PRACTITIONER

## 2022-12-08 PROCEDURE — 99203 PR OFFICE/OUTPT VISIT, NEW, LEVL III, 30-44 MIN: ICD-10-PCS | Mod: S$GLB,,, | Performed by: NURSE PRACTITIONER

## 2022-12-08 PROCEDURE — 3075F PR MOST RECENT SYSTOLIC BLOOD PRESS GE 130-139MM HG: ICD-10-PCS | Mod: CPTII,S$GLB,, | Performed by: NURSE PRACTITIONER

## 2022-12-08 PROCEDURE — 87811 SARS CORONAVIRUS 2 ANTIGEN POCT, MANUAL READ: ICD-10-PCS | Mod: QW,S$GLB,, | Performed by: NURSE PRACTITIONER

## 2022-12-08 PROCEDURE — 99203 OFFICE O/P NEW LOW 30 MIN: CPT | Mod: S$GLB,,, | Performed by: NURSE PRACTITIONER

## 2022-12-09 NOTE — PROGRESS NOTES
"Subjective:       Patient ID: Jamilah French is a 32 y.o. female.    Vitals:  height is 5' 5" (1.651 m) and weight is 131 kg (288 lb 12.8 oz). Her temperature is 98.7 °F (37.1 °C). Her blood pressure is 136/83 and her pulse is 81. Her respiration is 20 and oxygen saturation is 100%.     Chief Complaint: Cough    32-year-old female seen today for COVID testing.  States her  was found positive a few hours ago.  States she has concerns regarding the close proximity that they maintain in their home.  She denies any symptoms, strictly wants testing due to her working closely with the public.    Cough  Episode onset: 3 days. Pertinent negatives include no chest pain, chills, ear pain, fever, myalgias, rash, sore throat or shortness of breath.     Constitution: Negative for chills and fever.   HENT:  Negative for ear pain, congestion and sore throat.    Neck: Negative for painful lymph nodes.   Cardiovascular:  Negative for chest pain, palpitations and sob on exertion.   Respiratory:  Negative for cough, shortness of breath and stridor.    Gastrointestinal:  Negative for abdominal pain.   Musculoskeletal:  Negative for muscle ache.   Skin:  Negative for rash.   Neurological:  Negative for dizziness, light-headedness, passing out, disorientation and altered mental status.   Hematologic/Lymphatic: Negative for swollen lymph nodes.   Psychiatric/Behavioral:  Negative for altered mental status, disorientation and confusion.      Objective:      Physical Exam   Constitutional: She is oriented to person, place, and time. She appears well-developed. She is cooperative.  Non-toxic appearance. She does not appear ill. No distress.   HENT:   Head: Normocephalic and atraumatic.   Ears:   Right Ear: External ear normal.   Left Ear: External ear normal.   Nose: Nose normal.   Mouth/Throat: Oropharynx is clear and moist and mucous membranes are normal. Mucous membranes are moist. Oropharynx is clear.   Eyes: Conjunctivae " and lids are normal. No scleral icterus.   Neck: Trachea normal and phonation normal. Neck supple.   Cardiovascular: Normal rate, regular rhythm, normal heart sounds and normal pulses.   Pulmonary/Chest: Effort normal and breath sounds normal.   Abdominal: Normal appearance.   Musculoskeletal:         General: No deformity.   Lymphadenopathy:     She has no cervical adenopathy.   Neurological: She is alert and oriented to person, place, and time. She has normal strength and normal reflexes. No sensory deficit.   Skin: Skin is warm, dry, intact and not diaphoretic. Capillary refill takes 2 to 3 seconds.   Psychiatric: Her speech is normal and behavior is normal. Judgment and thought content normal.   Nursing note and vitals reviewed.      Assessment:       1. Encounter for laboratory testing for COVID-19 virus    2. COVID-19 virus not detected          Plan:         Encounter for laboratory testing for COVID-19 virus  -     SARS Coronavirus 2 Antigen, POCT Manual Read    COVID-19 virus not detected          I have discussed the test results and physical exam findings with the patient. We discussed the need to continue to monitor for symptoms and return to clinic or follow up for the development of any new symptoms. She verbalized understanding and agreement.

## 2022-12-14 ENCOUNTER — PATIENT MESSAGE (OUTPATIENT)
Dept: ELECTROPHYSIOLOGY | Facility: CLINIC | Age: 32
End: 2022-12-14
Payer: COMMERCIAL

## 2022-12-14 ENCOUNTER — TELEPHONE (OUTPATIENT)
Dept: ELECTROPHYSIOLOGY | Facility: CLINIC | Age: 32
End: 2022-12-14
Payer: COMMERCIAL

## 2022-12-14 NOTE — TELEPHONE ENCOUNTER
Called pt to discuss clinic visit on 1/3/23. No answer, Left voicemail and sent my [facility] message. Pt apt moved to [facility]. Pt to see both CARMELITA Montalvo and Dr Contreras at apt time as previously scheduled.

## 2022-12-29 NOTE — PROGRESS NOTES
Cardiology Clinic Note  Reason for Visit: History of PE x2    HPI:       Jamilah French is a 32 y.o. female, who presents for follow up regarding her history of PE/DVT.   Mrs. French underwent decompression surgery for Chiari malformation in Dec. 2021. She was found to have her first PE 2 weeks later on 1/2/2022. She was anticoagulated successfully on Eliquis until had to undergo a shunt procedure in May 2022. Again, 2 weeks later she was found to have PE, plus LLE DVT. CTA chest at that time showed concerns for right heart strain. Echo from early June did not show adequate visualization. Repeat CTA chest from 7/25/2022 indicated residual embolus without cardiomegaly or right heart strain. Since then she has established with hematology, and has been recommended to continue life long anticoagulation. Today she is doing well. Is tolerating eliquis 5 mg BID without issue. She reports an aching sensation in her chest in early Dec. Occurred at rest, and lasted 2-3 minutes. She states that around that time she missed one dose of eliquis. She reports SOB when walking long distances in [facility]. She also has complaint of anterior right knee pain x3 days. She was seen in the ER for this issue yesterday. Her symptoms are improving, and inconsistent with DVT. She denies any palpitations, chest pain with activity or claudication symptoms.       Medical: History of PE x2, Chiari malformation type I  Surgical: Reviewed, as below.  Family: Reviewed, as below. No premature CAD, HF, SCD.  Social: Reviewed, as below.    ROS:    Pertinent ROS included in HPI and below.  PMH:     Past Medical History:   Diagnosis Date    Bilateral pulmonary embolism 1/6/2022    Current moderate episode of major depressive disorder without prior episode 6/15/2018    Radiculopathy, cervical region 10/12/2021     Past Surgical History:   Procedure Laterality Date    DECOMPRESSION OF CHIARI MALFORMATION BY REMOVAL OF POSTERIOR ARCH OF FIRST  CERVICAL VERTEBRA N/A 2021    Procedure: DECOMPRESSION, CHIARI MALFORMATION, BY 1ST CERVICAL VERTEBRA POSTERIOR ARCH REMOVAL;  Surgeon: Kirk Reis MD;  Location: [facility] OR [address] [place] [address];  Service: Neurosurgery;  Laterality: N/A;  ASA1  TOR1  T&Cross x 2 units  [place]    HYSTEROSCOPY WITH DILATION AND CURETTAGE OF UTERUS N/A 10/26/2020    Procedure: HYSTEROSCOPY, WITH DILATION AND CURETTAGE OF UTERUS;  Surgeon: Manjula Rivas MD;  Location: [facility];  Service: OB/GYN;  Laterality: N/A;    REVISION OF PROCEDURE INVOLVING SYRINGOPLEURAL SHUNT N/A 2022    Procedure: SYRINGOPLEURAL SHUNT Insertion T4 Laminectomy;  Surgeon: Kirk Reis MD;  Location: [facility] OR [address] [place] [address];  Service: Neurosurgery;  Laterality: N/A;     Allergies:     Review of patient's allergies indicates:   Allergen Reactions    Sulfa (sulfonamide antibiotics) Hives           Medications:     Current Outpatient Medications on File Prior to Visit   Medication Sig Dispense Refill    apixaban (ELIQUIS) 5 mg Tab Take 1 tablet (5 mg total) by mouth 2 (two) times daily. 180 tablet 3    [DISCONTINUED] metoprolol tartrate (LOPRESSOR) 50 MG tablet Take 1 tablet (50 mg total) by mouth 2 (two) times daily. (Patient not taking: Reported on 2022) 60 tablet 11     No current facility-administered medications on file prior to visit.     Social History:     Social History     Tobacco Use    Smoking status: Former     Types: Vaping w/o nicotine     Quit date: 2018     Years since quittin.9    Smokeless tobacco: Never   Substance Use Topics    Alcohol use: Yes     Comment: social      Family History:     Family History   Problem Relation Age of Onset    No Known Problems Father     Hypertension Mother     Kidney disease Maternal Aunt     Mental illness Brother     Breast cancer Neg Hx     Cancer Neg Hx     Colon cancer Neg Hx     Diabetes Neg Hx     Eclampsia Neg Hx     Miscarriages / Stillbirths Neg Hx     Ovarian cancer Neg Hx      labor Neg Hx  "    Stroke Neg Hx     Prostate cancer Neg Hx     Bladder Cancer Neg Hx      Physical Exam:   /77 (BP Location: Left arm, Patient Position: Sitting, BP Method: Large (Automatic))   Pulse 91   Ht 5' 5" (1.651 m)   Wt 131.6 kg (290 lb 2 oz)   SpO2 98%   BMI 48.28 kg/m²      Physical Exam  Vitals and nursing note reviewed.   Constitutional:       Appearance: Normal appearance. She is obese.   HENT:      Head: Normocephalic and atraumatic.   Neck:      Vascular: No carotid bruit or hepatojugular reflux.   Cardiovascular:      Rate and Rhythm: Normal rate and regular rhythm.      Pulses:           Radial pulses are 2+ on the right side and 2+ on the left side.        Dorsalis pedis pulses are 2+ on the right side and 2+ on the left side.        Posterior tibial pulses are 2+ on the right side and 2+ on the left side.      Heart sounds: S1 normal and S2 normal.   Pulmonary:      Effort: Pulmonary effort is normal.      Breath sounds: Normal breath sounds.   Abdominal:      General: Bowel sounds are normal.      Palpations: Abdomen is soft.      Tenderness: There is no abdominal tenderness.   Feet:      Right foot:      Skin integrity: Skin integrity normal.      Left foot:      Skin integrity: Skin integrity normal.   Neurological:      Mental Status: She is alert.   Psychiatric:         Behavior: Behavior is cooperative.        Labs:     Blood Tests:  Lab Results   Component Value Date    BNP <10 05/31/2022     07/25/2022    K 3.8 07/25/2022     07/25/2022    CO2 23 07/25/2022    BUN 11 07/25/2022    CREATININE 0.8 07/25/2022    GLU 79 07/25/2022    HGBA1C 5.4 10/12/2021    MG 2.3 06/02/2022    AST 15 07/25/2022    ALT 12 07/25/2022    ALBUMIN 3.5 07/25/2022    ALBUMIN 3.9 08/21/2020    PROT 7.4 07/25/2022    BILITOT 0.9 07/25/2022    WBC 8.00 07/25/2022    HGB 12.7 07/25/2022    HCT 39.4 07/25/2022    MCV 83 07/25/2022     07/25/2022    INR 1.4 (H) 05/19/2022    TSH 1.605 10/04/2021 "       Lab Results   Component Value Date    CHOL 159 06/15/2018    HDL 38 (L) 06/15/2018    TRIG 79 06/15/2018       Lab Results   Component Value Date    LDLCALC 105.2 06/15/2018         Imaging:     Echocardiogram  2022  The left ventricle is normal in size with normal systolic function.  The estimated ejection fraction is 60%.  Mild tricuspid regurgitation.  Unable to visualize right atrium and right ventricle, clinical concerns persist consider alternative cardiac imaging.    2022  The left ventricle is normal in size with normal systolic function.  The estimated ejection fraction is 60%.  Indeterminate left ventricular diastolic function.  Intermediate central venous pressure (8 mmHg).  The estimated PA systolic pressure is 40 mmHg.    Stress testing  None    Cath Lab  None    Other  CTA Chest 2022  Extensive bilateral pulmonary embolisms with findings of right heart strain.  Clear lungs.    CTA Chest 2022  Contrast enhancement of the pulmonary arteries is optimal.  There appears to be a residual pulmonary embolus in the left upper pulmonary artery.  Other areas of residual embolus are not seen on today's study. Right heart strain is not presently seen.  Cardiomegaly is not noted.    EK2022  Sinus tachycardia   Otherwise normal ECG   When compared with ECG of 2022 17:27,   Incomplete right bundle branch block is no longer Present   T wave inversion no longer evident in Anterior leads    Assessment:     1. Chronic pulmonary embolism without acute cor pulmonale, unspecified pulmonary embolism type    2. BMI 45.0-49.9, adult    3. Atypical chest pain    4. Anticoagulant long-term use        Plan:     Chronic pulmonary embolism without acute cor pulmonale, unspecified pulmonary embolism type  -     Echo; Future    BMI 45.0-49.9, adult    Atypical chest pain    Anticoagulant long-term use    Discussed with patient that we would like her to go ahead and have another echo here at [facility]  [place] to definitively rule out right heart strain. We agree with hematology that Mrs. French is appropriate for long term anticoagulation, and would defer to them regarding appropriate long term dosage. Counseled patient on monitoring herself for signs of inappropriate bleeding and importance of seeking urgent medical attention in the event of a head injury. Reassured that based on risk factors and symptomology, we have low level of concern for chest pain related to cardiac cause. We encourage increasing physical activity as tolerated, with a goal of 30 min of cardio exercise at least 5 days per week. Recommend Mediterranean diet and limited dietary carbohydrates.          This patient was seen in conjunction with Dr. Contreras, who also examined the patient and agrees with the AURELIO's assessment and plan.     Signed:  Mariposa Montalvo PA-C  Cardiology     12/29/2022 3:00 PM    Follow-up:     Future Appointments   Date Time Provider Department Center   1/3/2023 11:00 AM Mariposa Montalvo PA-C [facility] CARDIO [facility]   1/30/2023  9:00 AM LAB, [facility] HOSP [facility] CLINLAB [facility]   2/1/2023  1:00 PM [facility] CC

## 2023-01-02 ENCOUNTER — HOSPITAL ENCOUNTER (EMERGENCY)
Facility: HOSPITAL | Age: 33
Discharge: HOME | End: 2023-01-02
Attending: EMERGENCY MEDICINE
Payer: COMMERCIAL

## 2023-01-02 VITALS
WEIGHT: 280 LBS | RESPIRATION RATE: 16 BRPM | HEART RATE: 81 BPM | SYSTOLIC BLOOD PRESSURE: 146 MMHG | OXYGEN SATURATION: 99 % | DIASTOLIC BLOOD PRESSURE: 71 MMHG | HEIGHT: 65 IN | BODY MASS INDEX: 46.65 KG/M2 | TEMPERATURE: 98 F

## 2023-01-02 DIAGNOSIS — M25.569 KNEE PAIN: ICD-10-CM

## 2023-01-02 DIAGNOSIS — M25.561 ACUTE PAIN OF RIGHT KNEE: Primary | ICD-10-CM

## 2023-01-02 DIAGNOSIS — M25.561 RIGHT KNEE PAIN: ICD-10-CM

## 2023-01-02 LAB — B-HCG UR QL: NEGATIVE

## 2023-01-02 PROCEDURE — 99283 EMERGENCY DEPT VISIT LOW MDM: CPT

## 2023-01-02 PROCEDURE — 81025 URINE PREGNANCY TEST: CPT | Performed by: NURSE PRACTITIONER

## 2023-01-02 RX ORDER — DICLOFENAC SODIUM 50 MG/1
50 TABLET, DELAYED RELEASE ORAL 3 TIMES DAILY PRN
Qty: 15 TABLET | Refills: 0 | Status: SHIPPED | OUTPATIENT
Start: 2023-01-02 | End: 2023-12-15

## 2023-01-03 ENCOUNTER — OFFICE VISIT (OUTPATIENT)
Dept: CARDIOLOGY | Facility: CLINIC | Age: 33
End: 2023-01-03
Payer: COMMERCIAL

## 2023-01-03 VITALS
OXYGEN SATURATION: 98 % | WEIGHT: 290.13 LBS | HEIGHT: 65 IN | BODY MASS INDEX: 48.34 KG/M2 | DIASTOLIC BLOOD PRESSURE: 77 MMHG | HEART RATE: 91 BPM | SYSTOLIC BLOOD PRESSURE: 132 MMHG

## 2023-01-03 DIAGNOSIS — I27.82 CHRONIC PULMONARY EMBOLISM WITHOUT ACUTE COR PULMONALE, UNSPECIFIED PULMONARY EMBOLISM TYPE: Primary | ICD-10-CM

## 2023-01-03 DIAGNOSIS — R07.89 ATYPICAL CHEST PAIN: ICD-10-CM

## 2023-01-03 DIAGNOSIS — Z79.01 ANTICOAGULANT LONG-TERM USE: ICD-10-CM

## 2023-01-03 PROCEDURE — 3078F DIAST BP <80 MM HG: CPT | Mod: CPTII,S$GLB,, | Performed by: PHYSICIAN ASSISTANT

## 2023-01-03 PROCEDURE — 99214 PR OFFICE/OUTPT VISIT, EST, LEVL IV, 30-39 MIN: ICD-10-PCS | Mod: S$GLB,,, | Performed by: PHYSICIAN ASSISTANT

## 2023-01-03 PROCEDURE — 99999 PR PBB SHADOW E&M-EST. PATIENT-LVL III: CPT | Mod: PBBFAC,,, | Performed by: PHYSICIAN ASSISTANT

## 2023-01-03 PROCEDURE — 3075F SYST BP GE 130 - 139MM HG: CPT | Mod: CPTII,S$GLB,, | Performed by: PHYSICIAN ASSISTANT

## 2023-01-03 PROCEDURE — 3008F BODY MASS INDEX DOCD: CPT | Mod: CPTII,S$GLB,, | Performed by: PHYSICIAN ASSISTANT

## 2023-01-03 PROCEDURE — 1159F PR MEDICATION LIST DOCUMENTED IN MEDICAL RECORD: ICD-10-PCS | Mod: CPTII,S$GLB,, | Performed by: PHYSICIAN ASSISTANT

## 2023-01-03 PROCEDURE — 1159F MED LIST DOCD IN RCRD: CPT | Mod: CPTII,S$GLB,, | Performed by: PHYSICIAN ASSISTANT

## 2023-01-03 PROCEDURE — 99999 PR PBB SHADOW E&M-EST. PATIENT-LVL III: ICD-10-PCS | Mod: PBBFAC,,, | Performed by: PHYSICIAN ASSISTANT

## 2023-01-03 PROCEDURE — 3078F PR MOST RECENT DIASTOLIC BLOOD PRESSURE < 80 MM HG: ICD-10-PCS | Mod: CPTII,S$GLB,, | Performed by: PHYSICIAN ASSISTANT

## 2023-01-03 PROCEDURE — 99214 OFFICE O/P EST MOD 30 MIN: CPT | Mod: S$GLB,,, | Performed by: PHYSICIAN ASSISTANT

## 2023-01-03 PROCEDURE — 3008F PR BODY MASS INDEX (BMI) DOCUMENTED: ICD-10-PCS | Mod: CPTII,S$GLB,, | Performed by: PHYSICIAN ASSISTANT

## 2023-01-03 PROCEDURE — 3075F PR MOST RECENT SYSTOLIC BLOOD PRESS GE 130-139MM HG: ICD-10-PCS | Mod: CPTII,S$GLB,, | Performed by: PHYSICIAN ASSISTANT

## 2023-01-03 NOTE — ED PROVIDER NOTES
Encounter Date: 1/2/2023    SCRIBE #1 NOTE: I, Becky Valentino, am scribing for, and in the presence of,  Jl Calvin MD.     History     Chief Complaint   Patient presents with    Knee Pain     Right knee pain started on Saturday     Time seen by provider: 7:18 PM on 01/02/2023    Jamilah French is a 32 y.o. female who presents to the ED with R knee pain that began as mild discomfort on 12/31/2022. The pt states x1 day post discomfort and noted her pain has progressively worsened. The pt denies taking any medications to alleviate pain. The pt states she has been resting her knee. The patient denies any other symptoms at this time. PMHx of bilateral PE. PSHx of hysterectomy with d/c.    The history is provided by the patient.   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 FIRST CERVICAL VERTEBRA N/A 12/23/2021    Procedure: DECOMPRESSION, CHIARI MALFORMATION, BY 1ST CERVICAL VERTEBRA POSTERIOR ARCH REMOVAL;  Surgeon: Kirk Reis MD;  Location: [address];  Service: Neurosurgery;  Laterality: N/A;  ASA1  TOR1  T&Cross x 2 units  [place]    HYSTEROSCOPY WITH DILATION AND CURETTAGE OF UTERUS N/A 10/26/2020    Procedure: HYSTEROSCOPY, WITH DILATION AND CURETTAGE OF UTERUS;  Surgeon: Manjula Rivas MD;  Location: [facility];  Service: OB/GYN;  Laterality: N/A;    REVISION OF PROCEDURE INVOLVING SYRINGOPLEURAL SHUNT N/A 5/19/2022    Procedure: SYRINGOPLEURAL SHUNT Insertion T4 Laminectomy;  Surgeon: Kirk Reis MD;  Location: [address];  Service: Neurosurgery;  Laterality: N/A;     Family  History   Problem Relation Age of Onset    No Known Problems Father     Hypertension Mother     Kidney disease Maternal Aunt     Mental illness Brother     Breast cancer Neg Hx     Cancer Neg Hx     Colon cancer Neg Hx     Diabetes Neg Hx     Eclampsia Neg Hx     Miscarriages / Stillbirths Neg Hx     Ovarian cancer Neg Hx      labor Neg Hx     Stroke Neg Hx     Prostate cancer Neg Hx     Bladder Cancer Neg Hx      Social History     Tobacco Use    Smoking status: Former     Types: Vaping w/o nicotine     Quit date:      Years since quittin.0    Smokeless tobacco: Never   Substance Use Topics    Alcohol use: Yes     Comment: social     Drug use: No     Review of Systems   Constitutional:  Negative for fever.   Respiratory:  Negative for shortness of breath.    Genitourinary:  Negative for flank pain.   Musculoskeletal:  Positive for arthralgias (Right knee). Negative for gait problem.   Neurological:  Negative for weakness.   Psychiatric/Behavioral:  Negative for confusion.      Physical Exam     Initial Vitals [23 1858]   BP Pulse Resp Temp SpO2   133/62 86 18 98.2 °F (36.8 °C) 97 %      MAP       --         Physical Exam    Nursing note and vitals reviewed.  Constitutional: She appears well-developed and well-nourished. She is not diaphoretic. No distress.   HENT:   Head: Normocephalic and atraumatic.   Cardiovascular:  Intact distal pulses.           Musculoskeletal:         General: Tenderness present. Normal range of motion.     Neurological: She is alert and oriented to person, place, and time. She has normal strength. No sensory deficit.   Skin: Skin is warm and dry. No rash and no abscess noted. No erythema.   Psychiatric: She has a normal mood and affect.       ED Course   Procedures  Labs Reviewed   PREGNANCY TEST, URINE RAPID    Narrative:     Specimen Source->Urine          Imaging Results              X-Ray Knee 3 View Right (Final result)  Result time 23 19:27:10      Final  result by Sachin Polanco DO (01/02/23 19:27:10)                   Impression:      No acute osseous abnormality.      Electronically signed by: Sachin Polanco  Date:    01/02/2023  Time:    19:27               Narrative:    EXAMINATION:  XR KNEE 3 VIEW RIGHT    CLINICAL HISTORY:  Pain in right knee    TECHNIQUE:  AP, lateral, and Merchant views of the right knee were performed.    COMPARISON:  None    FINDINGS:  There is no acute fracture or dislocation. Alignment is normal. Joint spaces are preserved. No joint effusion.                                       Medications - No data to display  Medical Decision Making:   History:   Old Medical Records: I decided to obtain old medical records.  Initial Assessment:   32-year-old female presented with knee pain.  Differential Diagnosis:   Initial differential diagnosis included but not limited to fracture, dislocation, and ligamentous injury.  Clinical Tests:   Radiological Study: Ordered and Reviewed  ED Management:  The patient was emergently evaluated in the emergency department, her evaluation was significant for a well-appearing female with a benign exam.  The patient's x-ray shows no acute bony abnormalities.  The patient likely has musculoskeletal pain versus a ligamentous pain causing her symptoms.  She is stable for discharge to home.  I have considered narcotic pain medications in this patient, but have ruled against them.  She will instead be discharged home on p.o. diclofenac.  She is referred to primary care for follow-up.        Scribe Attestation:   Scribe #1: I performed the above scribed service and the documentation accurately describes the services I performed. I attest to the accuracy of the note.               I, Dr. Jl Calvin, personally performed the services described in this documentation. All medical record entries made by the scribe were at my direction and in my presence.  I have reviewed the chart and agree that the record reflects my  personal performance and is accurate and complete. Jl Calvin MD.  10:54 PM 01/02/2023      Clinical Impression:   Final diagnoses:  [M25.561] Right knee pain  [M25.569] Knee pain  [M25.561] Acute pain of right knee (Primary)        ED Disposition Condition    Discharge Stable          ED Prescriptions       Medication Sig Dispense Start Date End Date Auth. Provider    diclofenac (VOLTAREN) 50 MG EC tablet Take 1 tablet (50 mg total) by mouth 3 (three) times daily as needed (pain). 15 tablet 1/2/2023 -- Jl Calvin MD          Follow-up Information       Follow up With Specialties Details Why Contact Info    Hanna Edouard NP Family Medicine Schedule an appointment as soon as possible for a visit   [address] [place] [address]  [phone]               Jl Calvin MD  01/02/23 6953

## 2023-01-03 NOTE — FIRST PROVIDER EVALUATION
Emergency Department TeleTriage Encounter Note      CHIEF COMPLAINT    Chief Complaint   Patient presents with    Knee Pain     Right knee pain started on Saturday       VITAL SIGNS   Initial Vitals [01/02/23 1858]   BP Pulse Resp Temp SpO2   133/62 86 18 98.2 °F (36.8 °C) 97 %      MAP       --            ALLERGIES    Review of patient's allergies indicates:   Allergen Reactions    Sulfa (sulfonamide antibiotics) Hives             PROVIDER TRIAGE NOTE  This is a teletriage evaluation of a 32 y.o. female presenting to the ED with c/o right knee pain for 3 days. Limited physical exam via telehealth: The patient is awake, alert, answering questions appropriately and is not in respiratory distress. Initial orders will be placed and care will be transferred to an alternate provider when patient is roomed for a full evaluation. Any additional orders and the final disposition will be determined by that provider.         ORDERS  Labs Reviewed   PREGNANCY TEST, URINE RAPID       ED Orders (720h ago, onward)      Start Ordered     Status Ordering Provider    01/02/23 1907 01/02/23 1906  X-Ray Knee 3 View Right  1 time imaging         Ordered KORY VIDAL    01/02/23 1906 01/02/23 1906  Pregnancy, urine rapid  Once         Ordered KORY VIDAL              Virtual Visit Note: The provider triage portion of this emergency department evaluation and documentation was performed via [employer], a HIPAA-compliant telemedicine application, in concert with a tele-presenter in the room. A face to face patient evaluation with one of my colleagues will occur once the patient is placed in an emergency department room.      DISCLAIMER: This note was prepared with [employer] voice recognition transcription software. Garbled syntax, mangled pronouns, and other bizarre constructions may be attributed to that software system.

## 2023-01-27 ENCOUNTER — PATIENT MESSAGE (OUTPATIENT)
Dept: FAMILY MEDICINE | Facility: CLINIC | Age: 33
End: 2023-01-27
Payer: COMMERCIAL

## 2023-01-30 ENCOUNTER — LAB VISIT (OUTPATIENT)
Dept: LAB | Facility: HOSPITAL | Age: 33
End: 2023-01-30
Attending: INTERNAL MEDICINE
Payer: COMMERCIAL

## 2023-01-30 DIAGNOSIS — I26.99 OTHER ACUTE PULMONARY EMBOLISM WITHOUT ACUTE COR PULMONALE: ICD-10-CM

## 2023-01-30 LAB
ALBUMIN SERPL BCP-MCNC: 3.4 G/DL (ref 3.5–5.2)
ALP SERPL-CCNC: 70 U/L (ref 55–135)
ALT SERPL W/O P-5'-P-CCNC: 10 U/L (ref 10–44)
ANION GAP SERPL CALC-SCNC: 8 MMOL/L (ref 8–16)
AST SERPL-CCNC: 13 U/L (ref 10–40)
BASOPHILS # BLD AUTO: 0.06 K/UL (ref 0–0.2)
BASOPHILS NFR BLD: 0.8 % (ref 0–1.9)
BILIRUB SERPL-MCNC: 0.8 MG/DL (ref 0.1–1)
BUN SERPL-MCNC: 11 MG/DL (ref 6–20)
CALCIUM SERPL-MCNC: 9 MG/DL (ref 8.7–10.5)
CHLORIDE SERPL-SCNC: 106 MMOL/L (ref 95–110)
CO2 SERPL-SCNC: 25 MMOL/L (ref 23–29)
CREAT SERPL-MCNC: 0.8 MG/DL (ref 0.5–1.4)
DIFFERENTIAL METHOD: ABNORMAL
EOSINOPHIL # BLD AUTO: 0.2 K/UL (ref 0–0.5)
EOSINOPHIL NFR BLD: 3 % (ref 0–8)
ERYTHROCYTE [DISTWIDTH] IN BLOOD BY AUTOMATED COUNT: 13.6 % (ref 11.5–14.5)
EST. GFR  (NO RACE VARIABLE): >60 ML/MIN/1.73 M^2
GLUCOSE SERPL-MCNC: 84 MG/DL (ref 70–110)
HCT VFR BLD AUTO: 39.6 % (ref 37–48.5)
HGB BLD-MCNC: 12.4 G/DL (ref 12–16)
IMM GRANULOCYTES # BLD AUTO: 0.01 K/UL (ref 0–0.04)
IMM GRANULOCYTES NFR BLD AUTO: 0.1 % (ref 0–0.5)
LYMPHOCYTES # BLD AUTO: 2.6 K/UL (ref 1–4.8)
LYMPHOCYTES NFR BLD: 33.9 % (ref 18–48)
MCH RBC QN AUTO: 25.9 PG (ref 27–31)
MCHC RBC AUTO-ENTMCNC: 31.3 G/DL (ref 32–36)
MCV RBC AUTO: 83 FL (ref 82–98)
MONOCYTES # BLD AUTO: 0.5 K/UL (ref 0.3–1)
MONOCYTES NFR BLD: 6.6 % (ref 4–15)
NEUTROPHILS # BLD AUTO: 4.3 K/UL (ref 1.8–7.7)
NEUTROPHILS NFR BLD: 55.6 % (ref 38–73)
NRBC BLD-RTO: 0 /100 WBC
PLATELET # BLD AUTO: 363 K/UL (ref 150–450)
PMV BLD AUTO: 9.9 FL (ref 9.2–12.9)
POTASSIUM SERPL-SCNC: 3.6 MMOL/L (ref 3.5–5.1)
PROT SERPL-MCNC: 7 G/DL (ref 6–8.4)
RBC # BLD AUTO: 4.78 M/UL (ref 4–5.4)
SODIUM SERPL-SCNC: 139 MMOL/L (ref 136–145)
WBC # BLD AUTO: 7.71 K/UL (ref 3.9–12.7)

## 2023-01-30 PROCEDURE — 85025 COMPLETE CBC W/AUTO DIFF WBC: CPT | Performed by: INTERNAL MEDICINE

## 2023-01-30 PROCEDURE — 80053 COMPREHEN METABOLIC PANEL: CPT | Performed by: INTERNAL MEDICINE

## 2023-01-30 PROCEDURE — 36415 COLL VENOUS BLD VENIPUNCTURE: CPT | Performed by: INTERNAL MEDICINE

## 2023-02-01 ENCOUNTER — OFFICE VISIT (OUTPATIENT)
Dept: HEMATOLOGY/ONCOLOGY | Facility: CLINIC | Age: 33
End: 2023-02-01
Payer: COMMERCIAL

## 2023-02-01 ENCOUNTER — HOSPITAL ENCOUNTER (OUTPATIENT)
Dept: CARDIOLOGY | Facility: HOSPITAL | Age: 33
Discharge: HOME | End: 2023-02-01
Attending: PHYSICIAN ASSISTANT
Payer: COMMERCIAL

## 2023-02-01 VITALS
BODY MASS INDEX: 48.32 KG/M2 | SYSTOLIC BLOOD PRESSURE: 130 MMHG | HEIGHT: 65 IN | DIASTOLIC BLOOD PRESSURE: 80 MMHG | HEART RATE: 60 BPM | WEIGHT: 290 LBS

## 2023-02-01 VITALS
HEIGHT: 65 IN | SYSTOLIC BLOOD PRESSURE: 132 MMHG | RESPIRATION RATE: 12 BRPM | TEMPERATURE: 98 F | DIASTOLIC BLOOD PRESSURE: 82 MMHG | HEART RATE: 86 BPM | BODY MASS INDEX: 48.45 KG/M2 | OXYGEN SATURATION: 100 % | WEIGHT: 290.81 LBS

## 2023-02-01 DIAGNOSIS — I26.99 OTHER ACUTE PULMONARY EMBOLISM WITHOUT ACUTE COR PULMONALE: Primary | ICD-10-CM

## 2023-02-01 DIAGNOSIS — I27.82 CHRONIC PULMONARY EMBOLISM WITHOUT ACUTE COR PULMONALE, UNSPECIFIED PULMONARY EMBOLISM TYPE: ICD-10-CM

## 2023-02-01 LAB
ASCENDING AORTA: 2.32 CM
AV INDEX (PROSTH): 0.76
AV MEAN GRADIENT: 3 MMHG
AV PEAK GRADIENT: 5 MMHG
AV VALVE AREA: 2.77 CM2
AV VELOCITY RATIO: 0.68
BSA FOR ECHO PROCEDURE: 2.46 M2
CV ECHO LV RWT: 0.26 CM
DOP CALC AO PEAK VEL: 1.16 M/S
DOP CALC AO VTI: 22.73 CM
DOP CALC LVOT AREA: 3.7 CM2
DOP CALC LVOT DIAMETER: 2.16 CM
DOP CALC LVOT PEAK VEL: 0.79 M/S
DOP CALC LVOT STROKE VOLUME: 62.99 CM3
DOP CALCLVOT PEAK VEL VTI: 17.2 CM
E WAVE DECELERATION TIME: 242.27 MSEC
E/A RATIO: 1.94
E/E' RATIO: 8.86 M/S
ECHO LV POSTERIOR WALL: 0.81 CM (ref 0.6–1.1)
EJECTION FRACTION: 58 %
FRACTIONAL SHORTENING: 26 % (ref 28–44)
INTERVENTRICULAR SEPTUM: 0.84 CM (ref 0.6–1.1)
LA MAJOR: 4.11 CM
LA MINOR: 3.96 CM
LA WIDTH: 3.37 CM
LEFT ATRIUM SIZE: 3.56 CM
LEFT ATRIUM VOLUME INDEX MOD: 12 ML/M2
LEFT ATRIUM VOLUME INDEX: 17.7 ML/M2
LEFT ATRIUM VOLUME MOD: 27.73 CM3
LEFT ATRIUM VOLUME: 41.13 CM3
LEFT INTERNAL DIMENSION IN SYSTOLE: 4.65 CM (ref 2.1–4)
LEFT VENTRICLE DIASTOLIC VOLUME INDEX: 85.35 ML/M2
LEFT VENTRICLE DIASTOLIC VOLUME: 198.01 ML
LEFT VENTRICLE MASS INDEX: 90 G/M2
LEFT VENTRICLE SYSTOLIC VOLUME INDEX: 43 ML/M2
LEFT VENTRICLE SYSTOLIC VOLUME: 99.69 ML
LEFT VENTRICULAR INTERNAL DIMENSION IN DIASTOLE: 6.26 CM (ref 3.5–6)
LEFT VENTRICULAR MASS: 208.27 G
LV LATERAL E/E' RATIO: 7.75 M/S
LV SEPTAL E/E' RATIO: 10.33 M/S
MV A" WAVE DURATION": 9.71 MSEC
MV PEAK A VEL: 0.48 M/S
MV PEAK E VEL: 0.93 M/S
MV STENOSIS PRESSURE HALF TIME: 70.26 MS
MV VALVE AREA P 1/2 METHOD: 3.13 CM2
PULM VEIN S/D RATIO: 1.52
PV PEAK D VEL: 0.21 M/S
PV PEAK S VEL: 0.32 M/S
RA MAJOR: 3.95 CM
RA PRESSURE: 8 MMHG
RA WIDTH: 4.18 CM
RIGHT VENTRICULAR END-DIASTOLIC DIMENSION: 3.71 CM
RV TISSUE DOPPLER FREE WALL SYSTOLIC VELOCITY 1 (APICAL 4 CHAMBER VIEW): 9.77 CM/S
SINUS: 2.6 CM
STJ: 2.18 CM
TDI LATERAL: 0.12 M/S
TDI SEPTAL: 0.09 M/S
TDI: 0.11 M/S
TRICUSPID ANNULAR PLANE SYSTOLIC EXCURSION: 1.68 CM

## 2023-02-01 PROCEDURE — 99999 PR PBB SHADOW E&M-EST. PATIENT-LVL IV: ICD-10-PCS | Mod: PBBFAC,,, | Performed by: NURSE PRACTITIONER

## 2023-02-01 PROCEDURE — 3075F SYST BP GE 130 - 139MM HG: CPT | Mod: CPTII,S$GLB,, | Performed by: NURSE PRACTITIONER

## 2023-02-01 PROCEDURE — 99213 OFFICE O/P EST LOW 20 MIN: CPT | Mod: S$GLB,,, | Performed by: NURSE PRACTITIONER

## 2023-02-01 PROCEDURE — 99213 PR OFFICE/OUTPT VISIT, EST, LEVL III, 20-29 MIN: ICD-10-PCS | Mod: S$GLB,,, | Performed by: NURSE PRACTITIONER

## 2023-02-01 PROCEDURE — 3008F BODY MASS INDEX DOCD: CPT | Mod: CPTII,S$GLB,, | Performed by: NURSE PRACTITIONER

## 2023-02-01 PROCEDURE — 3079F PR MOST RECENT DIASTOLIC BLOOD PRESSURE 80-89 MM HG: ICD-10-PCS | Mod: CPTII,S$GLB,, | Performed by: NURSE PRACTITIONER

## 2023-02-01 PROCEDURE — 99999 PR PBB SHADOW E&M-EST. PATIENT-LVL IV: CPT | Mod: PBBFAC,,, | Performed by: NURSE PRACTITIONER

## 2023-02-01 PROCEDURE — 3079F DIAST BP 80-89 MM HG: CPT | Mod: CPTII,S$GLB,, | Performed by: NURSE PRACTITIONER

## 2023-02-01 PROCEDURE — 1160F PR REVIEW ALL MEDS BY PRESCRIBER/CLIN PHARMACIST DOCUMENTED: ICD-10-PCS | Mod: CPTII,S$GLB,, | Performed by: NURSE PRACTITIONER

## 2023-02-01 PROCEDURE — C8929 TTE W OR WO FOL WCON,DOPPLER: HCPCS

## 2023-02-01 PROCEDURE — 93306 TTE W/DOPPLER COMPLETE: CPT | Mod: 26,,, | Performed by: INTERNAL MEDICINE

## 2023-02-01 PROCEDURE — 1159F MED LIST DOCD IN RCRD: CPT | Mod: CPTII,S$GLB,, | Performed by: NURSE PRACTITIONER

## 2023-02-01 PROCEDURE — 93306 ECHO (CUPID ONLY): ICD-10-PCS | Mod: 26,,, | Performed by: INTERNAL MEDICINE

## 2023-02-01 PROCEDURE — 1159F PR MEDICATION LIST DOCUMENTED IN MEDICAL RECORD: ICD-10-PCS | Mod: CPTII,S$GLB,, | Performed by: NURSE PRACTITIONER

## 2023-02-01 PROCEDURE — 1160F RVW MEDS BY RX/DR IN RCRD: CPT | Mod: CPTII,S$GLB,, | Performed by: NURSE PRACTITIONER

## 2023-02-01 PROCEDURE — 3008F PR BODY MASS INDEX (BMI) DOCUMENTED: ICD-10-PCS | Mod: CPTII,S$GLB,, | Performed by: NURSE PRACTITIONER

## 2023-02-01 PROCEDURE — 3075F PR MOST RECENT SYSTOLIC BLOOD PRESS GE 130-139MM HG: ICD-10-PCS | Mod: CPTII,S$GLB,, | Performed by: NURSE PRACTITIONER

## 2023-02-01 NOTE — PROGRESS NOTES
HPI    32 years old female obese referred to Hematology Clinic for evaluation of pulmonary embolism.    Initially patient provoked pulmonary embolism after spine surgery in 2022. Patient states that she had spine surgery in December in January she was found to have bilateral pulmonary embolism with right heart strains.  At the time DVT of lower extremities negative.  Patient was treated with Lovenox transition to Eliquis.  In May of 2022 patient return to clinic for a shunt procedure which anticoagulation was stopped for that.  After completion of procedure patient start on Lovenox transition to Eliquis without loading dosage.  Time of anticoagulation interruption proximally about 2 and half weeks.  On  patient experienced chest pain return to ED which found to have lower extremity DVT and ongoing pulmonary embolism.  Patient was again treated with Lovenox started on Eliquis loading.  At the time of current clinical visit patient is still on Eliquis loading dosage 10 mg b.i.d..    She denies any history of blood clots.  She denies any family history of blood clots.  Patient denies any smoking or estrogen products use.    2023:  Returns today for follow-up.  She is feeling well with the exception of mild nasal congestion.  She is tolerating Eliquis well with no side effects      Past Medical History:   Diagnosis Date    Bilateral pulmonary embolism 2022    Current moderate episode of major depressive disorder without prior episode 6/15/2018    Radiculopathy, cervical region 10/12/2021     Social History     Socioeconomic History    Marital status:      Spouse name: Neil    Number of children: 0   Tobacco Use    Smoking status: Former     Types: Vaping w/o nicotine     Quit date: 2018     Years since quittin.0    Smokeless tobacco: Never   Substance and Sexual Activity    Alcohol use: Yes     Comment: social     Drug use: No    Sexual activity: Yes     Partners: Male     Birth  control/protection: None     Social Determinants of Health     Financial Resource Strain: Low Risk     Difficulty of Paying Living Expenses: Not very hard   Food Insecurity: Food Insecurity Present    Worried About Running Out of Food in the Last Year: Sometimes true    Ran Out of Food in the Last Year: Sometimes true   Transportation Needs: No Transportation Needs    Lack of Transportation (Medical): No    Lack of Transportation (Non-Medical): No   Physical Activity: Insufficiently Active    Days of Exercise per Week: 2 days    Minutes of Exercise per Session: 30 min   Stress: No Stress Concern Present    Feeling of Stress : Only a little   Social Connections: Unknown    Frequency of Communication with Friends and Family: Twice a week    Frequency of Social Gatherings with Friends and Family: Twice a week    Active Member of Clubs or Organizations: Yes    Attends Club or Organization Meetings: More than 4 times per year    Marital Status:    Housing Stability: Low Risk     Unable to Pay for Housing in the Last Year: No    Number of Places Lived in the Last Year: 1    Unstable Housing in the Last Year: No         Subjective      Review of Systems   Constitutional: Negative for appetite change, fatigue and unexpected weight change.   HENT: Negative for mouth sores.   Eyes: Negative for visual disturbance.   Respiratory: Negative for cough and shortness of breath.   Cardiovascular: Negative for chest pain.   Gastrointestinal: Negative for diarrhea.   Genitourinary: Negative for frequency.   Musculoskeletal: Negative for back pain.   Skin: Negative for rash.   Neurological: Negative for headaches.   Hematological: Negative for adenopathy.   Psychiatric/Behavioral: The patient is not nervous/anxious.   All other systems reviewed and are negative.     Objective    Physical Exam     There were no vitals filed for this visit.    Awake alert no acute distress  Normocephalic atraumatic  Pupils equal round  reactive  Clear to auscultate  RRR  Soft nontender nondistended  Distal pulses intact  No rash no lesions  CMP  Sodium   Date Value Ref Range Status   01/30/2023 139 136 - 145 mmol/L Final     Potassium   Date Value Ref Range Status   01/30/2023 3.6 3.5 - 5.1 mmol/L Final     Chloride   Date Value Ref Range Status   01/30/2023 106 95 - 110 mmol/L Final     CO2   Date Value Ref Range Status   01/30/2023 25 23 - 29 mmol/L Final     Glucose   Date Value Ref Range Status   01/30/2023 84 70 - 110 mg/dL Final     BUN   Date Value Ref Range Status   01/30/2023 11 6 - 20 mg/dL Final     Creatinine   Date Value Ref Range Status   01/30/2023 0.8 0.5 - 1.4 mg/dL Final     Calcium   Date Value Ref Range Status   01/30/2023 9.0 8.7 - 10.5 mg/dL Final     Total Protein   Date Value Ref Range Status   01/30/2023 7.0 6.0 - 8.4 g/dL Final     Albumin   Date Value Ref Range Status   01/30/2023 3.4 (L) 3.5 - 5.2 g/dL Final   08/21/2020 3.9 3.6 - 5.1 g/dL Final     Comment:     For additional information, please refer to   http://education.[employer]/faq/GNM462 (This link is   being provided for informational/ educational purposes only.)  This test was developed and its analytical performance   characteristics have been determined by [facility]. It has not been cleared or approved by the   [place] Food and Drug Administration. This assay has been validated   pursuant to the CLIA regulations and is used for clinical   purposes.  @ Test Performed By:  [facility]  Catrachito Archer M.D., Ph.D.,   [address] [place] [id number]  [place]  [id number]       Total Bilirubin   Date Value Ref Range Status   01/30/2023 0.8 0.1 - 1.0 mg/dL Final     Comment:     For infants and newborns, interpretation of results should be based  on gestational age, weight and in agreement with clinical  observations.    Premature Infant recommended reference ranges:  Up  to 24 hours.............<8.0 mg/dL  Up to 48 hours............<12.0 mg/dL  3-5 days..................<15.0 mg/dL  6-29 days.................<15.0 mg/dL       Alkaline Phosphatase   Date Value Ref Range Status   01/30/2023 70 55 - 135 U/L Final     AST   Date Value Ref Range Status   01/30/2023 13 10 - 40 U/L Final     ALT   Date Value Ref Range Status   01/30/2023 10 10 - 44 U/L Final     Anion Gap   Date Value Ref Range Status   01/30/2023 8 8 - 16 mmol/L Final     eGFR if    Date Value Ref Range Status   07/25/2022 >60 >60 mL/min/1.73 m^2 Final     eGFR if non    Date Value Ref Range Status   07/25/2022 >60 >60 mL/min/1.73 m^2 Final     Comment:     Calculation used to obtain the estimated glomerular filtration  rate (eGFR) is the CKD-EPI equation.        Ultrasound lower extremity  06/02/2022  Thrombus within para left posterior tibial vein.  Compared to jan 22 new finding.    05/31/2022 CTA  Bilateral occlusive and nonocclusive pulmonary embolism with right heart strain    01/05/2022 ultrasound lower extremity bilateral  Negative for DVT on study    Assessment    Recurrence relapse extensive pulmonary embolism now involving lower extremity:  -Initial events thrombus pulmonary embolism without lower extremity DVT diagnosed January 2, 2022 treated with Lovenox transition to Eliquis at the time.  Due to need of spine surgery and shunt procedure anticoagulation was interrupted for 2 and half weeks and will resumed with Lovenox transition to Eliquis 5 mg p.o. b.i.d..  On January 2nd patient experienced chest pain shortness breath which prompted her to go to ED found to have pulmonary embolism in addition lower extremity DVT.  She was treated with Lovenox and was transition to Eliquis 10 mg p.o. b.i.d..  -expect to lifelong coagulation  -continue Eliquis 5 mg p.o. b.i.d.  -return to clinic in 6 months with labs

## 2023-04-04 ENCOUNTER — PATIENT MESSAGE (OUTPATIENT)
Dept: RESEARCH | Facility: HOSPITAL | Age: 33
End: 2023-04-04
Payer: COMMERCIAL

## 2023-04-11 ENCOUNTER — PATIENT MESSAGE (OUTPATIENT)
Dept: RESEARCH | Facility: HOSPITAL | Age: 33
End: 2023-04-11
Payer: COMMERCIAL

## 2023-05-16 ENCOUNTER — PATIENT MESSAGE (OUTPATIENT)
Dept: NEUROSURGERY | Facility: CLINIC | Age: 33
End: 2023-05-16
Payer: COMMERCIAL

## 2023-07-28 ENCOUNTER — LAB VISIT (OUTPATIENT)
Dept: LAB | Facility: HOSPITAL | Age: 33
End: 2023-07-28
Attending: INTERNAL MEDICINE
Payer: COMMERCIAL

## 2023-07-28 DIAGNOSIS — I26.99 OTHER ACUTE PULMONARY EMBOLISM WITHOUT ACUTE COR PULMONALE: ICD-10-CM

## 2023-07-28 LAB
ALBUMIN SERPL BCP-MCNC: 3.4 G/DL (ref 3.5–5.2)
ALP SERPL-CCNC: 72 U/L (ref 55–135)
ALT SERPL W/O P-5'-P-CCNC: 11 U/L (ref 10–44)
ANION GAP SERPL CALC-SCNC: 8 MMOL/L (ref 8–16)
AST SERPL-CCNC: 13 U/L (ref 10–40)
BASOPHILS # BLD AUTO: 0.07 K/UL (ref 0–0.2)
BASOPHILS NFR BLD: 0.7 % (ref 0–1.9)
BILIRUB SERPL-MCNC: 0.5 MG/DL (ref 0.1–1)
BUN SERPL-MCNC: 9 MG/DL (ref 6–20)
CALCIUM SERPL-MCNC: 9.1 MG/DL (ref 8.7–10.5)
CHLORIDE SERPL-SCNC: 106 MMOL/L (ref 95–110)
CO2 SERPL-SCNC: 24 MMOL/L (ref 23–29)
CREAT SERPL-MCNC: 0.8 MG/DL (ref 0.5–1.4)
DIFFERENTIAL METHOD: ABNORMAL
EOSINOPHIL # BLD AUTO: 0.5 K/UL (ref 0–0.5)
EOSINOPHIL NFR BLD: 4.7 % (ref 0–8)
ERYTHROCYTE [DISTWIDTH] IN BLOOD BY AUTOMATED COUNT: 14.4 % (ref 11.5–14.5)
EST. GFR  (NO RACE VARIABLE): >60 ML/MIN/1.73 M^2
GLUCOSE SERPL-MCNC: 84 MG/DL (ref 70–110)
HCT VFR BLD AUTO: 38.9 % (ref 37–48.5)
HGB BLD-MCNC: 11.7 G/DL (ref 12–16)
IMM GRANULOCYTES # BLD AUTO: 0.02 K/UL (ref 0–0.04)
IMM GRANULOCYTES NFR BLD AUTO: 0.2 % (ref 0–0.5)
LYMPHOCYTES # BLD AUTO: 2.5 K/UL (ref 1–4.8)
LYMPHOCYTES NFR BLD: 25.4 % (ref 18–48)
MCH RBC QN AUTO: 24.5 PG (ref 27–31)
MCHC RBC AUTO-ENTMCNC: 30.1 G/DL (ref 32–36)
MCV RBC AUTO: 82 FL (ref 82–98)
MONOCYTES # BLD AUTO: 0.6 K/UL (ref 0.3–1)
MONOCYTES NFR BLD: 5.9 % (ref 4–15)
NEUTROPHILS # BLD AUTO: 6.1 K/UL (ref 1.8–7.7)
NEUTROPHILS NFR BLD: 63.1 % (ref 38–73)
NRBC BLD-RTO: 0 /100 WBC
PLATELET # BLD AUTO: 430 K/UL (ref 150–450)
PMV BLD AUTO: 9.4 FL (ref 9.2–12.9)
POTASSIUM SERPL-SCNC: 4.1 MMOL/L (ref 3.5–5.1)
PROT SERPL-MCNC: 7.2 G/DL (ref 6–8.4)
RBC # BLD AUTO: 4.77 M/UL (ref 4–5.4)
SODIUM SERPL-SCNC: 138 MMOL/L (ref 136–145)
WBC # BLD AUTO: 9.73 K/UL (ref 3.9–12.7)

## 2023-07-28 PROCEDURE — 85025 COMPLETE CBC W/AUTO DIFF WBC: CPT | Performed by: NURSE PRACTITIONER

## 2023-07-28 PROCEDURE — 36415 COLL VENOUS BLD VENIPUNCTURE: CPT | Performed by: NURSE PRACTITIONER

## 2023-07-28 PROCEDURE — 80053 COMPREHEN METABOLIC PANEL: CPT | Performed by: NURSE PRACTITIONER

## 2023-08-02 DIAGNOSIS — E66.01 MORBID OBESITY: ICD-10-CM

## 2023-08-02 DIAGNOSIS — I26.99 OTHER ACUTE PULMONARY EMBOLISM WITHOUT ACUTE COR PULMONALE: Primary | ICD-10-CM

## 2023-09-05 ENCOUNTER — TELEPHONE (OUTPATIENT)
Dept: NEUROSURGERY | Facility: CLINIC | Age: 33
End: 2023-09-05
Payer: COMMERCIAL

## 2023-09-05 NOTE — TELEPHONE ENCOUNTER
Returned call. Again asking if Dr Reis can review MRI. Confirmed that EEG showed no brain activity. Then explained that MRI review is useless. He understood.    ----- Message from Pamela Miller sent at 9/5/2023 11:19 AM CDT -----  Regarding: Call back requested  Contact: [phone]  Hi pt called to request a call back from the office. Pls call the pt's  at [phone] to spk with the pt's .

## 2023-11-22 ENCOUNTER — TELEPHONE (OUTPATIENT)
Dept: HEMATOLOGY/ONCOLOGY | Facility: CLINIC | Age: 33
End: 2023-11-22
Payer: COMMERCIAL

## 2023-11-27 ENCOUNTER — TELEPHONE (OUTPATIENT)
Dept: HEMATOLOGY/ONCOLOGY | Facility: CLINIC | Age: 33
End: 2023-11-27
Payer: COMMERCIAL

## 2023-11-28 ENCOUNTER — OCCUPATIONAL HEALTH (OUTPATIENT)
Dept: URGENT CARE | Facility: CLINIC | Age: 33
End: 2023-11-28

## 2023-11-28 ENCOUNTER — IMMUNIZATION (OUTPATIENT)
Dept: URGENT CARE | Facility: CLINIC | Age: 33
End: 2023-11-28
Payer: COMMERCIAL

## 2023-11-28 DIAGNOSIS — Z23 NEED FOR IMMUNIZATION AGAINST INFLUENZA: Primary | ICD-10-CM

## 2023-11-28 DIAGNOSIS — Z23 NEED FOR PROPHYLACTIC VACCINATION AND INOCULATION AGAINST INFLUENZA: ICD-10-CM

## 2023-11-28 DIAGNOSIS — Z13.9 ENCOUNTER FOR SCREENING: Primary | ICD-10-CM

## 2023-11-28 PROCEDURE — 86580 TB INTRADERMAL TEST: CPT | Mod: S$GLB,,, | Performed by: NURSE PRACTITIONER

## 2023-11-28 PROCEDURE — 86580 POCT TB SKIN TEST: ICD-10-PCS | Mod: S$GLB,,, | Performed by: NURSE PRACTITIONER

## 2023-11-28 PROCEDURE — 90686 IIV4 VACC NO PRSV 0.5 ML IM: CPT | Mod: S$GLB,,, | Performed by: EMERGENCY MEDICINE

## 2023-11-28 PROCEDURE — 90686 PR FLU VACCINE, QIIV4, NO PRSV, 0.5 ML, IM: ICD-10-PCS | Mod: S$GLB,,, | Performed by: EMERGENCY MEDICINE

## 2023-12-11 ENCOUNTER — TELEPHONE (OUTPATIENT)
Dept: OBSTETRICS AND GYNECOLOGY | Facility: CLINIC | Age: 33
End: 2023-12-11
Payer: COMMERCIAL

## 2023-12-12 ENCOUNTER — LAB VISIT (OUTPATIENT)
Dept: LAB | Facility: HOSPITAL | Age: 33
End: 2023-12-12
Attending: INTERNAL MEDICINE
Payer: COMMERCIAL

## 2023-12-12 DIAGNOSIS — E66.01 MORBID OBESITY: ICD-10-CM

## 2023-12-12 DIAGNOSIS — I26.99 OTHER ACUTE PULMONARY EMBOLISM WITHOUT ACUTE COR PULMONALE: ICD-10-CM

## 2023-12-12 LAB
ALBUMIN SERPL BCP-MCNC: 3.3 G/DL (ref 3.5–5.2)
ALP SERPL-CCNC: 72 U/L (ref 55–135)
ALT SERPL W/O P-5'-P-CCNC: 15 U/L (ref 10–44)
ANION GAP SERPL CALC-SCNC: 8 MMOL/L (ref 8–16)
AST SERPL-CCNC: 13 U/L (ref 10–40)
BASOPHILS # BLD AUTO: 0.06 K/UL (ref 0–0.2)
BASOPHILS NFR BLD: 0.5 % (ref 0–1.9)
BILIRUB SERPL-MCNC: 0.4 MG/DL (ref 0.1–1)
BUN SERPL-MCNC: 9 MG/DL (ref 6–20)
CALCIUM SERPL-MCNC: 8.7 MG/DL (ref 8.7–10.5)
CHLORIDE SERPL-SCNC: 108 MMOL/L (ref 95–110)
CO2 SERPL-SCNC: 23 MMOL/L (ref 23–29)
CREAT SERPL-MCNC: 0.8 MG/DL (ref 0.5–1.4)
DIFFERENTIAL METHOD: ABNORMAL
EOSINOPHIL # BLD AUTO: 0.5 K/UL (ref 0–0.5)
EOSINOPHIL NFR BLD: 4.5 % (ref 0–8)
ERYTHROCYTE [DISTWIDTH] IN BLOOD BY AUTOMATED COUNT: 14.3 % (ref 11.5–14.5)
EST. GFR  (NO RACE VARIABLE): >60 ML/MIN/1.73 M^2
GLUCOSE SERPL-MCNC: 96 MG/DL (ref 70–110)
HCT VFR BLD AUTO: 35.9 % (ref 37–48.5)
HGB BLD-MCNC: 10.9 G/DL (ref 12–16)
IMM GRANULOCYTES # BLD AUTO: 0.03 K/UL (ref 0–0.04)
IMM GRANULOCYTES NFR BLD AUTO: 0.3 % (ref 0–0.5)
LYMPHOCYTES # BLD AUTO: 2.8 K/UL (ref 1–4.8)
LYMPHOCYTES NFR BLD: 25 % (ref 18–48)
MCH RBC QN AUTO: 24.8 PG (ref 27–31)
MCHC RBC AUTO-ENTMCNC: 30.4 G/DL (ref 32–36)
MCV RBC AUTO: 82 FL (ref 82–98)
MONOCYTES # BLD AUTO: 0.8 K/UL (ref 0.3–1)
MONOCYTES NFR BLD: 7.3 % (ref 4–15)
NEUTROPHILS # BLD AUTO: 7 K/UL (ref 1.8–7.7)
NEUTROPHILS NFR BLD: 62.4 % (ref 38–73)
NRBC BLD-RTO: 0 /100 WBC
PLATELET # BLD AUTO: 434 K/UL (ref 150–450)
PMV BLD AUTO: 9.4 FL (ref 9.2–12.9)
POTASSIUM SERPL-SCNC: 3.6 MMOL/L (ref 3.5–5.1)
PROT SERPL-MCNC: 7 G/DL (ref 6–8.4)
RBC # BLD AUTO: 4.4 M/UL (ref 4–5.4)
SODIUM SERPL-SCNC: 139 MMOL/L (ref 136–145)
WBC # BLD AUTO: 11.3 K/UL (ref 3.9–12.7)

## 2023-12-12 PROCEDURE — 80053 COMPREHEN METABOLIC PANEL: CPT | Performed by: INTERNAL MEDICINE

## 2023-12-12 PROCEDURE — 85025 COMPLETE CBC W/AUTO DIFF WBC: CPT | Performed by: INTERNAL MEDICINE

## 2023-12-12 PROCEDURE — 36415 COLL VENOUS BLD VENIPUNCTURE: CPT | Performed by: INTERNAL MEDICINE

## 2023-12-15 ENCOUNTER — LAB VISIT (OUTPATIENT)
Dept: LAB | Facility: OTHER | Age: 33
End: 2023-12-15
Attending: OBSTETRICS & GYNECOLOGY
Payer: COMMERCIAL

## 2023-12-15 ENCOUNTER — OFFICE VISIT (OUTPATIENT)
Dept: OBSTETRICS AND GYNECOLOGY | Facility: CLINIC | Age: 33
End: 2023-12-15
Payer: COMMERCIAL

## 2023-12-15 VITALS
SYSTOLIC BLOOD PRESSURE: 144 MMHG | HEIGHT: 65 IN | BODY MASS INDEX: 48.82 KG/M2 | WEIGHT: 293 LBS | DIASTOLIC BLOOD PRESSURE: 82 MMHG

## 2023-12-15 DIAGNOSIS — N92.6 IRREGULAR MENSTRUAL CYCLE: ICD-10-CM

## 2023-12-15 DIAGNOSIS — Z01.419 ENCOUNTER FOR GYNECOLOGICAL EXAMINATION WITHOUT ABNORMAL FINDING: Primary | ICD-10-CM

## 2023-12-15 DIAGNOSIS — Z12.4 SCREENING FOR MALIGNANT NEOPLASM OF THE CERVIX: ICD-10-CM

## 2023-12-15 LAB
B-HCG UR QL: NEGATIVE
CTP QC/QA: YES
TSH SERPL DL<=0.005 MIU/L-ACNC: 1.45 UIU/ML (ref 0.4–4)

## 2023-12-15 PROCEDURE — 99395 PR PREVENTIVE VISIT,EST,18-39: ICD-10-PCS | Mod: S$GLB,,, | Performed by: OBSTETRICS & GYNECOLOGY

## 2023-12-15 PROCEDURE — 3008F BODY MASS INDEX DOCD: CPT | Mod: CPTII,S$GLB,, | Performed by: OBSTETRICS & GYNECOLOGY

## 2023-12-15 PROCEDURE — 3079F DIAST BP 80-89 MM HG: CPT | Mod: CPTII,S$GLB,, | Performed by: OBSTETRICS & GYNECOLOGY

## 2023-12-15 PROCEDURE — 99999 PR PBB SHADOW E&M-EST. PATIENT-LVL III: CPT | Mod: PBBFAC,,, | Performed by: OBSTETRICS & GYNECOLOGY

## 2023-12-15 PROCEDURE — 84443 ASSAY THYROID STIM HORMONE: CPT | Performed by: OBSTETRICS & GYNECOLOGY

## 2023-12-15 PROCEDURE — 81025 POCT URINE PREGNANCY: ICD-10-PCS | Mod: S$GLB,,, | Performed by: OBSTETRICS & GYNECOLOGY

## 2023-12-15 PROCEDURE — 36415 COLL VENOUS BLD VENIPUNCTURE: CPT | Performed by: OBSTETRICS & GYNECOLOGY

## 2023-12-15 PROCEDURE — 81025 URINE PREGNANCY TEST: CPT | Mod: S$GLB,,, | Performed by: OBSTETRICS & GYNECOLOGY

## 2023-12-15 PROCEDURE — 3008F PR BODY MASS INDEX (BMI) DOCUMENTED: ICD-10-PCS | Mod: CPTII,S$GLB,, | Performed by: OBSTETRICS & GYNECOLOGY

## 2023-12-15 PROCEDURE — 1159F MED LIST DOCD IN RCRD: CPT | Mod: CPTII,S$GLB,, | Performed by: OBSTETRICS & GYNECOLOGY

## 2023-12-15 PROCEDURE — 88142 CYTOPATH C/V THIN LAYER: CPT | Performed by: OBSTETRICS & GYNECOLOGY

## 2023-12-15 PROCEDURE — 3079F PR MOST RECENT DIASTOLIC BLOOD PRESSURE 80-89 MM HG: ICD-10-PCS | Mod: CPTII,S$GLB,, | Performed by: OBSTETRICS & GYNECOLOGY

## 2023-12-15 PROCEDURE — 3077F SYST BP >= 140 MM HG: CPT | Mod: CPTII,S$GLB,, | Performed by: OBSTETRICS & GYNECOLOGY

## 2023-12-15 PROCEDURE — 1159F PR MEDICATION LIST DOCUMENTED IN MEDICAL RECORD: ICD-10-PCS | Mod: CPTII,S$GLB,, | Performed by: OBSTETRICS & GYNECOLOGY

## 2023-12-15 PROCEDURE — 99999 PR PBB SHADOW E&M-EST. PATIENT-LVL III: ICD-10-PCS | Mod: PBBFAC,,, | Performed by: OBSTETRICS & GYNECOLOGY

## 2023-12-15 PROCEDURE — 99395 PREV VISIT EST AGE 18-39: CPT | Mod: S$GLB,,, | Performed by: OBSTETRICS & GYNECOLOGY

## 2023-12-15 PROCEDURE — 3077F PR MOST RECENT SYSTOLIC BLOOD PRESSURE >= 140 MM HG: ICD-10-PCS | Mod: CPTII,S$GLB,, | Performed by: OBSTETRICS & GYNECOLOGY

## 2023-12-15 RX ORDER — METFORMIN HYDROCHLORIDE 500 MG/1
500 TABLET ORAL 2 TIMES DAILY WITH MEALS
Qty: 60 TABLET | Refills: 12 | Status: SHIPPED | OUTPATIENT
Start: 2023-12-15 | End: 2024-12-14

## 2023-12-15 NOTE — PROGRESS NOTES
CC: Well woman exam    Jamilah French is a 33 y.o. female  presents for a well woman exam.  She is having cycles that last for almost 2 wks.  She is skipping cycles  She is trying to conceive at this time  With Bilateral PE and a recurrent PE she is on eliquis.       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 ARCH OF FIRST CERVICAL VERTEBRA N/A 2021    Procedure: DECOMPRESSION, CHIARI MALFORMATION, BY 1ST CERVICAL VERTEBRA POSTERIOR ARCH REMOVAL;  Surgeon: Kirk Reis MD;  Location: [address];  Service: Neurosurgery;  Laterality: N/A;  ASA1  TOR1  T&Cross x 2 units  [place]    HYSTEROSCOPY WITH DILATION AND CURETTAGE OF UTERUS N/A 10/26/2020    Procedure: HYSTEROSCOPY, WITH DILATION AND CURETTAGE OF UTERUS;  Surgeon: Manjula Rivas MD;  Location: [facility];  Service: OB/GYN;  Laterality: N/A;    REVISION OF PROCEDURE INVOLVING SYRINGOPLEURAL SHUNT N/A 2022    Procedure: SYRINGOPLEURAL SHUNT Insertion T4 Laminectomy;  Surgeon: Kirk Reis MD;  Location: [address];  Service: Neurosurgery;  Laterality: N/A;       OB History    Para Term  AB Living   0 0 0 0 0 0   SAB IAB Ectopic Multiple Live Births   0 0 0 0 0       Family History   Problem Relation Age of Onset    No Known Problems Father     Hypertension Mother     Kidney disease Maternal Aunt     Mental illness Brother     Breast cancer Neg Hx     Cancer Neg Hx     Colon cancer Neg Hx     Diabetes Neg Hx     Eclampsia Neg Hx     Miscarriages / Stillbirths Neg Hx     Ovarian cancer Neg Hx      labor Neg Hx     Stroke Neg Hx     Prostate cancer Neg Hx     Bladder Cancer Neg Hx        Social History     Tobacco Use  "   Smoking status: Former     Types: Vaping w/o nicotine     Quit date: 2018     Years since quittin.9    Smokeless tobacco: Never   Substance Use Topics    Alcohol use: Yes     Comment: social     Drug use: No       BP (!) 144/82   Ht 5' 5" (1.651 m)   Wt (!) 139.5 kg (307 lb 8.7 oz)   LMP 2023 (Approximate)   BMI 51.18 kg/m²     ROS:  GENERAL: Denies weight gain or weight loss. Feeling well overall.   SKIN: Denies rash or lesions.   HEAD: Denies head injury or headache.   NODES: Denies enlarged lymph nodes.   CHEST: Denies chest pain or shortness of breath.   CARDIOVASCULAR: Denies palpitations or left sided chest pain.   ABDOMEN: No abdominal pain, constipation, diarrhea, nausea, vomiting or rectal bleeding.   URINARY: No frequency, dysuria, hematuria, or burning on urination.  REPRODUCTIVE: See HPI.   BREASTS: The patient performs breast self-examination and denies pain, lumps, or nipple discharge.   HEMATOLOGIC: No easy bruisability or excessive bleeding.  MUSCULOSKELETAL: Denies joint pain or swelling.   NEUROLOGIC: Denies syncope or weakness.   PSYCHIATRIC: Denies depression, anxiety or mood swings.    Physical Exam:    APPEARANCE: Well nourished, well developed, in no acute distress.  AFFECT: WNL, alert and oriented x 3  SKIN: No acne or hirsutism  NECK: Neck symmetric without masses or thyromegaly  NODES: No inguinal, cervical, axillary, or femoral lymph node enlargement  CHEST: Good respiratory effect  ABDOMEN: Soft.  No tenderness or masses.  No hepatosplenomegaly.  No hernias.  BREASTS: Symmetrical, no skin changes or visible lesions.  No palpable masses, nipple discharge bilaterally.  PELVIC: Normal external genitalia without lesions.  Normal hair distribution.  Adequate perineal body, normal urethral meatus.  Vagina moist and well rugated without lesions or discharge.  Cervix pink, without lesions, discharge or tenderness.  No significant cystocele or rectocele.  Bimanual exam shows " uterus to be normal size, regular, mobile and nontender.  Adnexa without masses or tenderness.    EXTREMITIES: No edema.    ASSESSMENT AND PLAN  1. Encounter for gynecological examination without abnormal finding        2. Screening for malignant neoplasm of the cervix  Liquid-Based Pap Smear, Screening      3. Irregular menstrual cycle  POCT Urine Pregnancy    TSH    US Pelvis Comp with Transvag NON-OB (xpd    metFORMIN (GLUCOPHAGE) 500 MG tablet    CANCELED: C. trachomatis/N. gonorrhoeae by AMP DNA [facility]; Cervix        JAMES Inositol 2 grams BID, Folic acid 400 mcg  Fish Oil  Vitamin D Supplements for PCOS  N-Acetyl-Cysteine (NAC)  Carnitine Supplements for PCOS  Magnesium  Probiotics  Zinc     B Vitamin Supplements for PCOS  Natrual cycles/ charting      Patient was counseled today on A.C.S. Pap guidelines and recommendations for yearly pelvic exams, mammograms and monthly self breast exams; to see her PCP for other health maintenance.     Follow up in about 6 months (around 6/15/2024), or if symptoms worsen or fail to improve.

## 2023-12-18 ENCOUNTER — HOSPITAL ENCOUNTER (OUTPATIENT)
Dept: RADIOLOGY | Facility: HOSPITAL | Age: 33
Discharge: HOME | End: 2023-12-18
Attending: OBSTETRICS & GYNECOLOGY
Payer: COMMERCIAL

## 2023-12-18 DIAGNOSIS — N92.6 IRREGULAR MENSTRUAL CYCLE: ICD-10-CM

## 2023-12-18 PROCEDURE — 76830 TRANSVAGINAL US NON-OB: CPT | Mod: TC

## 2023-12-21 ENCOUNTER — TELEPHONE (OUTPATIENT)
Dept: HEMATOLOGY/ONCOLOGY | Facility: CLINIC | Age: 33
End: 2023-12-21
Payer: COMMERCIAL

## 2023-12-22 ENCOUNTER — OFFICE VISIT (OUTPATIENT)
Dept: HEMATOLOGY/ONCOLOGY | Facility: CLINIC | Age: 33
End: 2023-12-22
Payer: COMMERCIAL

## 2023-12-22 DIAGNOSIS — E28.2 PCOS (POLYCYSTIC OVARIAN SYNDROME): Primary | ICD-10-CM

## 2023-12-22 DIAGNOSIS — I26.99 OTHER ACUTE PULMONARY EMBOLISM WITHOUT ACUTE COR PULMONALE: ICD-10-CM

## 2023-12-22 DIAGNOSIS — E66.01 MORBID OBESITY: ICD-10-CM

## 2023-12-22 PROCEDURE — 99214 OFFICE O/P EST MOD 30 MIN: CPT | Mod: 95,,, | Performed by: INTERNAL MEDICINE

## 2023-12-22 PROCEDURE — 99214 PR OFFICE/OUTPT VISIT, EST, LEVL IV, 30-39 MIN: ICD-10-PCS | Mod: 95,,, | Performed by: INTERNAL MEDICINE

## 2023-12-22 NOTE — PROGRESS NOTES
The patient location is: home  The chief complaint leading to consultation is:  Iron deficiency anemia long-term anticoagulation    Visit type: audiovisual    Face to Face time with patient: 10  20 minutes of total time spent on the encounter, which includes face to face time and non-face to face time preparing to see the patient (eg, review of tests), Obtaining and/or reviewing separately obtained history, Documenting clinical information in the electronic or other health record, Independently interpreting results (not separately reported) and communicating results to the patient/family/caregiver, or Care coordination (not separately reported).         Each patient to whom he or she provides medical services by telemedicine is:  (1) informed of the relationship between the physician and patient and the respective role of any other health care provider with respect to management of the patient; and (2) notified that he or she may decline to receive medical services by telemedicine and may withdraw from such care at any time.    Notes:       HPI    33 years old female obese referred to [facility]ology [facility] for evaluation of pulmonary embolism.    Initially patient provoked pulmonary embolism after spine surgery in January 2022. Patient states that she had spine surgery in December in January she was found to have bilateral pulmonary embolism with right heart strains.  At the time DVT of lower extremities negative.  Patient was treated with Lovenox transition to Eliquis.  In May of 2022 patient return to clinic for a shunt procedure which anticoagulation was stopped for that.  After completion of procedure patient start on Lovenox transition to Eliquis without loading dosage.  Time of anticoagulation interruption proximally about 2 and half weeks.  On June 2nd patient experienced chest pain return to ED which found to have lower extremity DVT and ongoing pulmonary embolism.  Patient was again treated with Lovenox started  on Eliquis loading.  At the time of current clinical visit patient is still on Eliquis loading dosage 10 mg b.i.d..    She denies any history of blood clots.  She denies any family history of blood clots.  Patient denies any smoking or estrogen products use.      Past Medical History:   Diagnosis Date    Bilateral pulmonary embolism 2022    Current moderate episode of major depressive disorder without prior episode 6/15/2018    Radiculopathy, cervical region 10/12/2021     Social History     Socioeconomic History    Marital status:      Spouse name: Neil    Number of children: 0   Tobacco Use    Smoking status: Former     Types: Vaping w/o nicotine     Quit date: 2018     Years since quittin.9    Smokeless tobacco: Never   Substance and Sexual Activity    Alcohol use: Yes     Comment: social     Drug use: No    Sexual activity: Yes     Partners: Male     Birth control/protection: None     Social Determinants of Health     Financial Resource Strain: Medium Risk (12/15/2023)    Overall Financial Resource Strain (CARDIA)     Difficulty of Paying Living Expenses: Somewhat hard   Food Insecurity: Food Insecurity Present (12/15/2023)    Hunger Vital Sign     Worried About Running Out of Food in the Last Year: Sometimes true     Ran Out of Food in the Last Year: Sometimes true   Transportation Needs: No Transportation Needs (12/15/2023)    PRAPARE - Transportation     Lack of Transportation (Medical): No     Lack of Transportation (Non-Medical): No   Physical Activity: Insufficiently Active (12/15/2023)    Exercise Vital Sign     Days of Exercise per Week: 3 days     Minutes of Exercise per Session: 20 min   Stress: Stress Concern Present (12/15/2023)    [employer] [place] [employer] - Occupational Stress Questionnaire     Feeling of Stress : To some extent   Social Connections: Unknown (12/15/2023)    Social Connection and Isolation Panel [NHANES]     Frequency of Communication with Friends  and Family: Once a week     Frequency of Social Gatherings with Friends and Family: Three times a week     Active Member of Clubs or Organizations: Yes     Attends Club or Organization Meetings: 1 to 4 times per year     Marital Status:    Housing Stability: Low Risk  (12/15/2023)    Housing Stability Vital Sign     Unable to Pay for Housing in the Last Year: No     Number of Places Lived in the Last Year: 1     Unstable Housing in the Last Year: No         Subjective      Review of Systems   Constitutional: Negative for appetite change, fatigue and unexpected weight change.   HENT: Negative for mouth sores.   Eyes: Negative for visual disturbance.   Respiratory: Negative for cough and shortness of breath.   Cardiovascular: Negative for chest pain.   Gastrointestinal: Negative for diarrhea.   Genitourinary: Negative for frequency.   Musculoskeletal: Negative for back pain.   Skin: Negative for rash.   Neurological: Negative for headaches.   Hematological: Negative for adenopathy.   Psychiatric/Behavioral: The patient is not nervous/anxious.   All other systems reviewed and are negative.     Objective    Physical Exam     vv  Awake alert no acute distress  Normocephalic atraumatic  Pupils equal round reactive  Clear to auscultate  Tachycardic  Soft nontender nondistended  Distal pulses intact  No rash no lesions  CMP  Sodium   Date Value Ref Range Status   12/12/2023 139 136 - 145 mmol/L Final     Potassium   Date Value Ref Range Status   12/12/2023 3.6 3.5 - 5.1 mmol/L Final     Chloride   Date Value Ref Range Status   12/12/2023 108 95 - 110 mmol/L Final     CO2   Date Value Ref Range Status   12/12/2023 23 23 - 29 mmol/L Final     Glucose   Date Value Ref Range Status   12/12/2023 96 70 - 110 mg/dL Final     BUN   Date Value Ref Range Status   12/12/2023 9 6 - 20 mg/dL Final     Creatinine   Date Value Ref Range Status   12/12/2023 0.8 0.5 - 1.4 mg/dL Final     Calcium   Date Value Ref Range Status    12/12/2023 8.7 8.7 - 10.5 mg/dL Final     Total Protein   Date Value Ref Range Status   12/12/2023 7.0 6.0 - 8.4 g/dL Final     Albumin   Date Value Ref Range Status   12/12/2023 3.3 (L) 3.5 - 5.2 g/dL Final   08/21/2020 3.9 3.6 - 5.1 g/dL Final     Comment:     For additional information, please refer to   http://education.[employer]/faq/NOX718 (This link is   being provided for informational/ educational purposes only.)  This test was developed and its analytical performance   characteristics have been determined by [facility]. It has not been cleared or approved by the   [place] Food and Drug Administration. This assay has been validated   pursuant to the CLIA regulations and is used for clinical   purposes.  @ Test Performed By:  [facility]  Catrachito Archer M.D., Ph.D.,   [address] [place] [id number]  [facility]  [id number]       Total Bilirubin   Date Value Ref Range Status   12/12/2023 0.4 0.1 - 1.0 mg/dL Final     Comment:     For infants and newborns, interpretation of results should be based  on gestational age, weight and in agreement with clinical  observations.    Premature Infant recommended reference ranges:  Up to 24 hours.............<8.0 mg/dL  Up to 48 hours............<12.0 mg/dL  3-5 days..................<15.0 mg/dL  6-29 days.................<15.0 mg/dL       Alkaline Phosphatase   Date Value Ref Range Status   12/12/2023 72 55 - 135 U/L Final     AST   Date Value Ref Range Status   12/12/2023 13 10 - 40 U/L Final     ALT   Date Value Ref Range Status   12/12/2023 15 10 - 44 U/L Final     Anion Gap   Date Value Ref Range Status   12/12/2023 8 8 - 16 mmol/L Final     eGFR if    Date Value Ref Range Status   07/25/2022 >60 >60 mL/min/1.73 m^2 Final     eGFR if non    Date Value Ref Range Status   07/25/2022 >60 >60 mL/min/1.73 m^2 Final     Comment:     Calculation used  to obtain the estimated glomerular filtration  rate (eGFR) is the CKD-EPI equation.        Ultrasound lower extremity  06/02/2022  Thrombus within para left posterior tibial vein.  Compared to jan 22 new finding.    05/31/2022 CTA  Bilateral occlusive and nonocclusive pulmonary embolism with right heart strain    01/05/2022 ultrasound lower extremity bilateral  Negative for DVT on study    Assessment    Recurrence relapse extensive pulmonary embolism now involving lower extremity.    Patient has currently been rechallenged with Eliquis 10 mg p.o. b.i.d. loading dose and is planning on transition to 5 mg p.o. b.i.d. after 1 week.    Initial events thrombus pulmonary embolism without lower extremity DVT diagnosed January 2, 2022 treated with Lovenox transition to Eliquis at the time.  Due to need of spine surgery and shunt procedure anticoagulation was interrupted for 2 and half weeks and will resumed with Lovenox transition to Eliquis 5 mg p.o. b.i.d..  On January 2nd patient experienced chest pain shortness breath which prompted her to go to ED found to have pulmonary embolism in addition lower extremity DVT.  She was treated with Lovenox and was transition to Eliquis 10 mg p.o. b.i.d..    Polycystic ovarian syndrome    Elevated APA lisotype IgM anticardiolipin     Lupus coag was not detected    Life long anticoagulation     Repeat CTA small amount of residual thrombus in the left pulmonary artery    Repeat US lower veins bilateral negative DVT    Plan    Continue on going coagulation Eliquis.    Expect lifelong anticoagulation    Check iron study and RTC 4 weeks with result VV         There are no diagnoses linked to this encounter.

## 2023-12-26 ENCOUNTER — PATIENT MESSAGE (OUTPATIENT)
Dept: HEMATOLOGY/ONCOLOGY | Facility: CLINIC | Age: 33
End: 2023-12-26
Payer: COMMERCIAL

## 2024-01-02 LAB
FINAL PATHOLOGIC DIAGNOSIS: NORMAL
Lab: NORMAL

## 2024-01-18 ENCOUNTER — LAB VISIT (OUTPATIENT)
Dept: LAB | Facility: HOSPITAL | Age: 34
End: 2024-01-18
Attending: INTERNAL MEDICINE
Payer: COMMERCIAL

## 2024-01-18 DIAGNOSIS — I26.99 OTHER ACUTE PULMONARY EMBOLISM WITHOUT ACUTE COR PULMONALE: ICD-10-CM

## 2024-01-18 DIAGNOSIS — E66.01 MORBID OBESITY: ICD-10-CM

## 2024-01-18 DIAGNOSIS — E28.2 PCOS (POLYCYSTIC OVARIAN SYNDROME): ICD-10-CM

## 2024-01-18 LAB
ALBUMIN SERPL BCP-MCNC: 3.7 G/DL (ref 3.5–5.2)
ALP SERPL-CCNC: 69 U/L (ref 55–135)
ALT SERPL W/O P-5'-P-CCNC: 16 U/L (ref 10–44)
ANION GAP SERPL CALC-SCNC: 8 MMOL/L (ref 8–16)
AST SERPL-CCNC: 14 U/L (ref 10–40)
BASOPHILS # BLD AUTO: 0.09 K/UL (ref 0–0.2)
BASOPHILS NFR BLD: 0.9 % (ref 0–1.9)
BILIRUB SERPL-MCNC: 0.5 MG/DL (ref 0.1–1)
BUN SERPL-MCNC: 11 MG/DL (ref 6–20)
CALCIUM SERPL-MCNC: 9.1 MG/DL (ref 8.7–10.5)
CHLORIDE SERPL-SCNC: 105 MMOL/L (ref 95–110)
CO2 SERPL-SCNC: 25 MMOL/L (ref 23–29)
CREAT SERPL-MCNC: 0.8 MG/DL (ref 0.5–1.4)
DIFFERENTIAL METHOD BLD: ABNORMAL
EOSINOPHIL # BLD AUTO: 0.6 K/UL (ref 0–0.5)
EOSINOPHIL NFR BLD: 5.7 % (ref 0–8)
ERYTHROCYTE [DISTWIDTH] IN BLOOD BY AUTOMATED COUNT: 14 % (ref 11.5–14.5)
EST. GFR  (NO RACE VARIABLE): >60 ML/MIN/1.73 M^2
FERRITIN SERPL-MCNC: 11 NG/ML (ref 20–300)
GLUCOSE SERPL-MCNC: 86 MG/DL (ref 70–110)
HCT VFR BLD AUTO: 34.2 % (ref 37–48.5)
HGB BLD-MCNC: 10.3 G/DL (ref 12–16)
IMM GRANULOCYTES # BLD AUTO: 0.03 K/UL (ref 0–0.04)
IMM GRANULOCYTES NFR BLD AUTO: 0.3 % (ref 0–0.5)
IRON SERPL-MCNC: 29 UG/DL (ref 30–160)
LYMPHOCYTES # BLD AUTO: 2.9 K/UL (ref 1–4.8)
LYMPHOCYTES NFR BLD: 28.4 % (ref 18–48)
MCH RBC QN AUTO: 23.8 PG (ref 27–31)
MCHC RBC AUTO-ENTMCNC: 30.1 G/DL (ref 32–36)
MCV RBC AUTO: 79 FL (ref 82–98)
MONOCYTES # BLD AUTO: 0.8 K/UL (ref 0.3–1)
MONOCYTES NFR BLD: 7.7 % (ref 4–15)
NEUTROPHILS # BLD AUTO: 5.9 K/UL (ref 1.8–7.7)
NEUTROPHILS NFR BLD: 57 % (ref 38–73)
NRBC BLD-RTO: 0 /100 WBC
PLATELET # BLD AUTO: 426 K/UL (ref 150–450)
PMV BLD AUTO: 8.6 FL (ref 9.2–12.9)
POTASSIUM SERPL-SCNC: 3.8 MMOL/L (ref 3.5–5.1)
PROT SERPL-MCNC: 7.6 G/DL (ref 6–8.4)
RBC # BLD AUTO: 4.32 M/UL (ref 4–5.4)
SATURATED IRON: 8 % (ref 20–50)
SODIUM SERPL-SCNC: 138 MMOL/L (ref 136–145)
TOTAL IRON BINDING CAPACITY: 385 UG/DL (ref 250–450)
TRANSFERRIN SERPL-MCNC: 275 MG/DL (ref 200–375)
WBC # BLD AUTO: 10.29 K/UL (ref 3.9–12.7)

## 2024-01-18 PROCEDURE — 82728 ASSAY OF FERRITIN: CPT | Performed by: INTERNAL MEDICINE

## 2024-01-18 PROCEDURE — 80053 COMPREHEN METABOLIC PANEL: CPT | Performed by: INTERNAL MEDICINE

## 2024-01-18 PROCEDURE — 36415 COLL VENOUS BLD VENIPUNCTURE: CPT | Performed by: INTERNAL MEDICINE

## 2024-01-18 PROCEDURE — 83540 ASSAY OF IRON: CPT | Performed by: INTERNAL MEDICINE

## 2024-01-18 PROCEDURE — 85025 COMPLETE CBC W/AUTO DIFF WBC: CPT | Performed by: INTERNAL MEDICINE

## 2024-01-19 ENCOUNTER — OFFICE VISIT (OUTPATIENT)
Dept: HEMATOLOGY/ONCOLOGY | Facility: CLINIC | Age: 34
End: 2024-01-19
Payer: COMMERCIAL

## 2024-01-19 DIAGNOSIS — D50.9 IRON DEFICIENCY ANEMIA, UNSPECIFIED IRON DEFICIENCY ANEMIA TYPE: ICD-10-CM

## 2024-01-19 DIAGNOSIS — E66.01 MORBID OBESITY: Primary | ICD-10-CM

## 2024-01-19 DIAGNOSIS — I26.99 ACUTE PULMONARY EMBOLISM WITHOUT ACUTE COR PULMONALE, UNSPECIFIED PULMONARY EMBOLISM TYPE: ICD-10-CM

## 2024-01-19 PROCEDURE — 99214 OFFICE O/P EST MOD 30 MIN: CPT | Mod: 95,,, | Performed by: INTERNAL MEDICINE

## 2024-01-19 NOTE — PROGRESS NOTES
The patient location is: home  The chief complaint leading to consultation is:  Iron deficiency anemia long-term anticoagulation    Visit type: audiovisual    Face to Face time with patient: 10  20 minutes of total time spent on the encounter, which includes face to face time and non-face to face time preparing to see the patient (eg, review of tests), Obtaining and/or reviewing separately obtained history, Documenting clinical information in the electronic or other health record, Independently interpreting results (not separately reported) and communicating results to the patient/family/caregiver, or Care coordination (not separately reported).         Each patient to whom he or she provides medical services by telemedicine is:  (1) informed of the relationship between the physician and patient and the respective role of any other health care provider with respect to management of the patient; and (2) notified that he or she may decline to receive medical services by telemedicine and may withdraw from such care at any time.    Notes:       HPI    33 years old female obese referred to Hematology Clinic for evaluation of pulmonary embolism.    Initially patient provoked pulmonary embolism after spine surgery in January 2022. Patient states that she had spine surgery in December in January she was found to have bilateral pulmonary embolism with right heart strains.  At the time DVT of lower extremities negative.  Patient was treated with Lovenox transition to Eliquis.  In May of 2022 patient return to clinic for a shunt procedure which anticoagulation was stopped for that.  After completion of procedure patient start on Lovenox transition to Eliquis without loading dosage.  Time of anticoagulation interruption proximally about 2 and half weeks.  On June 2nd patient experienced chest pain return to ED which found to have lower extremity DVT and ongoing pulmonary embolism.  Patient was again treated with Lovenox started  on Eliquis loading.  At the time of current clinical visit patient is still on Eliquis loading dosage 10 mg b.i.d..    She denies any history of blood clots.  She denies any family history of blood clots.  Patient denies any smoking or estrogen products use.      Past Medical History:   Diagnosis Date    Bilateral pulmonary embolism 2022    Current moderate episode of major depressive disorder without prior episode 6/15/2018    Radiculopathy, cervical region 10/12/2021     Social History     Socioeconomic History    Marital status:      Spouse name: Neil    Number of children: 0   Tobacco Use    Smoking status: Former     Types: Vaping w/o nicotine     Quit date:      Years since quittin.0    Smokeless tobacco: Never   Substance and Sexual Activity    Alcohol use: Yes     Comment: social     Drug use: No    Sexual activity: Yes     Partners: Male     Birth control/protection: None     Social Determinants of Health     Financial Resource Strain: Medium Risk (12/15/2023)    Overall Financial Resource Strain (CARDIA)     Difficulty of Paying Living Expenses: Somewhat hard   Food Insecurity: Food Insecurity Present (12/15/2023)    Hunger Vital Sign     Worried About Running Out of Food in the Last Year: Sometimes true     Ran Out of Food in the Last Year: Sometimes true   Transportation Needs: No Transportation Needs (12/15/2023)    PRAPARE - Transportation     Lack of Transportation (Medical): No     Lack of Transportation (Non-Medical): No   Physical Activity: Insufficiently Active (12/15/2023)    Exercise Vital Sign     Days of Exercise per Week: 3 days     Minutes of Exercise per Session: 20 min   Stress: Stress Concern Present (12/15/2023)    [employer] [place] [employer] - Occupational Stress Questionnaire     Feeling of Stress : To some extent   Social Connections: Unknown (12/15/2023)    Social Connection and Isolation Panel [NHANES]     Frequency of Communication with Friends  and Family: Once a week     Frequency of Social Gatherings with Friends and Family: Three times a week     Active Member of Clubs or Organizations: Yes     Attends Club or Organization Meetings: 1 to 4 times per year     Marital Status:    Housing Stability: Low Risk  (12/15/2023)    Housing Stability Vital Sign     Unable to Pay for Housing in the Last Year: No     Number of Places Lived in the Last Year: 1     Unstable Housing in the Last Year: No         Subjective      Review of Systems   Constitutional: Negative for appetite change, fatigue and unexpected weight change.   HENT: Negative for mouth sores.   Eyes: Negative for visual disturbance.   Respiratory: Negative for cough and shortness of breath.   Cardiovascular: Negative for chest pain.   Gastrointestinal: Negative for diarrhea.   Genitourinary: Negative for frequency.   Musculoskeletal: Negative for back pain.   Skin: Negative for rash.   Neurological: Negative for headaches.   Hematological: Negative for adenopathy.   Psychiatric/Behavioral: The patient is not nervous/anxious.   All other systems reviewed and are negative.     Objective    Physical Exam     vv  Awake alert no acute distress  Normocephalic atraumatic  Pupils equal round reactive  Clear to auscultate  Tachycardic  Soft nontender nondistended  Distal pulses intact  No rash no lesions  CMP  Sodium   Date Value Ref Range Status   01/18/2024 138 136 - 145 mmol/L Final     Potassium   Date Value Ref Range Status   01/18/2024 3.8 3.5 - 5.1 mmol/L Final     Chloride   Date Value Ref Range Status   01/18/2024 105 95 - 110 mmol/L Final     CO2   Date Value Ref Range Status   01/18/2024 25 23 - 29 mmol/L Final     Glucose   Date Value Ref Range Status   01/18/2024 86 70 - 110 mg/dL Final     BUN   Date Value Ref Range Status   01/18/2024 11 6 - 20 mg/dL Final     Creatinine   Date Value Ref Range Status   01/18/2024 0.8 0.5 - 1.4 mg/dL Final     Calcium   Date Value Ref Range Status    01/18/2024 9.1 8.7 - 10.5 mg/dL Final     Total Protein   Date Value Ref Range Status   01/18/2024 7.6 6.0 - 8.4 g/dL Final     Albumin   Date Value Ref Range Status   01/18/2024 3.7 3.5 - 5.2 g/dL Final   08/21/2020 3.9 3.6 - 5.1 g/dL Final     Comment:     For additional information, please refer to   http://education.[facility]/faq/PCR106 (This link is   being provided for informational/ educational purposes only.)  This test was developed and its analytical performance   characteristics have been determined by [facility]. It has not been cleared or approved by the   [place] Food and Drug Administration. This assay has been validated   pursuant to the CLIA regulations and is used for clinical   purposes.  @ Test Performed By:  [facility]  Catrachito Archer M.D., Ph.D.,   [address] [place] [id number]  [facility]  [id number]       Total Bilirubin   Date Value Ref Range Status   01/18/2024 0.5 0.1 - 1.0 mg/dL Final     Comment:     For infants and newborns, interpretation of results should be based  on gestational age, weight and in agreement with clinical  observations.    Premature Infant recommended reference ranges:  Up to 24 hours.............<8.0 mg/dL  Up to 48 hours............<12.0 mg/dL  3-5 days..................<15.0 mg/dL  6-29 days.................<15.0 mg/dL       Alkaline Phosphatase   Date Value Ref Range Status   01/18/2024 69 55 - 135 U/L Final     AST   Date Value Ref Range Status   01/18/2024 14 10 - 40 U/L Final     ALT   Date Value Ref Range Status   01/18/2024 16 10 - 44 U/L Final     Anion Gap   Date Value Ref Range Status   01/18/2024 8 8 - 16 mmol/L Final     eGFR if    Date Value Ref Range Status   07/25/2022 >60 >60 mL/min/1.73 m^2 Final     eGFR if non    Date Value Ref Range Status   07/25/2022 >60 >60 mL/min/1.73 m^2 Final     Comment:     Calculation used to  obtain the estimated glomerular filtration  rate (eGFR) is the CKD-EPI equation.        Ultrasound lower extremity  06/02/2022  Thrombus within para left posterior tibial vein.  Compared to jan 22 new finding.    05/31/2022 CTA  Bilateral occlusive and nonocclusive pulmonary embolism with right heart strain    01/05/2022 ultrasound lower extremity bilateral  Negative for DVT on study    Assessment    Recurrence relapse extensive pulmonary embolism now involving lower extremity.    Patient has currently been rechallenged with Eliquis 10 mg p.o. b.i.d. loading dose and is planning on transition to 5 mg p.o. b.i.d. after 1 week.    Initial events thrombus pulmonary embolism without lower extremity DVT diagnosed January 2, 2022 treated with Lovenox transition to Eliquis at the time.  Due to need of spine surgery and shunt procedure anticoagulation was interrupted for 2 and half weeks and will resumed with Lovenox transition to Eliquis 5 mg p.o. b.i.d..  On January 2nd patient experienced chest pain shortness breath which prompted her to go to ED found to have pulmonary embolism in addition lower extremity DVT.  She was treated with Lovenox and was transition to Eliquis 10 mg p.o. b.i.d..    Polycystic ovarian syndrome    Elevated APA lisotype IgM anticardiolipin     Lupus coag was not detected    Life long anticoagulation     Repeat CTA small amount of residual thrombus in the left pulmonary artery    Repeat US lower veins bilateral negative DVT    Plan    Continue on going coagulation Eliquis.    Expect lifelong anticoagulation    Iron deficiency anemia.  Patient agrees to start on oral iron supplements.  I will repeat eval patient in about 8 weeks virtual visit.  If she fails oral iron supplement will initiate IV iron therapy.    Check iron study and RTC 8 weeks with result VV         There are no diagnoses linked to this encounter.

## 2024-01-22 ENCOUNTER — TELEPHONE (OUTPATIENT)
Dept: FAMILY MEDICINE | Facility: CLINIC | Age: 34
End: 2024-01-22
Payer: COMMERCIAL

## 2024-01-22 NOTE — TELEPHONE ENCOUNTER
Unable to locate where NP Javan's office attempted to reach out to patient.  Noted patient does have an appointment scheduled on 1-29-24 with Dr. Juárez.  Unsure if call patient received was an appointment reminder or if another specialty is reaching out to patient.  Call placed to patient for notification of above. No answer received. Left detailed message for patient and indicated for patient to  return call to office if any further assistance is needed.

## 2024-01-22 NOTE — TELEPHONE ENCOUNTER
----- Message from Alan Church sent at 1/22/2024 12:07 PM CST -----  Type:  Patient Returning Call    Who Called:  pt  Who Left Message for Patient:   NA  Does the patient know what this is regarding?:   not sure  Best Call Back Number:   [phone]  Additional Information:  pl call bk to advise thanks

## 2024-01-24 ENCOUNTER — PATIENT MESSAGE (OUTPATIENT)
Dept: OBSTETRICS AND GYNECOLOGY | Facility: CLINIC | Age: 34
End: 2024-01-24
Payer: COMMERCIAL

## 2024-01-24 ENCOUNTER — TELEPHONE (OUTPATIENT)
Dept: OBSTETRICS AND GYNECOLOGY | Facility: CLINIC | Age: 34
End: 2024-01-24
Payer: COMMERCIAL

## 2024-01-24 ENCOUNTER — PATIENT MESSAGE (OUTPATIENT)
Dept: NEUROSURGERY | Facility: CLINIC | Age: 34
End: 2024-01-24
Payer: COMMERCIAL

## 2024-01-24 NOTE — TELEPHONE ENCOUNTER
Patient is requesting a prescription to help her bleeding stop. She has been bleeding for about two months. Last seen 12/15/2023.

## 2024-01-25 DIAGNOSIS — G93.5 CHIARI MALFORMATION TYPE I: Primary | ICD-10-CM

## 2024-01-25 DIAGNOSIS — G95.0 SYRINX OF SPINAL CORD: ICD-10-CM

## 2024-01-29 ENCOUNTER — OFFICE VISIT (OUTPATIENT)
Dept: FAMILY MEDICINE | Facility: CLINIC | Age: 34
End: 2024-01-29
Payer: COMMERCIAL

## 2024-01-29 VITALS
SYSTOLIC BLOOD PRESSURE: 130 MMHG | BODY MASS INDEX: 48.82 KG/M2 | HEART RATE: 82 BPM | DIASTOLIC BLOOD PRESSURE: 70 MMHG | HEIGHT: 65 IN | OXYGEN SATURATION: 99 % | TEMPERATURE: 97 F | WEIGHT: 293 LBS

## 2024-01-29 DIAGNOSIS — I27.82 CHRONIC PULMONARY EMBOLISM WITHOUT ACUTE COR PULMONALE, UNSPECIFIED PULMONARY EMBOLISM TYPE: ICD-10-CM

## 2024-01-29 DIAGNOSIS — F32.1 CURRENT MODERATE EPISODE OF MAJOR DEPRESSIVE DISORDER WITHOUT PRIOR EPISODE: ICD-10-CM

## 2024-01-29 DIAGNOSIS — L90.5 SCAR OR FIBROSIS OF SKIN DUE TO BURN: ICD-10-CM

## 2024-01-29 DIAGNOSIS — R23.8 DRY SCALP: ICD-10-CM

## 2024-01-29 DIAGNOSIS — Z86.711 HX OF PULMONARY EMBOLUS: ICD-10-CM

## 2024-01-29 DIAGNOSIS — Z00.00 ENCOUNTER FOR ANNUAL GENERAL MEDICAL EXAMINATION WITHOUT ABNORMAL FINDINGS IN ADULT: Primary | ICD-10-CM

## 2024-01-29 DIAGNOSIS — G95.0 SYRINX OF SPINAL CORD: ICD-10-CM

## 2024-01-29 PROCEDURE — 99214 OFFICE O/P EST MOD 30 MIN: CPT | Mod: S$GLB,,, | Performed by: STUDENT IN AN ORGANIZED HEALTH CARE EDUCATION/TRAINING PROGRAM

## 2024-01-29 PROCEDURE — 1159F MED LIST DOCD IN RCRD: CPT | Mod: CPTII,S$GLB,, | Performed by: STUDENT IN AN ORGANIZED HEALTH CARE EDUCATION/TRAINING PROGRAM

## 2024-01-29 PROCEDURE — 3075F SYST BP GE 130 - 139MM HG: CPT | Mod: CPTII,S$GLB,, | Performed by: STUDENT IN AN ORGANIZED HEALTH CARE EDUCATION/TRAINING PROGRAM

## 2024-01-29 PROCEDURE — 1160F RVW MEDS BY RX/DR IN RCRD: CPT | Mod: CPTII,S$GLB,, | Performed by: STUDENT IN AN ORGANIZED HEALTH CARE EDUCATION/TRAINING PROGRAM

## 2024-01-29 PROCEDURE — 99999 PR PBB SHADOW E&M-EST. PATIENT-LVL IV: CPT | Mod: PBBFAC,,, | Performed by: STUDENT IN AN ORGANIZED HEALTH CARE EDUCATION/TRAINING PROGRAM

## 2024-01-29 PROCEDURE — 3008F BODY MASS INDEX DOCD: CPT | Mod: CPTII,S$GLB,, | Performed by: STUDENT IN AN ORGANIZED HEALTH CARE EDUCATION/TRAINING PROGRAM

## 2024-01-29 PROCEDURE — 3078F DIAST BP <80 MM HG: CPT | Mod: CPTII,S$GLB,, | Performed by: STUDENT IN AN ORGANIZED HEALTH CARE EDUCATION/TRAINING PROGRAM

## 2024-01-29 RX ORDER — KETOCONAZOLE 20 MG/ML
SHAMPOO, SUSPENSION TOPICAL
Qty: 120 ML | Refills: 2 | Status: SHIPPED | OUTPATIENT
Start: 2024-01-29 | End: 2024-02-23 | Stop reason: SDUPTHER

## 2024-01-29 RX ORDER — HYDROXYZINE HYDROCHLORIDE 25 MG/1
25 TABLET, FILM COATED ORAL 3 TIMES DAILY PRN
Qty: 90 TABLET | Refills: 3 | Status: SHIPPED | OUTPATIENT
Start: 2024-01-29 | End: 2024-07-27

## 2024-01-29 RX ORDER — LANOLIN ALCOHOL/MO/W.PET/CERES
1 CREAM (GRAM) TOPICAL
COMMUNITY

## 2024-01-29 RX ORDER — MUPIROCIN 20 MG/G
OINTMENT TOPICAL 3 TIMES DAILY
Qty: 22 G | Refills: 0 | Status: SHIPPED | OUTPATIENT
Start: 2024-01-29 | End: 2024-02-12

## 2024-01-29 NOTE — PROGRESS NOTES
SUBJECTIVE:    CHIEF COMPLAINT:   Chief Complaint   Patient presents with    Establish Care     Right ear scab           274}    HISTORY OF PRESENT ILLNESS:  Jamilah French is a 33 y.o. female with chronic PE/DVT w/ cor pulmonale, Hx of Chiari malformation s/p decompression surgery (2021), PCOS, who presents to the clinic today to establish care.    She reports that she is experienced scabbing in itching of her right ear since washing her here with head and shoulders shampoo.  She is noted to have documented sulfa allergy.  She is unsure if using this shampoo could have caused her ear lesions pictured below. Lesion is nontender,but pruritic in nature. denies any chest pain, shortness of breath, abdominal pain, fevers, chills, nausea, vomiting, diarrhea or dysuria.         PAST MEDICAL HISTORY:     274}  Past Medical History:   Diagnosis Date    Bilateral pulmonary embolism 1/6/2022    Current moderate episode of major depressive disorder without prior episode 6/15/2018    Radiculopathy, cervical region 10/12/2021       PAST SURGICAL HISTORY:  Past Surgical History:   Procedure Laterality Date    DECOMPRESSION OF CHIARI MALFORMATION BY REMOVAL OF POSTERIOR ARCH OF FIRST CERVICAL VERTEBRA N/A 12/23/2021    Procedure: DECOMPRESSION, CHIARI MALFORMATION, BY 1ST CERVICAL VERTEBRA POSTERIOR ARCH REMOVAL;  Surgeon: Kirk Reis MD;  Location: [facility] OR [address] [place] [address];  Service: Neurosurgery;  Laterality: N/A;  ASA1  TOR1  T&Cross x 2 units  [place]    HYSTEROSCOPY WITH DILATION AND CURETTAGE OF UTERUS N/A 10/26/2020    Procedure: HYSTEROSCOPY, WITH DILATION AND CURETTAGE OF UTERUS;  Surgeon: Manjula Rivas MD;  Location: [facility];  Service: OB/GYN;  Laterality: N/A;    REVISION OF PROCEDURE INVOLVING SYRINGOPLEURAL SHUNT N/A 5/19/2022    Procedure: SYRINGOPLEURAL SHUNT Insertion T4 Laminectomy;  Surgeon: Kirk Reis MD;  Location: [address];  Service: Neurosurgery;  Laterality: N/A;       SOCIAL  HISTORY:  Social History     Socioeconomic History    Marital status:      Spouse name: Neil    Number of children: 0   Tobacco Use    Smoking status: Former     Types: Vaping w/o nicotine     Quit date: 2018     Years since quittin.0    Smokeless tobacco: Never   Substance and Sexual Activity    Alcohol use: Yes     Comment: social     Drug use: No    Sexual activity: Yes     Partners: Male     Birth control/protection: None     Social Determinants of Health     Financial Resource Strain: Medium Risk (12/15/2023)    Overall Financial Resource Strain (CARDIA)     Difficulty of Paying Living Expenses: Somewhat hard   Food Insecurity: Food Insecurity Present (12/15/2023)    Hunger Vital Sign     Worried About Running Out of Food in the Last Year: Sometimes true     Ran Out of Food in the Last Year: Sometimes true   Transportation Needs: No Transportation Needs (12/15/2023)    PRAPARE - Transportation     Lack of Transportation (Medical): No     Lack of Transportation (Non-Medical): No   Physical Activity: Insufficiently Active (12/15/2023)    Exercise Vital Sign     Days of Exercise per Week: 3 days     Minutes of Exercise per Session: 20 min   Stress: Stress Concern Present (12/15/2023)    [employer] - Occupational Stress Questionnaire     Feeling of Stress : To some extent   Social Connections: Unknown (12/15/2023)    Social Connection and Isolation Panel [NHANES]     Frequency of Communication with Friends and Family: Once a week     Frequency of Social Gatherings with Friends and Family: Three times a week     Active Member of Clubs or Organizations: Yes     Attends Club or Organization Meetings: 1 to 4 times per year     Marital Status:    Housing Stability: Low Risk  (12/15/2023)    Housing Stability Vital Sign     Unable to Pay for Housing in the Last Year: No     Number of Places Lived in the Last Year: 1     Unstable Housing in the Last Year: No       FAMILY  HISTORY:       Family History   Problem Relation Age of Onset    No Known Problems Father     Hypertension Mother     Kidney disease Maternal Aunt     Mental illness Brother     Breast cancer Neg Hx     Cancer Neg Hx     Colon cancer Neg Hx     Diabetes Neg Hx     Eclampsia Neg Hx     Miscarriages / Stillbirths Neg Hx     Ovarian cancer Neg Hx      labor Neg Hx     Stroke Neg Hx     Prostate cancer Neg Hx     Bladder Cancer Neg Hx        ALLERGIES AND MEDICATIONS: updated and reviewed.      274}  Review of patient's allergies indicates:   Allergen Reactions    Sulfa (sulfonamide antibiotics) Hives           Medication List with Changes/Refills   New Medications    HYDROXYZINE HCL (ATARAX) 25 MG TABLET    Take 1 tablet (25 mg total) by mouth 3 (three) times daily as needed for Itching or Anxiety.    KETOCONAZOLE (NIZORAL) 2 % SHAMPOO    Apply topically twice a week. for 14 days    MUPIROCIN (BACTROBAN) 2 % OINTMENT    Apply topically 3 (three) times daily. for 14 days   Current Medications    APIXABAN (ELIQUIS) 5 MG TAB    Take 1 tablet (5 mg total) by mouth 2 (two) times daily.    FERROUS SULFATE (FEOSOL) TAB TABLET    Take 1 tablet by mouth daily with breakfast.    MEDROXYPROGESTERONE (PROVERA) 10 MG TABLET    Take 1 tablet (10 mg total) by mouth once daily.    METFORMIN (GLUCOPHAGE) 500 MG TABLET    Take 1 tablet (500 mg total) by mouth 2 (two) times daily with meals.       SCREENING HISTORY:    274}  Health Maintenance         Date Due Completion Date    Hepatitis C Screening Never done ---    Pneumococcal Vaccines (Age 0-64) (1 of 2 - PCV) Never done ---    HIV Screening Never done ---    COVID-19 Vaccine (3 - 2023-24 season) 2023    Cervical Cancer Screening 12/15/2026 12/15/2023    TETANUS VACCINE 06/15/2028 6/15/2018            REVIEW OF SYSTEMS:   Review of Systems   All other systems reviewed and are negative.      PHYSICAL EXAM:      274}  /70 (BP Location: Right arm, Patient  "Position: Sitting, BP Method: Large (Manual))   Pulse 82   Temp 97.3 °F (36.3 °C)   Ht 5' 5" (1.651 m)   Wt (!) 138.9 kg (306 lb 3.5 oz)   SpO2 99%   BMI 50.96 kg/m²   Wt Readings from Last 3 Encounters:   01/29/24 (!) 138.9 kg (306 lb 3.5 oz)   12/15/23 (!) 139.5 kg (307 lb 8.7 oz)   02/01/23 131.5 kg (290 lb)     BP Readings from Last 3 Encounters:   01/29/24 130/70   12/15/23 (!) 144/82   02/01/23 130/80     Estimated body mass index is 50.96 kg/m² as calculated from the following:    Height as of this encounter: 5' 5" (1.651 m).    Weight as of this encounter: 138.9 kg (306 lb 3.5 oz).     Physical Exam  Vitals reviewed.   Constitutional:       General: She is not in acute distress.     Appearance: Normal appearance. She is well-developed. She is obese. She is not ill-appearing.   HENT:      Head: Normocephalic and atraumatic.      Right Ear: External ear normal.      Left Ear: External ear normal.      Nose: Nose normal.   Eyes:      Extraocular Movements: Extraocular movements intact.      Conjunctiva/sclera: Conjunctivae normal.      Pupils: Pupils are equal, round, and reactive to light.   Neck:      Thyroid: No thyroid mass.   Cardiovascular:      Rate and Rhythm: Normal rate and regular rhythm.      Pulses: Normal pulses.      Heart sounds: Normal heart sounds, S1 normal and S2 normal. No murmur heard.     No gallop.   Pulmonary:      Effort: Pulmonary effort is normal. No respiratory distress.      Breath sounds: Normal breath sounds and air entry. No stridor. No wheezing, rhonchi or rales.   Abdominal:      General: Bowel sounds are normal. There is no distension.      Palpations: Abdomen is soft. There is no mass.      Tenderness: There is no abdominal tenderness.   Musculoskeletal:         General: No swelling or deformity. Normal range of motion.      Cervical back: Normal range of motion and neck supple. No edema or erythema.   Skin:     General: Skin is warm and dry.      Findings: Lesion " present. No rash.   Neurological:      General: No focal deficit present.      Mental Status: She is alert and oriented to person, place, and time. Mental status is at baseline.   Psychiatric:         Mood and Affect: Mood normal.         Behavior: Behavior normal. Behavior is cooperative.         Judgment: Judgment normal.         LABS:   274}  I have reviewed old labs below:  Lab Results   Component Value Date    WBC 10.29 01/18/2024    HGB 10.3 (L) 01/18/2024    HCT 34.2 (L) 01/18/2024    MCV 79 (L) 01/18/2024     01/18/2024     01/18/2024    K 3.8 01/18/2024     01/18/2024    CALCIUM 9.1 01/18/2024    PHOS 3.8 06/02/2022    CO2 25 01/18/2024    GLU 86 01/18/2024    BUN 11 01/18/2024    CREATININE 0.8 01/18/2024    ANIONGAP 8 01/18/2024    ESTGFRAFRICA >60 07/25/2022    EGFRNONAA >60 07/25/2022    PROT 7.6 01/18/2024    ALBUMIN 3.7 01/18/2024    BILITOT 0.5 01/18/2024    ALKPHOS 69 01/18/2024    ALT 16 01/18/2024    AST 14 01/18/2024    INR 1.4 (H) 05/19/2022    CHOL 159 06/15/2018    TRIG 79 06/15/2018    HDL 38 (L) 06/15/2018    LDLCALC 105.2 06/15/2018    TSH 1.447 12/15/2023    HGBA1C 5.4 10/12/2021       ASSESSMENT AND PLAN:  274}  1. Encounter for annual general medical examination without abnormal findings in adult  -     HIV 1/2 Ag/Ab (4th Gen); Future; Expected date: 01/29/2024  -     Hepatitis C Antibody; Future; Expected date: 01/29/2024  -     TSH; Future; Expected date: 01/29/2024  -     Lipid Panel; Future; Expected date: 01/29/2024  -     Hemoglobin A1C; Future; Expected date: 01/29/2024    2. Chronic pulmonary embolism without acute cor pulmonale, unspecified pulmonary embolism type  Comments:  C/w Eliquis 5mg PO BID    3. Hx of pulmonary embolus  Comments:  Continue Eliquis 5 mg p.o. b.i.d.    4. Current moderate episode of major depressive disorder without prior episode  Comments:  Conrolled. Will continue present management    5. Syrinx of spinal cord  Overview:  Noted on  8/2022 MRI Thoracic spine: Interval positioning of shunt catheter within the upper thoracic spinal cord via posterior approach at the T3 level with marked interval decrease in previously demonstrated cord syrinx.     Assessment & Plan:  Condition is stable. Follow-up Neurosurgery.      6. Dry scalp  Comments:  Recommended avoidance of sulfur containing topical agents periods. Will send ketoconazole for use in scalp twice per week.  Orders:  -     ketoconazole (NIZORAL) 2 % shampoo; Apply topically twice a week. for 14 days  Dispense: 120 mL; Refill: 2  -     hydrOXYzine HCL (ATARAX) 25 MG tablet; Take 1 tablet (25 mg total) by mouth 3 (three) times daily as needed for Itching or Anxiety.  Dispense: 90 tablet; Refill: 3    7. Scar or fibrosis of skin due to burn  -     mupirocin (BACTROBAN) 2 % ointment; Apply topically 3 (three) times daily. for 14 days  Dispense: 22 g; Refill: 0           Orders Placed This Encounter   Procedures    HIV 1/2 Ag/Ab (4th Gen)    Hepatitis C Antibody    TSH    Lipid Panel    Hemoglobin A1C       Follow up in about 6 months (around 7/29/2024) for Follow-up ear lesion. or sooner as needed.

## 2024-02-02 ENCOUNTER — CLINICAL SUPPORT (OUTPATIENT)
Dept: REHABILITATION | Facility: HOSPITAL | Age: 34
End: 2024-02-02
Attending: NEUROLOGICAL SURGERY
Payer: COMMERCIAL

## 2024-02-02 ENCOUNTER — LAB VISIT (OUTPATIENT)
Dept: LAB | Facility: HOSPITAL | Age: 34
End: 2024-02-02
Attending: STUDENT IN AN ORGANIZED HEALTH CARE EDUCATION/TRAINING PROGRAM
Payer: COMMERCIAL

## 2024-02-02 DIAGNOSIS — Z74.09 DECREASED FUNCTIONAL MOBILITY AND ENDURANCE: ICD-10-CM

## 2024-02-02 DIAGNOSIS — R26.89 BALANCE PROBLEMS: ICD-10-CM

## 2024-02-02 DIAGNOSIS — R53.1 WEAKNESS: Primary | ICD-10-CM

## 2024-02-02 DIAGNOSIS — Z55.9 SPECIAL EDUCATIONAL NEEDS: ICD-10-CM

## 2024-02-02 DIAGNOSIS — Z00.00 ENCOUNTER FOR ANNUAL GENERAL MEDICAL EXAMINATION WITHOUT ABNORMAL FINDINGS IN ADULT: ICD-10-CM

## 2024-02-02 DIAGNOSIS — G93.5 CHIARI MALFORMATION TYPE I: ICD-10-CM

## 2024-02-02 DIAGNOSIS — G95.0 SYRINX OF SPINAL CORD: ICD-10-CM

## 2024-02-02 LAB
CHOLEST SERPL-MCNC: 155 MG/DL (ref 120–199)
CHOLEST/HDLC SERPL: 4.2 {RATIO} (ref 2–5)
ESTIMATED AVG GLUCOSE: 108 MG/DL (ref 68–131)
HBA1C MFR BLD: 5.4 % (ref 4–5.6)
HCV AB SERPL QL IA: NORMAL
HDLC SERPL-MCNC: 37 MG/DL (ref 40–75)
HDLC SERPL: 23.9 % (ref 20–50)
HIV 1+2 AB+HIV1 P24 AG SERPL QL IA: NORMAL
LDLC SERPL CALC-MCNC: 98.4 MG/DL (ref 63–159)
NONHDLC SERPL-MCNC: 118 MG/DL
TRIGL SERPL-MCNC: 98 MG/DL (ref 30–150)
TSH SERPL DL<=0.005 MIU/L-ACNC: 3.23 UIU/ML (ref 0.4–4)

## 2024-02-02 PROCEDURE — 80061 LIPID PANEL: CPT | Performed by: STUDENT IN AN ORGANIZED HEALTH CARE EDUCATION/TRAINING PROGRAM

## 2024-02-02 PROCEDURE — 36415 COLL VENOUS BLD VENIPUNCTURE: CPT | Mod: PO | Performed by: STUDENT IN AN ORGANIZED HEALTH CARE EDUCATION/TRAINING PROGRAM

## 2024-02-02 PROCEDURE — 87389 HIV-1 AG W/HIV-1&-2 AB AG IA: CPT | Performed by: STUDENT IN AN ORGANIZED HEALTH CARE EDUCATION/TRAINING PROGRAM

## 2024-02-02 PROCEDURE — 97162 PT EVAL MOD COMPLEX 30 MIN: CPT | Mod: PN

## 2024-02-02 PROCEDURE — 84443 ASSAY THYROID STIM HORMONE: CPT | Performed by: STUDENT IN AN ORGANIZED HEALTH CARE EDUCATION/TRAINING PROGRAM

## 2024-02-02 PROCEDURE — 86803 HEPATITIS C AB TEST: CPT | Performed by: STUDENT IN AN ORGANIZED HEALTH CARE EDUCATION/TRAINING PROGRAM

## 2024-02-02 PROCEDURE — 83036 HEMOGLOBIN GLYCOSYLATED A1C: CPT | Performed by: STUDENT IN AN ORGANIZED HEALTH CARE EDUCATION/TRAINING PROGRAM

## 2024-02-02 SDOH — SOCIAL DETERMINANTS OF HEALTH (SDOH): PROBLEMS RELATED TO EDUCATION AND LITERACY, UNSPECIFIED: Z55.9

## 2024-02-03 NOTE — PLAN OF CARE
"[facility]   Physical Therapy Initial Evaluation     Date: 2/2/2024   Name: Jamilah French  Clinic Number: [id number]    Therapy Diagnosis:   Encounter Diagnoses   Name Primary?    Chiari malformation type I     Syrinx of spinal cord     Weakness Yes    Balance problems     Decreased functional mobility and endurance     Special educational needs      Physician: Kirk Reis MD    Physician Orders: PT Eval and Treat   Medical Diagnosis from Referral: G93.5 (ICD-10-CM) - Chiari malformation type I G95.0 (ICD-10-CM) - Syrinx of spinal cord  Evaluation Date: 2/2/2024  Authorization Period Expiration: 01/24/2025  Plan of Care Expiration: 03/22/2024 at 2x/wk x 6 weeks  Progress Note Due: 03/02/2024  Visit # / Visits authorized: 1/ 1   FOTO: 1/ 3     Time In: 1:39 pm  Time Out: 2:41 pm  Total Appointment Time (timed & untimed codes): 62 minutes    Precautions: Standard and previous cervical and thoracic surgeries and history of blood clots.   SUBJECTIVE   Date of onset: The patient reports these current complaints for approximately one year.     History of current condition - Jamilah reports: In December of 2021 she had her first surgery due to a Chiari Malformation. She reports that she prolonged the surgery too long as some permanent nerve damage was present. She reports that after her initial surgery she had pins and needles down the right side of her body. This still exists. Then in May of 2022, she had to have a second surgery to place a shunt to help drain the spinal cord fluid. She reports that when she woke up from the second surgery she had pins and needles from the left side of her waist down to her left foot. She eventually got normal feeling back from the left waist to just above the left knee. However, she still has pins and needles from the left knee to the left foot. She had trouble with her left leg dragging. She had PT in November of 2022, but she reports that it "did " "not help much." She has tried to continue with her home program on and off since then. "I can walk, but I have to adjust the way I walk due to the left leg. If I walk or stand too long, I will have to use a cane and lean on the cane. If I go up and down stairs, I have to lean on the hand rail." She also points out that she had blood clots after both surgeries. At times, she has shortness of breath or chest pain. She thinks she may be having a clot, but she believes it is based on being nervous from when she had the actual clots. She reports that her Dr has informed her that her Eliquis medication has her clots under control. Currently, the pain in her left knee is intermittent. However, the pain in her left foot is constant.                          Falls: She reports that she had nearly fallen a few times when her left knee almost gave out, but that she grabbed onto something to prevent the fall.     Imaging: There is no recent imaging to refer too.    Prior Therapy: The patient had PT in 2022 for similar problems.   Social History: Jamilah lives with her family.   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     Prior Level of Function: She reports no limitations before having issues related to her Chiari Malformation.   Current Level of Function: She reports that she avoids long walks. She needs to lean her body at times to compensate for left leg pain. She will need an assistive device at times She reports left leg fatigue. She can't stand too long. Her desired activities are limited for fear of fatigue and then injury. She can't christy after her puppy. She reports left foot drag at times when her leg is fatigued.       Pain:         Current 5/10, worst 8/10, best 5/10   Location: The left foot and the left knee.   Description: Pins and needles in her foot, and her knee is a dull ache that will turn into pins and needles.   Aggravating Factors: Prolonged standing " "or walking, going up and down stairs, wearing closed toe shoes.            Easing Factors: She adjusts how she walks. She changes positions. She avoids activities. She avoids wearing closed to shoes.     Patients goals: " I'd love for the pins and needles to go away in my left foot. It affects wearing closed toe shoes. I want to get stronger too, so that I don't lean and limp when I walk too much."      Medical History:   Past Medical History:   Diagnosis Date    Bilateral pulmonary embolism 1/6/2022    Current moderate episode of major depressive disorder without prior episode 6/15/2018    Radiculopathy, cervical region 10/12/2021     Surgical History:   Jamilah French  has a past surgical history that includes Hysteroscopy with dilation and curettage of uterus (N/A, 10/26/2020); Decompression of Chiari malformation by removal of posterior arch of first cervical vertebra (N/A, 12/23/2021); and Revision of procedure involving syringopleural shunt (N/A, 5/19/2022).    Medications:   Jamilah has a current medication list which includes the following prescription(s): apixaban, ferrous sulfate, hydroxyzine hcl, ketoconazole, medroxyprogesterone, metformin, mupirocin, and [DISCONTINUED] metoprolol tartrate.    Allergies:   Review of patient's allergies indicates:   Allergen Reactions    Sulfa (sulfonamide antibiotics) Hives            OBJECTIVE     Observation: She has a good brenda. She does not appear in distress. She is flat footed bilaterally.     Range of Motion: She has normal knee range of motion bilaterally.  Knee Left active Right Active   Flexion WFLs WFLs   Extension WFLs WFLs     Lower Extremity Strength  Right LE  Left LE    Knee extension: 5/5 Knee extension: 4/5   Knee flexion: 4-/5 Knee flexion: 4-/5   Ankle dorsiflexion: 5/5 Ankle dorsiflexion: 4+/5   Ankle plantarflexion: 4+/5 Ankle plantarflexion: 4+/5   Ankle Inversion 4+/5 Ankle Inversion 4+/5   Ankle Eversion 4/5 Ankle Eversion 4/5 "       Special Tests:   Left Right   Valgus Stress Test (MCL) Negative Negative   Varus Stress test (LCL) Negative Negative   Lachman's test (ACL) Negative Negative   Posterior Lachman (PCL) Negative Negative   Clair's Test (Meniscus) Negative Negative   Apley's Compression (Meniscus) Negative Negative   Patellar Grind Test (PFS/OA) Negative Negative       Joint Mobility: There is normal patella mobility bilaterally.    Palpation: No significant tenderness noted at the left knee. She was tender with deep palpation to the left forefoot and lateral malleolus areas.     Sensation: She has diminished light touch and pin prick in her bilateral lower extremities.     Flexibility:     Popliteal Angle (Hamstring tone):                        R = (-) ;     L = (+) Minimal Increase   Gastrocnemius tone:                                              R = (-) ;     L = (+) Moderate Increase     Edema: There is no visible left knee or ankle edema at the evaluation.    Timed up and go: 1) 8.31 seconds  2) 7.65  seconds  3) 7.83  seconds = 23.79 seconds (7.93 seconds average)      Single leg stance test: 3 attempt average:    Right: 5.91 seconds     Left: 3.545  seconds    Limitation/Restriction for FOTO Foot Survey    Therapist reviewed FOTO scores for Jamilah French on 2/2/2024.   FOTO documents entered into [employer] - see Media section.    Limitation Score: 61% Function at the Evaluation       TREATMENT     Total Treatment time (time-based codes) separate from Evaluation: N/A minutes     PATIENT EDUCATION AND HOME EXERCISES     Education provided:   -The patient was educated on the findings of her evaluation and her prognosis expected. She will receive her initial written home exercise program at her first follow up PT session.    Written Home Exercises Provided:  Jamilah demonstrated good  understanding of the education provided. See EMR under Patient Instructions for exercises provided during therapy sessions.  ASSESSMENT      Jamilah is a 33 y.o. female referred to outpatient Physical Therapy with a medical diagnosis of G93.5 (ICD-10-CM) - Chiari malformation type I G95.0 (ICD-10-CM) - Syrinx of spinal cord. She continues with periodic exacerbations of left knee and foot pain. She has decreased strength. She has decreased balance. She has decreased functional ability and endurance with activities. She needs patient education and a custom written home exercise program.     Patient prognosis is Fair.   Patientt will benefit from skilled outpatient Physical Therapy to address the deficits stated above and in the chart below, provide patient /family education, and to maximize patientt's level of independence.     Plan of care discussed with patient: Yes  Patient's spiritual, cultural and educational needs considered and patient is agreeable to the plan of care and goals as stated below:     Anticipated Barriers for therapy: co-morbidities from previous surgeries and the chronicity of the original injury. However, she remains motivated for improvement.     Medical Necessity is demonstrated by the following  History  Co-morbidities and personal factors that may impact the plan of care Co-morbidities:   coping style/mechanism, depression, difficulty sleeping, level of undertstanding of current condition, and bilateral pulmonary embolism, cervical radiculopathy, and two previous neck surgeries.     Personal Factors:   coping style  lifestyle  character  attitudes     high   Examination  Body Structures and Functions, activity limitations and participation restrictions that may impact the plan of care Body Regions:   lower extremities and trunk    Body Systems:    strength  gross coordinated movement  balance  gait  motor control  motor learning  Increased muscle tone and pain control.    Participation Restrictions:   none    Activity limitations:   Learning and applying knowledge  no deficits    General Tasks and Commands  no  deficits    Communication  no deficits    Mobility  lifting and carrying objects  walking  driving (bike, car, motorcycle)    Self care  dressing  looking after one's health    Domestic Life  shopping  cooking  doing house work (cleaning house, washing dishes, laundry)  assisting others    Interactions/Relationships  no deficits    Life Areas  school education  employment    Community and Social Life  community life  recreation and leisure         moderate   Clinical Presentation stable and uncomplicated moderate   Decision Making/ Complexity Score: moderate     Goals:    STG  Weeks/Visits Date Established  Date Met   1.Decrease her max left foot and knee pain to 6/10 in order to improve her quality of life. 3 weeks. 2/2/2024   In Progress   2. Increase her left knee and ankle strength a 1/2 grade in order to improve her weight bearing activity endurance without an assistive device.  3 weeks. 2/2/2024   In Progress   3.Decrease her Timed up and Go average to <=7.4 seconds in order to improve her ability to safely christy after her puppy.  3 weeks. 2/2/2024   In Progress   4.Increase her single leg stance time on her left leg to >=4 seconds in order to improve the safety of her gait when she ambulates long distances.  3 weeks. 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   6.Fair Initial written home exercise program knowledge and be without questions in order to have carryover after discharge from PT.  3 weeks.          3 weeks. 2/2/2024          2/3/2024     In Progress          In Progress               LTG Weeks/Visits Date Established  Date Met   1.Decrease her max left foot and knee pain to 4/10 in order to improve her quality of life. 6 weeks.  2/2/2024   In Progress   2. Increase her left knee and ankle strength one grade from the evaluation date order to improve her weight bearing activity endurance without an assistive device on all  surfaces.  6 weeks.  2/2/2024     In Progress   3.Decrease her Timed up and Go average to <=7.0 seconds in order to improve her ability to safely christy after her puppy.  6 weeks.  2/2/2024   In Progress   4.Increase her single leg stance time on her left leg to >=4.5 seconds in order to improve the safety of her gait when she ambulates long distances.  6 weeks.  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   6. Good Progressed written home exercise program knowledge and be without questions in order to have carryover after discharge from PT.  6 weeks.           6 weeks.  2/2/2024          2/3/2024     In Progress          In Progress             In  PLAN   Plan of care Certification: 2/2/2024 to 03/22/2024.    Outpatient Physical Therapy 2 times weekly for 6 weeks to include the following interventions: Electrical Stimulation unattended and [place], Gait Training, Manual Therapy, Moist Heat/ Ice, Neuromuscular Re-ed, Patient Education, Self Care, Therapeutic Activities, Therapeutic Exercise, and care by a PTA.     Emery Tuttle, PT      I CERTIFY THE NEED FOR THESE SERVICES FURNISHED UNDER THIS PLAN OF TREATMENT AND WHILE UNDER MY CARE   Physician's comments:     Physician's Signature: ___________________________________________________

## 2024-02-08 NOTE — PROGRESS NOTES
"[facility]   Physical Therapy Treatment Note     Name: Jamilah French  Clinic Number: [id number]    Therapy Diagnosis:   Encounter Diagnoses   Name Primary?    Weakness Yes    Balance problems     Decreased functional mobility and endurance     Special educational needs      Physician: Kirk Reis MD    Visit Date: 2/9/2024    Physician Orders: PT Eval and Treat   Medical Diagnosis from Referral: G93.5 (ICD-10-CM) - Chiari malformation type I G95.0 (ICD-10-CM) - Syrinx of spinal cord  Evaluation Date: 2/2/2024  Authorization Period Expiration: 12/31/2025  Plan of Care Expiration: 03/22/2024 at 2x/wk x 6 weeks  Progress Note Due: 03/02/2024  Visit # / Visits authorized: 1/20   (2/12 on the plan of care)  FOTO: 1/ 3   PTA Visit #: 0/5      Time In: 1:38 pm   Time Out: 2:27 pm  Total Appointment Time (timed & untimed codes): 45 minutes     Precautions: Standard and previous cervical and thoracic surgeries and history of blood clots.   SUBJECTIVE     Pt reports: "My left knee is real achy today. I'd say it and the lower part of the leg are like a 4/10 today." PT acknowledges.    She was compliant with her home exercise program.    Response to previous treatment: This is the patient's first PT session since the initial evaluation.  Functional change: This is the patient's first PT session since the initial evaluation.    Pain: 4/10 upon entering the clinic this day.   Location:  The left foot and the left knee.   OBJECTIVE     Objective Measures updated at progress report unless specified.     Treatment     Jamilah received the treatments listed below:      Jamilah received therapeutic exercises to develop strength, endurance, ROM, flexibility, posture, and core stabilization for 10 minutes including:  -Precor back machine with 40 pounds x 25  -Precor knee flexion with 35 pounds x 25  -Precor knee extension with 20 pounds x 10 and with 10 pounds x 10  -Precor hip abduction with 30 " pounds x 25    Jamilah participated in neuromuscular re-education activities to improve: Balance, Coordination, Kinesthetic, Sense, Proprioception, and Posture for 35 minutes. The following activities were included:  -Stand up elliptical Level 1 x 4 minutes forwards and backwards. (Greater pain going forward)  -single leg stance on blue foam with 5 second hold x 10  -Rhomberg stance on blue foam eyes closed for 4 seconds x 10  -stand on left leg and right leg three way heel taps x 10  -Left ankle red banded: inversion, and eversion x 20 each  -sit to stand from 4th black box on blue foam without upper extremity use x 15  -bilateral long arc quads with 3 pound ankle weights x 10 each leg with 2 second hold  -supine green banded clamshell x 15  -Lumbar extension against the cabinet with 3 second hold x 10    Jamilah participated in dynamic functional therapeutic activities to improve functional performance for 0  minutes, including:  +++ADD CORE EXERCISES+++  -TRX squats  -black band walking: forwards, backward, and bilaterally x 2 minutes each    Patient Education and Home Exercises     Home Exercises Provided and Patient Education Provided     Education provided:   -The patient received her initial written home exercise program this day. She returned demo to PT and was without questions.     Written Home Exercises Provided: yes. Exercises were reviewed and Jamilah was able to demonstrate them prior to the end of the session.  Jamilah demonstrated good  understanding of the education provided. See EMR under Patient Instructions for exercises provided during therapy sessions.  ASSESSMENT     Jamilah continued her routine. Today, she focused on the proper resistances and techniques needed to improve her strength and balance. Her left ankle weakness contributes to her decreased single leg stance on the left. However, she also had decreased proprioception. Surface textures were adjusted to increase safety and to prevent fall.  Muscle fatigue was noted. She needs continued skilled PT to help her reach her goals. She worked hard. She was compliant with PT. She received her initial written home program this day. She tolerated today's PT session well.     Jamilah Is progressing well towards her goals.   The patient's prognosis is Fair.     The patient will continue to benefit from skilled outpatient physical therapy in order to address the deficits listed in the problem list box on the initial evaluation, provide patient/family education and to maximize the patient's level of independence in the home and in the community environment.     The patient's spiritual, cultural and educational needs were considered, and the patient was agreeable to the plan of care and its goals.     Anticipated barriers to physical therapy: co-morbidities from previous surgeries and the chronicity of the original injury. However, she remains motivated for improvement.     Goals:     STG  Weeks/Visits Date Established  Date Met   1.Decrease her max left foot and knee pain to 6/10 in order to improve her quality of life. 3 weeks. 2/2/2024    In Progress 2/9/2024     2. Increase her left knee and ankle strength a 1/2 grade in order to improve her weight bearing activity endurance without an assistive device.  3 weeks. 2/2/2024    In Progress 2/9/2024     3.Decrease her Timed up and Go average to <=7.4 seconds in order to improve her ability to safely christy after her puppy.  3 weeks. 2/2/2024    In Progress 2/9/2024     4.Increase her single leg stance time on her left leg to >=4 seconds in order to improve the safety of her gait when she ambulates long distances.  3 weeks. 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   6.Fair Initial written home exercise program knowledge and be without questions in order to have carryover after discharge from PT.  3 weeks.        3 weeks.  2/2/2024       2/3/2024       In Progress 2/9/2024        In Progress 2/9/2024                       LTG Weeks/Visits Date Established  Date Met   1.Decrease her max left foot and knee pain to 4/10 in order to improve her quality of life. 6 weeks.  2/2/2024    In Progress 2/9/2024     2. Increase her left knee and ankle strength one grade from the evaluation date order to improve her weight bearing activity endurance without an assistive device on all surfaces.  6 weeks.  2/2/2024       In Progress 2/9/2024     3.Decrease her Timed up and Go average to <=7.0 seconds in order to improve her ability to safely christy after her puppy.  6 weeks.  2/2/2024    In Progress 2/9/2024     4.Increase her single leg stance time on her left leg to >=4.5 seconds in order to improve the safety of her gait when she ambulates long distances.  6 weeks.  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   6. Good Progressed written home exercise program knowledge and be without questions in order to have carryover after discharge from PT.  6 weeks.         6 weeks.  2/2/2024         2/3/2024       In Progress 2/9/2024       In Progress 2/9/2024\                    PLAN     Plan of care Certification: 2/2/2024 to 03/22/2024. Outpatient Physical Therapy 2 times weekly for 6 weeks. Plan to improve her core and lower extremity strength in an attempt to improve her static and dynamic balance and reduce her pain complaints. Plan to continue to progress her home program as she tolerates.     Emery Tuttle, PT

## 2024-02-09 ENCOUNTER — CLINICAL SUPPORT (OUTPATIENT)
Dept: REHABILITATION | Facility: HOSPITAL | Age: 34
End: 2024-02-09
Payer: COMMERCIAL

## 2024-02-09 ENCOUNTER — TELEPHONE (OUTPATIENT)
Dept: FAMILY MEDICINE | Facility: CLINIC | Age: 34
End: 2024-02-09
Payer: COMMERCIAL

## 2024-02-09 DIAGNOSIS — R53.1 WEAKNESS: Primary | ICD-10-CM

## 2024-02-09 DIAGNOSIS — R26.89 BALANCE PROBLEMS: ICD-10-CM

## 2024-02-09 DIAGNOSIS — Z74.09 DECREASED FUNCTIONAL MOBILITY AND ENDURANCE: ICD-10-CM

## 2024-02-09 DIAGNOSIS — Z55.9 SPECIAL EDUCATIONAL NEEDS: ICD-10-CM

## 2024-02-09 PROCEDURE — 97112 NEUROMUSCULAR REEDUCATION: CPT | Mod: PN

## 2024-02-09 PROCEDURE — 97110 THERAPEUTIC EXERCISES: CPT | Mod: PN

## 2024-02-09 SDOH — SOCIAL DETERMINANTS OF HEALTH (SDOH): PROBLEMS RELATED TO EDUCATION AND LITERACY, UNSPECIFIED: Z55.9

## 2024-02-09 NOTE — TELEPHONE ENCOUNTER
Spoke with patient in regards to appointment on 2/29/24 at 8:00am with CORETTA Caceres needing to be rescheduled due to [place]. PT has been rescheduled for 2/23/24 at 3:30pm  with CORETTA Caceres.   Pt verbalized understanding

## 2024-02-16 ENCOUNTER — CLINICAL SUPPORT (OUTPATIENT)
Dept: REHABILITATION | Facility: HOSPITAL | Age: 34
End: 2024-02-16
Payer: COMMERCIAL

## 2024-02-16 DIAGNOSIS — Z55.9 SPECIAL EDUCATIONAL NEEDS: ICD-10-CM

## 2024-02-16 DIAGNOSIS — R26.89 BALANCE PROBLEMS: ICD-10-CM

## 2024-02-16 DIAGNOSIS — Z74.09 DECREASED FUNCTIONAL MOBILITY AND ENDURANCE: ICD-10-CM

## 2024-02-16 DIAGNOSIS — R53.1 WEAKNESS: Primary | ICD-10-CM

## 2024-02-16 PROCEDURE — 97110 THERAPEUTIC EXERCISES: CPT | Mod: PN

## 2024-02-16 PROCEDURE — 97112 NEUROMUSCULAR REEDUCATION: CPT | Mod: PN

## 2024-02-16 SDOH — SOCIAL DETERMINANTS OF HEALTH (SDOH): PROBLEMS RELATED TO EDUCATION AND LITERACY, UNSPECIFIED: Z55.9

## 2024-02-16 NOTE — PROGRESS NOTES
"[facility]   Physical Therapy Treatment Note     Name: Jamilah French  Clinic Number: [id number]    Therapy Diagnosis:   Encounter Diagnoses   Name Primary?    Weakness Yes    Balance problems     Decreased functional mobility and endurance     Special educational needs      Physician: Kirk Reis MD    Visit Date: 2/16/2024    Physician Orders: PT Eval and Treat   Medical Diagnosis from Referral: G93.5 (ICD-10-CM) - Chiari malformation type I G95.0 (ICD-10-CM) - Syrinx of spinal cord  Evaluation Date: 2/2/2024  Authorization Period Expiration: 12/31/2025  Plan of Care Expiration: 03/22/2024 at 2x/wk x 6 weeks  Progress Note Due: 03/02/2024  Visit # / Visits authorized: 2/20   (3/12 on the plan of care)  FOTO: 1/ 3   PTA Visit #: 0/5      Time In: 1:42 pm   Time Out: 2:31 pm  Total Appointment Time (timed & untimed codes): 40 minutes     Precautions: Standard and previous cervical and thoracic surgeries and history of blood clots.   SUBJECTIVE     Pt reports: "Today, it's the lower back. It's not bad though. It's like a 2-3/10." PT acknowledges.    She was compliant with her home exercise program.    Response to previous treatment: She enters with no new complaints.   Functional change: No changes to report since the last PT session.    Pain: 2-3/10 upon entering the clinic this day.   Location:  The left foot and the left knee.   OBJECTIVE     Objective Measures updated at progress report unless specified.     Treatment     Jamilah received the treatments listed below:      Jamilah received therapeutic exercises to develop strength, endurance, ROM, flexibility, posture, and core stabilization for 10 minutes including:  -Precor back machine with 40 pounds x 25  -Precor knee flexion with 35 pounds x 25  -Precor knee extension with 10 pounds  2 x 10  -Precor hip abduction with 40 pounds x 25    Jamilah participated in neuromuscular re-education activities to improve: Balance, " Coordination, Kinesthetic, Sense, Proprioception, and Posture for 30 minutes. The following activities were included:  -+Nu-step on Level 2 x 6 minutes  -+bilateral hip extension and abduction with red band x 15 each  -+supine red band marching x 15 each  -+supine red ball abdominal crunches: forward, left, and right with 2 second holds x 10 each direction  -sit to stand from 3rd black box sitting on blue foam without upper extremity use x 15    Add at the next PT session:  Paloff press    Below not performed this day:  -Stand up elliptical Level 1 x 4 minutes forwards and backwards. (Greater pain going forward)  -single leg stance on blue foam with 5 second hold x 10  -Rhomberg stance on blue foam eyes closed for 4 seconds x 10  -stand on left leg and right leg three way heel taps x 10  -Left ankle red/green banded: inversion, and eversion x 20 each  -bilateral long arc quads with 3 pound ankle weights x 10 each leg with 2 second hold  -supine green banded clamshell x 15  -Lumbar extension against the cabinet with 3 second hold x 10    Jamilah participated in dynamic functional therapeutic activities to improve functional performance for 0  minutes, including:  -TRX squats  -black band walking: forwards, backward, and bilaterally x 2 minutes each    Patient Education and Home Exercises     Home Exercises Provided and Patient Education Provided     Education provided:   -The patient received her initial written home exercise program this day. She returned demo to PT and was without questions.   -+02/16/2024 The patient received updates to her written home exercise program. She returned demo to PT and was without questions.     Written Home Exercises Provided: yes. Exercises were reviewed and Jamilah was able to demonstrate them prior to the end of the session.  Jamilah demonstrated good  understanding of the education provided. See EMR under Patient Instructions for exercises provided during therapy  sessions.  ASSESSMENT     Jamilah continued her routine. Today, she focused on safely adding core exercises. PT adjusted positioning where needed. Core muscle fatigue was noted. She can benefit from continued core and proximal trunk strengthening in order to improve her distal extremity strength. Both should improve her balance and proprioception in the long term. She worked hard. She was compliant with PT. Her home program was updated this day. She tolerated today's PT session well.     Jamilah Is progressing well towards her goals.   The patient's prognosis is Fair.     The patient will continue to benefit from skilled outpatient physical therapy in order to address the deficits listed in the problem list box on the initial evaluation, provide patient/family education and to maximize the patient's level of independence in the home and in the community environment.     The patient's spiritual, cultural and educational needs were considered, and the patient was agreeable to the plan of care and its goals.     Anticipated barriers to physical therapy: co-morbidities from previous surgeries and the chronicity of the original injury. However, she remains motivated for improvement.     Goals:     STG  Weeks/Visits Date Established  Date Met   1.Decrease her max left foot and knee pain to 6/10 in order to improve her quality of life. 3 weeks. 2/2/2024    In Progress 2/17/2024     2. Increase her left knee and ankle strength a 1/2 grade in order to improve her weight bearing activity endurance without an assistive device.  3 weeks. 2/2/2024    In Progress 2/17/2024     3.Decrease her Timed up and Go average to <=7.4 seconds in order to improve her ability to safely christy after her puppy.  3 weeks. 2/2/2024    In Progress 2/17/2024     4.Increase her single leg stance time on her left leg to >=4 seconds in order to improve the safety of her gait when she ambulates long distances.  3 weeks. 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   6.Fair Initial written home exercise program knowledge and be without questions in order to have carryover after discharge from PT.  3 weeks.        3 weeks. 2/2/2024       2/3/2024       In Progress 2/17/2024        In Progress 2/17/2024                       LTG Weeks/Visits Date Established  Date Met   1.Decrease her max left foot and knee pain to 4/10 in order to improve her quality of life. 6 weeks.  2/2/2024    In Progress 2/17/2024     2. Increase her left knee and ankle strength one grade from the evaluation date order to improve her weight bearing activity endurance without an assistive device on all surfaces.  6 weeks.  2/2/2024       In Progress 2/17/2024     3.Decrease her Timed up and Go average to <=7.0 seconds in order to improve her ability to safely christy after her puppy.  6 weeks.  2/2/2024    In Progress 2/17/2024     4.Increase her single leg stance time on her left leg to >=4.5 seconds in order to improve the safety of her gait when she ambulates long distances.  6 weeks.  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   6. Good Progressed written home exercise program knowledge and be without questions in order to have carryover after discharge from PT.  6 weeks.         6 weeks.  2/2/2024         2/3/2024       In Progress 2/17/2024       In Progress 2/17/2024\                    PLAN     Plan of care Certification: 2/2/2024 to 03/22/2024. Outpatient Physical Therapy 2 times weekly for 6 weeks. Plan to improve her core and lower extremity strength in an attempt to improve her static and dynamic balance and reduce her pain complaints. Plan to continue to progress her home program as she tolerates.     Emery Tuttle, PT

## 2024-02-23 ENCOUNTER — OFFICE VISIT (OUTPATIENT)
Dept: FAMILY MEDICINE | Facility: CLINIC | Age: 34
End: 2024-02-23
Payer: COMMERCIAL

## 2024-02-23 ENCOUNTER — CLINICAL SUPPORT (OUTPATIENT)
Dept: REHABILITATION | Facility: HOSPITAL | Age: 34
End: 2024-02-23
Payer: COMMERCIAL

## 2024-02-23 VITALS
BODY MASS INDEX: 48.82 KG/M2 | DIASTOLIC BLOOD PRESSURE: 70 MMHG | HEART RATE: 88 BPM | OXYGEN SATURATION: 97 % | RESPIRATION RATE: 18 BRPM | WEIGHT: 293 LBS | HEIGHT: 65 IN | SYSTOLIC BLOOD PRESSURE: 118 MMHG

## 2024-02-23 DIAGNOSIS — R26.89 BALANCE PROBLEMS: ICD-10-CM

## 2024-02-23 DIAGNOSIS — Z74.09 DECREASED FUNCTIONAL MOBILITY AND ENDURANCE: ICD-10-CM

## 2024-02-23 DIAGNOSIS — R53.1 WEAKNESS: Primary | ICD-10-CM

## 2024-02-23 DIAGNOSIS — R06.83 SNORING: ICD-10-CM

## 2024-02-23 DIAGNOSIS — R23.8 DRY SCALP: Primary | ICD-10-CM

## 2024-02-23 DIAGNOSIS — E66.01 OBESITY, MORBID, BMI 40.0-49.9: ICD-10-CM

## 2024-02-23 DIAGNOSIS — R06.81 WITNESSED EPISODE OF APNEA: ICD-10-CM

## 2024-02-23 DIAGNOSIS — Z55.9 SPECIAL EDUCATIONAL NEEDS: ICD-10-CM

## 2024-02-23 PROCEDURE — 97110 THERAPEUTIC EXERCISES: CPT | Mod: PN

## 2024-02-23 PROCEDURE — 3074F SYST BP LT 130 MM HG: CPT | Mod: CPTII,S$GLB,, | Performed by: FAMILY MEDICINE

## 2024-02-23 PROCEDURE — 1160F RVW MEDS BY RX/DR IN RCRD: CPT | Mod: CPTII,S$GLB,, | Performed by: FAMILY MEDICINE

## 2024-02-23 PROCEDURE — 3078F DIAST BP <80 MM HG: CPT | Mod: CPTII,S$GLB,, | Performed by: FAMILY MEDICINE

## 2024-02-23 PROCEDURE — 99999 PR PBB SHADOW E&M-EST. PATIENT-LVL IV: CPT | Mod: PBBFAC,,, | Performed by: FAMILY MEDICINE

## 2024-02-23 PROCEDURE — 3008F BODY MASS INDEX DOCD: CPT | Mod: CPTII,S$GLB,, | Performed by: FAMILY MEDICINE

## 2024-02-23 PROCEDURE — 97112 NEUROMUSCULAR REEDUCATION: CPT | Mod: PN

## 2024-02-23 PROCEDURE — 99213 OFFICE O/P EST LOW 20 MIN: CPT | Mod: S$GLB,,, | Performed by: FAMILY MEDICINE

## 2024-02-23 PROCEDURE — 1159F MED LIST DOCD IN RCRD: CPT | Mod: CPTII,S$GLB,, | Performed by: FAMILY MEDICINE

## 2024-02-23 PROCEDURE — 3044F HG A1C LEVEL LT 7.0%: CPT | Mod: CPTII,S$GLB,, | Performed by: FAMILY MEDICINE

## 2024-02-23 RX ORDER — KETOCONAZOLE 20 MG/ML
SHAMPOO, SUSPENSION TOPICAL
Qty: 120 ML | Refills: 2 | Status: SHIPPED | OUTPATIENT
Start: 2024-02-26

## 2024-02-23 SDOH — SOCIAL DETERMINANTS OF HEALTH (SDOH): PROBLEMS RELATED TO EDUCATION AND LITERACY, UNSPECIFIED: Z55.9

## 2024-02-23 NOTE — PROGRESS NOTES
"[facility]   Physical Therapy Treatment Note     Name: Jamilah French  Clinic Number: [id number]    Therapy Diagnosis:   Encounter Diagnoses   Name Primary?    Weakness Yes    Balance problems     Decreased functional mobility and endurance     Special educational needs      Physician: Kirk Reis MD    Visit Date: 2/23/2024    Physician Orders: PT Eval and Treat   Medical Diagnosis from Referral: G93.5 (ICD-10-CM) - Chiari malformation type I G95.0 (ICD-10-CM) - Syrinx of spinal cord  Evaluation Date: 2/2/2024  Authorization Period Expiration: 12/31/2025  Plan of Care Expiration: 03/22/2024 at 2x/wk x 6 weeks  Progress Note Due: 03/01/2024  Visit # / Visits authorized: 3/20   (4/12 on the plan of care)  FOTO: 1/ 3         ++++++++Do second FOTO 03/01/2024++++++++++  PTA Visit #: 0/5      Time In: 1:44 pm   Time Out: 2:36 pm  Total Appointment Time (timed & untimed codes): 40 minutes     Precautions: Standard and previous cervical and thoracic surgeries and history of blood clots.   SUBJECTIVE     Pt reports: "I have noticed less left leg pain since starting PT. Like today, I have not had any of the original left leg pain. So, that has gotten better since starting the PT. It's not pain free all the time, but it is some of the time now."     She was compliant with her home exercise program.    Response to previous treatment: She enters with no new complaints.   Functional change: She is beginning to have days and parts of days with no left leg pain.    Pain: 0/10 upon entering the clinic this day.   Location:  The left foot and the left knee.   OBJECTIVE     Objective Measures updated at progress report unless specified.     Treatment     Jamilah received the treatments listed below:      Jamilah received therapeutic exercises to develop strength, endurance, ROM, flexibility, posture, and core stabilization for 10 minutes including:  -Precor back machine with 50 pounds x " 25  -Precor knee flexion with 40 pounds x 25  -Precor knee extension with 15 pounds x 25  -Precor hip abduction with 45 pounds x 25    Jamilah participated in neuromuscular re-education activities to improve: Balance, Coordination, Kinesthetic, Sense, Proprioception, and Posture for 30 minutes. The following activities were included:  -Nu-step on Level 3 x 6 minutes  -+Bilateral forward step ups on 6 inch step working on lowering down slowly x 10 each leg  -+Bilateral forward step downs on 6 inch step working on stepping back up slowly x 10 each  -+Bilateral forward step overs on 6 inch step x 6 each (4 on left leg)  -+Bilateral Paloff press with blue band x 15 each  -sit to stand from 3rd black box sitting on blue foam without upper extremity use x 8    Below not performed this day:  -bilateral hip extension and abduction with red band x 15 each  -supine red band marching x 15 each  -supine red ball abdominal crunches: forward, left, and right with 2 second holds x 10 each direction  -Stand up elliptical Level 1 x 4 minutes forwards and backwards. (Greater pain going forward)  -stand on left leg and right leg three way heel taps x 10  -Left ankle red/green banded: inversion, and eversion x 20 each  -bilateral long arc quads with 3 pound ankle weights x 10 each leg with 2 second hold  -supine green banded clamshell x 15  -Lumbar extension against the cabinet with 3 second hold x 10    Jamilah participated in dynamic functional therapeutic activities to improve functional performance for 0 minutes, including:  -TRX squats  -black band walking: forwards, backward, and bilaterally x 2 minutes each    Patient Education and Home Exercises     Home Exercises Provided and Patient Education Provided     Education provided:   -The patient received her initial written home exercise program this day. She returned demo to PT and was without questions.   -02/16/2024 The patient received updates to her written home exercise  program. She returned demo to PT and was without questions.     Written Home Exercises Provided: Patient instructed to cont prior HEP. Exercises were reviewed and Jamilah was able to demonstrate them prior to the end of the session.  Jamilah demonstrated good  understanding of the education provided. See EMR under Patient Instructions for exercises provided during therapy sessions.  ASSESSMENT     Jamilah continued her routine. Today, she entered with no left leg pain for the first time. Strengthening and proprioceptive exercises were continued. She was able to safely progress her Precor resistances. Decreased bilateral proprioception was noted. The left leg remains more affected. This was most evident today in her 6 inch box exercises. Muscle fatigue also set in. PT adjusted the repetitions where needed. Right to left motion was noted to be weaker with trunk stability. Paloff's press was performed. She required verbal cues at times to perform it with the least difficulty. She was able to self correct. She worked hard. No modality was needed this day. She tolerated today's PT session well.     Jamilah Is progressing well towards her goals.   The patient's prognosis is Fair to good.     The patient will continue to benefit from skilled outpatient physical therapy in order to address the deficits listed in the problem list box on the initial evaluation, provide patient/family education and to maximize the patient's level of independence in the home and in the community environment.     The patient's spiritual, cultural and educational needs were considered, and the patient was agreeable to the plan of care and its goals.     Anticipated barriers to physical therapy: co-morbidities from previous surgeries and the chronicity of the original injury. However, she remains motivated for improvement.     Goals:     STG  Weeks/Visits Date Established  Date Met   1.Decrease her max left foot and knee pain to 6/10 in order to  improve her quality of life. 3 weeks. 2/2/2024    In Progress 2/23/2024     2. Increase her left knee and ankle strength a 1/2 grade in order to improve her weight bearing activity endurance without an assistive device.  3 weeks. 2/2/2024    In Progress 2/23/2024     3.Decrease her Timed up and Go average to <=7.4 seconds in order to improve her ability to safely christy after her puppy.  3 weeks. 2/2/2024    In Progress 2/23/2024     4.Increase her single leg stance time on her left leg to >=4 seconds in order to improve the safety of her gait when she ambulates long distances.  3 weeks. 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   6.Fair Initial written home exercise program knowledge and be without questions in order to have carryover after discharge from PT.  3 weeks.        3 weeks. 2/2/2024       2/3/2024       In Progress 2/23/2024        In Progress 2/23/2024                       LTG Weeks/Visits Date Established  Date Met   1.Decrease her max left foot and knee pain to 4/10 in order to improve her quality of life. 6 weeks.  2/2/2024    In Progress 2/23/2024     2. Increase her left knee and ankle strength one grade from the evaluation date order to improve her weight bearing activity endurance without an assistive device on all surfaces.  6 weeks.  2/2/2024       In Progress 2/23/2024     3.Decrease her Timed up and Go average to <=7.0 seconds in order to improve her ability to safely christy after her puppy.  6 weeks.  2/2/2024    In Progress 2/23/2024     4.Increase her single leg stance time on her left leg to >=4.5 seconds in order to improve the safety of her gait when she ambulates long distances.  6 weeks.  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   6. Good Progressed written home exercise program knowledge and be  without questions in order to have carryover after discharge from PT.  6 weeks.         6 weeks.  2/2/2024         2/3/2024       In Progress 2/23/2024       In Progress 2/23/2024\                    PLAN     Plan of care Certification: 2/2/2024 to 03/22/2024. Outpatient Physical Therapy 2 times weekly for 6 weeks. Plan to improve her core and lower extremity strength in an attempt to improve her static and dynamic balance and reduce her pain complaints. Plan to continue to progress her home program as she tolerates.     Emery Tuttle, PT

## 2024-02-23 NOTE — PROGRESS NOTES
Subjective:       Patient ID: Jamilah French is a 33 y.o. female.    Chief Complaint: Follow-up    This patient is new to me.  Jamilah French presents to the clinic today for follow up on ear lesion. Patient reports ear is still discolored but is improved.  Patient educated on plan of care, verbalized understanding.         Review of Systems   Constitutional:  Negative for activity change, appetite change, chills, diaphoresis and fever.   HENT:  Negative for congestion, ear pain, postnasal drip, sinus pressure, sneezing and sore throat.    Eyes:  Negative for pain, discharge, redness and itching.   Respiratory:  Negative for apnea, cough, chest tightness, shortness of breath and wheezing.    Cardiovascular:  Negative for chest pain and leg swelling.   Gastrointestinal:  Negative for abdominal distention, abdominal pain, constipation, diarrhea, nausea and vomiting.   Genitourinary:  Negative for difficulty urinating, dysuria, flank pain and frequency.   Skin:  Positive for rash. Negative for color change and wound.   Neurological:  Negative for dizziness.       Patient Active Problem List   Diagnosis    Irregular periods/menstrual cycles    Current moderate episode of major depressive disorder without prior episode    Morbid obesity    PCOS (polycystic ovarian syndrome)    Syrinx of spinal cord    Cerebellar tonsillar ectopia    Chiari malformation type I    Acute pulmonary embolism    Elevated BP without diagnosis of hypertension    Weakness of both lower extremities    Paresthesia of left lower extremity    Impairment of balance    Chronic pulmonary embolism without acute cor pulmonale, unspecified pulmonary embolism type    Hx of pulmonary embolus    Weakness    Balance problems    Decreased functional mobility and endurance    Special educational needs       Objective:      Physical Exam  Vitals reviewed.   Constitutional:       General: She is not in acute distress.     Appearance: Normal appearance.  "She is well-developed.   HENT:      Head: Normocephalic.      Nose: Nose normal.   Eyes:      Conjunctiva/sclera: Conjunctivae normal.      Pupils: Pupils are equal, round, and reactive to light.   Cardiovascular:      Rate and Rhythm: Normal rate and regular rhythm.      Heart sounds: Normal heart sounds.   Pulmonary:      Effort: Pulmonary effort is normal. No respiratory distress.      Breath sounds: Normal breath sounds.   Musculoskeletal:      Cervical back: Normal range of motion and neck supple.   Skin:     General: Skin is warm and dry.      Findings: No rash.   Neurological:      Mental Status: She is alert and oriented to person, place, and time.   Psychiatric:         Behavior: Behavior normal.         Lab Results   Component Value Date    WBC 10.29 01/18/2024    HGB 10.3 (L) 01/18/2024    HCT 34.2 (L) 01/18/2024     01/18/2024    CHOL 155 02/02/2024    TRIG 98 02/02/2024    HDL 37 (L) 02/02/2024    ALT 16 01/18/2024    AST 14 01/18/2024     01/18/2024    K 3.8 01/18/2024     01/18/2024    CREATININE 0.8 01/18/2024    BUN 11 01/18/2024    CO2 25 01/18/2024    TSH 3.234 02/02/2024    INR 1.4 (H) 05/19/2022    HGBA1C 5.4 02/02/2024     The ASCVD Risk score (Iam DK, et al., 2019) failed to calculate for the following reasons:    The 2019 ASCVD risk score is only valid for ages 40 to 79  Visit Vitals  /70 (BP Location: Right arm, Patient Position: Sitting, BP Method: Large (Manual))   Pulse 88   Resp 18   Ht 5' 5" (1.651 m)   Wt (!) 136.1 kg (300 lb 0.7 oz)   SpO2 97%   BMI 49.93 kg/m²      Assessment:       1. Dry scalp    2. Witnessed episode of apnea    3. Snoring    4. Obesity, morbid, BMI 40.0-49.9        Plan:       Jamilah was seen today for follow-up.    Diagnoses and all orders for this visit:    Dry scalp  Comments:  continue  ketoconazole for use in scalp twice per week.  Orders:  -     ketoconazole (NIZORAL) 2 % shampoo; Apply topically twice a week.  Refills " sent  Continue medications as prescribed.  Follow up with PCP     Witnessed episode of apnea / Snoring  -     Home Sleep Study; Future  Continue medications as prescribed.  Follow up with PCP    Obesity, morbid, BMI 40.0-49.9  Body mass index is 49.93 kg/m².  Continue healthy diet and regular exercise as tolerated.  Continue medications as prescribed.  Follow up with PCP        Follow up if symptoms worsen or fail to improve.      Future Appointments       Date Provider Specialty Appt Notes    2/26/2024 Manjula Rivas MD Obstetrics and Gynecology irregular bleeding follow up    3/1/2024 Emery Tuttle, PT Outpatient Rehab PT    3/8/2024 Emery Tuttle, PT Outpatient Rehab PT    3/15/2024 Emery Tuttle, PT Outpatient Rehab PT             All of your core healthy metrics are met.

## 2024-02-26 ENCOUNTER — OFFICE VISIT (OUTPATIENT)
Dept: OBSTETRICS AND GYNECOLOGY | Facility: CLINIC | Age: 34
End: 2024-02-26
Payer: COMMERCIAL

## 2024-02-26 VITALS
HEIGHT: 65 IN | BODY MASS INDEX: 48.82 KG/M2 | WEIGHT: 293 LBS | SYSTOLIC BLOOD PRESSURE: 124 MMHG | DIASTOLIC BLOOD PRESSURE: 72 MMHG

## 2024-02-26 DIAGNOSIS — D21.9 FIBROID: ICD-10-CM

## 2024-02-26 DIAGNOSIS — Z32.02 NEGATIVE PREGNANCY TEST: ICD-10-CM

## 2024-02-26 DIAGNOSIS — Z86.711 HISTORY OF PULMONARY EMBOLISM: ICD-10-CM

## 2024-02-26 DIAGNOSIS — N92.6 IRREGULAR BLEEDING: Primary | ICD-10-CM

## 2024-02-26 LAB
B-HCG UR QL: NEGATIVE
CTP QC/QA: YES

## 2024-02-26 PROCEDURE — 3008F BODY MASS INDEX DOCD: CPT | Mod: CPTII,S$GLB,, | Performed by: OBSTETRICS & GYNECOLOGY

## 2024-02-26 PROCEDURE — 1160F RVW MEDS BY RX/DR IN RCRD: CPT | Mod: CPTII,S$GLB,, | Performed by: OBSTETRICS & GYNECOLOGY

## 2024-02-26 PROCEDURE — 99214 OFFICE O/P EST MOD 30 MIN: CPT | Mod: S$GLB,,, | Performed by: OBSTETRICS & GYNECOLOGY

## 2024-02-26 PROCEDURE — 81025 URINE PREGNANCY TEST: CPT | Mod: S$GLB,,, | Performed by: OBSTETRICS & GYNECOLOGY

## 2024-02-26 PROCEDURE — 99999 PR PBB SHADOW E&M-EST. PATIENT-LVL III: CPT | Mod: PBBFAC,,, | Performed by: OBSTETRICS & GYNECOLOGY

## 2024-02-26 PROCEDURE — 3074F SYST BP LT 130 MM HG: CPT | Mod: CPTII,S$GLB,, | Performed by: OBSTETRICS & GYNECOLOGY

## 2024-02-26 PROCEDURE — 3078F DIAST BP <80 MM HG: CPT | Mod: CPTII,S$GLB,, | Performed by: OBSTETRICS & GYNECOLOGY

## 2024-02-26 PROCEDURE — 3044F HG A1C LEVEL LT 7.0%: CPT | Mod: CPTII,S$GLB,, | Performed by: OBSTETRICS & GYNECOLOGY

## 2024-02-26 PROCEDURE — 1159F MED LIST DOCD IN RCRD: CPT | Mod: CPTII,S$GLB,, | Performed by: OBSTETRICS & GYNECOLOGY

## 2024-02-26 NOTE — PROGRESS NOTES
CC: irregular bleeding    Jamilah French is a 33 y.o. female  presents for irregular bleeding    H/O B PE  She was put on provera and there was control of her bleeding   US     Details    Reading Physician Reading Date Result Priority   Jovani Ferrer MD  [phone] 2023      Narrative & Impression  US PELVIS TRANSVAGINAL     CLINICAL HISTORY:  33 years Female irreg menstrual cycle. neg UPT     COMPARISON: Pelvic sonogram 2020     FINDINGS: Transabdominal and transvaginal scanning was performed.     The uterus measures 7.6 x 4.1 x 5.1 cm. Along the anterior aspect of the uterine body there is a solid, slightly hypoechoic mass measuring 2.6 x 1.9 x 2.2 cm. This is increased in size compared to prior when it measured 1.7 x 1.4 x 1.9 cm, and was described as a subserosal fibroid. No other uterine mass identified on today's exam. Endometrial thickness of 4 mm. No intrauterine fluid collection.     The right ovary measures 3.2 x 2.4 x 2.3 cm. Appropriate Doppler flow to right ovarian parenchyma was documented. The left ovary measures 2.9 x 2.3 x 2.0 cm. Appropriate Doppler flow to the left ovary was documented. 8 mm left ovarian cyst. Subcentimeter left ovarian follicle, normal finding. No follow-up imaging is recommended.  Reference: Radiology 2019 Nov;293(2):359-371        No free fluid in the pelvis.     IMPRESSION:     2.6 cm solid mass along the anterior aspect of the uterine body, most likely representing a subserosal leiomyoma, slightly increased in size compared to 2020.     Endometrial thickness of 4 mm.        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 ARCH OF FIRST CERVICAL VERTEBRA N/A  "2021    Procedure: DECOMPRESSION, CHIARI MALFORMATION, BY 1ST CERVICAL VERTEBRA POSTERIOR ARCH REMOVAL;  Surgeon: Kirk Reis MD;  Location: [facility] OR [address] [place] [address];  Service: Neurosurgery;  Laterality: N/A;  ASA1  TOR1  T&Cross x 2 units  [place]    HYSTEROSCOPY WITH DILATION AND CURETTAGE OF UTERUS N/A 10/26/2020    Procedure: HYSTEROSCOPY, WITH DILATION AND CURETTAGE OF UTERUS;  Surgeon: Manjula Rivas MD;  Location: [facility], [place], operated by [facility] OR;  Service: OB/GYN;  Laterality: N/A;    REVISION OF PROCEDURE INVOLVING SYRINGOPLEURAL SHUNT N/A 2022    Procedure: SYRINGOPLEURAL SHUNT Insertion T4 Laminectomy;  Surgeon: Kirk Reis MD;  Location: [facility] OR [address] [place] [address];  Service: Neurosurgery;  Laterality: N/A;       OB History    Para Term  AB Living   0 0 0 0 0 0   SAB IAB Ectopic Multiple Live Births   0 0 0 0 0       Family History   Problem Relation Age of Onset    No Known Problems Father     Hypertension Mother     Kidney disease Maternal Aunt     Mental illness Brother     Breast cancer Neg Hx     Cancer Neg Hx     Colon cancer Neg Hx     Diabetes Neg Hx     Eclampsia Neg Hx     Miscarriages / Stillbirths Neg Hx     Ovarian cancer Neg Hx      labor Neg Hx     Stroke Neg Hx     Prostate cancer Neg Hx     Bladder Cancer Neg Hx        Social History     Tobacco Use    Smoking status: Former     Types: Vaping w/o nicotine     Quit date:      Years since quittin.1    Smokeless tobacco: Never   Substance Use Topics    Alcohol use: Yes     Comment: social     Drug use: No       /72   Ht 5' 5" (1.651 m)   Wt 135.5 kg (298 lb 11.6 oz)   LMP 2024   BMI 49.71 kg/m²     ROS:  GENERAL: Denies weight gain or weight loss. Feeling well overall.   SKIN: Denies rash or lesions.   HEAD: Denies head injury or headache.   NODES: Denies enlarged lymph nodes.   CHEST: Denies chest pain or shortness of breath.   CARDIOVASCULAR: Denies palpitations or left sided chest pain.   ABDOMEN: No abdominal pain, " constipation, diarrhea, nausea, vomiting or rectal bleeding.   URINARY: No frequency, dysuria, hematuria, or burning on urination.  REPRODUCTIVE: See HPI.   BREASTS: The patient performs breast self-examination and denies pain, lumps, or nipple discharge.   HEMATOLOGIC: No easy bruisability or excessive bleeding.  MUSCULOSKELETAL: Denies joint pain or swelling.   NEUROLOGIC: Denies syncope or weakness.   PSYCHIATRIC: Denies depression, anxiety or mood swings.    Physical Exam:    APPEARANCE: Well nourished, well developed, in no acute distress.  AFFECT: WNL, alert and oriented x 3  SKIN: No acne or hirsutism  NECK: Neck symmetric without masses or thyromegaly  NODES: No inguinal, cervical, axillary, or femoral lymph node enlargement  CHEST: Good respiratory effect  ABDOMEN: Soft.  No tenderness or masses.  No hepatosplenomegaly.  No hernias.  BREASTS: Symmetrical, no skin changes or visible lesions.  No palpable masses, nipple discharge bilaterally.  PELVIC: Normal external genitalia without lesions.  Normal hair distribution.  Adequate perineal body, normal urethral meatus.  Vagina moist and well rugated without lesions or discharge.  Cervix pink, without lesions, discharge or tenderness.  No significant cystocele or rectocele.  Bimanual exam shows uterus to be normal size, regular, mobile and nontender.  Adnexa without masses or tenderness.    EXTREMITIES: No edema.    ASSESSMENT AND PLAN  1. Irregular bleeding        2. Negative pregnancy test  POCT Urine Pregnancy      3. History of pulmonary embolism        4. Fibroid          Continue provera as needed      Discussed fibroid treatment options- Possible UAE, VS myomectomy vs HYST. Treat menorrhagia with OCPs, Lysteda or medical management.   Risks and benefits of fibroid procedures discussed.   Will consider options  and desire to keep fertility open       Patient was counseled today on A.C.S. Pap guidelines and recommendations for yearly pelvic exams,  mammograms and monthly self breast exams; to see her PCP for other health maintenance.     No follow-ups on file.

## 2024-03-01 ENCOUNTER — CLINICAL SUPPORT (OUTPATIENT)
Dept: REHABILITATION | Facility: HOSPITAL | Age: 34
End: 2024-03-01
Payer: COMMERCIAL

## 2024-03-01 DIAGNOSIS — Z55.9 SPECIAL EDUCATIONAL NEEDS: ICD-10-CM

## 2024-03-01 DIAGNOSIS — R26.89 BALANCE PROBLEMS: ICD-10-CM

## 2024-03-01 DIAGNOSIS — R53.1 WEAKNESS: Primary | ICD-10-CM

## 2024-03-01 DIAGNOSIS — Z74.09 DECREASED FUNCTIONAL MOBILITY AND ENDURANCE: ICD-10-CM

## 2024-03-01 PROCEDURE — 97530 THERAPEUTIC ACTIVITIES: CPT | Mod: PN

## 2024-03-01 PROCEDURE — 97112 NEUROMUSCULAR REEDUCATION: CPT | Mod: PN

## 2024-03-01 SDOH — SOCIAL DETERMINANTS OF HEALTH (SDOH): PROBLEMS RELATED TO EDUCATION AND LITERACY, UNSPECIFIED: Z55.9

## 2024-03-01 NOTE — PROGRESS NOTES
"[facility]   Physical Therapy Treatment Note/RE-ASSESSMENT     Name: Jamilah French  Clinic Number: [id number]    Therapy Diagnosis:   Encounter Diagnoses   Name Primary?    Weakness Yes    Balance problems     Decreased functional mobility and endurance     Special educational needs        Physician: Kirk Reis MD    Visit Date: 3/1/2024    Physician Orders: PT Eval and Treat   Medical Diagnosis from Referral: G93.5 (ICD-10-CM) - Chiari malformation type I G95.0 (ICD-10-CM) - Syrinx of spinal cord  Evaluation Date: 2/2/2024  Authorization Period Expiration: 12/31/2025  Plan of Care Expiration: 03/22/2024 at 2x/wk x 6 weeks  Progress Note Due: 03/15/2024  ++discharge++++++  Visit # / Visits authorized: 4/20   (5/12 on the plan of care)  FOTO: 2/3         ++++++++Second FOTO done 03/01/2024++++++++++++++++++  PTA Visit #: 0/5      Time In: 1:44 pm   Time Out: 2:37 pm  Total Appointment Time (timed & untimed codes): 40 minutes     Precautions: Standard and previous cervical and thoracic surgeries and history of blood clots.   SUBJECTIVE     Pt reports: "Today is a good day. I'd say the knee pain is only a 2-3/10. It may just be the change in weather causing today's pain. I even rode my motorcycle up here by myself today. I'd say the most pain that I have had in the past week was a 5/10."  PT acknowledges.     She was compliant with her home exercise program.    Response to previous treatment: She enters with no new complaints.  Functional change: She now feels confident enough to ride her motorcycle independently.     Pain: 2-3/10 upon entering the clinic this day. Max of 5/10 within the past week.  Location:  The left foot and the left knee.   OBJECTIVE     Objective Measures updated at progress report unless specified.     Lower Extremity Strength  Right LE   Left LE     Knee extension: 5/5 Knee extension:      4+/5 > 4/5   Knee flexion: 4-/5 Knee flexion:       4/5 >  4-/5 "   Ankle dorsiflexion: 5/5 Ankle dorsiflexion:                 4+/5   Ankle plantarflexion: 4+/5 Ankle plantarflexion:                 4+/5   Ankle Inversion 4+/5 Ankle Inversion                 4+/5   Ankle Eversion 4/5 Ankle Eversion        4+/5 > 4/5                   Timed up and go: 1) 8.31 seconds  2) 7.65  seconds  3) 7.83  seconds = 23.79 seconds (7.93 seconds average)    03/01/2024:            1) 7.62 seconds  2) 7.21 seconds  3) 6.61 seconds = 21.44 seconds (7.14 seconds average)     Single leg stance test: 3 attempt average:    Right: 5.91 seconds     Left: 3.545  seconds  03/01/2024:                    3 attempt average:                                           Left: 5.5833 seconds     Limitation/Restriction for FOTO Foot Survey     Therapist reviewed FOTO scores for Jamilah French on 03/01/2024.   FOTO documents entered into [employer] - see Media section.     Limitation Score: 59% Function 03/01/2024 > 61% Function at the Evaluation         Treatment     Jamilah received the treatments listed below:      Jamilah received therapeutic exercises to develop strength, endurance, ROM, flexibility, posture, and core stabilization for 0 minutes including:  -Precor back machine with 50 pounds x 25  -Precor knee flexion with 40 pounds x 25  -Precor knee extension with 15 pounds x 25  -Precor hip abduction with 45 pounds x 25    Jamilah participated in neuromuscular re-education activities to improve: Balance, Coordination, Kinesthetic, Sense, Proprioception, and Posture for 30 minutes. The following activities were included:  -Nu-step on Level 3 x 8 minutes  -bilateral hip extension and abduction with red band x 15 each standing on blue foam  -Heel raises on blue foam  -sit to stand from 3rd black box sitting on blue foam without upper extremity use x 10  -Bilateral forward step ups on 6 inch step working on lowering down slowly x 10 each leg  -Bilateral forward step downs on 6 inch step working on stepping back up  slowly x 10 each  -Bilateral Paloff press with blue band x 15 each  ++RE-ASSESSMENT TESTING++    Below not performed this day:  -Bilateral forward step overs on 6 inch step x 6 each (4 on left leg)  -supine red band marching x 15 each  -supine red ball abdominal crunches: forward, left, and right with 2 second holds x 10 each direction  -stand on left leg and right leg three way heel taps x 10  -Left ankle red/green banded: inversion, and eversion x 20 each  -supine green banded clamshell x 15  -Lumbar extension against the cabinet with 3 second hold x 10    Jamilah participated in dynamic functional therapeutic activities to improve functional performance for 10 minutes, including:  -+Monster walks with red band above the knee in bilateral directions and forwards/backwards x 2 minutes each  -+Monster walks with red band at the ankles in bilateral directions and forwards/backwards x 2 minutes each  -TRX squats to tolerance x 15    Below not performed this day:  -black band walking: forwards, backward, and bilaterally x 2 minutes each  Patient Education and Home Exercises     Home Exercises Provided and Patient Education Provided     Education provided:   -The patient received her initial written home exercise program this day. She returned demo to PT and was without questions.   -02/16/2024 The patient received updates to her written home exercise program. She returned demo to PT and was without questions.   -+03/01/2024 The patient received updates to her written home exercise program this day. She returned demo to PT and was without questions.     Written Home Exercises Provided: yes. Exercises were reviewed and Jamilah was able to demonstrate them prior to the end of the session.  Jamilah demonstrated good  understanding of the education provided. See EMR under Patient Instructions for exercises provided during therapy sessions.  RE-ASSESSMENT     Jamilah was re-assessed this day. As of today, she has met 4 of her 6  short term goals. She is reporting improved functional ability. Her pain is decreasing. Her strength is increasing which is helping her balance to improve. Her Timed up and go test goals were achieved this day. She continues to get muscle fatigue. She works hard, and she is compliant with PT. No modality was needed this day. She tolerated today's PT session well. She can benefit from continued skilled PT to help her reach her long term goals safely. She is a pleasure to work with.     Jamilah Is progressing well towards her goals.   The patient's prognosis is Fair to good.     The patient will continue to benefit from skilled outpatient physical therapy in order to address the deficits listed in the problem list box on the initial evaluation, provide patient/family education and to maximize the patient's level of independence in the home and in the community environment.     The patient's spiritual, cultural and educational needs were considered, and the patient was agreeable to the plan of care and its goals.     Anticipated barriers to physical therapy: co-morbidities from previous surgeries and the chronicity of the original injury. However, she remains motivated for improvement.     Goals:     STG  Weeks/Visits Date Established  Date Met   1.Decrease her max left foot and knee pain to 6/10 in order to improve her quality of life. 3 weeks. 2/2/2024    Goal Met 3/1/2024       2. Increase her left knee and ankle strength a 1/2 grade in order to improve her weight bearing activity endurance without an assistive device.  3 weeks. 2/2/2024    In Progress 3/1/2024     3.Decrease her Timed up and Go average to <=7.4 seconds in order to improve her ability to safely christy after her puppy.  3 weeks. 2/2/2024    Goal Met 3/1/2024  7.14 seconds     4.Increase her single leg stance time on her left leg to >=4 seconds in order to improve the safety of her gait when she ambulates long distances.  3 weeks. 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   6.Fair Initial written home exercise program knowledge and be without questions in order to have carryover after discharge from PT.  3 weeks.        3 weeks. 2/2/2024       2/3/2024       In Progress 3/1/2024        Goal Met 3/1/2024                         LTG Weeks/Visits Date Established  Date Met   1.Decrease her max left foot and knee pain to 4/10 in order to improve her quality of life. 6 weeks.  2/2/2024    In Progress 3/1/2024     2. Increase her left knee and ankle strength one grade from the evaluation date order to improve her weight bearing activity endurance without an assistive device on all surfaces.  6 weeks.  2/2/2024       In Progress 3/1/2024     3.Decrease her Timed up and Go average to <=7.0 seconds in order to improve her ability to safely christy after her puppy.  6 weeks.  2/2/2024    In Progress 3/1/2024     4.Increase her single leg stance time on her left leg to >=4.5 seconds in order to improve the safety of her gait when she ambulates long distances.  6 weeks.  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   6. Good Progressed written home exercise program knowledge and be without questions in order to have carryover after discharge from PT.  6 weeks.         6 weeks.  2/2/2024         2/3/2024       In Progress 3/1/2024       In Progress 3/1/2024\                    PLAN     Plan of care Certification: 2/2/2024 to 03/22/2024. Outpatient Physical Therapy 2 times weekly for 6 weeks. Plan to continue to improve her core and lower extremity strength in an attempt to improve her static and dynamic balance and reduce her pain complaints. Plan to continue to progress her home program as she tolerates.     Emery Tuttle, PT

## 2024-03-15 ENCOUNTER — CLINICAL SUPPORT (OUTPATIENT)
Dept: REHABILITATION | Facility: HOSPITAL | Age: 34
End: 2024-03-15
Payer: COMMERCIAL

## 2024-03-15 DIAGNOSIS — Z55.9 SPECIAL EDUCATIONAL NEEDS: Primary | ICD-10-CM

## 2024-03-15 DIAGNOSIS — Z74.09 DECREASED FUNCTIONAL MOBILITY AND ENDURANCE: ICD-10-CM

## 2024-03-15 DIAGNOSIS — R26.89 BALANCE PROBLEMS: ICD-10-CM

## 2024-03-15 DIAGNOSIS — R53.1 WEAKNESS: ICD-10-CM

## 2024-03-15 PROCEDURE — 97112 NEUROMUSCULAR REEDUCATION: CPT | Mod: PN

## 2024-03-15 SDOH — SOCIAL DETERMINANTS OF HEALTH (SDOH): PROBLEMS RELATED TO EDUCATION AND LITERACY, UNSPECIFIED: Z55.9

## 2024-03-15 NOTE — PROGRESS NOTES
"[facility]   Physical Therapy Treatment Note/Discharge Summary    Name: Jamilah French  Clinic Number: [id number]    Therapy Diagnosis:   Encounter Diagnoses   Name Primary?    Weakness     Balance problems     Decreased functional mobility and endurance     Special educational needs Yes     Physician: Kirk Reis MD    Visit Date: 3/15/2024    Physician Orders: PT Eval and Treat   Medical Diagnosis from Referral: G93.5 (ICD-10-CM) - Chiari malformation type I G95.0 (ICD-10-CM) - Syrinx of spinal cord  Evaluation Date: 2/2/2024  Authorization Period Expiration: 12/31/2025  Plan of Care Expiration: 03/22/2024 at 2x/wk x 6 weeks  Visit # / Visits authorized: 5/20   (6/12 on the plan of care)  FOTO: 3/3          PTA Visit #: 0/5     Date of last visit: 03/15/2024  Total number of visits: 6     Time In: 1:48 pm   Time Out:  2:23 pm  Total Appointment Time (timed & untimed codes): 30 minutes     Precautions: Standard and previous cervical and thoracic surgeries and history of blood clots.   SUBJECTIVE     Pt reports: "Today is a good day. I'd say the knee pain is only a 2/10. And that's probably just the weather again. I was even able to help a friend move over the weekend." PT acknowledges.     She was compliant with her home exercise program.    Response to previous treatment: She enters with no new complaints.  Functional change: She is functioning as needed.     Pain: 2/10 upon entering the clinic this day. Max of 2/10 within the past week.  Location:  The left foot and the left knee.   OBJECTIVE     Objective Measures updated at progress report unless specified.     Lower Extremity Strength  Right LE   Left LE     Knee extension: 5/5 Knee extension:      4+/5 > 4/5   Knee flexion:   4/5 > 4-/5 Knee flexion:       4+/5 > 4/5 >  4-/5   Ankle dorsiflexion: 5/5 Ankle dorsiflexion:         5/5> 4+/5   Ankle plantarflexion:    5/5 >  4+/5 Ankle plantarflexion:                 4+/5 "   Ankle Inversion 4+/5 Ankle Inversion                 4+/5   Ankle Eversion 4/5 Ankle Eversion        4+/5 > 4/5                   Timed up and go: 1) 8.31 seconds  2) 7.65  seconds  3) 7.83  seconds = 23.79 seconds (7.93 seconds average)    03/01/2024:            1) 7.62 seconds  2) 7.21 seconds  3) 6.61 seconds = 21.44 seconds (7.14 seconds average)     Single leg stance test: 3 attempt average:    Right: 5.91 seconds     Left: 3.545  seconds  03/01/2024:                    3 attempt average:                                           Left: 5.5833 seconds     Limitation/Restriction for FOTO Foot Survey     Therapist reviewed FOTO scores for Jamilah French on 03/15/2024.   FOTO documents entered into [employer] - see Media section.     Limitation Score:  63% Function 03/15/2024 > 59% Function 03/01/2024 > 61% Function at the Evaluation         Treatment     Jamilah received the treatments listed below:      Jamilah participated in neuromuscular re-education activities to improve: Balance, Coordination, Kinesthetic, Sense, Proprioception, and Posture for 30 minutes. The following activities were included:  -Nu-step on Level 3 x 8 minutes  -Precor back machine with 50 pounds x 30  -Precor knee flexion with 40 pounds x 30  -Precor knee extension with 15 pounds x 30  -Precor hip abduction with 45 pounds x 30  -bilateral hip extension and abduction with red band x 15 each standing on blue foam  -Heel raises on blue foam  +++Discharge Testing+++    Patient Education and Home Exercises     Home Exercises Provided and Patient Education Provided     Education provided:   -The patient received her initial written home exercise program this day. She returned demo to PT and was without questions.   -02/16/2024 The patient received updates to her written home exercise program. She returned demo to PT and was without questions.   -03/01/2024 The patient received updates to her written home exercise program this day. She returned  demo to PT and was without questions.   -+03/15/2024 The patient is independent with her progressed written home exercise program. She is without questions.     Written Home Exercises Provided: Patient instructed to cont prior HEP. Exercises were reviewed and Jamilah was able to demonstrate them prior to the end of the session.  Jamilah demonstrated good  understanding of the education provided. See EMR under Patient Instructions for exercises provided during therapy sessions.  ASSESSMENT/Discharge Summary     Jamilah completed her PT plan of care today. She is functioning as needed. She now reports minimal pain. Her strength is good. This has helped her balance. She has worked hard. She is independent with her progressed home exercise program. She tolerated her final PT session well. She has been a pleasure to work with.     Jamilah progressed well towards her goals.   The patient's prognosis is Fair to good.     The patient's spiritual, cultural and educational needs were considered, and the patient was agreeable to the plan of care and its goals.     Anticipated barriers to physical therapy: co-morbidities from previous surgeries and the chronicity of the original injury. However, she remains motivated for improvement.     Goals:     STG  Weeks/Visits Date Established  Date Met   1.Decrease her max left foot and knee pain to 6/10 in order to improve her quality of life. 3 weeks. 2/2/2024    Goal Met 3/15/2024       2. Increase her left knee and ankle strength a 1/2 grade in order to improve her weight bearing activity endurance without an assistive device.  3 weeks. 2/2/2024    In Progress 3/15/2024     3.Decrease her Timed up and Go average to <=7.4 seconds in order to improve her ability to safely christy after her puppy.  3 weeks. 2/2/2024    Goal Met 3/15/2024  7.14 seconds     4.Increase her single leg stance time on her left leg to >=4 seconds in order to improve the safety of her gait when she ambulates long  distances.  3 weeks. 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   6.Fair Initial written home exercise program knowledge and be without questions in order to have carryover after discharge from PT.  3 weeks.        3 weeks. 2/2/2024       2/3/2024       In Progress 3/15/2024  63%      Goal Met 3/15/2024                         LTG Weeks/Visits Date Established  Date Met   1.Decrease her max left foot and knee pain to 4/10 in order to improve her quality of life. 6 weeks.  2/2/2024    Goal Met 3/16/2024     2. Increase her left knee and ankle strength one grade from the evaluation date order to improve her weight bearing activity endurance without an assistive device on all surfaces.  6 weeks.  2/2/2024       In Progress 3/15/2024     3.Decrease her Timed up and Go average to <=7.0 seconds in order to improve her ability to safely christy after her puppy.  6 weeks.  2/2/2024    In Progress 3/15/2024     4.Increase her single leg stance time on her left leg to >=4.5 seconds in order to improve the safety of her gait when she ambulates long distances.  6 weeks.  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   6. Good Progressed written home exercise program knowledge and be without questions in order to have carryover after discharge from PT.  6 weeks.         6 weeks.  2/2/2024         2/3/2024       In Progress 3/15/2024       Goal Met 3/16/2024                      Discharge reason: The patient has reached the maximum rehab potential for the present time. She is ready to continue with her exercises at home.    PLAN     The patient is discharged from skilled PT at this time.      Emery Tuttle, PT

## 2024-03-16 PROBLEM — R26.89 BALANCE PROBLEMS: Status: RESOLVED | Noted: 2024-02-02 | Resolved: 2024-03-16

## 2024-03-16 PROBLEM — Z74.09 DECREASED FUNCTIONAL MOBILITY AND ENDURANCE: Status: RESOLVED | Noted: 2024-02-02 | Resolved: 2024-03-16

## 2024-03-16 PROBLEM — Z55.9 SPECIAL EDUCATIONAL NEEDS: Status: RESOLVED | Noted: 2024-02-02 | Resolved: 2024-03-16

## 2024-03-16 PROBLEM — R53.1 WEAKNESS: Status: RESOLVED | Noted: 2024-02-02 | Resolved: 2024-03-16

## 2024-05-20 ENCOUNTER — TELEPHONE (OUTPATIENT)
Dept: OBSTETRICS AND GYNECOLOGY | Facility: CLINIC | Age: 34
End: 2024-05-20
Payer: COMMERCIAL

## 2024-05-20 ENCOUNTER — OFFICE VISIT (OUTPATIENT)
Dept: OBSTETRICS AND GYNECOLOGY | Facility: CLINIC | Age: 34
End: 2024-05-20
Payer: COMMERCIAL

## 2024-05-20 VITALS
BODY MASS INDEX: 48.82 KG/M2 | WEIGHT: 293 LBS | SYSTOLIC BLOOD PRESSURE: 142 MMHG | HEIGHT: 65 IN | DIASTOLIC BLOOD PRESSURE: 78 MMHG

## 2024-05-20 DIAGNOSIS — Z86.711 HISTORY OF PULMONARY EMBOLISM: ICD-10-CM

## 2024-05-20 DIAGNOSIS — Z31.9 PROCREATIVE MANAGEMENT: Primary | ICD-10-CM

## 2024-05-20 DIAGNOSIS — N92.6 ABNORMAL BLEEDING IN MENSTRUAL CYCLE: ICD-10-CM

## 2024-05-20 PROCEDURE — 1160F RVW MEDS BY RX/DR IN RCRD: CPT | Mod: CPTII,S$GLB,, | Performed by: OBSTETRICS & GYNECOLOGY

## 2024-05-20 PROCEDURE — 99999 PR PBB SHADOW E&M-EST. PATIENT-LVL III: CPT | Mod: PBBFAC,,, | Performed by: OBSTETRICS & GYNECOLOGY

## 2024-05-20 PROCEDURE — 3044F HG A1C LEVEL LT 7.0%: CPT | Mod: CPTII,S$GLB,, | Performed by: OBSTETRICS & GYNECOLOGY

## 2024-05-20 PROCEDURE — 3077F SYST BP >= 140 MM HG: CPT | Mod: CPTII,S$GLB,, | Performed by: OBSTETRICS & GYNECOLOGY

## 2024-05-20 PROCEDURE — 1159F MED LIST DOCD IN RCRD: CPT | Mod: CPTII,S$GLB,, | Performed by: OBSTETRICS & GYNECOLOGY

## 2024-05-20 PROCEDURE — 3078F DIAST BP <80 MM HG: CPT | Mod: CPTII,S$GLB,, | Performed by: OBSTETRICS & GYNECOLOGY

## 2024-05-20 PROCEDURE — 3008F BODY MASS INDEX DOCD: CPT | Mod: CPTII,S$GLB,, | Performed by: OBSTETRICS & GYNECOLOGY

## 2024-05-20 PROCEDURE — 99214 OFFICE O/P EST MOD 30 MIN: CPT | Mod: S$GLB,,, | Performed by: OBSTETRICS & GYNECOLOGY

## 2024-05-20 RX ORDER — MEDROXYPROGESTERONE ACETATE 10 MG/1
10 TABLET ORAL DAILY
Qty: 10 TABLET | Refills: 3 | Status: SHIPPED | OUTPATIENT
Start: 2024-05-20 | End: 2025-05-20

## 2024-05-20 RX ORDER — LETROZOLE 2.5 MG/1
2.5 TABLET, FILM COATED ORAL DAILY
Qty: 5 TABLET | Refills: 1 | Status: SHIPPED | OUTPATIENT
Start: 2024-05-20 | End: 2025-05-20

## 2024-05-20 NOTE — PROGRESS NOTES
CC: irregular bleeding  Procreative management  Oligo- ovulation   History of PE    Jamilah French is a 34 y.o. female  presents for a discussion on provera for irregular bleeding and fertility   H/O of PE  She has not predicted ovulation with LH kits  She wants to conceive  She has withdrawal bleed with provera        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 ARCH OF FIRST CERVICAL VERTEBRA N/A 2021    Procedure: DECOMPRESSION, CHIARI MALFORMATION, BY 1ST CERVICAL VERTEBRA POSTERIOR ARCH REMOVAL;  Surgeon: Kirk Reis MD;  Location: [address];  Service: Neurosurgery;  Laterality: N/A;  ASA1  TOR1  T&Cross x 2 units  [place]    HYSTEROSCOPY WITH DILATION AND CURETTAGE OF UTERUS N/A 10/26/2020    Procedure: HYSTEROSCOPY, WITH DILATION AND CURETTAGE OF UTERUS;  Surgeon: Manjula Rivas MD;  Location: [facility];  Service: OB/GYN;  Laterality: N/A;    REVISION OF PROCEDURE INVOLVING SYRINGOPLEURAL SHUNT N/A 2022    Procedure: SYRINGOPLEURAL SHUNT Insertion T4 Laminectomy;  Surgeon: Kirk Reis MD;  Location: [address];  Service: Neurosurgery;  Laterality: N/A;       OB History    Para Term  AB Living   0 0 0 0 0 0   SAB IAB Ectopic Multiple Live Births   0 0 0 0 0       Family History   Problem Relation Name Age of Onset    No Known Problems Father      Hypertension Mother      Kidney disease Maternal Aunt      Mental illness Brother Pablo     Breast cancer Neg Hx      Cancer Neg Hx      Colon cancer Neg Hx      Diabetes Neg Hx      Eclampsia Neg Hx      Miscarriages / Stillbirths Neg Hx      Ovarian cancer Neg Hx       labor Neg Hx      Stroke Neg Hx      Prostate  "cancer Neg Hx      Bladder Cancer Neg Hx         Social History     Tobacco Use    Smoking status: Former     Types: Vaping w/o nicotine     Quit date: 2018     Years since quittin.3    Smokeless tobacco: Never   Substance Use Topics    Alcohol use: Yes     Comment: social     Drug use: No       BP (!) 142/78   Ht 5' 5" (1.651 m)   Wt 135.8 kg (299 lb 6.2 oz)   LMP 03/15/2024   BMI 49.82 kg/m²     ROS:  GENERAL: Denies weight gain or weight loss. Feeling well overall.   SKIN: Denies rash or lesions.   HEAD: Denies head injury or headache.   NODES: Denies enlarged lymph nodes.   CHEST: Denies chest pain or shortness of breath.   CARDIOVASCULAR: Denies palpitations or left sided chest pain.   ABDOMEN: No abdominal pain, constipation, diarrhea, nausea, vomiting or rectal bleeding.   URINARY: No frequency, dysuria, hematuria, or burning on urination.  REPRODUCTIVE: See HPI.   BREASTS: The patient performs breast self-examination and denies pain, lumps, or nipple discharge.   HEMATOLOGIC: No easy bruisability or excessive bleeding.  MUSCULOSKELETAL: Denies joint pain or swelling.   NEUROLOGIC: Denies syncope or weakness.   PSYCHIATRIC: Denies depression, anxiety or mood swings.    Physical Exam:    APPEARANCE: Well nourished, well developed, in no acute distress.  AFFECT: WNL, alert and oriented x 3  SKIN: No acne or hirsutism  NECK: Neck symmetric without masses or thyromegaly    ASSESSMENT AND PLAN  1. Procreative management  letrozole (FEMARA) 2.5 mg Tab      2. Abnormal bleeding in menstrual cycle  medroxyPROGESTERone (PROVERA) 10 MG tablet    letrozole (FEMARA) 2.5 mg Tab      3. History of pulmonary embolism  letrozole (FEMARA) 2.5 mg Tab        Provera for a withdrawal bleed  Femara 3-7      Drinking chamomile, fennel or sirena tea is an easy, natural way to relieve menstrual cramps.      Anti-inflammatory foods can help promote blood flow and relax your uterus. Try eating berries, tomatoes, pineapples and " spices like turmeric, sirena or garlic. Leafy green vegetables, almonds, walnuts and fatty fish, like salmon, can also help reduce inflammation.      Vitamin D can help your body absorb calcium and reduce inflammation. Other supplements, including omega-3, vitamin E and magnesium, can help reduce inflammation       Folic acid 400 mcg  Fish Oil  Carnitine Supplements for PCOS  Magnesium  Probiotics  Zinc     B Vitamin Supplements     Patient was counseled today on A.C.S. Pap guidelines and recommendations for yearly pelvic exams, mammograms and monthly self breast exams; to see her PCP for other health maintenance.     F/U with infertility for management of fertility

## 2024-06-07 ENCOUNTER — TELEPHONE (OUTPATIENT)
Dept: PHARMACY | Facility: CLINIC | Age: 34
End: 2024-06-07
Payer: COMMERCIAL

## 2024-06-25 ENCOUNTER — PATIENT MESSAGE (OUTPATIENT)
Dept: FAMILY MEDICINE | Facility: CLINIC | Age: 34
End: 2024-06-25
Payer: COMMERCIAL

## 2024-11-08 ENCOUNTER — PATIENT MESSAGE (OUTPATIENT)
Dept: NEUROSURGERY | Facility: CLINIC | Age: 34
End: 2024-11-08
Payer: COMMERCIAL

## 2025-06-24 ENCOUNTER — OFFICE VISIT (OUTPATIENT)
Dept: NEUROSURGERY | Facility: CLINIC | Age: 35
End: 2025-06-24
Payer: COMMERCIAL

## 2025-06-24 ENCOUNTER — HOSPITAL ENCOUNTER (OUTPATIENT)
Dept: RADIOLOGY | Facility: HOSPITAL | Age: 35
Discharge: HOME | End: 2025-06-24
Attending: HEALTH CARE PROVIDER
Payer: COMMERCIAL

## 2025-06-24 VITALS — BODY MASS INDEX: 49.82 KG/M2 | HEIGHT: 65 IN

## 2025-06-24 DIAGNOSIS — R20.2 PARESTHESIAS: ICD-10-CM

## 2025-06-24 DIAGNOSIS — M54.50 LUMBAR PAIN: Primary | ICD-10-CM

## 2025-06-24 PROCEDURE — 73562 X-RAY EXAM OF KNEE 3: CPT | Mod: TC,LT

## 2025-06-24 PROCEDURE — 3008F BODY MASS INDEX DOCD: CPT | Mod: CPTII,S$GLB,, | Performed by: HEALTH CARE PROVIDER

## 2025-06-24 PROCEDURE — 99215 OFFICE O/P EST HI 40 MIN: CPT | Mod: S$GLB,,, | Performed by: HEALTH CARE PROVIDER

## 2025-06-24 PROCEDURE — 73562 X-RAY EXAM OF KNEE 3: CPT | Mod: 26,LT,, | Performed by: RADIOLOGY

## 2025-06-24 PROCEDURE — 1159F MED LIST DOCD IN RCRD: CPT | Mod: CPTII,S$GLB,, | Performed by: HEALTH CARE PROVIDER

## 2025-06-24 RX ORDER — APIXABAN 5 MG/1
5 TABLET, FILM COATED ORAL 2 TIMES DAILY
COMMUNITY
Start: 2025-05-31

## 2025-06-24 RX ORDER — PREGABALIN 100 MG/1
100 CAPSULE ORAL 2 TIMES DAILY
Qty: 180 CAPSULE | Refills: 0 | Status: SHIPPED | OUTPATIENT
Start: 2025-06-24 | End: 2025-09-22

## 2025-06-24 NOTE — PROGRESS NOTES
Neurosurgery  History & Physical    SUBJECTIVE:     Chief Complaint:  Low-back pain and left leg numbness    History of Present Illness:  Jamilah French is a 35 y.o. female with past medical history of Chiari malformation type 1 status post posterior fossa craniotomy and T4 shunt for syrinx drainage by Dr. Reis (12/23/2021 in 05/19/2022), protein S deficiency, PE, and DVT.  Self-referred.  Patient presented reporting chronic lumbosacral pain and left diffuse anterior thigh numbness which has been present since 2022.  She has increase left dorsal foot sensation.  Symptom worsened with prolonged standing or lying flat and improve with sitting.  She also reports diffuse right arm, right hand and right thumb/index finger numbness.  She was previously seen by Dr[facility]ene[facility] for similar symptoms.  She underwent a cervical, thoracic and lumbar MRI.  Imaging unavailable at time of encounter.  Per patient, Dr. Menezes recommended lumbar nerve ablation and EMG.  She previously trialed Lyrica which improved symptoms.  She was recently laid off and could not continue care with [facility] due to cost of co-pay.  She also reports difficulty with ambulation due to left knee giving out requiring her to use a cane for the last 6-12 months.  She denies right leg symptoms or left upper arm symptoms.  She denies saddle anesthesia, urinary or bladder dysfunction.     Of note, patient reported she developed a pulmonary embolism after craniotomy and placement of shunt.    AP/AC:  Eliquis  Smoking status-: Former smoker, quit 6 years ago      Review of patient's allergies indicates:   Allergen Reactions    Sulfa (sulfonamide antibiotics) Hives             Current Medications[1]    Family History       Problem Relation (Age of Onset)    Hypertension Mother    Kidney disease Maternal Aunt    Mental illness Brother    No Known Problems Father          Social History     Socioeconomic History    Marital status:       "Spouse name: Neil    Number of children: 0   Tobacco Use    Smoking status: Former     Types: Vaping w/o nicotine     Quit date: 2018     Years since quittin.4    Smokeless tobacco: Never   Substance and Sexual Activity    Alcohol use: Yes     Comment: social     Drug use: No    Sexual activity: Yes     Partners: Male     Birth control/protection: None   Social History Narrative    ** Merged History Encounter **         ** Merged History Encounter **          Social Drivers of Health     Financial Resource Strain: Medium Risk (12/15/2023)    Overall Financial Resource Strain (CARDIA)     Difficulty of Paying Living Expenses: Somewhat hard   Food Insecurity: Food Insecurity Present (12/15/2023)    Hunger Vital Sign     Worried About Running Out of Food in the Last Year: Sometimes true     Ran Out of Food in the Last Year: Sometimes true   Transportation Needs: No Transportation Needs (12/15/2023)    PRAPARE - Transportation     Lack of Transportation (Medical): No     Lack of Transportation (Non-Medical): No   Physical Activity: Insufficiently Active (12/15/2023)    Exercise Vital Sign     Days of Exercise per Week: 3 days     Minutes of Exercise per Session: 20 min   Stress: Stress Concern Present (12/15/2023)    [employer] - Occupational Stress Questionnaire     Feeling of Stress : To some extent   Housing Stability: Low Risk  (12/15/2023)    Housing Stability Vital Sign     Unable to Pay for Housing in the Last Year: No     Number of Places Lived in the Last Year: 1     Unstable Housing in the Last Year: No       OBJECTIVE:     Vital Signs  Pain Score:   9  Height: 5' 5" (165.1 cm)  Body mass index is 49.82 kg/m².      Neurosurgery Physical Exam    General:  Very pleasant, obese, no acute distress.  Neurologic: Alert and oriented. Thought content appropriate.  GCS: E4 V5 M6; Total: 15  Mental Status: Awake, Alert, Oriented   Pulmonary: normal respirations, no signs of " respiratory distress    Sensory: intact to light touch throughout  Motor Strength: Moves all extremities spontaneously.  No abnormal movements seen.   Bilateral upper extremity deltoid/biceps/triceps/hand  5/5   Bilateral lower extremity strength iliopsoas/TA/EHL/gastrocnemius 5/5    Reflexes Right Left   Biceps 2+ 2+   Brachioradialis 2+ 2+   Triceps 2+ 2+   Patellar 2+ 2+   Lee's Neg Neg       Gait: stable  Tandem Gait: No difficulty  Able to walk on heels & toes     Cervical:   Midline TTP: Negative.     Thoracic:  Midline TTP: Negative.     Lumbar:  Midline TTP: Negative.  Straight Leg Test: Negative.       Diagnostic Imaging:  Cervical MRI (08/23/2022).  Reviewed   Thoracic MRI (08/23/2022).  Reviewed.  Left knee x-ray (06/24/2025).  Reviewed.      ASSESSMENT/PLAN:     1. Lumbar pain    2. Paresthesias        Patient primarily reports lumbosacral pain along with diffuse left anterior thigh numbness and right upper extremity numbness.  She is neurologically intact.  Right knee x-ray demonstrated mild lateral patellar tilt without acute findings.  EMG of bilateral upper and left lower extremity ordered for further evaluation.  Patient instructed to bring cervical, thoracic and lumbar imaging report at follow-up appointment.  Pregabalin prescription provided.    Patient will follow-up with Dr. Herrera after EMG and imaging.          Lumbar pain    Paresthesias  -     EMG W/ ULTRASOUND AND NERVE CONDUCTION TEST 3 Extremities; Future  -     pregabalin (LYRICA) 100 MG capsule; Take 1 capsule (100 mg total) by mouth 2 (two) times daily.  Dispense: 180 capsule; Refill: 0  -     X-Ray Knee 3 View Left; Future; Expected date: 06/24/2025        I spent a total of 45 minutes of non-face and face to face time preparing to see the patient (eg, review of tests), obtain and/or review separately obtained history, document clinical information in the electronic or other health record, interpret results and communicate  results to the patient/family/caregiver, or care coordinator.    Note dictated with voice recognition software, please excuse any grammatical errors.      Fay Aaron PA-C  Neurosurgery  [facility]       [1]   Current Outpatient Medications   Medication Sig Dispense Refill    ELIQUIS 5 mg Tab Take 5 mg by mouth 2 (two) times daily.      ferrous sulfate (FEOSOL) Tab tablet Take 1 tablet by mouth daily with breakfast.      ketoconazole (NIZORAL) 2 % shampoo Apply topically twice a week. 120 mL 2    medroxyPROGESTERone (PROVERA) 10 MG tablet Take 1 tablet (10 mg total) by mouth once daily. 10 tablet 3    metFORMIN (GLUCOPHAGE) 500 MG tablet Take 1 tablet (500 mg total) by mouth 2 (two) times daily with meals. 60 tablet 12    pregabalin (LYRICA) 100 MG capsule Take 1 capsule (100 mg total) by mouth 2 (two) times daily. 180 capsule 0     No current facility-administered medications for this visit.

## 2025-07-03 ENCOUNTER — PATIENT MESSAGE (OUTPATIENT)
Dept: NEUROLOGY | Facility: CLINIC | Age: 35
End: 2025-07-03
Payer: COMMERCIAL

## 2025-07-03 ENCOUNTER — TELEPHONE (OUTPATIENT)
Dept: NEUROLOGY | Facility: CLINIC | Age: 35
End: 2025-07-03
Payer: COMMERCIAL

## 2025-07-03 NOTE — TELEPHONE ENCOUNTER
Copied from CRM #[id number]. Topic: General Inquiry - Return Call  >> Jul 3, 2025 11:49 AM Alanis wrote:  Type:  Patient Returning Call    Who Called:PT   Who Left Message for Patient: ALBARO   Does the patient know what this is regarding?: EMG GABRIELLA   Would the patient rather a call back or a response via [facility]? CALL   Best Call Back Number:[phone]  Additional Information: THANK YOU

## 2025-07-14 ENCOUNTER — OFFICE VISIT (OUTPATIENT)
Dept: ORTHOPEDICS | Facility: CLINIC | Age: 35
End: 2025-07-14
Payer: COMMERCIAL

## 2025-07-14 VITALS — HEIGHT: 65 IN | WEIGHT: 293 LBS | BODY MASS INDEX: 48.82 KG/M2

## 2025-07-14 DIAGNOSIS — M25.562 CHRONIC PAIN OF LEFT KNEE: ICD-10-CM

## 2025-07-14 DIAGNOSIS — M25.362 PATELLAR INSTABILITY OF LEFT KNEE: ICD-10-CM

## 2025-07-14 DIAGNOSIS — G89.29 CHRONIC PAIN OF LEFT KNEE: ICD-10-CM

## 2025-07-14 DIAGNOSIS — M22.42 CHONDROMALACIA OF LEFT PATELLA: Primary | ICD-10-CM

## 2025-07-14 PROCEDURE — 3008F BODY MASS INDEX DOCD: CPT | Mod: CPTII,S$GLB,, | Performed by: FAMILY MEDICINE

## 2025-07-14 PROCEDURE — 1159F MED LIST DOCD IN RCRD: CPT | Mod: CPTII,S$GLB,, | Performed by: FAMILY MEDICINE

## 2025-07-14 PROCEDURE — 20610 DRAIN/INJ JOINT/BURSA W/O US: CPT | Mod: LT,S$GLB,, | Performed by: FAMILY MEDICINE

## 2025-07-14 PROCEDURE — 99204 OFFICE O/P NEW MOD 45 MIN: CPT | Mod: 25,S$GLB,, | Performed by: FAMILY MEDICINE

## 2025-07-14 PROCEDURE — 99999 PR PBB SHADOW E&M-EST. PATIENT-LVL III: CPT | Mod: PBBFAC,,, | Performed by: FAMILY MEDICINE

## 2025-07-14 RX ORDER — METHYLPREDNISOLONE ACETATE 80 MG/ML
80 INJECTION, SUSPENSION INTRA-ARTICULAR; INTRALESIONAL; INTRAMUSCULAR; SOFT TISSUE
Status: DISCONTINUED | OUTPATIENT
Start: 2025-07-14 | End: 2025-07-14 | Stop reason: HOSPADM

## 2025-07-14 RX ADMIN — METHYLPREDNISOLONE ACETATE 80 MG: 80 INJECTION, SUSPENSION INTRA-ARTICULAR; INTRALESIONAL; INTRAMUSCULAR; SOFT TISSUE at 09:07

## 2025-07-14 NOTE — PROGRESS NOTES
Subjective     Patient ID: Jamilah French is a 35 y.o. female.    Chief Complaint: Pain of the Left Knee    45-year-old female here today with complaints of left-sided knee pain.  This has been a chronic issue ongoing for the last six 12th months.  Significant past history of multiple spinal surgeries due to Chiari malformation among other issues.  As a result she states that she has chronic numbness in her left leg.  Around the same time she has been feeling a ache in the area of her knee mainly over the area patella.  Several times she has felt her left knee buckle and almost given.  Every time that happens it is always in the area of the patella.  She notes that it is made worse with deep flexion.  States that the pain is less sharp she believes she does not feel that as much due to the numbness in her left leg as she always feels a general ache after.  She has taken Tylenol without much relief.  She is unable to take NSAIDs due to being on Eliquis.    Pain  Pertinent negatives include no chest pain, chills, congestion, coughing, headaches, rash or sore throat.       Review of Systems   Constitutional: Negative for chills and decreased appetite.   HENT:  Negative for congestion and sore throat.    Eyes:  Negative for blurred vision.   Cardiovascular:  Negative for chest pain, dyspnea on exertion and palpitations.   Respiratory:  Negative for cough and shortness of breath.    Skin:  Negative for rash.   Neurological:  Negative for difficulty with concentration, disturbances in coordination and headaches.   Psychiatric/Behavioral:  Negative for altered mental status, depression, hallucinations, memory loss and suicidal ideas.           Objective     General    Nursing note and vitals reviewed.  Constitutional: She is oriented to person, place, and time. She appears well-developed and well-nourished.   HENT:   Nose: Nose normal.   Eyes: EOM are normal. Pupils are equal, round, and reactive to light.   Neck:  Neck supple.   Cardiovascular:  Normal rate.            Pulmonary/Chest: Effort normal.   Abdominal: Soft.   Neurological: She is alert and oriented to person, place, and time. She has normal reflexes.   Psychiatric: She has a normal mood and affect. Her behavior is normal. Judgment and thought content normal.           Right Knee Exam   Right knee exam is normal.    Inspection   Effusion: absent    Range of Motion   Extension:  50   Flexion:  140     Tests   Meniscus   Concepcion:  Medial - negative Lateral - negative  Ligament Examination   Lachman: normal (-1 to 2mm)   PCL-Posterior Drawer: normal (0 to 2mm)     MCL - Valgus: normal (0 to 2mm)  LCL - Varus: normal  Patella   Patellar apprehension: negative  Passive Patellar Tilt: neutral  Patellar Tracking: normal    Other   Popliteal (Baker's) Cyst: absent  Sensation: normal    Left Knee Exam     Inspection   Effusion: present    Tenderness   The patient tender to palpation of the patella, lateral joint line and medial joint line.    Crepitus   The patient has crepitus of the patella.    Range of Motion   Extension:  50   Flexion:  140     Tests   Meniscus   Concepcion:  Medial - negative Lateral - negative  Stability   Lachman: normal (-1 to 2mm)   PCL-Posterior Drawer: normal (0 to 2mm)  MCL - Valgus: normal (0 to 2mm)  LCL - Varus: normal (0 to 2mm)  Patella   Patellar apprehension: positive  Passive Patellar Tilt: lateral tilt  Patellar Tracking: normal  Patellar Glide (Quadrants): Lateral - 2   Patellar Grind: positive  J-Sign: J sign present    Other   Popliteal (Baker's) Cyst: absent  Sensation: normal    Muscle Strength   Right Lower Extremity   Quadriceps:  5/5   Hamstrin/5   Left Lower Extremity   Quadriceps:  5/5   Hamstrin/5     Vascular Exam     Right Pulses  Dorsalis Pedis:      2+          Left Pulses  Dorsalis Pedis:      2+          Physical Exam  Vitals and nursing note reviewed.   Constitutional:       Appearance: She is well-developed  and well-nourished.   HENT:      Nose: Nose normal.   Eyes:      Extraocular Movements: EOM normal.      Pupils: Pupils are equal, round, and reactive to light.   Cardiovascular:      Rate and Rhythm: Normal rate.      Pulses:           Dorsalis pedis pulses are 2+ on the right side and 2+ on the left side.   Pulmonary:      Effort: Pulmonary effort is normal.   Abdominal:      Palpations: Abdomen is soft.   Musculoskeletal:      Cervical back: Normal range of motion and neck supple.      Right knee: No effusion.      Instability Tests: Medial Concepcion test negative and lateral Concepcion test negative.      Left knee: Effusion present.      Instability Tests: Medial Concepcion test negative and lateral Concepcion test negative.   Neurological:      Mental Status: She is alert and oriented to person, place, and time.      Deep Tendon Reflexes: Reflexes are normal and symmetric.   Psychiatric:         Mood and Affect: Mood and affect normal.         Behavior: Behavior normal.         Thought Content: Thought content normal.         Judgment: Judgment normal.   X-ray images that were ordered by Neurosurgery reviewed in clinic today.  Did not show any significant degenerative changes of either knee.  There is a slight lateral tilt to the left patella.  No acute fractures are present.          Assessment and Plan     Encounter Diagnoses   Name Primary?    Chronic pain of left knee     Chondromalacia of left patella Yes    Patellar instability of left knee          Jamilah was seen today for pain.    Diagnoses and all orders for this visit:    Chondromalacia of left patella  -     Ambulatory Referral/Consult to Physical Therapy; Future    Chronic pain of left knee  -     Ambulatory Referral/Consult to Physical Therapy; Future    Patellar instability of left knee  -     Ambulatory Referral/Consult to Physical Therapy; Future    Based on all findings I believe she has a patellar tracking disorder and possibly several incidents  of subluxation of the patella associated with the chondromalacia.  Explain this condition and treatment options to her.  I believe she would benefit from physical therapy for patellar stabilizing exercises.  We did recommend a patellar stabilizing brace as well.  Offered a cortisone injection in her relief for acute pain relief.  She agreed with this.  She does not have significant pain relief in the next 2-3 months I recommend following up here as needed.    Large Joint Aspiration/Injection: L knee    Date/Time: 7/14/2025 9:00 AM    Performed by: Jalen Wagoner MD  Authorized by: Jalen Wagoner MD    Consent Done?:  Yes (Verbal)  Indications:  Arthritis and pain  Site marked: the procedure site was marked    Timeout: prior to procedure the correct patient, procedure, and site was verified    Prep: patient was prepped and draped in usual sterile fashion      Local anesthesia used?: Yes    Local anesthetic:  Lidocaine 1% without epinephrine  Anesthetic total (ml):  2      Details:  Needle Size:  22 G  Ultrasonic Guidance for needle placement?: No    Approach:  Anterolateral  Location:  Knee  Site:  L knee  Medications:  80 mg methylPREDNISolone acetate 80 mg/mL  Patient tolerance:  Patient tolerated the procedure well with no immediate complications

## 2025-08-12 ENCOUNTER — TELEPHONE (OUTPATIENT)
Dept: OBSTETRICS AND GYNECOLOGY | Facility: CLINIC | Age: 35
End: 2025-08-12
Payer: COMMERCIAL

## 2025-08-26 ENCOUNTER — TELEPHONE (OUTPATIENT)
Dept: PHYSICAL MEDICINE AND REHAB | Facility: CLINIC | Age: 35
End: 2025-08-26
Payer: COMMERCIAL

## 2025-08-27 ENCOUNTER — TELEPHONE (OUTPATIENT)
Dept: PHYSICAL MEDICINE AND REHAB | Facility: CLINIC | Age: 35
End: 2025-08-27
Payer: COMMERCIAL

## (undated) DEVICE — BUR BONE CUT MICRO TPS 3X3.8MM

## (undated) DEVICE — KIT SPINAL PATIENT CARE JACK

## (undated) DEVICE — SOL 9P NACL IRR PIC IL

## (undated) DEVICE — SEALANT VISTASEAL FIBRIN 10ML

## (undated) DEVICE — DRESSING SURGICAL 1/2X1/2

## (undated) DEVICE — CORD BIPOLAR 12 FOOT

## (undated) DEVICE — SUT 4/0 18IN NUROLON BLK B

## (undated) DEVICE — SOL IRR NACL .9% 3000ML

## (undated) DEVICE — PASSER

## (undated) DEVICE — SUT 0 VICRYL / UR6 (J603)

## (undated) DEVICE — DRESSING AQUACEL FOAM 5 X 5

## (undated) DEVICE — Device

## (undated) DEVICE — SUT D SPECIAL VICRYL 2-0

## (undated) DEVICE — ELECTRODE REM PLYHSV RETURN 9

## (undated) DEVICE — APPLIER LIGACLIP SM 9.38IN

## (undated) DEVICE — DRAPE OPMI STERILE

## (undated) DEVICE — TAPE MEDIPORE 4IN X 2YDS

## (undated) DEVICE — DRAPE C ARM 42 X 120 10/BX

## (undated) DEVICE — CLIP APPLIER SMALL ETHICON

## (undated) DEVICE — NDL HYPO REG 25G X 1 1/2

## (undated) DEVICE — TRAY FOLEY 16FR INFECTION CONT

## (undated) DEVICE — DRESSING ABSRBNT ISLAND 3.6X8

## (undated) DEVICE — DRESSING TELFA N ADH 3X8

## (undated) DEVICE — DURAPREP SURG SCRUB 26ML

## (undated) DEVICE — GAUZE SPONGE 4X4 12PLY

## (undated) DEVICE — DRESSING MEPILEX BORDER 4 X 4

## (undated) DEVICE — GLOVE BIOGEL SKINSENSE PI 6.5

## (undated) DEVICE — MARKER SKIN STND TIP BLUE BARR

## (undated) DEVICE — DRESSING TRANS 4X4 TEGADERM

## (undated) DEVICE — PINS SKULL ADULT MAYFIELD
Type: IMPLANTABLE DEVICE | Site: CRANIAL | Status: NON-FUNCTIONAL
Removed: 2021-12-23

## (undated) DEVICE — TIP KERRISON RONGEUR THIN 1MM

## (undated) DEVICE — SEE MEDLINE ITEM 157150

## (undated) DEVICE — TUBE FRAZIER 5MM 2FT SOFT TIP

## (undated) DEVICE — DIFFUSER

## (undated) DEVICE — DRAPE STERI INSTRUMENT 1018

## (undated) DEVICE — SET BASIN 48X48IN 6000ML RING

## (undated) DEVICE — SEALANT ADHERUS AUTOSPRY DURAL

## (undated) DEVICE — BLADE ELECTRO EDGE INSULATED

## (undated) DEVICE — DEVICE MYOSURE REACH SYS

## (undated) DEVICE — DRAPE ABDOMINAL TIBURON 14X11

## (undated) DEVICE — GOWN SURG 2XL DISP TIE BACK

## (undated) DEVICE — SEAL LENS SCOPE MYOSURE

## (undated) DEVICE — SKIN MARKERS DEROYAL

## (undated) DEVICE — CLIPPER BLADE MOD 4406 (CAREF)

## (undated) DEVICE — TOWEL OR STER DISP BLUE 17X27

## (undated) DEVICE — CARTRIDGE OIL

## (undated) DEVICE — DRAPE THYROID WITH ARMBOARD

## (undated) DEVICE — HEMOSTAT SURGICEL 4X8IN

## (undated) DEVICE — STAPLER SKIN PROXIMATE WIDE

## (undated) DEVICE — SPONGE NEURO 1/4X1/4

## (undated) DEVICE — SUT MCRYL PLUS 4-0 PS2 27IN

## (undated) DEVICE — SEE MEDLINE ITEM 156905

## (undated) DEVICE — ROUTER TAPERED 2.3MM

## (undated) DEVICE — SUT VICRYL PLUS 0 CT1 18IN

## (undated) DEVICE — SUT PROLENE 5-0 24 C-1 BL

## (undated) DEVICE — PACK FLUENT DISPOSABLE

## (undated) DEVICE — SUT VICRYL PLUS 2-0

## (undated) DEVICE — APPLIER CLIP LIAGCLIP 9.375IN

## (undated) DEVICE — DRAPE INCISE IOBAN 2 23X17IN

## (undated) DEVICE — DRESSING TELFA STRL 4X3 LF

## (undated) DEVICE — PAD PERI POST REPLACEMNT